# Patient Record
Sex: FEMALE | Race: BLACK OR AFRICAN AMERICAN | NOT HISPANIC OR LATINO | Employment: OTHER | URBAN - METROPOLITAN AREA
[De-identification: names, ages, dates, MRNs, and addresses within clinical notes are randomized per-mention and may not be internally consistent; named-entity substitution may affect disease eponyms.]

---

## 2017-04-07 RX ORDER — CHOLECALCIFEROL (VITAMIN D3) 25 MCG
185 TABLET ORAL DAILY
COMMUNITY
End: 2022-11-16

## 2017-04-07 RX ORDER — MONTELUKAST SODIUM 10 MG/1
10 TABLET ORAL NIGHTLY
COMMUNITY
End: 2017-04-19

## 2017-04-07 RX ORDER — ESOMEPRAZOLE MAGNESIUM 40 MG/1
40 CAPSULE, DELAYED RELEASE ORAL
COMMUNITY
End: 2017-04-19

## 2017-04-07 RX ORDER — GABAPENTIN 100 MG/1
300 CAPSULE ORAL NIGHTLY
COMMUNITY
End: 2018-01-12 | Stop reason: CLARIF

## 2017-04-07 RX ORDER — NITROGLYCERIN 0.4 MG/1
0.4 TABLET SUBLINGUAL EVERY 5 MIN PRN
COMMUNITY
End: 2017-12-20 | Stop reason: SDUPTHER

## 2017-04-07 RX ORDER — ROSUVASTATIN CALCIUM 20 MG/1
20 TABLET, COATED ORAL NIGHTLY
COMMUNITY
End: 2017-09-27 | Stop reason: DRUGHIGH

## 2017-04-07 RX ORDER — NAPROXEN 500 MG/1
500 TABLET ORAL 2 TIMES DAILY WITH MEALS
COMMUNITY
End: 2017-04-19

## 2017-04-07 RX ORDER — FUROSEMIDE 20 MG/1
20 TABLET ORAL DAILY PRN
COMMUNITY
End: 2021-07-12 | Stop reason: SDUPTHER

## 2017-04-07 RX ORDER — LORAZEPAM 1 MG/1
1 TABLET ORAL EVERY 6 HOURS PRN
COMMUNITY
End: 2017-04-19

## 2017-04-07 RX ORDER — AMOXICILLIN 500 MG
2 CAPSULE ORAL DAILY
COMMUNITY

## 2017-04-07 RX ORDER — FLUOXETINE HYDROCHLORIDE 20 MG/1
20 CAPSULE ORAL DAILY
COMMUNITY
End: 2018-01-12 | Stop reason: CLARIF

## 2017-04-07 RX ORDER — VALSARTAN 160 MG/1
80 TABLET ORAL 2 TIMES DAILY
COMMUNITY
End: 2018-06-27 | Stop reason: DRUGHIGH

## 2017-04-07 RX ORDER — METOPROLOL SUCCINATE 25 MG/1
25 TABLET, EXTENDED RELEASE ORAL 2 TIMES DAILY
COMMUNITY
End: 2017-04-19

## 2017-04-07 RX ORDER — ASPIRIN 81 MG/1
81 TABLET ORAL DAILY
COMMUNITY

## 2017-04-07 RX ORDER — POTASSIUM CHLORIDE 750 MG/1
10 TABLET, EXTENDED RELEASE ORAL DAILY
COMMUNITY
End: 2017-06-28 | Stop reason: SDUPTHER

## 2017-04-11 ENCOUNTER — TELEPHONE (OUTPATIENT)
Dept: CARDIOLOGY | Facility: CLINIC | Age: 77
End: 2017-04-11

## 2017-04-11 NOTE — TELEPHONE ENCOUNTER
----- Message from Gabriella Ceja sent at 4/10/2017  4:17 PM CDT -----  Patient is returning office call concerning her appointment on 4/19/17. Please call back at [phone].

## 2017-04-18 PROBLEM — I35.9 AORTIC VALVE DISORDER: Status: ACTIVE | Noted: 2017-04-18

## 2017-04-18 PROBLEM — E78.2 MIXED HYPERLIPIDEMIA: Status: ACTIVE | Noted: 2017-04-18

## 2017-04-18 PROBLEM — I10 ESSENTIAL HYPERTENSION: Status: ACTIVE | Noted: 2017-04-18

## 2017-04-18 PROBLEM — Z95.3 S/P AORTIC VALVE REPLACEMENT WITH BIOPROSTHETIC VALVE: Status: ACTIVE | Noted: 2017-04-18

## 2017-04-18 PROBLEM — I05.9 MITRAL VALVE DISORDER: Status: ACTIVE | Noted: 2017-04-18

## 2017-04-18 PROBLEM — I25.10 CORONARY ARTERY DISEASE INVOLVING NATIVE CORONARY ARTERY OF NATIVE HEART: Status: ACTIVE | Noted: 2017-04-18

## 2017-04-19 ENCOUNTER — OFFICE VISIT (OUTPATIENT)
Dept: CARDIOLOGY | Facility: CLINIC | Age: 77
End: 2017-04-19
Payer: MEDICARE

## 2017-04-19 VITALS
DIASTOLIC BLOOD PRESSURE: 84 MMHG | SYSTOLIC BLOOD PRESSURE: 134 MMHG | HEART RATE: 79 BPM | HEIGHT: 65 IN | BODY MASS INDEX: 33.72 KG/M2 | WEIGHT: 202.38 LBS

## 2017-04-19 DIAGNOSIS — E66.9 OBESITY (BMI 30.0-34.9): ICD-10-CM

## 2017-04-19 DIAGNOSIS — I35.9 AORTIC VALVE DISORDER: ICD-10-CM

## 2017-04-19 DIAGNOSIS — I25.10 CORONARY ARTERY DISEASE INVOLVING NATIVE CORONARY ARTERY OF NATIVE HEART WITHOUT ANGINA PECTORIS: Primary | ICD-10-CM

## 2017-04-19 DIAGNOSIS — I10 ESSENTIAL HYPERTENSION: ICD-10-CM

## 2017-04-19 DIAGNOSIS — E78.2 MIXED HYPERLIPIDEMIA: ICD-10-CM

## 2017-04-19 PROBLEM — E66.811 OBESITY (BMI 30.0-34.9): Status: ACTIVE | Noted: 2017-04-19

## 2017-04-19 PROCEDURE — 99214 OFFICE O/P EST MOD 30 MIN: CPT | Mod: S$GLB,,, | Performed by: INTERNAL MEDICINE

## 2017-04-19 PROCEDURE — 1159F MED LIST DOCD IN RCRD: CPT | Mod: S$GLB,,, | Performed by: INTERNAL MEDICINE

## 2017-04-19 PROCEDURE — 1160F RVW MEDS BY RX/DR IN RCRD: CPT | Mod: S$GLB,,, | Performed by: INTERNAL MEDICINE

## 2017-04-19 PROCEDURE — 1126F AMNT PAIN NOTED NONE PRSNT: CPT | Mod: S$GLB,,, | Performed by: INTERNAL MEDICINE

## 2017-04-19 PROCEDURE — 3079F DIAST BP 80-89 MM HG: CPT | Mod: S$GLB,,, | Performed by: INTERNAL MEDICINE

## 2017-04-19 PROCEDURE — 3075F SYST BP GE 130 - 139MM HG: CPT | Mod: S$GLB,,, | Performed by: INTERNAL MEDICINE

## 2017-04-19 RX ORDER — METOPROLOL SUCCINATE 25 MG/1
25 TABLET, EXTENDED RELEASE ORAL DAILY
Qty: 90 TABLET | Refills: 1 | Status: SHIPPED | OUTPATIENT
Start: 2017-04-19 | End: 2017-10-10 | Stop reason: SDUPTHER

## 2017-04-19 RX ORDER — METOPROLOL TARTRATE 25 MG/1
12.5 TABLET, FILM COATED ORAL 2 TIMES DAILY
COMMUNITY
End: 2017-04-19 | Stop reason: ALTCHOICE

## 2017-04-19 RX ORDER — FAMOTIDINE 10 MG/1
10 TABLET ORAL DAILY
COMMUNITY
End: 2019-05-01

## 2017-04-19 RX ORDER — UBIDECARENONE 30 MG
30 CAPSULE ORAL DAILY
COMMUNITY
Start: 2017-03-01

## 2017-04-19 NOTE — PATIENT INSTRUCTIONS
High Cholesterol: Assessing Your Risk    Have you been told that your cholesterol is too high? If so, you could be heading for a heart attack, also known as acute myocardial infarction (AMI), or stroke. This is especially true if you have other risk factors for heart disease. Get smart about cholesterol and your heart disease risk. This sheet can help you understand your heart disease risk and how your cholesterol level affects it. Talk to your healthcare provider about how to get started controlling your cholesterol.  Why is high cholesterol a problem?  Blood cholesterol is a fatty substance. The body uses it to make membranes in cells and for hormone production. It travels through the bloodstream and is used by the tissues for normal function. When blood cholesterol is high, it forms plaque and causes inflammation. The plaque builds up in the walls of arteries (blood vessels that carry blood from the heart to the body). This narrows the opening for blood flow. Over time, the heart may not get enough oxygen. This can lead to coronary artery disease, heart attack, or stroke.  3 steps to assessing your risk  Step 1. Find your risk factors for heart disease and stroke  How your cholesterol numbers affect your heart health depends on other risk factors for heart attack and stroke. Check off each risk factor below that applies to you:  · Are you a man 45 years old or older or a woman 55 years old or older?  · Does your family have a history of heart problems before the age of 55 in male relatives or age 65 in female relatives? This includes heart attack, coronary heart disease, or atherosclerosis.  · Do you have high blood pressure? Do you take medicine to treat high blood pressure?  · Do you smoke?  · Do you have diabetes?  · Do you exercise very little or not very often? Recommendations are for 30 minutes of exercise at least 5 days a week. If you are not doing cardiovascular exercise as often as these  recommendations, it may not be enough and you may be at higher risk for elevated cholesterol and heart disease.  · Do you eat a diet that is high in saturated or trans fats, cholesterol, sugar, or alcohol? You may be at increased risk for heart disease if you do not eat enough fruits, vegetables, lean meats and eat sugars or drink alcohol sparingly.  · Have you been told you have high cholesterol? Do you take medicine to control your cholesterol?  Step 2. Test your cholesterol  Have your cholesterol tested every 5 years after the age of 20 and more often if you have risk factors. Cholesterol testing most often needs no preparation. Sometimes you may be asked to fast (not eat) before your test. A blood sample is taken and sent to a lab. There, the amount of cholesterol and triglyceride in your blood is measured. There are 2 types of cholesterol in the sample. The first is HDL (good cholesterol). The second is LDL (bad cholesterol). Cholesterol test results are most often shown as the total of HDL and LDL cholesterol numbers. You may also be told the separate HDL and LDL cholesterol results.  Fill in your numbers below.  HDL cholesterol:                           LDL cholesterol:                         Total cholesterol:                         Triglyceride:                           Step 3. Discuss the results with your healthcare provider   If your cholesterol levels are higher than normal, your healthcare provider will help you with steps to take to lower your levels. Steps may include lifestyle changes like diet, physical activity, and quitting smoking, and medicine to lower bad cholesterol levels.  If you have high cholesterol, you may need your cholesterol level tested more often to make sure your medicine and lifestyle changes are working to reduce your risks of having a heart attack or stroke.   Date Last Reviewed: 6/1/2016  © 5218-8660 [employer]. [address] [place] [address], [place] [address].  All rights reserved. This information is not intended as a substitute for professional medical care. Always follow your healthcare professional's instructions.        Understanding Coronary Artery Disease (CAD)    To understand coronary artery disease (CAD), you need to know how your heart works. Your heart is a muscle that pumps blood throughout your body. To work right, your heart needs a steady supply of oxygen. It gets this oxygen from blood supplied by the coronary arteries.        Healthy Artery      Damaged Artery      Narrowed Artery      Blocked Artery   Healthy artery. When a coronary artery is healthy and has no blockages, blood flows through easily. Healthy arteries can easily supply the oxygen-rich blood your heart needs.  Damaged artery. Coronary artery disease begins when damage to the artery lining leads to the buildup of fat-like substances and cholesterol along the artery wall. This is called plaque. This damage could be caused by things like high blood pressure or smoking. This plaque buildup begins to narrow the arteries carrying blood to the heart. This is called atherosclerosis,  Narrowed artery. As more plaque builds up, your artery has trouble supplying blood to your heart muscle when it needs it most, such as during exercise. You may not feel any symptoms when this happens. Or you may feel angina--pressure, tightness, achiness, or pain in your chest, jaw, neck, back, or arm.  Blocked artery. A piece of plaque may break off and completely block the artery. Or a blood clot may plug the narrowed artery. When this happens, blood flow is blocked from reaching the heart. Without oxygen-rich blood, part of the heart muscle becomes damaged and stops working. You may feel crushing pressure or pain in or around your chest. This is a heart attack (acute myocardial infarction, or AMI) and is a medical emergency.  Date Last Reviewed: 3/28/2016  © 8754-7643 [employer]. [address] [place] [address]  Road, [place] [address]. All rights reserved. This information is not intended as a substitute for professional medical care. Always follow your healthcare professional's instructions.        Exercise for a Healthier Heart  You may wonder how you can improve the health of your heart. If youre thinking about exercise, youre on the right track. You dont need to become an athlete, but you do need a certain amount of brisk exercise to help strengthen your heart. If you have been diagnosed with a heart condition, your doctor may recommend exercise to help stabilize your condition. To help make exercise a habit, choose safe, fun activities.     Exercise with a friend. When activity is fun, you're more likely to stick with it.     Be sure to check with your healthcare provider before starting an exercise program.   Why exercise?  Exercising regularly offers many healthy rewards. It can help you do all of the following:  · Improve your blood cholesterol level to help prevent further heart trouble  · Lower your blood pressure to help prevent a stroke or heart attack  · Control diabetes, or reduce your risk of getting this disease  · Improve your heart and lung function  · Reach and maintain a healthy weight  · Make your muscles stronger and more limber so you can stay active  · Prevent falls and fractures by slowing the loss of bone mass (osteoporosis)  · Manage stress better  · Reduce your blood pressure  · Improve your sense of self and your body image  Exercise tips  Ease into your routine. Set small goals. Then build on them.  Exercise on most days. Aim for a total of 150 or more minutes of moderate to  vigorous intensity activity each week. Consider 40 minutes, 3 to 4 times a week. For best results, activity should last for 40 minutes on average. It is OK to work up to the 40 minute period over time. Examples of moderate-intensity activity is walking 1 mile in 15 minutes or 30 to 45 minutes of yard work.  Step up  your daily activity level. Along with your exercise program, try being more active throughout the day. Walk instead of drive. Do more household tasks or yard work.  Choose one or more activities you enjoy. Walking is one of the easiest things you can do. You can also try swimming, riding a bike, dancing, or taking an exercise class.  Stop exercising and call your doctor if you:  · Have chest pain or feel dizzy or lightheaded  · Feel burning, tightness, pressure, or heaviness in your chest, neck, shoulders, back, or arms  · Have unusual shortness of breath  · Have increased joint or muscle pain  · Have palpitations or an irregular heartbeat   Date Last Reviewed: 5/1/2016  © 9565-6790 [employer]. [address] [place] [address], [place] [address]. All rights reserved. This information is not intended as a substitute for professional medical care. Always follow your healthcare professional's instructions.        Established High Blood Pressure    High blood pressure (hypertension) is a chronic disease. Often health care providers dont know what causes it. But it can be caused by certain health conditions and medicines.  If you have high blood pressure, you may not have any symptoms. If you do have symptoms, they may include headache, dizziness, changes in your vision, chest pain, and shortness of breath. But even without symptoms, high blood pressure thats not treated raises your risk for heart attack and stroke. High blood pressure is a serious health risk and shouldnt be ignored.  A blood pressure reading is made up of two numbers: a higher number over a lower number. The top number is the systolic pressure. The bottom number is the diastolic pressure. A normal blood pressure is less than 120 over less than 80.  High blood pressure is when either the top number is 140 or higher, or the bottom number is 90 or higher. This must be the result when taking your blood pressure a number of times. The blood  pressures between normal and high are called prehypertension.  Home care  If you have high blood pressure, you should do what is listed below to lower your blood pressure. If you are taking medicines for high blood pressure, these methods may reduce or end your need for medicines in the future.  · Begin a weight-loss program if you are overweight.  · Cut back on how much salt you get in your diet. Heres how to do this:  ¨ Dont eat foods that have a lot of salt. These include olives, pickles, smoked meats, and salted potato chips.  ¨ Dont add salt to your food at the table.  ¨ Use only small amounts of salt when cooking.  · Begin an exercise program. Talk with your health care provider about the type of exercise program that would be best for you. It doesn't have to be hard. Even brisk walking for 20 minutes 3 times a week is a good form of exercise.  · Dont take medicines that have heart stimulants. This includes many cold and sinus decongestant pills and sprays, as well as diet pills. Check the warnings about hypertension on the label. Stimulants such as amphetamine or cocaine could be lethal for someone with high blood pressure. Never take these.  · Limit how much caffeine you get in your diet. Switch to caffeine-free products.  · Stop smoking. If you are a long-time smoker, this can be hard. Enroll in a stop-smoking program to make it more likely that you will quit for good.  · Learn how to handle stress. This is an important part of any program to lower blood pressure. Learn about relaxation methods like meditation, yoga, or biofeedback.  · If your provider prescribed medicines, take them exactly as directed. Missing doses may cause your blood pressure get out of control.  · Consider buying an automatic blood pressure machine. You can get one of these at most pharmacies. Use this to watch your blood pressure at home. Give the results to your provider.  Follow-up care  You will need to make regular visits to  your health care provider. This is to check your blood pressure and to make changes to your medicines. Make a follow-up appointment as directed.  When to seek medical advice  Call your health care provider right away if any of these occur:  · Chest pain or shortness of breath  · Severe headache  · Throbbing or rushing sound in the ears  · Nosebleed  · Sudden severe pain in your belly (abdomen)  · Extreme drowsiness, confusion, or fainting  · Dizziness or dizziness with a spinning sensation (vertigo)  · Weakness of an arm or leg or one side of the face  · You have problems speaking or seeing   Date Last Reviewed: 11/25/2014 © 2000-2016 [employer]. [address] [place] [address], [place] [address]. All rights reserved. This information is not intended as a substitute for professional medical care. Always follow your healthcare professional's instructions.        Heart Valve Problems  Your hearts job is to pump blood through your body. That job starts with pumping blood through the heart itself. Inside your heart, blood passes through a series of one-way garnica called valves. If a valve works poorly, not enough blood moves forward. A problem heart valve may not open wide enough, not close tightly enough, or both. In any case, not enough blood is sent to the heart muscle or out to the body.  Symptoms of heart valve problems  You can have a problem valve for decades yet have no symptoms. If you do have symptoms, they may come on so slowly that you barely notice them. In other cases, though, symptoms appear suddenly. You might have one or more of these symptoms:  · Problems breathing when you lie down, exert yourself, or get stressed emotionally  · Pain, pressure, tightness, or numbness in your chest, neck, back, or arms (angina)  · Feeling dizzy, faint, or lightheaded  · Tiredness, especially with activity or as the day goes on  · Waking up at night coughing or short of breath  · A fast, pounding, or irregular  heartbeat  · A fluttering feeling in your chest  · Swollen ankles or feet  · Fainting, especially upon standing up or with exertion  Problems opening (stenosis)    When a valve doesnt open all the way, the problem is called stenosis. The leaflets may be stuck together or too stiff to open fully. When the valve doesnt open fully, blood has to flow through a smaller opening. So the heart muscle has to work harder to push the blood through the valve.  Problems closing (regurgitation)    When a valve doesnt close tightly enough and blood leaks backward through the valve, the problem is called regurgitation or insufficiency. The valve itself may be described as leaky. Leaflets may fit together poorly. Or the structures that support them may be torn. Some blood leaks through the valve back into the chamber it just left. So the heart has to move that blood twice. This can result in heart muscle damage.  Common causes of valve problems  People of any age can have heart valve trouble. You may have been born with a problem valve. Or a valve may have worn out as youve aged. It may not be possible to pinpoint what caused your valve problem. But common causes include:  · Buildup of calcium or scar tissue on a valve  · Rheumatic fever and certain other infections and diseases  · High blood pressure  · Other heart problems, such as coronary artery disease  · Congenital defects of the heart valves      Date Last Reviewed: 6/1/2016 © 2000-2016 [employer]. [address] [place] [id number]. All rights reserved. This information is not intended as a substitute for professional medical care. Always follow your healthcare professional's instructions.        Weight Management: Exercise and Activity  Studies show that people who exercise are the most likely to lose weight and keep it off. Exercise burns calories. It helps build muscle to make your body stronger. Make exercise an important part of your  weight-management plan.    Make activity part of your day  You may not think you have the time to exercise. But you can work activity into your daily life--you just need to be committed. Take 10 minutes out of your lunch hour to take a walk. Walk to the [employer] to get your paper instead of having it delivered. Make it a habit to take the stairs instead of the elevator. Park in a far away parking spot instead of the closest. Youll be surprised at how fast these little changes can make a difference.  Some people really cannot walk very far, and tire out quickly with exercise. Instead of becoming discouraged, resolve to do what you can do, and work to make that a regular frequent habit.   The benefits of exercise  Exercise offers many benefits including:   · Exercise increases your metabolism (the speed at which your body burns calories).  · Regular exercise can increase the amount of muscle in your body. Muscle burns calories faster than fat. The more muscle you have, the more calories you burn.  · Exercise gives you energy and curbs your appetite.  · Exercise decreases stress and helps you sleep better.  Make exercise fun  Exercise can be fun. Choose an activity you enjoy. You may even get a friend to do it with you:  · Take a resistance-training or aerobics class  · Join a team sport  · Take a dance class  · Walk the dog  · Ride a bike  If you have health problems, be sure to ask your healthcare provider before you start an exercise program. Have a  help you develop a plan thats safe for you.   Date Last Reviewed: 2/4/2016  © 9458-9240 [employer]. [address] [place] [address], [place] [address]. All rights reserved. This information is not intended as a substitute for professional medical care. Always follow your healthcare professional's instructions.

## 2017-04-19 NOTE — MR AVS SNAPSHOT
[facility]  [address] [place] [id number]  Phone: [phone]                  Yael Claire   2017 11:40 AM   Office Visit    Description:  Female : 1940   Provider:  Eb Ruelas MD   Department:  [facility]           Reason for Visit     Coronary Artery Disease     Valvular Heart Disease     Hypertension     Hyperlipidemia           Diagnoses this Visit        Comments    Coronary artery disease involving native coronary artery of native heart without angina pectoris    -  Primary STABLE    Aortic valve disorder     MONITOR , SOME NARROWING    Essential hypertension     STABLE    Mixed hyperlipidemia     ON MEDS, DIET    Obesity (BMI 30.0-34.9)     DIET           To Do List           Goals (5 Years of Data)     None       These Medications        Disp Refills Start End    metoprolol succinate (TOPROL-XL) 25 MG 24 hr tablet 90 tablet 1 2017    Take 1 tablet (25 mg total) by mouth once daily. - Oral    Pharmacy: [facility] #[id number] - [address]  #: [phone]         [facility]     [facility] -  Assistance  Unless otherwise directed by your provider, please contact [facility], our nurse care line that is available for  assistance.     Registered nurses in the [facility] provide: appointment scheduling, clinical advisement, health education, and other advisory services.  Call: [phone] (toll free)               Medications           Message regarding Medications     Verify the changes and/or additions to your medication regime listed below are the same as discussed with your clinician today.  If any of these changes or additions are incorrect, please notify your healthcare provider.        START taking these NEW medications        Refills    metoprolol succinate (TOPROL-XL) 25 MG 24 hr tablet 1    Sig: Take 1 tablet (25 mg total) by mouth once daily.     Class: Normal    Route: Oral      STOP taking these medications     montelukast (SINGULAIR) 10 mg tablet Take 10 mg by mouth every evening.    naproxen (NAPROSYN) 500 MG tablet Take 500 mg by mouth 2 (two) times daily with meals.    lorazepam (ATIVAN) 1 MG tablet Take 1 mg by mouth every 6 (six) hours as needed for Anxiety.    esomeprazole (NEXIUM) 40 MG capsule Take 40 mg by mouth before breakfast.    metoprolol tartrate (LOPRESSOR) 25 MG tablet Take 12.5 mg by mouth 2 (two) times daily.           Verify that the below list of medications is an accurate representation of the medications you are currently taking.  If none reported, the list may be blank. If incorrect, please contact your healthcare provider. Carry this list with you in case of emergency.           Current Medications     aspirin (ECOTRIN) 81 MG EC tablet Take 81 mg by mouth once daily.    co-enzyme Q-10 30 mg capsule Take 30 mg by mouth once daily.     famotidine (PEPCID) 10 MG tablet Take 10 mg by mouth once daily.    fish oil-omega-3 fatty acids 300-1,000 mg capsule Take 2 g by mouth once daily.    FOLIC ACID/MULTIVIT-MIN/LUTEIN (CENTRUM SILVER ORAL) Take by mouth once daily.    furosemide (LASIX) 20 MG tablet Take 20 mg by mouth once daily. As needed    gabapentin (NEURONTIN) 100 MG capsule Take 300 mg by mouth every evening.     nitroGLYCERIN (NITROSTAT) 0.4 MG SL tablet Place 0.4 mg under the tongue every 5 (five) minutes as needed for Chest pain.    potassium chloride (KLOR-CON) 10 MEQ TbSR Take 10 mEq by mouth once daily.    rosuvastatin (CRESTOR) 40 MG Tab Take 10 mg by mouth every evening.    valsartan (DIOVAN) 160 MG tablet Take 160 mg by mouth once daily.    vitamin D 1000 units Tab Take 185 mg by mouth once daily.    fluoxetine (PROZAC) 20 MG capsule Take 20 mg by mouth once daily.    metoprolol succinate (TOPROL-XL) 25 MG 24 hr tablet Take 1 tablet (25 mg total) by mouth once daily.           Clinical Reference Information          "  Your Vitals Were     BP Pulse Height Weight BMI    134/84 79 5' 5" (1.651 m) 91.8 kg (202 lb 6.4 oz) 33.68 kg/m2      Blood Pressure          Most Recent Value    BP  134/84      Allergies as of 4/19/2017     Darvocet A500 [Propoxyphene N-acetaminophen]    Pcn [Penicillins]      Immunizations Administered on Date of Encounter - 4/19/2017     None      Orders Placed During Today's Visit     Future Labs/Procedures Expected by Expires    Comprehensive metabolic panel  4/19/2017 6/18/2018      [facility] Sign-Up     Activating your [facility] account is as easy as 1-2-3!     1) Visit [email or web address], select Sign Up Now, enter this activation code and your date of birth, then select Next.  [email or web address]  Expires: 6/3/2017 12:11 PM      2) Create a username and password to use when you visit [facility] in the future and select a security question in case you lose your password and select Next.    3) Enter your e-mail address and click Sign Up!    Additional Information  If you have questions, please e-mail [email or web address] or call [phone] to talk to our [facility] staff. Remember, [facility] is NOT to be used for urgent needs. For medical emergencies, dial 911.         Instructions      High Cholesterol: Assessing Your Risk    Have you been told that your cholesterol is too high? If so, you could be heading for a heart attack, also known as acute myocardial infarction (AMI), or stroke. This is especially true if you have other risk factors for heart disease. Get smart about cholesterol and your heart disease risk. This sheet can help you understand your heart disease risk and how your cholesterol level affects it. Talk to your healthcare provider about how to get started controlling your cholesterol.  Why is high cholesterol a problem?  Blood cholesterol is a fatty substance. The body uses it to make membranes in cells and for hormone production. It travels through the bloodstream and is used by the tissues for " normal function. When blood cholesterol is high, it forms plaque and causes inflammation. The plaque builds up in the walls of arteries (blood vessels that carry blood from the heart to the body). This narrows the opening for blood flow. Over time, the heart may not get enough oxygen. This can lead to coronary artery disease, heart attack, or stroke.  3 steps to assessing your risk  Step 1. Find your risk factors for heart disease and stroke  How your cholesterol numbers affect your heart health depends on other risk factors for heart attack and stroke. Check off each risk factor below that applies to you:  · Are you a man 45 years old or older or a woman 55 years old or older?  · Does your family have a history of heart problems before the age of 55 in male relatives or age 65 in female relatives? This includes heart attack, coronary heart disease, or atherosclerosis.  · Do you have high blood pressure? Do you take medicine to treat high blood pressure?  · Do you smoke?  · Do you have diabetes?  · Do you exercise very little or not very often? Recommendations are for 30 minutes of exercise at least 5 days a week. If you are not doing cardiovascular exercise as often as these recommendations, it may not be enough and you may be at higher risk for elevated cholesterol and heart disease.  · Do you eat a diet that is high in saturated or trans fats, cholesterol, sugar, or alcohol? You may be at increased risk for heart disease if you do not eat enough fruits, vegetables, lean meats and eat sugars or drink alcohol sparingly.  · Have you been told you have high cholesterol? Do you take medicine to control your cholesterol?  Step 2. Test your cholesterol  Have your cholesterol tested every 5 years after the age of 20 and more often if you have risk factors. Cholesterol testing most often needs no preparation. Sometimes you may be asked to fast (not eat) before your test. A blood sample is taken and sent to a lab. There,  the amount of cholesterol and triglyceride in your blood is measured. There are 2 types of cholesterol in the sample. The first is HDL (good cholesterol). The second is LDL (bad cholesterol). Cholesterol test results are most often shown as the total of HDL and LDL cholesterol numbers. You may also be told the separate HDL and LDL cholesterol results.  Fill in your numbers below.  HDL cholesterol:                           LDL cholesterol:                         Total cholesterol:                         Triglyceride:                           Step 3. Discuss the results with your healthcare provider   If your cholesterol levels are higher than normal, your healthcare provider will help you with steps to take to lower your levels. Steps may include lifestyle changes like diet, physical activity, and quitting smoking, and medicine to lower bad cholesterol levels.  If you have high cholesterol, you may need your cholesterol level tested more often to make sure your medicine and lifestyle changes are working to reduce your risks of having a heart attack or stroke.   Date Last Reviewed: 6/1/2016  © 5274-5623 [employer]. [address] [place] [address]. All rights reserved. This information is not intended as a substitute for professional medical care. Always follow your healthcare professional's instructions.        Understanding Coronary Artery Disease (CAD)    To understand coronary artery disease (CAD), you need to know how your heart works. Your heart is a muscle that pumps blood throughout your body. To work right, your heart needs a steady supply of oxygen. It gets this oxygen from blood supplied by the coronary arteries.        Healthy Artery      Damaged Artery      Narrowed Artery      Blocked Artery   Healthy artery. When a coronary artery is healthy and has no blockages, blood flows through easily. Healthy arteries can easily supply the oxygen-rich blood your heart  needs.  Damaged artery. Coronary artery disease begins when damage to the artery lining leads to the buildup of fat-like substances and cholesterol along the artery wall. This is called plaque. This damage could be caused by things like high blood pressure or smoking. This plaque buildup begins to narrow the arteries carrying blood to the heart. This is called atherosclerosis,  Narrowed artery. As more plaque builds up, your artery has trouble supplying blood to your heart muscle when it needs it most, such as during exercise. You may not feel any symptoms when this happens. Or you may feel angina--pressure, tightness, achiness, or pain in your chest, jaw, neck, back, or arm.  Blocked artery. A piece of plaque may break off and completely block the artery. Or a blood clot may plug the narrowed artery. When this happens, blood flow is blocked from reaching the heart. Without oxygen-rich blood, part of the heart muscle becomes damaged and stops working. You may feel crushing pressure or pain in or around your chest. This is a heart attack (acute myocardial infarction, or AMI) and is a medical emergency.  Date Last Reviewed: 3/28/2016  © 3552-4637 [employer]. [address] [place], [place] [id number]. All rights reserved. This information is not intended as a substitute for professional medical care. Always follow your healthcare professional's instructions.        Exercise for a Healthier Heart  You may wonder how you can improve the health of your heart. If youre thinking about exercise, youre on the right track. You dont need to become an athlete, but you do need a certain amount of brisk exercise to help strengthen your heart. If you have been diagnosed with a heart condition, your doctor may recommend exercise to help stabilize your condition. To help make exercise a habit, choose safe, fun activities.     Exercise with a friend. When activity is fun, you're more likely to stick with it.     Be  sure to check with your healthcare provider before starting an exercise program.   Why exercise?  Exercising regularly offers many healthy rewards. It can help you do all of the following:  · Improve your blood cholesterol level to help prevent further heart trouble  · Lower your blood pressure to help prevent a stroke or heart attack  · Control diabetes, or reduce your risk of getting this disease  · Improve your heart and lung function  · Reach and maintain a healthy weight  · Make your muscles stronger and more limber so you can stay active  · Prevent falls and fractures by slowing the loss of bone mass (osteoporosis)  · Manage stress better  · Reduce your blood pressure  · Improve your sense of self and your body image  Exercise tips  Ease into your routine. Set small goals. Then build on them.  Exercise on most days. Aim for a total of 150 or more minutes of moderate to  vigorous intensity activity each week. Consider 40 minutes, 3 to 4 times a week. For best results, activity should last for 40 minutes on average. It is OK to work up to the 40 minute period over time. Examples of moderate-intensity activity is walking 1 mile in 15 minutes or 30 to 45 minutes of yard work.  Step up your daily activity level. Along with your exercise program, try being more active throughout the day. Walk instead of drive. Do more household tasks or yard work.  Choose one or more activities you enjoy. Walking is one of the easiest things you can do. You can also try swimming, riding a bike, dancing, or taking an exercise class.  Stop exercising and call your doctor if you:  · Have chest pain or feel dizzy or lightheaded  · Feel burning, tightness, pressure, or heaviness in your chest, neck, shoulders, back, or arms  · Have unusual shortness of breath  · Have increased joint or muscle pain  · Have palpitations or an irregular heartbeat   Date Last Reviewed: 5/1/2016  © 2243-4180 [employer]. [address] [facility],  [place] [address]. All rights reserved. This information is not intended as a substitute for professional medical care. Always follow your healthcare professional's instructions.        Established High Blood Pressure    High blood pressure (hypertension) is a chronic disease. Often health care providers dont know what causes it. But it can be caused by certain health conditions and medicines.  If you have high blood pressure, you may not have any symptoms. If you do have symptoms, they may include headache, dizziness, changes in your vision, chest pain, and shortness of breath. But even without symptoms, high blood pressure thats not treated raises your risk for heart attack and stroke. High blood pressure is a serious health risk and shouldnt be ignored.  A blood pressure reading is made up of two numbers: a higher number over a lower number. The top number is the systolic pressure. The bottom number is the diastolic pressure. A normal blood pressure is less than 120 over less than 80.  High blood pressure is when either the top number is 140 or higher, or the bottom number is 90 or higher. This must be the result when taking your blood pressure a number of times. The blood pressures between normal and high are called prehypertension.  Home care  If you have high blood pressure, you should do what is listed below to lower your blood pressure. If you are taking medicines for high blood pressure, these methods may reduce or end your need for medicines in the future.  · Begin a weight-loss program if you are overweight.  · Cut back on how much salt you get in your diet. Heres how to do this:  ¨ Dont eat foods that have a lot of salt. These include olives, pickles, smoked meats, and salted potato chips.  ¨ Dont add salt to your food at the table.  ¨ Use only small amounts of salt when cooking.  · Begin an exercise program. Talk with your health care provider about the type of exercise program that would be best  for you. It doesn't have to be hard. Even brisk walking for 20 minutes 3 times a week is a good form of exercise.  · Dont take medicines that have heart stimulants. This includes many cold and sinus decongestant pills and sprays, as well as diet pills. Check the warnings about hypertension on the label. Stimulants such as amphetamine or cocaine could be lethal for someone with high blood pressure. Never take these.  · Limit how much caffeine you get in your diet. Switch to caffeine-free products.  · Stop smoking. If you are a long-time smoker, this can be hard. Enroll in a stop-smoking program to make it more likely that you will quit for good.  · Learn how to handle stress. This is an important part of any program to lower blood pressure. Learn about relaxation methods like meditation, yoga, or biofeedback.  · If your provider prescribed medicines, take them exactly as directed. Missing doses may cause your blood pressure get out of control.  · Consider buying an automatic blood pressure machine. You can get one of these at most pharmacies. Use this to watch your blood pressure at home. Give the results to your provider.  Follow-up care  You will need to make regular visits to your health care provider. This is to check your blood pressure and to make changes to your medicines. Make a follow-up appointment as directed.  When to seek medical advice  Call your health care provider right away if any of these occur:  · Chest pain or shortness of breath  · Severe headache  · Throbbing or rushing sound in the ears  · Nosebleed  · Sudden severe pain in your belly (abdomen)  · Extreme drowsiness, confusion, or fainting  · Dizziness or dizziness with a spinning sensation (vertigo)  · Weakness of an arm or leg or one side of the face  · You have problems speaking or seeing   Date Last Reviewed: 11/25/2014  © 6265-6249 [employer]. [address] [place] [address], [place] [address]. All rights reserved. This information  is not intended as a substitute for professional medical care. Always follow your healthcare professional's instructions.        Heart Valve Problems  Your hearts job is to pump blood through your body. That job starts with pumping blood through the heart itself. Inside your heart, blood passes through a series of one-way garnica called valves. If a valve works poorly, not enough blood moves forward. A problem heart valve may not open wide enough, not close tightly enough, or both. In any case, not enough blood is sent to the heart muscle or out to the body.  Symptoms of heart valve problems  You can have a problem valve for decades yet have no symptoms. If you do have symptoms, they may come on so slowly that you barely notice them. In other cases, though, symptoms appear suddenly. You might have one or more of these symptoms:  · Problems breathing when you lie down, exert yourself, or get stressed emotionally  · Pain, pressure, tightness, or numbness in your chest, neck, back, or arms (angina)  · Feeling dizzy, faint, or lightheaded  · Tiredness, especially with activity or as the day goes on  · Waking up at night coughing or short of breath  · A fast, pounding, or irregular heartbeat  · A fluttering feeling in your chest  · Swollen ankles or feet  · Fainting, especially upon standing up or with exertion  Problems opening (stenosis)    When a valve doesnt open all the way, the problem is called stenosis. The leaflets may be stuck together or too stiff to open fully. When the valve doesnt open fully, blood has to flow through a smaller opening. So the heart muscle has to work harder to push the blood through the valve.  Problems closing (regurgitation)    When a valve doesnt close tightly enough and blood leaks backward through the valve, the problem is called regurgitation or insufficiency. The valve itself may be described as leaky. Leaflets may fit together poorly. Or the structures that support them may be  torn. Some blood leaks through the valve back into the chamber it just left. So the heart has to move that blood twice. This can result in heart muscle damage.  Common causes of valve problems  People of any age can have heart valve trouble. You may have been born with a problem valve. Or a valve may have worn out as youve aged. It may not be possible to pinpoint what caused your valve problem. But common causes include:  · Buildup of calcium or scar tissue on a valve  · Rheumatic fever and certain other infections and diseases  · High blood pressure  · Other heart problems, such as coronary artery disease  · Congenital defects of the heart valves      Date Last Reviewed: 6/1/2016 © 2000-2016 [employer]. [address] [place] [address], [place] [address]. All rights reserved. This information is not intended as a substitute for professional medical care. Always follow your healthcare professional's instructions.        Weight Management: Exercise and Activity  Studies show that people who exercise are the most likely to lose weight and keep it off. Exercise burns calories. It helps build muscle to make your body stronger. Make exercise an important part of your weight-management plan.    Make activity part of your day  You may not think you have the time to exercise. But you can work activity into your daily life--you just need to be committed. Take 10 minutes out of your lunch hour to take a walk. Walk to the [employer] to get your paper instead of having it delivered. Make it a habit to take the stairs instead of the elevator. Park in a far away parking spot instead of the closest. Youll be surprised at how fast these little changes can make a difference.  Some people really cannot walk very far, and tire out quickly with exercise. Instead of becoming discouraged, resolve to do what you can do, and work to make that a regular frequent habit.   The benefits of exercise  Exercise offers many benefits  including:   · Exercise increases your metabolism (the speed at which your body burns calories).  · Regular exercise can increase the amount of muscle in your body. Muscle burns calories faster than fat. The more muscle you have, the more calories you burn.  · Exercise gives you energy and curbs your appetite.  · Exercise decreases stress and helps you sleep better.  Make exercise fun  Exercise can be fun. Choose an activity you enjoy. You may even get a friend to do it with you:  · Take a resistance-training or aerobics class  · Join a team sport  · Take a dance class  · Walk the dog  · Ride a bike  If you have health problems, be sure to ask your healthcare provider before you start an exercise program. Have a  help you develop a plan thats safe for you.   Date Last Reviewed: 2/4/2016 © 2000-2016 [employer]. [address] [place] [address]. All rights reserved. This information is not intended as a substitute for professional medical care. Always follow your healthcare professional's instructions.             Language Assistance Services     ATTENTION: Language assistance services are available, free of charge. Please call [phone].      ATENCIÓN: Si habla español, tiene a umaña disposición servicios gratuitos de asistencia lingüística. Llame al [phone].     RENE Ý: N?u b?n nói Ti?ng Vi?t, có các d?ch v? h? tr? ngôn ng? mi?n phí dành cho b?n. G?i s? [phone].         [facility] complies with applicable Federal civil rights laws and does not discriminate on the basis of race, color, national origin, age, disability, or sex.

## 2017-04-19 NOTE — PROGRESS NOTES
Subjective:    Patient ID:  Yael Claire is a 76 y.o. female who presents for Coronary Artery Disease; Valvular Heart Disease; Hypertension; and Hyperlipidemia      HPI  PT WAS FOLLOWED BY DR HOSKINS, LIMITED RECRDS AVIALABLE, ECHO, 8/2016, , MILD LVH, AVR, HILLARY 1.0-1.1  CM2, MILD AI, CA MV, PAP 35, EF 60%, CAROTIDS US 8/2016 MILD CAROTID DISEASE, NO LABS, PCP IS DR LONDON, TO HAVE LABS IN MAY, SEE ROS  Past Medical History:   Diagnosis Date    Acute coronary syndrome     LIGHT 2004    Aortic valve disorder     Coronary artery disease     Heart murmur     Hyperlipidemia     Hypertension     Palpitations     Stenosis of aortic and mitral valves     Valvular regurgitation      Past Surgical History:   Procedure Laterality Date    CARDIAC CATHETERIZATION      CARDIAC VALVE SURGERY      CORONARY ARTERY BYPASS GRAFT       History reviewed. No pertinent family history.  Social History     Social History    Marital status: Single     Spouse name: N/A    Number of children: N/A    Years of education: N/A     Social History Main Topics    Smoking status: Never Smoker    Smokeless tobacco: Never Used    Alcohol use No    Drug use: No    Sexual activity: Not Asked     Other Topics Concern    None     Social History Narrative       Review of patient's allergies indicates:   Allergen Reactions    Darvocet a500 [propoxyphene n-acetaminophen]     Pcn [penicillins]        Current Outpatient Prescriptions:     aspirin (ECOTRIN) 81 MG EC tablet, Take 81 mg by mouth once daily., Disp: , Rfl:     co-enzyme Q-10 30 mg capsule, Take 30 mg by mouth once daily. , Disp: , Rfl:     famotidine (PEPCID) 10 MG tablet, Take 10 mg by mouth once daily., Disp: , Rfl:     fish oil-omega-3 fatty acids 300-1,000 mg capsule, Take 2 g by mouth once daily., Disp: , Rfl:     FOLIC ACID/MULTIVIT-MIN/LUTEIN (CENTRUM SILVER ORAL), Take by mouth once daily., Disp: , Rfl:     furosemide (LASIX) 20 MG tablet, Take 20 mg by mouth once  daily. As needed, Disp: , Rfl:     gabapentin (NEURONTIN) 100 MG capsule, Take 300 mg by mouth every evening. , Disp: , Rfl:     nitroGLYCERIN (NITROSTAT) 0.4 MG SL tablet, Place 0.4 mg under the tongue every 5 (five) minutes as needed for Chest pain., Disp: , Rfl:     potassium chloride (KLOR-CON) 10 MEQ TbSR, Take 10 mEq by mouth once daily., Disp: , Rfl:     rosuvastatin (CRESTOR) 40 MG Tab, Take 10 mg by mouth every evening., Disp: , Rfl:     valsartan (DIOVAN) 160 MG tablet, Take 160 mg by mouth once daily., Disp: , Rfl:     vitamin D 1000 units Tab, Take 185 mg by mouth once daily., Disp: , Rfl:     fluoxetine (PROZAC) 20 MG capsule, Take 20 mg by mouth once daily., Disp: , Rfl:     metoprolol succinate (TOPROL-XL) 25 MG 24 hr tablet, Take 1 tablet (25 mg total) by mouth once daily., Disp: 90 tablet, Rfl: 1    Review of Systems   Constitution: Negative for chills, decreased appetite, diaphoresis, fever, weakness, malaise/fatigue, night sweats and weight gain.   HENT: Negative for congestion and nosebleeds.    Eyes: Positive for visual disturbance. Negative for blurred vision and discharge.   Cardiovascular: Positive for leg swelling (OCC,ANKLES). Negative for chest pain, claudication, cyanosis, irregular heartbeat, near-syncope, orthopnea, paroxysmal nocturnal dyspnea and syncope. Dyspnea on exertion: MINIMAL. Palpitations: OCC,WHEN MEDS WARES OFF.   Respiratory: Negative for hemoptysis, shortness of breath, sleep disturbances due to breathing, sputum production and wheezing. Cough: RECENT BRONCHITIS, BETTER.    Endocrine: Negative for cold intolerance, heat intolerance, polydipsia and polyphagia.   Hematologic/Lymphatic: Negative for adenopathy and bleeding problem. Does not bruise/bleed easily.   Skin: Negative for color change, itching and nail changes.   Musculoskeletal: Positive for arthritis and stiffness. Negative for back pain and falls.   Gastrointestinal: Negative for bloating, abdominal  "pain, change in bowel habit, constipation, dysphagia, flatus, heartburn, hematemesis, jaundice and melena.   Genitourinary: Positive for nocturia. Negative for dysuria, flank pain, frequency, hematuria, incomplete emptying and pelvic pain.   Neurological: Negative for brief paralysis, difficulty with concentration, dizziness, focal weakness, light-headedness, loss of balance, numbness, paresthesias, seizures, sensory change and tremors.   Psychiatric/Behavioral: Negative for altered mental status, memory loss and substance abuse. The patient does not have insomnia and is not nervous/anxious.    Allergic/Immunologic: Negative for persistent infections.        Objective:      Vitals:    04/19/17 1151   BP: 134/84   Pulse: 79   Weight: 91.8 kg (202 lb 6.4 oz)   Height: 5' 5" (1.651 m)   PainSc: 0-No pain     Body mass index is 33.68 kg/(m^2).    Physical Exam   Constitutional: She is oriented to person, place, and time. She appears well-developed and well-nourished.   OVER WEIGHT   HENT:   Head: Normocephalic and atraumatic.   Mouth/Throat: Oropharynx is clear and moist and mucous membranes are normal.   Eyes: Conjunctivae and EOM are normal. Pupils are equal, round, and reactive to light. Right eye exhibits normal extraocular motion. Left eye exhibits normal extraocular motion.   Neck: Trachea normal and normal range of motion. Neck supple. Normal carotid pulses, no hepatojugular reflux and no JVD present. Carotid bruit: MURMUR TO NECK. Edema (TRACE/R) present. No tracheal deviation and no erythema present. No thyromegaly present.   Cardiovascular: Normal rate and regular rhythm.   No extrasystoles are present. PMI is not displaced.  Exam reveals no gallop and no friction rub.    Murmur heard.   Harsh midsystolic murmur is present with a grade of 2/6  at the upper right sternal border radiating to the neck  Pulses:       Carotid pulses are 2+ on the right side, and 2+ on the left side.       Radial pulses are 2+ on " the right side, and 2+ on the left side.        Femoral pulses are 2+ on the right side, and 2+ on the left side.       Popliteal pulses are 2+ on the right side, and 2+ on the left side.        Dorsalis pedis pulses are 2+ on the right side, and 2+ on the left side.        Posterior tibial pulses are 2+ on the right side, and 2+ on the left side.   Pulmonary/Chest: Effort normal and breath sounds normal. No accessory muscle usage. No respiratory distress. She has no wheezes. She has no rales. She exhibits no tenderness.   LARGE STERNOTOMY SCAR   Abdominal: Soft. Bowel sounds are normal. She exhibits no distension and no mass. There is no hepatosplenomegaly. There is no tenderness. There is no guarding and no CVA tenderness.   Musculoskeletal: She exhibits no edema or tenderness.   Lymphadenopathy:     She has no cervical adenopathy.   Neurological: She is alert and oriented to person, place, and time. She displays no tremor. No cranial nerve deficit. She exhibits normal muscle tone. Coordination normal.   Skin: Skin is warm and dry. No rash noted. No cyanosis or erythema. No pallor.   Psychiatric: She has a normal mood and affect. Her speech is normal and behavior is normal. Judgment and thought content normal.               ..    Chemistry    No results found for: NA, K, CL, CO2, BUN, CREATININE, GLU No results found for: CALCIUM, ALKPHOS, AST, ALT, BILITOT         ..No results found for: CHOL  No results found for: HDL  No results found for: LDLCALC  No results found for: TRIG  No results found for: CHOLHDL  ..No results found for: WBC, HGB, HCT, MCV, PLT    Test(s) Reviewed  I have reviewed the following in detail:  [] Stress test   [] Angiography   [] Echocardiogram   [] Labs   [x] Other:       Assessment:         ICD-10-CM ICD-9-CM   1. Coronary artery disease involving native coronary artery of native heart without angina pectoris I25.10 414.01   2. Aortic valve disorder I35.9 424.1   3. Essential  hypertension I10 401.9   4. Mixed hyperlipidemia E78.2 272.2   5. Obesity (BMI 30.0-34.9) E66.9 278.00     Problem List Items Addressed This Visit        Cardiology Problems    Aortic valve disorder    Relevant Orders    Comprehensive metabolic panel    Essential hypertension    Relevant Orders    Comprehensive metabolic panel    Coronary artery disease involving native coronary artery of native heart - Primary    Relevant Orders    Comprehensive metabolic panel    Mixed hyperlipidemia    Relevant Orders    Comprehensive metabolic panel       Other    Obesity (BMI 30.0-34.9)    Relevant Orders    Comprehensive metabolic panel           Plan:     CHANGE METOPROLOL TO LONG ACTING /HR FLUCTUATION, ALL CV CLINICALLY STABLE, NO ANGINA, NO HF, NO TIA, NO CLINICAL ARRHYTHMIA,CONTINUE CURRENT MEDS, EDUCATION, DIET, EXERCISE, WEIGHT LOSS,MONITOR AV, WAS SLIGHTLY NARROWED,  GET ME COPY OF LABS WHEN DONE, RTC IN 6 MO WITH LABS      Coronary artery disease involving native coronary artery of native heart without angina pectoris  -     Comprehensive metabolic panel; Future; Expected date: 4/19/17    Aortic valve disorder  -     Comprehensive metabolic panel; Future; Expected date: 4/19/17    Essential hypertension  -     Comprehensive metabolic panel; Future; Expected date: 4/19/17    Mixed hyperlipidemia  -     Comprehensive metabolic panel; Future; Expected date: 4/19/17    Obesity (BMI 30.0-34.9)  -     Comprehensive metabolic panel; Future; Expected date: 4/19/17    Other orders  -     metoprolol succinate (TOPROL-XL) 25 MG 24 hr tablet; Take 1 tablet (25 mg total) by mouth once daily.  Dispense: 90 tablet; Refill: 1  RTC Low level/low impact aerobic exercise 5x's/wk. Heart healthy diet and risk factor modification.    See labs and med orders.    Aerobic exercise 5x's/wk. Heart healthy diet and risk factor modification.    See labs and med orders.

## 2017-06-28 ENCOUNTER — OFFICE VISIT (OUTPATIENT)
Dept: CARDIOLOGY | Facility: CLINIC | Age: 77
End: 2017-06-28
Payer: MEDICARE

## 2017-06-28 VITALS
HEART RATE: 81 BPM | BODY MASS INDEX: 33.66 KG/M2 | WEIGHT: 202 LBS | SYSTOLIC BLOOD PRESSURE: 102 MMHG | HEIGHT: 65 IN | DIASTOLIC BLOOD PRESSURE: 62 MMHG

## 2017-06-28 DIAGNOSIS — E78.2 MIXED HYPERLIPIDEMIA: ICD-10-CM

## 2017-06-28 DIAGNOSIS — R00.2 HEART PALPITATIONS: ICD-10-CM

## 2017-06-28 DIAGNOSIS — E66.9 OBESITY (BMI 30.0-34.9): ICD-10-CM

## 2017-06-28 DIAGNOSIS — I10 ESSENTIAL HYPERTENSION: Primary | ICD-10-CM

## 2017-06-28 PROCEDURE — 99212 OFFICE O/P EST SF 10 MIN: CPT | Mod: S$GLB,,, | Performed by: INTERNAL MEDICINE

## 2017-06-28 PROCEDURE — 1159F MED LIST DOCD IN RCRD: CPT | Mod: S$GLB,,, | Performed by: INTERNAL MEDICINE

## 2017-06-28 PROCEDURE — 1126F AMNT PAIN NOTED NONE PRSNT: CPT | Mod: S$GLB,,, | Performed by: INTERNAL MEDICINE

## 2017-06-28 RX ORDER — POTASSIUM CHLORIDE 750 MG/1
10 TABLET, EXTENDED RELEASE ORAL
Qty: 8 TABLET | Refills: 3 | Status: SHIPPED | OUTPATIENT
Start: 2017-06-29 | End: 2017-12-20 | Stop reason: SDUPTHER

## 2017-06-28 NOTE — PROGRESS NOTES
Subjective:    Patient ID:  Yael Claire is a 76 y.o. female who presents for Coronary Artery Disease; Hypertension; and Valvular Heart Disease      HPI  HEART FEELS BETTER WITH MEDS CHANGE, NO CP, NO SOB, SEE ROS  Past Medical History:   Diagnosis Date    Acute coronary syndrome     LIGHT 2004    Aortic valve disorder     Coronary artery disease     Heart murmur     Hyperlipidemia     Hypertension     Palpitations     Stenosis of aortic and mitral valves     Valvular regurgitation      Past Surgical History:   Procedure Laterality Date    CARDIAC CATHETERIZATION      CARDIAC VALVE SURGERY      CORONARY ARTERY BYPASS GRAFT       History reviewed. No pertinent family history.  Social History     Social History    Marital status: Single     Spouse name: N/A    Number of children: N/A    Years of education: N/A     Social History Main Topics    Smoking status: Never Smoker    Smokeless tobacco: Never Used    Alcohol use No    Drug use: No    Sexual activity: Not Asked     Other Topics Concern    None     Social History Narrative    None       Review of patient's allergies indicates:   Allergen Reactions    Darvocet a500 [propoxyphene n-acetaminophen]     Pcn [penicillins]        Current Outpatient Prescriptions:     aspirin (ECOTRIN) 81 MG EC tablet, Take 81 mg by mouth once daily., Disp: , Rfl:     co-enzyme Q-10 30 mg capsule, Take 30 mg by mouth once daily. , Disp: , Rfl:     famotidine (PEPCID) 10 MG tablet, Take 10 mg by mouth once daily., Disp: , Rfl:     fish oil-omega-3 fatty acids 300-1,000 mg capsule, Take 2 g by mouth once daily., Disp: , Rfl:     fluoxetine (PROZAC) 20 MG capsule, Take 20 mg by mouth once daily., Disp: , Rfl:     FOLIC ACID/MULTIVIT-MIN/LUTEIN (CENTRUM SILVER ORAL), Take by mouth once daily., Disp: , Rfl:     furosemide (LASIX) 20 MG tablet, Take 20 mg by mouth once daily. As needed, Disp: , Rfl:     gabapentin (NEURONTIN) 100 MG capsule, Take 300 mg by  mouth every evening. , Disp: , Rfl:     metoprolol succinate (TOPROL-XL) 25 MG 24 hr tablet, Take 1 tablet (25 mg total) by mouth once daily., Disp: 90 tablet, Rfl: 1    nitroGLYCERIN (NITROSTAT) 0.4 MG SL tablet, Place 0.4 mg under the tongue every 5 (five) minutes as needed for Chest pain., Disp: , Rfl:     [START ON 6/29/2017] potassium chloride (KLOR-CON) 10 MEQ TbSR, Take 1 tablet (10 mEq total) by mouth twice a week., Disp: 8 tablet, Rfl: 3    rosuvastatin (CRESTOR) 40 MG Tab, Take 10 mg by mouth every evening., Disp: , Rfl:     valsartan (DIOVAN) 160 MG tablet, Take 160 mg by mouth once daily., Disp: , Rfl:     vitamin D 1000 units Tab, Take 185 mg by mouth once daily., Disp: , Rfl:     Review of Systems   Constitution: Negative for chills, decreased appetite, diaphoresis, fever, weakness, malaise/fatigue, night sweats and weight gain.   HENT: Negative for congestion and nosebleeds.    Eyes: Negative for blurred vision and double vision.   Cardiovascular: Negative for chest pain, claudication, cyanosis, irregular heartbeat (BETTER), near-syncope, orthopnea, palpitations (BETTER), paroxysmal nocturnal dyspnea and syncope. Dyspnea on exertion: MINIMAL. Leg swelling: OCC,ANKLES.   Respiratory: Negative for cough, hemoptysis, shortness of breath, sleep disturbances due to breathing, sputum production and wheezing.    Endocrine: Negative for cold intolerance, heat intolerance, polydipsia and polyphagia.   Hematologic/Lymphatic: Negative for adenopathy and bleeding problem. Does not bruise/bleed easily.   Skin: Negative for color change, itching and nail changes.   Musculoskeletal: Positive for arthritis. Negative for back pain and falls.   Gastrointestinal: Negative for bloating, abdominal pain, change in bowel habit, constipation, dysphagia, flatus, heartburn, hematemesis, jaundice and melena.   Genitourinary: Positive for nocturia. Negative for dysuria, flank pain, frequency, hematuria, incomplete emptying  "and pelvic pain.   Neurological: Negative for brief paralysis, difficulty with concentration, dizziness, focal weakness, light-headedness, loss of balance, numbness, paresthesias, seizures, sensory change and tremors.   Psychiatric/Behavioral: Negative for altered mental status, memory loss and substance abuse. The patient does not have insomnia and is not nervous/anxious.    Allergic/Immunologic: Negative for persistent infections.        Objective:      Vitals:    06/28/17 0914   BP: 102/62   Pulse: 81   Weight: 91.6 kg (202 lb)   Height: 5' 5" (1.651 m)   PainSc: 0-No pain     Body mass index is 33.61 kg/m².    Physical Exam   Constitutional: She is oriented to person, place, and time. She appears well-developed and well-nourished.   OVER WEIGHT   HENT:   Head: Normocephalic and atraumatic.   Mouth/Throat: Oropharynx is clear and moist and mucous membranes are normal.   Eyes: Conjunctivae and EOM are normal. Pupils are equal, round, and reactive to light. Right eye exhibits normal extraocular motion. Left eye exhibits normal extraocular motion.   Neck: Trachea normal and normal range of motion. Neck supple. Normal carotid pulses, no hepatojugular reflux and no JVD present. Carotid bruit: MURMUR TO NECK. No tracheal deviation and no erythema present. Edema: TRACE/R. No thyromegaly present.   Cardiovascular: Normal rate and regular rhythm.   No extrasystoles are present. PMI is not displaced.  Exam reveals no gallop and no friction rub.    Murmur heard.   Harsh midsystolic murmur is present with a grade of 2/6  at the upper right sternal border radiating to the neck   Systolic murmur of grade 2/6 is also present at the upper right sternal border.  Pulses:       Carotid pulses are 2+ on the right side, and 2+ on the left side.       Radial pulses are 2+ on the right side, and 2+ on the left side.        Posterior tibial pulses are 2+ on the right side, and 2+ on the left side.   Pulmonary/Chest: Effort normal and " breath sounds normal. No accessory muscle usage. No respiratory distress. She has no wheezes. She has no rales. She exhibits no tenderness.   LARGE STERNOTOMY SCAR   Abdominal: Soft. Bowel sounds are normal. She exhibits no distension and no mass. There is no hepatosplenomegaly. There is no tenderness. There is no guarding and no CVA tenderness.   Musculoskeletal: She exhibits no edema or tenderness.   SLOW GAIT   Lymphadenopathy:     She has no cervical adenopathy.   Neurological: She is alert and oriented to person, place, and time. She displays no tremor. No cranial nerve deficit. She exhibits normal muscle tone. Coordination normal.   Skin: Skin is warm and dry. No rash noted. No cyanosis or erythema. No pallor.   Psychiatric: She has a normal mood and affect. Her speech is normal and behavior is normal. Judgment and thought content normal.               ..    Chemistry    No results found for: NA, K, CL, CO2, BUN, CREATININE, GLU No results found for: CALCIUM, ALKPHOS, AST, ALT, BILITOT         ..No results found for: CHOL  No results found for: HDL  No results found for: LDLCALC  No results found for: TRIG  No results found for: CHOLHDL  ..No results found for: WBC, HGB, HCT, MCV, PLT    Test(s) Reviewed  I have reviewed the following in detail:  [] Stress test   [] Angiography   [] Echocardiogram   [] Labs   [] Other:       Assessment:         ICD-10-CM ICD-9-CM   1. Essential hypertension I10 401.9   2. Heart palpitations R00.2 785.1   3. Obesity (BMI 30.0-34.9) E66.9 278.00   4. Mixed hyperlipidemia E78.2 272.2     Problem List Items Addressed This Visit        Cardiac    Essential hypertension - Primary    Relevant Orders    Comprehensive metabolic panel       Fluids/Electrolytes/Nutrition/GI    Mixed hyperlipidemia    Relevant Orders    Comprehensive metabolic panel    Lipid panel    Obesity (BMI 30.0-34.9)       Other    Heart palpitations      Other Visit Diagnoses    None.          Plan:     ALL CV  CLINICALLY STABLE, NO ANGINA, NO HF, NO TIA, NO CLINICAL ARRHYTHMIA,CONTINUE CURRENT MEDS, EDUCATION, DIET, EXERCISE, OK FOR TWICE/W K WITH LASIX, TO HAVE LABS PER PCP DR LONDON, RTC IN 4 MO WITH LABS      Essential hypertension  -     Comprehensive metabolic panel; Future; Expected date: 06/28/2017    Heart palpitations    Obesity (BMI 30.0-34.9)    Mixed hyperlipidemia  -     Comprehensive metabolic panel; Future; Expected date: 06/28/2017  -     Lipid panel; Future; Expected date: 06/28/2017    Other orders  -     potassium chloride (KLOR-CON) 10 MEQ TbSR; Take 1 tablet (10 mEq total) by mouth twice a week.  Dispense: 8 tablet; Refill: 3    RTC Low level/low impact aerobic exercise 5x's/wk. Heart healthy diet and risk factor modification.    See labs and med orders.    Aerobic exercise 5x's/wk. Heart healthy diet and risk factor modification.    See labs and med orders.

## 2017-06-28 NOTE — PATIENT INSTRUCTIONS
About Arrhythmias    Electrical impulses cause the normal heart to beat 60 to 100 times a minute while at rest. These impulses come from a natural pacemaker deep inside the heart muscle. Each impulse causes the heart muscle to contract. This causes the blood to flow through the heart and out to the tissues and organs of your body.  An arrhythmia is a change from the normal speed or pattern of these electrical impulses. This can cause the heart to beat too fast (tachycardia); or too slow (bradycardia); or in an unsteady pattern (irregular rhythm).  Symptoms of arrhythmias  Different people experience arrhythmias differently. Sometimes they may not have symptoms, but just notice a change in their pulse. Symptoms can include:  · Fluttering feeling in the chest  · Shortness of breath  · Chest pain or pressure  · Neck fullness  · Lightheadedness or dizziness  · Fainting or almost fainting  · Palpitations (the sense that your heart is fluttering or beating fast or hard or irregularly)  · Tiredness, fatigue, or weakness  · Cardiac arrest  Causes of arrhythmias  Arrhythmias are most often due to heart disease such as:  · Coronary artery disease  · Heart valve disease  · Enlarged heart  · High blood pressure  · Heart failure  Other causes of  arrhythmia include:  · Certain medicines (such as asthma inhalers and decongestants)  · Some herbal supplements  · Cardiac stimulant drugs (such as cocaine, amphetamine, diet pills, certain decongestant cold medicines, caffeine, and nicotine)  · Excessive alcohol use  · Anxiety and panic disorder  · Thyroid disease  · Anemia  · Diabetes  · Sleep apnea  · Obesity  · Congenital heart disease  · Cardiac genetic diseases  Arrhythmias can often be prevented. The cause and type of arrhythmia determines the best treatment. Sometimes your doctor may want to monitor your heart rate over a 24-hour period or longer. This can help identify the cause of your arrhythmia and find the best treatment.  This can be done with a Holter monitor, a portable EKG recording device attached by wires to your chest. Or you may get an event monitor, which you can place over the skin in front of your heart to record heart rhythms. You can carry this with you as you go about your routine activities during the monitoring period. Implantable loop recorders may also be used to monitor the heart rhythm for up to 2 years. This miniature device is placed underneath the skin overlying the heart.  Home care  The following guidelines will help you care for yourself at home:  · Avoid cardiac stimulants (such as cocaine, amphetamine, diet pills, certain decongestant cold medicines, caffeine, and nicotine).  · If you smoke, stop smoking. Contact your doctor or a local stop-smoking program for help.  · Tell your doctor about any prescription, over-the-counter, or herbal medicines you take. These may be affecting your heart rhythm.  Follow-up care  Follow up with your healthcare provider, or as advised. If a Holter monitor has been recommended, contact the cardiologist you have been referred to as soon as you can  the device. Other outpatient tests may also be arranged for you at that time.  Call 911  This is the fastest and safest way to get to the emergency department. The paramedics can also start treatment on the way to the hospital, if needed.  Don't wait until your symptoms are severe to call 911. Other reasons to call 911 besides chest pain include:  · Chest, shoulder, arm, neck, or back pain  · Shortness of breath  · Feeling lightheaded, faint, or dizzy  · Unexplained fainting  · Rapid heart beat  · Slower than usual heart rate compared to your normal  · Very irregular heartbeat  · Chest pain (angina) with weakness, dizziness, heavy sweating, nausea, or vomiting  · Extreme drowsiness, or confusion  · Weakness of an arm or leg or one side of the face  · Difficulty with speech or vision  When to seek medical advice  Remember,  "things are not always like they are on TV. Sometimes it is not so obvious. You may only feel weak or just "not right." If it is not clear or if you have any doubt, call for advice.  · Seek help for chest pain, or it feels different from usual, even if your symptoms are mild.  · Don't drive yourself. Have someone else drive. If no one can drive you, call 911.  · If your doctor has given you medicines to take when you have symptoms, take them, but do not delay getting help while trying to find them.  Date Last Reviewed: 4/25/2016 © 2000-2016 [employer]. [address] [place] [address], [place] [address]. All rights reserved. This information is not intended as a substitute for professional medical care. Always follow your healthcare professional's instructions.        Eating Heart-Healthy Foods  Eating has a big impact on your heart health. In fact, eating healthier can improve several of your heart risks at once. For instance, it helps you manage weight, cholesterol, and blood pressure. Here are ideas to help you make heart-healthy changes without giving up all the foods and flavors you love.  Getting started  · Talk with your health care provider about eating plans, such as the DASH or Mediterranean diet. You may also be referred to a dietitian.  · Change a few things at a time. Give yourself time to get used to a few eating changes before adding more.  · Work to create a tasty, healthy eating plan that you can stick to for the rest of your life.    Goals for healthy eating  Below are some tips to improve your eating habits:  · Limit saturated fats and trans fats. Saturated fats raise your levels of cholesterol, so keep these fats to a minimum. They are found in foods such as fatty meats, whole milk, cheese, and palm and coconut oils. Avoid trans fats because they lower good cholesterol as well as raise bad cholesterol. Trans fats are most often found in processed foods.  · Reduce sodium (salt) intake. Eating " too much salt may increase your blood pressure. Limit your sodium intake to 2,300 milligrams (mg) per day, or less if your health care provider recommends it. Dining out less often and eating fewer processed foods are two great ways to decrease the amount of salt you consume.  · Managing calories. A calorie is a unit of energy. Your body burns calories for fuel, but if you eat more calories than your body burns, the extras are stored as fat. Your health care provider can help you create a diet plan to manage your calories. This will likely include eating healthier foods as well as exercising regularly. To help you track your progress, keep a diary to record what you eat and how often you exercise.  Choose the right foods  Aim to make these foods staples of your diet. If you have diabetes, you may have different recommendations than what is listed here:  · Fruits and vegetable provide plenty of nutrients without a lot of calories. At meals, fill half your plate with these foods. Split the other half of your plate between whole grains and lean protein.  · Whole grains are high in fiber and rich in vitamins and nutrients. Good choices include whole-wheat bread, pasta, and brown rice.  · Lean proteins give you nutrition with less fat. Good choices include fish, skinless chicken, and beans.  · Low-fat or nonfat dairy provides nutrients without a lot of fat. Try low-fat or nonfat milk, cheese, or yogurt.  · Healthy fats can be good for you in small amounts. These are unsaturated fats, such as olive oil, nuts, and fish. Try to have at least 2 servings per week of fatty fish such as salmon, sardines, mackerel, rainbow trout, and albacore tuna. These contain omega-3 fatty acids, which are good for your heart. Flaxseed is another source of a heart-healthy fat.  More on heart healthy eating    Read food labels  Healthy eating starts at the grocery store. Be sure to pay attention to food labels on packaged foods. Look for  products that are high in fiber and protein, and low in saturated fat, cholesterol, and sodium. Avoid products that contain trans fat. And pay close attention to serving size. For instance, if you plan to eat two servings, double all the numbers on the label.  Prepare food right  A key part of healthy cooking is cutting down on added fat and salt. Look on the internet for lower-fat, lower-sodium recipes. Also, try these tips:  · Remove fat from meat and skin from poultry before cooking.  · Skim fat from the surface of soups and sauces.  · Broil, boil, bake, steam, grill, and microwave food without added fats.  · Choose ingredients that spice up your food without adding calories, fat, or sodium. Try these items: horseradish, hot sauce, lemon, mustard, nonfat salad dressings, and vinegar. For salt-free herbs and spices, try basil, cilantro, cinnamon, pepper, and rosemary.  Date Last Reviewed: 6/25/2015  © 0590-7573 [email or web address]. [address] [place] [address]. All rights reserved. This information is not intended as a substitute for professional medical care. Always follow your healthcare professional's instructions.        Controlling High Blood Pressure  High blood pressure (hypertension) is often called the silent killer. This is because many people who have it dont know it. High blood pressure is defined as 140/90 mm Hg or higher. Know your blood pressure and remember to check it regularly. Doing so can save your life. Here are some things you can do to help control your blood pressure.    Choose heart-healthy foods  · Select low-salt, low-fat foods. Limit sodium intake to 2,400 mg per day or the amount suggested by your healthcare provider.  · Limit canned, dried, cured, packaged, and fast foods. These can contain a lot of salt.  · Eat 8 to 10 servings of fruits and vegetables every day.  · Choose lean meats, fish, or chicken.  · Eat whole-grain pasta, brown rice, and beans.  · Eat 2 to 3  servings of low-fat or fat-free dairy products.  · Ask your doctor about the DASH eating plan. This plan helps reduce blood pressure.  · When you go to a restaurant, ask that your meal be prepared with no added salt.  Maintain a healthy weight  · Ask your healthcare provider how many calories to eat a day. Then stick to that number.  · Ask your healthcare provider what weight range is healthiest for you. If you are overweight, a weight loss of only 3% to 5% of your body weight can help lower blood pressure. Generally, a good weight loss goal is to lose 10% of your body weight in a year.  · Limit snacks and sweets.  · Get regular exercise.  Get up and get active  · Choose activities you enjoy. Find ones you can do with friends or family. This includes bicycling, dancing, walking, and jogging.  · Park farther away from building entrances.  · Use stairs instead of the elevator.  · When you can, walk or bike instead of driving.  · [place] leaves, garden, or do household repairs.  · Be active at a moderate to vigorous level of physical activity for at least 40 minutes for a minimum of 3 to 4 days a week.   Manage stress  · Make time to relax and enjoy life. Find time to laugh.  · Communicate your concerns with your loved ones and your healthcare provider.  · Visit with family and friends, and keep up with hobbies.  Limit alcohol and quit smoking  · Men should have no more than 2 drinks per day.  · Women should have no more than 1 drink per day.  · Talk with your healthcare provider about quitting smoking. Smoking significantly increases your risk for heart disease and stroke. Ask your healthcare provider about community smoking cessation programs and other options.  Medicines  If lifestyle changes arent enough, your healthcare provider may prescribe high blood pressure medicine. Take all medicines as prescribed. If you have any questions about your medicines, ask your healthcare provider before stopping or changing them.  "  Date Last Reviewed: 4/27/2016  © 9913-3541 [employer]. [address] [place] [address], [place] [address]. All rights reserved. This information is not intended as a substitute for professional medical care. Always follow your healthcare professional's instructions.        Heart Palpitations    Palpitations are the feeling that your heart is beating hard, fast, or irregular. Some describe it as "pounding" or "skipped beats." Palpitations may occur in someone with heart disease, but can also occur in a healthy person.  Heart-related causes:  · Arrhythmia (a change from the heart's normal rhythm)  · Heart valve disease  · Disease of the heart muscle  · Coronary artery disease  · High blood pressure  Non-heart-related causes:  · Certain medicines (such as asthma inhalers and decongestants)  · Some herbal supplements, energy drinks and pills, and weight loss pills  · Illegal stimulant drugs (such as cocaine, crank, methamphetamine, PCP, bath salts, ecstasy)  · Caffeine, alcohol, and tobacco  · Medical conditions such as thyroid disease, anemia, anxiety, and panic disorder  Sometimes the cause can't be found.  Home care  Follow these home care tips:  · Avoid excess caffeine, alcohol, tobacco, and any stimulant drugs.  · Tell your doctor about any prescription or over-the-counter or herbal medicines you take.  Follow-up care  · Follow up with your doctor, or as advised.  Call 911  This is the fastest and safest way to get to the emergency department. The paramedics can also begin treatment on the way to the hospital, if needed.  Don't wait until your symptoms are severe to call 911. These are reasons to call 911:  · Chest pain  · Shortness of breath  · Feeling lightheaded, faint, or dizzy  · Fainting or loss of consciousness  · Very irregular heartbeat  · Rapid heartbeat that makes you uncomfortable  · Slower than usual heart rate associated with symptoms  · Slower than usual heart rate  · Chest pain with " weakness, dizziness, heavy sweating, nausea, or vomiting  · Extreme drowsiness or confusion  · Weakness of an arm or leg, or on 1 side of the face  · Difficulty with speech or vision  When to seek medical advice  Get prompt medical attention if you have palpitations and any of the following:  · Weakness  · Dizziness  · Lightheadedness  · Fainting  Date Last Reviewed: 4/27/2016  © 1546-5404 [employer]. [address] [place] [address], [place] [address]. All rights reserved. This information is not intended as a substitute for professional medical care. Always follow your healthcare professional's instructions.

## 2017-09-20 ENCOUNTER — TELEPHONE (OUTPATIENT)
Dept: CARDIOLOGY | Facility: CLINIC | Age: 77
End: 2017-09-20

## 2017-09-20 NOTE — TELEPHONE ENCOUNTER
----- Message from Jovita Vogel sent at 9/20/2017  9:28 AM CDT -----  Contact: patient  Patient calling in regards to scheduling her 4 mo f/u appt. First avail appt in November but she wants to come in sooner, due to issues she is having. Please advise.  Call back   Thanks!

## 2017-09-27 ENCOUNTER — OFFICE VISIT (OUTPATIENT)
Dept: CARDIOLOGY | Facility: CLINIC | Age: 77
End: 2017-09-27
Payer: MEDICARE

## 2017-09-27 VITALS
WEIGHT: 195.75 LBS | HEIGHT: 65 IN | DIASTOLIC BLOOD PRESSURE: 62 MMHG | BODY MASS INDEX: 32.61 KG/M2 | SYSTOLIC BLOOD PRESSURE: 92 MMHG | HEART RATE: 61 BPM | OXYGEN SATURATION: 97 %

## 2017-09-27 DIAGNOSIS — I10 ESSENTIAL HYPERTENSION: ICD-10-CM

## 2017-09-27 DIAGNOSIS — E78.2 MIXED HYPERLIPIDEMIA: ICD-10-CM

## 2017-09-27 DIAGNOSIS — I25.709 CORONARY ARTERY DISEASE INVOLVING CORONARY BYPASS GRAFT OF NATIVE HEART WITH ANGINA PECTORIS: ICD-10-CM

## 2017-09-27 DIAGNOSIS — I35.9 AORTIC VALVE DISORDER: ICD-10-CM

## 2017-09-27 DIAGNOSIS — I49.9 CARDIAC ARRHYTHMIA, UNSPECIFIED CARDIAC ARRHYTHMIA TYPE: Primary | ICD-10-CM

## 2017-09-27 DIAGNOSIS — E66.9 OBESITY (BMI 30.0-34.9): ICD-10-CM

## 2017-09-27 PROCEDURE — 1126F AMNT PAIN NOTED NONE PRSNT: CPT | Mod: S$GLB,,, | Performed by: INTERNAL MEDICINE

## 2017-09-27 PROCEDURE — 3008F BODY MASS INDEX DOCD: CPT | Mod: S$GLB,,, | Performed by: INTERNAL MEDICINE

## 2017-09-27 PROCEDURE — 3078F DIAST BP <80 MM HG: CPT | Mod: S$GLB,,, | Performed by: INTERNAL MEDICINE

## 2017-09-27 PROCEDURE — 1159F MED LIST DOCD IN RCRD: CPT | Mod: S$GLB,,, | Performed by: INTERNAL MEDICINE

## 2017-09-27 PROCEDURE — 99214 OFFICE O/P EST MOD 30 MIN: CPT | Mod: S$GLB,,, | Performed by: INTERNAL MEDICINE

## 2017-09-27 PROCEDURE — 3074F SYST BP LT 130 MM HG: CPT | Mod: S$GLB,,, | Performed by: INTERNAL MEDICINE

## 2017-09-27 RX ORDER — ROSUVASTATIN CALCIUM 40 MG/1
40 TABLET, COATED ORAL NIGHTLY
Qty: 90 TABLET | Refills: 0 | Status: SHIPPED | OUTPATIENT
Start: 2017-09-27 | End: 2018-02-21 | Stop reason: SDUPTHER

## 2017-09-27 NOTE — PATIENT INSTRUCTIONS
About Arrhythmias    Electrical impulses cause the normal heart to beat 60 to 100 times a minute while at rest. These impulses come from a natural pacemaker deep inside the heart muscle. Each impulse causes the heart muscle to contract. This causes the blood to flow through the heart and out to the tissues and organs of your body.  An arrhythmia is a change from the normal speed or pattern of these electrical impulses. This can cause the heart to beat too fast (tachycardia); or too slow (bradycardia); or in an unsteady pattern (irregular rhythm).  Symptoms of arrhythmias  Different people experience arrhythmias differently. Sometimes they may not have symptoms, but just notice a change in their pulse. Symptoms can include:  · Fluttering feeling in the chest  · Shortness of breath  · Chest pain or pressure  · Neck fullness  · Lightheadedness or dizziness  · Fainting or almost fainting  · Palpitations (the sense that your heart is fluttering or beating fast or hard or irregularly)  · Tiredness, fatigue, or weakness  · Cardiac arrest  Causes of arrhythmias  Arrhythmias are most often due to heart disease such as:  · Coronary artery disease  · Heart valve disease  · Enlarged heart  · High blood pressure  · Heart failure  Other causes of  arrhythmia include:  · Certain medicines (such as asthma inhalers and decongestants)  · Some herbal supplements  · Cardiac stimulant drugs (such as cocaine, amphetamine, diet pills, certain decongestant cold medicines, caffeine, and nicotine)  · Excessive alcohol use  · Anxiety and panic disorder  · Thyroid disease  · Anemia  · Diabetes  · Sleep apnea  · Obesity  · Congenital heart disease  · Cardiac genetic diseases  Arrhythmias can often be prevented. The cause and type of arrhythmia determines the best treatment. Sometimes your doctor may want to monitor your heart rate over a 24-hour period or longer. This can help identify the cause of your arrhythmia and find the best treatment.  This can be done with a Holter monitor, a portable EKG recording device attached by wires to your chest. Or you may get an event monitor, which you can place over the skin in front of your heart to record heart rhythms. You can carry this with you as you go about your routine activities during the monitoring period. Implantable loop recorders may also be used to monitor the heart rhythm for up to 2 years. This miniature device is placed underneath the skin overlying the heart.  Home care  The following guidelines will help you care for yourself at home:  · Avoid cardiac stimulants (such as cocaine, amphetamine, diet pills, certain decongestant cold medicines, caffeine, and nicotine).  · If you smoke, stop smoking. Contact your doctor or a local stop-smoking program for help.  · Tell your doctor about any prescription, over-the-counter, or herbal medicines you take. These may be affecting your heart rhythm.  Follow-up care  Follow up with your healthcare provider, or as advised. If a Holter monitor has been recommended, contact the cardiologist you have been referred to as soon as you can  the device. Other outpatient tests may also be arranged for you at that time.  Call 911  This is the fastest and safest way to get to the emergency department. The paramedics can also start treatment on the way to the hospital, if needed.  Don't wait until your symptoms are severe to call 911. Other reasons to call 911 besides chest pain include:  · Chest, shoulder, arm, neck, or back pain  · Shortness of breath  · Feeling lightheaded, faint, or dizzy  · Unexplained fainting  · Rapid heart beat  · Slower than usual heart rate compared to your normal  · Very irregular heartbeat  · Chest pain (angina) with weakness, dizziness, heavy sweating, nausea, or vomiting  · Extreme drowsiness, or confusion  · Weakness of an arm or leg or one side of the face  · Difficulty with speech or vision  When to seek medical advice  Remember,  "things are not always like they are on TV. Sometimes it is not so obvious. You may only feel weak or just "not right." If it is not clear or if you have any doubt, call for advice.  · Seek help for chest pain, or it feels different from usual, even if your symptoms are mild.  · Don't drive yourself. Have someone else drive. If no one can drive you, call 911.  · If your doctor has given you medicines to take when you have symptoms, take them, but do not delay getting help while trying to find them.  Date Last Reviewed: 4/25/2016  © 9491-0564 [employer]. [address] [place] [address], [place] [address]. All rights reserved. This information is not intended as a substitute for professional medical care. Always follow your healthcare professional's instructions.        What Is Angina?  Angina is a warning that your heart muscle is not getting enough oxygen-rich blood and is at risk for damage. Medicines, certain medical procedures, and lifestyle changes can help control angina. Talk with your healthcare provider about how to prevent angina and what to do if you get it.    How does angina feel?  Angina is often described as chest pain, but this can be misleading. Angina is not always painful, and it isnt always felt in the chest. Angina might feel like:  · Discomfort, aching, tightness, or pressure that comes and goes. You may feel this in your chest, back, abdomen, arm, shoulder, neck, or jaw.  · Tiredness that gets worse or you have more tiredness than usual for no clear reason  · Shortness of breath while doing something that used to be easy  · Heartburn, indigestion, nausea, or sweating  Call 911 right away if any of your symptoms lasts for more than a few minutes. Or if they go away and come back. Or if they happen at rest and don't go away after taking nitroglycerin. Or if they continue to get worse. You could be having a heart attack (acute myocardial infarction).  When does angina happen?  · Angina " "usually happens during activity. It can also occur when youre upset or after a large meal. Sometimes angina can happen when the weather is too hot or too cold. All of these things can put more stress on your body and your heart.  · You may have unstable angina if angina starts occurring more often, lasts longer, happens even when you are resting, or causes more discomfort. Its a sign that your heart problem may be getting worse. You need to call your healthcare provider right away.  Date Last Reviewed: 10/1/2016  © 3127-2053 [employer]. [address] [place] [address]. All rights reserved. This information is not intended as a substitute for professional medical care. Always follow your healthcare professional's instructions.        Controlling Your Cholesterol  Cholesterol is a waxy substance. It travels in your blood through the blood vessels. When you have high cholesterol, it builds up in the walls of the blood vessels. This makes the vessels narrower. Blood flow decreases. You are then at greater risk for having a heart attack or a stroke.  Good and bad cholesterol  Lipids are fats. Blood is mostly water. Fat and water don't mix. So our bodies need lipoproteins (lipids inside a protein shell) to carry the lipids. The protein shell carries its lipids through the bloodstream. There are two main kinds of lipoproteins:  · LDL (low-density lipoprotein) is known as "bad cholesterol." It mainly carries cholesterol. It delivers this cholesterol to body cells. Excess LDL cholesterol will build up in artery walls. This increases your risk for heart disease and stroke.  · HDL (high-density lipoprotein) is known as "good cholesterol." This protein shell collects excess cholesterol that LDLs have left behind on blood vessel walls. That's why high levels of HDL cholesterol can decrease your risk of heart disease and stroke.  Controlling cholesterol levels  Total cholesterol includes LDL and HDL " cholesterol, as well as other fats in the bloodstream. If your total cholesterol is high, follow the steps below to help lower your total cholesterol level:  · Eat less unhealthy fat:  ¨ Cut back on saturated fats and trans (also called hydrogenated) fats by selecting lean cuts of meat, low-fat dairy, and using oils instead of solid fats. Limit baked goods, processed meats, and fried foods. A diet thats high in these fats increases your bad cholesterol. It's not enough to just cut back on foods containing cholesterol.  ¨ Eat about 2 servings of fish per week. Most fish contain omega-3 fatty acids. These help lower blood cholesterol.  ¨ Eat more whole grains and soluble fiber (such as oat bran). These lower overall cholesterol.  · Be active:  ¨ Choose an activity you enjoy. Walking, swimming, and riding a bike are some good ways to be active.  ¨ Start at a level where you feel comfortable. Increase your time and pace a little each week.  ¨ Work up to 40 minutes of moderate to high intensity physical activity at least 3 to 4 days per week.  ¨ Remember, some activity is better than none.  ¨ If you haven't been exercising regularly, start slowly. Check with your doctor to make sure the exercise plan is right for you.  · Quit smoking. Quitting smoking can improve your lipid levels. It also lowers your risk for heart disease and stroke.  · Weight management. If you are overweight or obese, your health care provider will work with you to lose weight and lower your BMI (body mass index) to a normal or near-normal level. Making diet changes and increasing physical activity can help.  · Take medication as directed. Many people need medication to get their LDL levels to a safe level. Medication to lower cholesterol levels is effective and safe. (But taking medication is not a substitute for exercise or watching your diet!) Your doctor can tell you whether you might benefit from a cholesterol-lowering medication.  Date Last  Reviewed: 5/11/2015 © 2000-2017 [employer]. [address] [place] [address], [place] [address]. All rights reserved. This information is not intended as a substitute for professional medical care. Always follow your healthcare professional's instructions.        Heart Disease Education    The heart beats 60 to 100 times per minute, 24 hours a day. This equals almost 1000,000 times a day. It pumps blood with oxygen and nutrients to the tissues and organs of the body. But the heart is a muscle and needs its own supply of blood. Blood flow to the heart is supplied by the coronary arteries. Coronary artery disease (atherosclerosis) is a result of cholesterol, saturated fat, and calcium deposits (plaques) that build up inside the walls. This causes inflammation within the coronary arteries. These plaques narrow the artery and reduce blood flow to the heart muscle. The reduction in blood flow to the heart muscle decreases oxygen supply to the heart. If the narrowing is significant enough, the oxygen supply to one or more regions of the heart can be temporarily or permanently shut down. This can cause chest pain, and possibly death of heart tissue (heart attack).  Types of chest pain  Angina is the name for pain in the heart muscle. Angina is a warning sign of serious heart disease. When untreated it can lead to a heart attack, also known as acute myocardial infarction, or AMI. Angina occurs when there is not enough blood and oxygen flowing to the heart for the amount of work it is doing. This most often happens during physical exertion, when the heart is working hardest. It is usually relieved by rest or nitroglycerin. Angina may also occur after a large meal when extra blood is sent to the digestive organs and less goes to the heart. In the case of advanced or unstable heart disease, angina can occur at rest or awaken you from sleep. Angina usually lasts from a few minutes up to 20 minutes or more. When treated  early, the effects of angina can be reversed without permanent damage to the heart. Angina is a serious condition and needs to be evaluated by a medical professional immediately.  There are two types of angina -- stable and unstable:  · Stable angina usually occurs with a predictable level of activity. Being stable, its character, severity, and occurrence do not change much over time. It usually starts with activity, and resolves with rest or taking your medicine as instructed by your doctor. The symptoms usually do not last long.  · Unstable angina changes or gets worse over time. It is different from whatever you are used to. It may feel different or worse, begin without cause, occur with exercise or exertion, wake you up from sleep, and last longer. It may not respond in the same way as it does when you take your usual medicines for an attack. This type of angina can be a warning sign of an impending heart attack.     A heart attack is usually the result of a blood clot that suddenly forms in a coronary artery that has been narrowed with plaque. When this occurs, blood flow may be cut off to a part of the heart muscle, causing the cells to die. This weakens the pumping action of the heart, which affects the delivery of blood to all the other organs in the body including the brain. This damage is not reversible. However, early treatment can limit the amount of damage.  The pain you feel with angina and a heart attack may have a similar quality. However, it is usually different in intensity and duration. Here are some typical descriptions of a heart attack:  · It is most often experienced as a squeezing, crushing, pressure-like sensation in the center of the chest.  · It is sometimes described as something heavy sitting on my chest.  · It may feel more like a bad case of indigestion.  · The pain may spread from the chest to the arm, shoulder, throat or jaw.  · Sometimes the pain is not felt in the chest at all,  "but only in the arm, shoulder, throat or jaw.  · There may also be nausea, vomiting, dizziness or light-headedness, sweating and trouble breathing.  · Palpitations, or your heart beating rapidly  · A new, irregular heart beat  · Unexplained weakness  You may not be able to tell the difference between "bad" angina and a heart attack at home. Seek help if your symptoms are different than usual. Do not be in denial or just try to "tough it out."  Call 911  This is the fastest and safest way to get to the emergency department. The paramedics can also start treatment on the way to the hospital, saving valuable time for your heart.  · If the angina gets worse, if it continues, or if it stops and returns, call 911 immediately. Do not delay. You may be having a heart attack.  · After you call 911, take a second tablet or spray unless instructed otherwise. When repeating doses, sit down if possible, because it can make you feel lightheaded or dizzy. Wait another 5 minutes. If the angina still does not go away, take a third tablet or spray. Do not take more than 3 tablets or sprays within 15 minutes. Stay on the phone with 911 for further instruction.  · Your healthcare provider may give you slightly different instructions than those above. If so, follow them carefully.  Do not wait until symptoms become severe to call 911.  Other reasons to call 911 include:  · Trouble breathing  · Feeling lightheaded, faint, or dizzy  · Rapid heart beat  · Slower than usual heart rate compared to your normal  · Angina with weakness, dizziness, fainting, heavy sweating, nausea, or vomiting  · Extreme drowsiness, confusion  · Weakness of an arm or leg or one side of the face  · Difficulty with speech or vision  When to seek medical care  Remember, the signs and symptoms of a heart attack are not always like they are on TV. Sometimes they are not so obvious. You may only feel weak, or just not right. If it is not clear or if you have any " "doubt, call for advice.  · Seek help if there is a change in the type of pain, if it feels different, or if your symptoms are mild.  · Do not drive yourself. Have someone else drive you. If no one can drive, call 911.  · Do not delay. Fast diagnosis and treatment can prevent or limit the amount of heart damage during a heart attack.  · Do not go to your doctor's office or a clinic as they may not be able to provide all the testing and treatment required for this condition.  · If your doctor has given you medicine to take when symptoms occur, take them but don't delay getting help trying to locate medicines.  What happens in the emergency department  The emergency department is connected to your local emergency medical system (EMS) through 911. That's why during a cardiac emergency, calling 911 is the fastest way to get help. The goal of the emergency department is to rapidly screen, evaluate, and treat people.  Once you are there, an electrocardiogram (ECG or heart tracing) will be done. Blood samples may be taken to look for the presence of heart enzymes that leak from damaged heart cells and show if a heart attack is occurring. You will often be evaluated by a heart specialist (cardiologist) who decides the best course of action. In the case of severe angina or early heart attack, and depending on the circumstances, powerful "clot busting" medicines can be used to dissolve blood clots in the coronary artery. In other cases, you may be taken to a cardiac catheterization lab. Here, a tiny balloon-tipped catheter is advanced through blood vessels to the heart. There the balloon is inflated pushing open the blood vessel restoring blood flow.  Risk factors for heart disease  Risk factors for heart disease are a combination of genetic and lifestyle. Many risk factors work by either directly or indirectly damaging the blood vessels of the heart, or by increasing the risk of forming blood or cholesterol clots, which then " clog up and block the arteries.     Examples of physical lifestyle risk factors:  · Cigarette smoking  · High blood pressure  · High blood cholesterol  · Use of stimulant drugs such as cocaine, crack, and amphetamines  · Eating a high-fat, high-cholesterol meal  · Diabetes   · Obesity which increases risk for diabetes and high blood pressure  · Lack of regular physical activity     Examples of emotional lifestyle factors:  · Chronic high stress levels release stress hormones. These raise blood pressure and cholesterol level and makes blood clot more easily.  · Held-in anger, hostile or cynical attitude  · Social and emotional isolation, lack of intimacy  · Loss of relationship  · Depression  Other factors that increase the risk of heart attack that you cannot control :  · Age. The older you get beyond 40, the greater is your risk of significant coronary artery disease.  · Gender. More men than women get heart disease; but once past menopause, women who are not taking estrogen replacement have the same risk as men for a heart attack.  · Family history. If your mother, father, brother or sister has coronary artery disease, your risk of having it is higher than a person your age without this family history.  What can you do to decrease your risk  To reduce your risk of heart disease:  · Get regular checkups with your doctor.  · Take your medicines for blood pressure, cholesterol or diabetes as directed.  · Watch your diet. Eat a heart healthy diet choosing fresh foods, less salt, cholesterol, and fat  · Stop smoking. Get help if needed.  · Get regular exercise.  · Manage stress.  · Carry a list of medicines and doses in your wallet.  Date Last Reviewed: 12/30/2015  © 9081-3913 [employer]. [address] [place] [address], [place] [address]. All rights reserved. This information is not intended as a substitute for professional medical care. Always follow your healthcare professional's  instructions.        Controlling High Blood Pressure  High blood pressure (hypertension) is often called the silent killer. This is because many people who have it dont know it. High blood pressure is defined as 140/90 mm Hg or higher. Know your blood pressure and remember to check it regularly. Doing so can save your life. Here are some things you can do to help control your blood pressure.    Choose heart-healthy foods  · Select low-salt, low-fat foods. Limit sodium intake to 2,400 mg per day or the amount suggested by your healthcare provider.  · Limit canned, dried, cured, packaged, and fast foods. These can contain a lot of salt.  · Eat 8 to 10 servings of fruits and vegetables every day.  · Choose lean meats, fish, or chicken.  · Eat whole-grain pasta, brown rice, and beans.  · Eat 2 to 3 servings of low-fat or fat-free dairy products.  · Ask your doctor about the DASH eating plan. This plan helps reduce blood pressure.  · When you go to a restaurant, ask that your meal be prepared with no added salt.  Maintain a healthy weight  · Ask your healthcare provider how many calories to eat a day. Then stick to that number.  · Ask your healthcare provider what weight range is healthiest for you. If you are overweight, a weight loss of only 3% to 5% of your body weight can help lower blood pressure. Generally, a good weight loss goal is to lose 10% of your body weight in a year.  · Limit snacks and sweets.  · Get regular exercise.  Get up and get active  · Choose activities you enjoy. Find ones you can do with friends or family. This includes bicycling, dancing, walking, and jogging.  · Park farther away from building entrances.  · Use stairs instead of the elevator.  · When you can, walk or bike instead of driving.  · [place] leaves, garden, or do household repairs.  · Be active at a moderate to vigorous level of physical activity for at least 40 minutes for a minimum of 3 to 4 days a week.   Manage stress  · Make time  to relax and enjoy life. Find time to laugh.  · Communicate your concerns with your loved ones and your healthcare provider.  · Visit with family and friends, and keep up with hobbies.  Limit alcohol and quit smoking  · Men should have no more than 2 drinks per day.  · Women should have no more than 1 drink per day.  · Talk with your healthcare provider about quitting smoking. Smoking significantly increases your risk for heart disease and stroke. Ask your healthcare provider about community smoking cessation programs and other options.  Medicines  If lifestyle changes arent enough, your healthcare provider may prescribe high blood pressure medicine. Take all medicines as prescribed. If you have any questions about your medicines, ask your healthcare provider before stopping or changing them.   Date Last Reviewed: 4/27/2016 © 2000-2017 [employer]. [address] [place] [address], [place] [address]. All rights reserved. This information is not intended as a substitute for professional medical care. Always follow your healthcare professional's instructions.        Heart Valve Problems  Your hearts job is to pump blood through your body. That job starts with pumping blood through the heart itself. Inside your heart, blood passes through a series of one-way garnica called valves. If a valve works poorly, not enough blood moves forward. A problem heart valve may not open wide enough, not close tightly enough, or both. In any case, not enough blood is sent to the heart muscle or out to the body.  Symptoms of heart valve problems  You can have a problem valve for decades yet have no symptoms. If you do have symptoms, they may come on so slowly that you barely notice them. In other cases, though, symptoms appear suddenly. You might have one or more of these symptoms:  · Problems breathing when you lie down, exert yourself, or get stressed emotionally  · Pain, pressure, tightness, or numbness in your chest, neck,  back, or arms (angina)  · Feeling dizzy, faint, or lightheaded  · Tiredness, especially with activity or as the day goes on  · Waking up at night coughing or short of breath  · A fast, pounding, or irregular heartbeat  · A fluttering feeling in your chest  · Swollen ankles or feet  · Fainting, especially upon standing up or with exertion  Problems opening (stenosis)    When a valve doesnt open all the way, the problem is called stenosis. The leaflets may be stuck together or too stiff to open fully. When the valve doesnt open fully, blood has to flow through a smaller opening. So the heart muscle has to work harder to push the blood through the valve.  Problems closing (regurgitation)    When a valve doesnt close tightly enough and blood leaks backward through the valve, the problem is called regurgitation or insufficiency. The valve itself may be described as leaky. Leaflets may fit together poorly. Or the structures that support them may be torn. Some blood leaks through the valve back into the chamber it just left. So the heart has to move that blood twice. This can result in heart muscle damage.  Common causes of valve problems  People of any age can have heart valve trouble. You may have been born with a problem valve. Or a valve may have worn out as youve aged. It may not be possible to pinpoint what caused your valve problem. But common causes include:  · Buildup of calcium or scar tissue on a valve  · Rheumatic fever and certain other infections and diseases  · High blood pressure  · Other heart problems, such as coronary artery disease  · Congenital defects of the heart valves      Date Last Reviewed: 6/1/2016  © 6349-5771 [employer]. [address] [place] [address], [place] [address]. All rights reserved. This information is not intended as a substitute for professional medical care. Always follow your healthcare professional's instructions.        Weight Management: Healthy Eating  Food is your  bodys fuel. You cant live without it. The key is to give your body enough nutrients and energy without eating too much. Reading food labels can help you make healthy choices. Also, learn new eating habits to manage your weight.     All the values on the label are based on one serving. The serving size is the average portion. Remember to multiply the values on the label by the number of servings you eat.   Eat less fat  A gram of fat has almost 2.5 times the calories of a gram of protein or carbohydrates. Try to balance your food choices so that only 20% to 35% of your calories comes from total fat. This means an average of 2½ to 3½ grams of fat for each 100 calories you eat.  Eat more fiber  High-fiber foods are digested more slowly than low-fiber foods, so you feel full longer. Try to get at least 25 grams of fiber each day for a 2000 calorie diet. Foods high in fiber include:  · Vegetables and fruits  · Whole-grain or bran breads, pastas, and cereals  · Legumes (beans) and peas  As you begin to eat more fiber, be sure to drink plenty of water to keep your digestive system working smoothly.  Tips  Do's and don'ts include:   · Dont skip meals. This often leads to overeating later on. Its best to spread your eating throughout the day.  · Eat a variety of foods, not just a few favorites.  · If you find yourself eating when youre not hungry, ask yourself why. Many of us eat when were bored, stressed, or just to be polite. Listen to your body. If youre not hungry, get busy doing something else instead of eating.  · Eat slower, shooting for 20 to 30 minutes for each meal. It takes 20 minutes for your stomach to tell your brain that its full. Slow eaters tend to eat less and are still satisfied, while fast eaters may tend to be overeaters.   · Pay attention to what you eat. Dont read or watch TV during your meal.  Date Last Reviewed: 1/31/2016  © 3955-4316 [employer]. [address] [place] [address],  [place] [address]. All rights reserved. This information is not intended as a substitute for professional medical care. Always follow your healthcare professional's instructions.

## 2017-09-27 NOTE — PROGRESS NOTES
Subjective:    Patient ID:  Yael Claire is a 76 y.o. female who presents for Palpitations; Hypertension; Hyperlipidemia; and Coronary Artery Disease      HPI  REQUESTED OV, HAD IRREG HEART BEATS, FELT IN NECK WHEN TAKING ALEVE, CHECKED PULSE WAS OK, NOW FEELS BETTER,  DISCUSSED LABS, CMP OK, , , HDL 43, SEE ROS  Past Medical History:   Diagnosis Date    Acute coronary syndrome     LIGHT 2004    Aortic valve disorder     Coronary artery disease     Heart murmur     Hyperlipidemia     Hypertension     Palpitations     Stenosis of aortic and mitral valves     Valvular regurgitation      Past Surgical History:   Procedure Laterality Date    CARDIAC CATHETERIZATION      CARDIAC VALVE SURGERY      CORONARY ARTERY BYPASS GRAFT  04/2005     History reviewed. No pertinent family history.  Social History     Social History    Marital status: Single     Spouse name: N/A    Number of children: N/A    Years of education: N/A     Social History Main Topics    Smoking status: Never Smoker    Smokeless tobacco: Never Used    Alcohol use No    Drug use: No    Sexual activity: Not Asked     Other Topics Concern    None     Social History Narrative    None       Review of patient's allergies indicates:   Allergen Reactions    Darvocet a500 [propoxyphene n-acetaminophen]     Mycinette [cetylpyridinium-benzocaine]     Pcn [penicillins]        Current Outpatient Prescriptions:     aspirin (ECOTRIN) 81 MG EC tablet, Take 81 mg by mouth once daily., Disp: , Rfl:     co-enzyme Q-10 30 mg capsule, Take 30 mg by mouth once daily. , Disp: , Rfl:     famotidine (PEPCID) 10 MG tablet, Take 10 mg by mouth once daily., Disp: , Rfl:     fish oil-omega-3 fatty acids 300-1,000 mg capsule, Take 2 g by mouth once daily., Disp: , Rfl:     fluoxetine (PROZAC) 20 MG capsule, Take 20 mg by mouth once daily., Disp: , Rfl:     FOLIC ACID/MULTIVIT-MIN/LUTEIN (CENTRUM SILVER ORAL), Take by mouth once daily., Disp:  , Rfl:     furosemide (LASIX) 20 MG tablet, Take 20 mg by mouth once daily. As needed, Disp: , Rfl:     gabapentin (NEURONTIN) 100 MG capsule, Take 300 mg by mouth every evening. , Disp: , Rfl:     metoprolol succinate (TOPROL-XL) 25 MG 24 hr tablet, Take 1 tablet (25 mg total) by mouth once daily., Disp: 90 tablet, Rfl: 1    nitroGLYCERIN (NITROSTAT) 0.4 MG SL tablet, Place 0.4 mg under the tongue every 5 (five) minutes as needed for Chest pain., Disp: , Rfl:     potassium chloride (KLOR-CON) 10 MEQ TbSR, Take 1 tablet (10 mEq total) by mouth twice a week., Disp: 8 tablet, Rfl: 3    valsartan (DIOVAN) 160 MG tablet, Take 160 mg by mouth once daily., Disp: , Rfl:     vitamin B complex no.12-niacin 50 mg/15 mL Liqd, Take 1 tablet by mouth once daily., Disp: , Rfl:     vitamin D 1000 units Tab, Take 185 mg by mouth once daily., Disp: , Rfl:     rosuvastatin (CRESTOR) 40 MG Tab, Take 1 tablet (40 mg total) by mouth every evening., Disp: 90 tablet, Rfl: 0    Review of Systems   Constitution: Negative for chills, decreased appetite, diaphoresis, fever, weakness, malaise/fatigue, night sweats and weight gain.   HENT: Negative for congestion and nosebleeds.    Eyes: Negative for blurred vision, discharge and visual disturbance.   Cardiovascular: Positive for irregular heartbeat and orthopnea. Negative for chest pain, claudication, cyanosis, near-syncope, paroxysmal nocturnal dyspnea and syncope. Dyspnea on exertion: MILD. Leg swelling: OCC,ANKLES. Palpitations: OCC,WHEN MEDS WARES OFF.   Respiratory: Negative for hemoptysis, shortness of breath, sleep disturbances due to breathing, sputum production and wheezing. Cough: RECENT BRONCHITIS, BETTER.    Endocrine: Negative for cold intolerance, heat intolerance, polydipsia and polyphagia.   Hematologic/Lymphatic: Negative for adenopathy and bleeding problem. Does not bruise/bleed easily.   Skin: Negative for color change, itching and nail changes.   Musculoskeletal:  "Positive for arthritis. Negative for back pain and falls.   Gastrointestinal: Negative for abdominal pain, change in bowel habit, dysphagia, heartburn, hematemesis, jaundice and melena.   Genitourinary: Negative for dysuria, flank pain and frequency.   Neurological: Negative for brief paralysis, dizziness, focal weakness, light-headedness, loss of balance, numbness, seizures, sensory change and tremors.   Psychiatric/Behavioral: Negative for altered mental status, memory loss and substance abuse. The patient does not have insomnia and is not nervous/anxious.    Allergic/Immunologic: Negative for hives and persistent infections.        Objective:      Vitals:    09/27/17 0847   BP: 92/62   Pulse: 61   SpO2: 97%   Weight: 88.8 kg (195 lb 12.3 oz)   Height: 5' 5" (1.651 m)   PainSc: 0-No pain     Body mass index is 32.58 kg/m².    Physical Exam   Constitutional: She is oriented to person, place, and time. She appears well-developed and well-nourished.   OVER WEIGHT   HENT:   Head: Normocephalic and atraumatic.   Mouth/Throat: Oropharynx is clear and moist and mucous membranes are normal.   Eyes: Conjunctivae and EOM are normal. Pupils are equal, round, and reactive to light. Right eye exhibits normal extraocular motion. Left eye exhibits normal extraocular motion.   Neck: Trachea normal and normal range of motion. Neck supple. Decreased carotid pulses present. No hepatojugular reflux and no JVD present. Carotid bruit: MURMUR TO NECK. No tracheal deviation and no erythema present. Edema: TRACE/R.   Cardiovascular: Normal rate and regular rhythm.   No extrasystoles are present. PMI is not displaced.  Exam reveals no gallop and no friction rub.    Murmur heard.   Harsh midsystolic murmur is present with a grade of 2/6  at the upper right sternal border radiating to the neck   Systolic murmur of grade 2/6 is also present at the upper right sternal border.  Pulses:       Carotid pulses are 2+ on the right side, and 2+ on " the left side.       Radial pulses are 2+ on the right side, and 2+ on the left side.        Dorsalis pedis pulses are 2+ on the right side, and 2+ on the left side.        Posterior tibial pulses are 2+ on the right side, and 2+ on the left side.   Pulmonary/Chest: Effort normal and breath sounds normal. No accessory muscle usage. No respiratory distress. She has no wheezes. She has no rales. She exhibits no tenderness.   LARGE STERNOTOMY SCAR   Abdominal: Soft. Bowel sounds are normal. She exhibits no distension and no mass. There is no hepatosplenomegaly. There is no tenderness. There is no guarding and no CVA tenderness.   Musculoskeletal: She exhibits no edema or tenderness.   SLOW GAIT   Lymphadenopathy:     She has no cervical adenopathy.   Neurological: She is alert and oriented to person, place, and time. She displays no tremor. No cranial nerve deficit. She exhibits normal muscle tone. Coordination normal.   Skin: Skin is warm and dry. No rash noted. No cyanosis or erythema. No pallor.   Psychiatric: She has a normal mood and affect. Her speech is normal and behavior is normal. Judgment and thought content normal.               ..    Chemistry    No results found for: NA, K, CL, CO2, BUN, CREATININE, GLU No results found for: CALCIUM, ALKPHOS, AST, ALT, BILITOT, ESTGFRAFRICA, EGFRNONAA         ..No results found for: CHOL  No results found for: HDL  No results found for: LDLCALC  No results found for: TRIG  No results found for: CHOLHDL  ..No results found for: WBC, HGB, HCT, MCV, PLT    Test(s) Reviewed  I have reviewed the following in detail:  [] Stress test   [] Angiography   [] Echocardiogram   [x] Labs   [] Other:       Assessment:         ICD-10-CM ICD-9-CM   1. Cardiac arrhythmia, unspecified cardiac arrhythmia type I49.9 427.9   2. 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 Mixed hyperlipidemia E78.2 272.2   4.  Essential hypertension I10 401.9   5. Aortic valve disorder I35.9 424.1   6. Obesity (BMI 30.0-34.9) E66.9 278.00     Problem List Items Addressed This Visit        Cardiac/Vascular    Aortic valve disorder    Relevant Orders    2D echo with color flow doppler    Essential hypertension    Coronary artery disease involving coronary bypass graft of native heart with angina pectoris    Relevant Orders    2D echo with color flow doppler    NM Myocardial Perfusion Spect Multi Pharmacologic    NM Multi Pharm Stress Cardiac Component    Mixed hyperlipidemia    Relevant Orders    Lipid panel    Hepatic function panel    Cardiac arrhythmia - Primary    Relevant Orders    Holter monitor - 24 hour    NM Myocardial Perfusion Spect Multi Pharmacologic    NM Multi Pharm Stress Cardiac Component       Endocrine    Obesity (BMI 30.0-34.9)      Other Visit Diagnoses    None.          Plan:     RECURRENT SX, NEEDS EVALUATION, WILL CHECK HOLTER. RE-ASSESS AS/ AI, CHECK NUCLEAR STRESS, INCREASE CRESTOR TO 40 MG PLUS CO-Q-10, ALL OTHER CV CLINICALLY STABLE, , NO HF, NO TIA, ASSESS  CLINICAL ARRHYTHMIA,CONTINUE CURRENT MEDS, EDUCATION, DIET, EXERCISE, WEIGHT LOSS, RTC IN 3 MO      Cardiac arrhythmia, unspecified cardiac arrhythmia type  -     Holter monitor - 24 hour; Future  -     NM Myocardial Perfusion Spect Multi Pharmacologic; Future; Expected date: 09/27/2017  -     NM Multi Pharm Stress Cardiac Component; Future    Coronary artery disease involving coronary bypass graft of native heart with angina pectoris  -     2D echo with color flow doppler; Future  -     NM Myocardial Perfusion Spect Multi Pharmacologic; Future; Expected date: 09/27/2017  -     NM Multi Pharm Stress Cardiac Component; Future    Mixed hyperlipidemia  -     Lipid panel; Future; Expected date: 09/27/2017  -     Hepatic function panel; Future; Expected date: 09/27/2017    Essential hypertension    Aortic valve disorder  -     2D echo with color flow doppler;  Future    Obesity (BMI 30.0-34.9)    Other orders  -     rosuvastatin (CRESTOR) 40 MG Tab; Take 1 tablet (40 mg total) by mouth every evening.  Dispense: 90 tablet; Refill: 0    RTC Low level/low impact aerobic exercise 5x's/wk. Heart healthy diet and risk factor modification.    See labs and med orders.    Aerobic exercise 5x's/wk. Heart healthy diet and risk factor modification.    See labs and med orders.

## 2017-10-10 RX ORDER — METOPROLOL SUCCINATE 25 MG/1
25 TABLET, EXTENDED RELEASE ORAL DAILY
Qty: 90 TABLET | Refills: 1 | Status: SHIPPED | OUTPATIENT
Start: 2017-10-10 | End: 2018-04-01 | Stop reason: SDUPTHER

## 2017-11-06 ENCOUNTER — TELEPHONE (OUTPATIENT)
Dept: CARDIOLOGY | Facility: CLINIC | Age: 77
End: 2017-11-06

## 2017-11-06 NOTE — TELEPHONE ENCOUNTER
----- Message from Luz Elena Hooks sent at 11/6/2017  1:09 PM CST -----  Contact: Patient  Yael, patient [phone], calling to have tomorrows appointments rescheduled and split up into two different appointments. Please advise. Thanks.

## 2017-11-20 ENCOUNTER — TELEPHONE (OUTPATIENT)
Dept: CARDIOLOGY | Facility: CLINIC | Age: 77
End: 2017-11-20

## 2017-11-20 NOTE — TELEPHONE ENCOUNTER
----- Message from Mariangel Watt sent at 11/20/2017  4:02 PM CST -----  Contact: Yael Cueva is calling to check on appointments. States can not do three days next week with Friday being not a good day for anything. Please call [phone] or [phone] to let options on scheduling. Thanks!

## 2017-11-24 ENCOUNTER — TELEPHONE (OUTPATIENT)
Dept: CARDIOLOGY | Facility: CLINIC | Age: 77
End: 2017-11-24

## 2017-11-24 NOTE — TELEPHONE ENCOUNTER
Spoke to patient, conformed stress test appointment date she was told to have the nuclear stress test done, pt stated she reschedule it from 11- to 11- at 12:45.  Inform pt about NPO 4 hours before test, no caffeine consumption after mid-night the night before the test, labs can be drawn when she check in for test and Holter monitor will be placed after the stress test as per Ms. Rey and Ms. Phillips in stress lab, patient verbalized understanding.

## 2017-11-30 ENCOUNTER — HOSPITAL ENCOUNTER (OUTPATIENT)
Dept: RADIOLOGY | Facility: HOSPITAL | Age: 77
Discharge: HOME | End: 2017-11-30
Attending: INTERNAL MEDICINE
Payer: MEDICARE

## 2017-11-30 ENCOUNTER — CLINICAL SUPPORT (OUTPATIENT)
Dept: CARDIOLOGY | Facility: CLINIC | Age: 77
End: 2017-11-30
Attending: INTERNAL MEDICINE
Payer: MEDICARE

## 2017-11-30 DIAGNOSIS — I49.9 CARDIAC ARRHYTHMIA, UNSPECIFIED CARDIAC ARRHYTHMIA TYPE: ICD-10-CM

## 2017-11-30 DIAGNOSIS — I25.709 CORONARY ARTERY DISEASE INVOLVING CORONARY BYPASS GRAFT OF NATIVE HEART WITH ANGINA PECTORIS: ICD-10-CM

## 2017-11-30 LAB — DIASTOLIC DYSFUNCTION: NO

## 2017-11-30 PROCEDURE — 93015 CV STRESS TEST SUPVJ I&R: CPT | Mod: S$GLB,,, | Performed by: INTERNAL MEDICINE

## 2017-11-30 PROCEDURE — 93224 XTRNL ECG REC UP TO 48 HRS: CPT | Mod: S$GLB,,, | Performed by: INTERNAL MEDICINE

## 2017-11-30 PROCEDURE — 78452 HT MUSCLE IMAGE SPECT MULT: CPT | Mod: 26,,, | Performed by: INTERNAL MEDICINE

## 2017-12-01 ENCOUNTER — TELEPHONE (OUTPATIENT)
Dept: CARDIOLOGY | Facility: CLINIC | Age: 77
End: 2017-12-01

## 2017-12-01 NOTE — TELEPHONE ENCOUNTER
----- Message from Eb Ruelas MD sent at 11/30/2017  6:25 PM CST -----  Imaging tests results are within normal parameters.  Keep your follow up appointment.

## 2017-12-05 ENCOUNTER — TELEPHONE (OUTPATIENT)
Dept: CARDIOLOGY | Facility: CLINIC | Age: 77
End: 2017-12-05

## 2017-12-15 ENCOUNTER — CLINICAL SUPPORT (OUTPATIENT)
Dept: CARDIOLOGY | Facility: CLINIC | Age: 77
End: 2017-12-15
Attending: INTERNAL MEDICINE
Payer: MEDICARE

## 2017-12-15 ENCOUNTER — LAB VISIT (OUTPATIENT)
Dept: LAB | Facility: HOSPITAL | Age: 77
End: 2017-12-15
Attending: INTERNAL MEDICINE
Payer: MEDICARE

## 2017-12-15 DIAGNOSIS — I35.9 AORTIC VALVE DISORDER: ICD-10-CM

## 2017-12-15 DIAGNOSIS — E78.2 MIXED HYPERLIPIDEMIA: ICD-10-CM

## 2017-12-15 DIAGNOSIS — I25.709 CORONARY ARTERY DISEASE INVOLVING CORONARY BYPASS GRAFT OF NATIVE HEART WITH ANGINA PECTORIS: ICD-10-CM

## 2017-12-15 LAB
ALBUMIN SERPL BCP-MCNC: 3.2 G/DL
ALP SERPL-CCNC: 110 U/L
ALT SERPL W/O P-5'-P-CCNC: 15 U/L
AORTIC VALVE STENOSIS: ABNORMAL
AST SERPL-CCNC: 23 U/L
BILIRUB DIRECT SERPL-MCNC: 0.3 MG/DL
BILIRUB SERPL-MCNC: 0.6 MG/DL
CHOLEST SERPL-MCNC: 246 MG/DL
CHOLEST/HDLC SERPL: 5.1 {RATIO}
ESTIMATED PA SYSTOLIC PRESSURE: 34.21
HDLC SERPL-MCNC: 48 MG/DL
HDLC SERPL: 19.5 %
LDLC SERPL CALC-MCNC: 180.8 MG/DL
MITRAL VALVE REGURGITATION: ABNORMAL
NONHDLC SERPL-MCNC: 198 MG/DL
PROT SERPL-MCNC: 7.5 G/DL
RETIRED EF AND QEF - SEE NOTES: 55 (ref 55–65)
TRICUSPID VALVE REGURGITATION: ABNORMAL
TRIGL SERPL-MCNC: 86 MG/DL

## 2017-12-15 PROCEDURE — 36415 COLL VENOUS BLD VENIPUNCTURE: CPT | Mod: PO

## 2017-12-15 PROCEDURE — 80076 HEPATIC FUNCTION PANEL: CPT

## 2017-12-15 PROCEDURE — 93306 TTE W/DOPPLER COMPLETE: CPT | Mod: S$GLB,,, | Performed by: INTERNAL MEDICINE

## 2017-12-15 PROCEDURE — 80061 LIPID PANEL: CPT

## 2017-12-20 ENCOUNTER — OFFICE VISIT (OUTPATIENT)
Dept: CARDIOLOGY | Facility: CLINIC | Age: 77
End: 2017-12-20
Payer: MEDICARE

## 2017-12-20 ENCOUNTER — TELEPHONE (OUTPATIENT)
Dept: PHARMACY | Facility: HOSPITAL | Age: 77
End: 2017-12-20

## 2017-12-20 VITALS — BODY MASS INDEX: 32.76 KG/M2 | WEIGHT: 196.63 LBS | HEIGHT: 65 IN

## 2017-12-20 DIAGNOSIS — I35.0 SEVERE AORTIC STENOSIS: Primary | ICD-10-CM

## 2017-12-20 DIAGNOSIS — E78.00 HYPERCHOLESTEROLEMIA: ICD-10-CM

## 2017-12-20 DIAGNOSIS — I34.0 NON-RHEUMATIC MITRAL REGURGITATION: ICD-10-CM

## 2017-12-20 DIAGNOSIS — I49.49 PREMATURE BEATS: ICD-10-CM

## 2017-12-20 DIAGNOSIS — Z79.01 CHRONIC ANTICOAGULATION: ICD-10-CM

## 2017-12-20 DIAGNOSIS — I25.709 CORONARY ARTERY DISEASE INVOLVING CORONARY BYPASS GRAFT OF NATIVE HEART WITH ANGINA PECTORIS: ICD-10-CM

## 2017-12-20 DIAGNOSIS — Z01.810 ENCOUNTER FOR PRE-OPERATIVE CARDIOVASCULAR CLEARANCE: ICD-10-CM

## 2017-12-20 DIAGNOSIS — Z01.818 PRE-OP TESTING: Primary | ICD-10-CM

## 2017-12-20 DIAGNOSIS — E66.9 OBESITY (BMI 30.0-34.9): ICD-10-CM

## 2017-12-20 PROCEDURE — 99214 OFFICE O/P EST MOD 30 MIN: CPT | Mod: S$GLB,,, | Performed by: INTERNAL MEDICINE

## 2017-12-20 RX ORDER — SODIUM CHLORIDE 9 MG/ML
INJECTION, SOLUTION INTRAVENOUS ONCE
Status: CANCELLED | OUTPATIENT
Start: 2017-12-20 | End: 2017-12-20

## 2017-12-20 RX ORDER — NITROGLYCERIN 0.4 MG/1
0.4 TABLET SUBLINGUAL EVERY 5 MIN PRN
Qty: 25 TABLET | Refills: 1 | Status: SHIPPED | OUTPATIENT
Start: 2017-12-20 | End: 2019-05-01 | Stop reason: SDUPTHER

## 2017-12-20 RX ORDER — POTASSIUM CHLORIDE 750 MG/1
10 TABLET, EXTENDED RELEASE ORAL
Qty: 24 TABLET | Refills: 1 | Status: SHIPPED | OUTPATIENT
Start: 2017-12-21 | End: 2019-05-20 | Stop reason: SDUPTHER

## 2017-12-20 RX ORDER — POTASSIUM CHLORIDE 750 MG/1
10 TABLET, EXTENDED RELEASE ORAL
Qty: 8 TABLET | Refills: 3 | Status: SHIPPED | OUTPATIENT
Start: 2017-12-21 | End: 2017-12-20 | Stop reason: SDUPTHER

## 2017-12-20 RX ORDER — NITROGLYCERIN 0.4 MG/1
0.4 TABLET SUBLINGUAL EVERY 5 MIN PRN
Qty: 25 TABLET | Refills: 0 | Status: SHIPPED | OUTPATIENT
Start: 2017-12-20 | End: 2017-12-20 | Stop reason: SDUPTHER

## 2017-12-20 NOTE — TELEPHONE ENCOUNTER
[facility] received a prescription for Repatha and we will contact their insurance company to find out if the medication is covered. We will update patient of status as more information is received. feel free to give us a call with  any questions at [phone].

## 2017-12-20 NOTE — PROGRESS NOTES
Subjective:    Patient ID:  Yael Claire is a 77 y.o. female who presents for Follow-up (3 month with echo, stress, holter, labs); Valvular Heart Disease; Irregular Heart Beat; and Coronary Artery Disease        HPI  DISCUSSED TESTS AND GOALS, NORMAL STRERSS, ECHO SEVERE AS AT 0.79 CM2, MILD/ MOD MR, HOLTER FEW PVC'S/PAC'S,, LFT'S OK,STATES COMPLIANT WITH MEDS, CRESTOR 40 MG,  SEE ROS    Past Medical History:   Diagnosis Date    Acute coronary syndrome     LIGHT 2004    Aortic valve disorder     Coronary artery disease     Heart murmur     Hyperlipidemia     Hypertension     Palpitations     Stenosis of aortic and mitral valves     Valvular regurgitation      Past Surgical History:   Procedure Laterality Date    CARDIAC CATHETERIZATION      CARDIAC VALVE SURGERY      CORONARY ARTERY BYPASS GRAFT  04/2005     History reviewed. No pertinent family history.  Social History     Social History    Marital status: Single     Spouse name: N/A    Number of children: N/A    Years of education: N/A     Social History Main Topics    Smoking status: Never Smoker    Smokeless tobacco: Never Used    Alcohol use No    Drug use: No    Sexual activity: Not Asked     Other Topics Concern    None     Social History Narrative    None       Review of patient's allergies indicates:   Allergen Reactions    Darvocet a500 [propoxyphene n-acetaminophen]     Mycinette [cetylpyridinium-benzocaine]     Pcn [penicillins]        Current Outpatient Prescriptions:     aspirin (ECOTRIN) 81 MG EC tablet, Take 81 mg by mouth once daily., Disp: , Rfl:     co-enzyme Q-10 30 mg capsule, Take 30 mg by mouth once daily. , Disp: , Rfl:     famotidine (PEPCID) 10 MG tablet, Take 10 mg by mouth once daily., Disp: , Rfl:     fish oil-omega-3 fatty acids 300-1,000 mg capsule, Take 2 g by mouth once daily., Disp: , Rfl:     fluoxetine (PROZAC) 20 MG capsule, Take 20 mg by mouth once daily., Disp: , Rfl:     FOLIC  ACID/MULTIVIT-MIN/LUTEIN (CENTRUM SILVER ORAL), Take by mouth once daily., Disp: , Rfl:     furosemide (LASIX) 20 MG tablet, Take 20 mg by mouth once daily. As needed, Disp: , Rfl:     gabapentin (NEURONTIN) 100 MG capsule, Take 300 mg by mouth every evening. , Disp: , Rfl:     metoprolol succinate (TOPROL-XL) 25 MG 24 hr tablet, TAKE 1 TABLET (25 MG TOTAL) BY MOUTH ONCE DAILY., Disp: 90 tablet, Rfl: 1    nitroGLYCERIN (NITROSTAT) 0.4 MG SL tablet, Place 0.4 mg under the tongue every 5 (five) minutes as needed for Chest pain., Disp: , Rfl:     potassium chloride (KLOR-CON) 10 MEQ TbSR, Take 1 tablet (10 mEq total) by mouth twice a week., Disp: 8 tablet, Rfl: 3    rosuvastatin (CRESTOR) 40 MG Tab, Take 1 tablet (40 mg total) by mouth every evening., Disp: 90 tablet, Rfl: 0    valsartan (DIOVAN) 160 MG tablet, Take 160 mg by mouth once daily., Disp: , Rfl:     vitamin B complex no.12-niacin 50 mg/15 mL Liqd, Take 1 tablet by mouth once daily., Disp: , Rfl:     vitamin D 1000 units Tab, Take 185 mg by mouth once daily., Disp: , Rfl:     Review of Systems   Constitution: Negative for chills, decreased appetite, diaphoresis, fever, weakness, malaise/fatigue, night sweats and weight gain.   HENT: Negative for congestion and nosebleeds.    Eyes: Negative for blurred vision, discharge and visual disturbance.   Cardiovascular: Positive for dyspnea on exertion (MILD), irregular heartbeat (OCC) and orthopnea. Negative for chest pain, claudication, cyanosis, near-syncope, paroxysmal nocturnal dyspnea and syncope. Leg swelling: OCC,ANKLES. Palpitations: OCC,WHEN MEDS WARES OFF.   Respiratory: Negative for hemoptysis, sputum production and wheezing. Cough: RECENT BRONCHITIS, BETTER. Shortness of breath: SOME.    Endocrine: Negative for cold intolerance, heat intolerance, polydipsia and polyphagia.   Hematologic/Lymphatic: Negative for adenopathy and bleeding problem. Does not bruise/bleed easily.   Skin: Negative for  "color change, itching and nail changes.   Musculoskeletal: Positive for arthritis. Negative for back pain and falls.   Gastrointestinal: Negative for abdominal pain, change in bowel habit, dysphagia, heartburn, hematemesis, jaundice and melena.   Genitourinary: Negative for dysuria, flank pain and frequency.   Neurological: Negative for brief paralysis, dizziness, focal weakness, light-headedness, loss of balance, numbness, sensory change and tremors.   Psychiatric/Behavioral: Negative for altered mental status, memory loss and substance abuse. The patient does not have insomnia and is not nervous/anxious.    Allergic/Immunologic: Negative for hives and persistent infections.        Objective:      Vitals:    12/20/17 1023   Weight: 89.2 kg (196 lb 10.4 oz)   Height: 5' 5" (1.651 m)   PainSc: 0-No pain     Body mass index is 32.72 kg/m².    Physical Exam   Constitutional: She is oriented to person, place, and time. She appears well-developed and well-nourished.   OVER WEIGHT   HENT:   Head: Normocephalic and atraumatic.   Mouth/Throat: Oropharynx is clear and moist and mucous membranes are normal.   Eyes: Conjunctivae and EOM are normal. Pupils are equal, round, and reactive to light. Right eye exhibits normal extraocular motion. Left eye exhibits normal extraocular motion.   Neck: Trachea normal and normal range of motion. Neck supple. Decreased carotid pulses present. No hepatojugular reflux and no JVD present. Carotid bruit: MURMUR TO NECK. No tracheal deviation and no erythema present. Edema: TRACE/R.   Cardiovascular: Normal rate and regular rhythm.   No extrasystoles are present. PMI is not displaced.  Exam reveals no gallop and no friction rub.    Murmur heard.   Harsh midsystolic murmur is present with a grade of 2/6  at the upper right sternal border radiating to the neck   Systolic murmur of grade 2/6 is also present at the upper right sternal border.  Pulses:       Carotid pulses are 2+ on the right side, " and 2+ on the left side.       Radial pulses are 2+ on the right side, and 2+ on the left side.        Dorsalis pedis pulses are 2+ on the right side, and 2+ on the left side.        Posterior tibial pulses are 2+ on the right side, and 2+ on the left side.   Pulmonary/Chest: Effort normal and breath sounds normal. No accessory muscle usage. No respiratory distress. She has no wheezes. She has no rales. She exhibits no tenderness.   LARGE STERNOTOMY SCAR   Abdominal: Soft. Bowel sounds are normal. She exhibits no distension and no mass. There is no hepatosplenomegaly. There is no tenderness. There is no guarding and no CVA tenderness.   Musculoskeletal: She exhibits no edema or tenderness.   SLOW GAIT   Lymphadenopathy:     She has no cervical adenopathy.   Neurological: She is alert and oriented to person, place, and time. She displays no tremor. No cranial nerve deficit. She exhibits normal muscle tone. Coordination normal.   Skin: Skin is warm and dry. No rash noted. No cyanosis or erythema. No pallor.   Psychiatric: She has a normal mood and affect. Her speech is normal and behavior is normal. Judgment and thought content normal.               ..    Chemistry    No results found for: NA, K, CL, CO2, BUN, CREATININE, GLU     Component Value Date/Time    ALKPHOS 110 12/15/2017 0849    AST 23 12/15/2017 0849    ALT 15 12/15/2017 0849    BILITOT 0.6 12/15/2017 0849            ..  Lab Results   Component Value Date    CHOL 246 (H) 12/15/2017     Lab Results   Component Value Date    HDL 48 12/15/2017     Lab Results   Component Value Date    LDLCALC 180.8 (H) 12/15/2017     Lab Results   Component Value Date    TRIG 86 12/15/2017     Lab Results   Component Value Date    CHOLHDL 19.5 (L) 12/15/2017     ..No results found for: WBC, HGB, HCT, MCV, PLT    Test(s) Reviewed  I have reviewed the following in detail:  [] Stress test   [] Angiography   [] Echocardiogram   [] Labs   [] Other:       Assessment:          ICD-10-CM ICD-9-CM   1. Severe aortic stenosis I35.0 424.1   2. Hypercholesterolemia E78.00 272.0   3. 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    Severe aortic stenosis - Primary    Hypercholesterolemia    Non-rheumatic mitral regurgitation           Plan:         LDL NOT AT TARGET WITH MAXIMUM CRESTOR, WILL ADD REPATHA, WILL LIKELY NEED REPEAT AVR, LAST 2005, COBY  TO ASSESS MR, AV PROTHESIS, THEN R/LHC, ALL OTHER  CV CLINICALLY STABLE, CLASS 1  ANGINA, NO HF, NO TIA, NO SIGNIFICANT CLINICAL ARRHYTHMIA,CONTINUE CURRENT MEDS, EDUCATION, DIET, EXERCISE, WEIGHT LOSS,OK FOR CATRACT SURGERY,ACCEPTABLE RISK  Severe aortic stenosis    Hypercholesterolemia    Coronary artery disease 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 Heart healthy diet and risk factor modification.    See labs and med orders.    Aerobic exercise 5x's/wk. Heart healthy diet and risk factor modification.    See labs and med orders.

## 2017-12-20 NOTE — PATIENT INSTRUCTIONS
Angiogram and COBY    Arrive for procedure at:  [facility] on 1/16/18 at 0800, osbaldo [place]on, patient registration, NPO after MN, HOLD fluid pill, take all other meds with water,     You will receive a phone call from [facility] Pre-Op Department with further instructions prior to your scheduled procedure.    Notify the nurse if you are ALLERGIC TO IODINE.    FASTING: You MAY NOT have anything to eat or drink AFTER MIDNIGHT the day before your procedure. If your procedure is scheduled in the afternoon, you may have a LIGHT BREAKFAST 6-8 hours prior to your procedure.  For example: Two slices of toast; black coffee or black tea.    MEDICATIONS: You may take your regular morning medications with water. If there are any medications that you should not take, you will be instructed to hold them for that morning.    ? CARDIOLOGY PRE-PROCEDURE MEDICATION ORDERS:  ** Please hold any medications that are checked below:    HOLD   # OF DAYS TO HOLD  ? Coumadin   Consult with Coumadin Clinic   ? Xarelto    _DAY BEFORE & DAY OF_  ? Pradaxa  _ DAY BEFORE & DAY OF _  ? Eliquis   _ DAY BEFORE & DAY OF _  ? Metformin    Day before procedure & morning of procedure  ? Short acting insulin   Morning of procedure    CONTINUE the Following Medications   ? Plavix      ? Effient     ? Aspirin        WHAT TO EXPECT:    How long will the procedure take?  The procedure will take an average of 1 - 2 hours to perform.  After the procedure, you will need to lay flat for around 4 - 6 hours to minimize bleeding from the puncture site. If the wrist is accessed you will need to keep your arm still as instructed by the nurse.    When can I go home?  You may be able to be discharged home that same afternoon if there were no complications.  If you have one of the following: balloon; stent; pacemaker or defibrillator procedures, you may spend one night for observation.  Your doctor will determine your discharge based upon your progress.  The  results of your procedure will be discussed with you before you are discharged.  Any further testing or procedures will be scheduled for you either before you leave or you will be instructed to call for a future appointment.      TRANSPORTATION:  PLEASE ARRANGE TO HAVE SOMEONE DRIVE YOU HOME FOLLOWING YOUR PROCEDURE, YOU WILL NOT BE ALLOWED TO DRIVE.

## 2017-12-27 NOTE — TELEPHONE ENCOUNTER
Documentation Only:    Faxed Prior Authorization form and supporting documentation for Repatha to insurance on 12/27/2017

## 2017-12-28 NOTE — TELEPHONE ENCOUNTER
Documentation Only:    Prior Authorization for Repatha has been approved for two years.    Approval dates:  12/27/2017 through 12/27/2019    Patient co-pay: $8.25

## 2018-01-09 ENCOUNTER — TELEPHONE (OUTPATIENT)
Dept: CARDIOLOGY | Facility: CLINIC | Age: 78
End: 2018-01-09

## 2018-01-09 NOTE — TELEPHONE ENCOUNTER
----- Message from Gabriella Ceja sent at 1/9/2018  2:20 PM CST -----  Patient would like to speak to office concerning an injection that office wants her to have. This was about two weeks ago. She has a few questions concerning this. Please call back for more details at [phone].

## 2018-01-10 NOTE — TELEPHONE ENCOUNTER
Initial Repatha Sureclick 140mg/ml Pens consult completed on 1/10/18 . Repatha Sureclick 140mg/ml Pens will be shipped on 1/10/18 to arrive at patient's home on 18 via [employer]. $ 8.35 copay (004)- INVOICE. Patient will start Repatha Sureclick 140mg/ml Pens on  18 - pending appt with Patty Amaral nurse for in home injection training. Address confirmed - apartment, signature required . Confirmed 2 patient identifiers - name and . Therapy Appropriate.  --Injection experience: none, but saw the commercial. She will arrange 1st injection with Concepcion RN [phone].    Counseled patient on administration directions:  - Inject Repatha Sureclick 140mg/ml Pens into the skin every 14 days.   - Take out of the refrigerator 30-60 minutes prior to injection.  - Wash hands before and after injection.  - Monthly RX will come with gauze, bandaids, and alcohol swabs.  - Patient may inject in either the tops of the thighs, abdomen- but at least 2 inches away from her belly button, or the outer part of her upper arm.  Patient was instructed to rotate injections sites.  - Patient is to wipe down the injection site with the alcohol pad, wait to dry.  Gently squeeze the area of the cleaned skin and hold it firmly.   Place the pen flat against the raised area of skin that is being squeezed, then push down on the button and release,  in 10-15 seconds you will hear a click and the window will go from from clear to yellow, indicating injection is complete.  - Patient should rotate injection sites.   - Patient will use sharps container; once full, per [place] law, she/ he may lock the sharps container and place in her trash. She/ he can then contact the Pharmacy and we will replace the sharps at no additional charge.    Patient was counseled on possible side effects:  - Injection site reaction: redness, soreness, itching, bruising, which should resolve within 3-5 days.  - flu-like symptoms    DDIs: none. Medication list  reviewed. Patient directed to contact [facility] prior to starting any RX, OTC, or herbal medications. She was advised to continue current cholesterol meds until told otherwise by her provider.     Patient did not have any further questions. She was advised to keep a calendar to stay compliant. Consultation included: indication; goals of treatment; administration; storage and handling; side effects; how to handle side effects; the importance of compliance; how to handle missed doses; the importance of laboratory monitoring; the importance of keeping all follow up appointments.  Patient understands to report any medication changes to [facility] and provider. All questions answered and addressed to patients satisfaction. I will f/u with her in 1 week from start, [facility] to contact patient in 3 weeks for refills.

## 2018-01-31 ENCOUNTER — TELEPHONE (OUTPATIENT)
Dept: PHARMACY | Facility: CLINIC | Age: 78
End: 2018-01-31

## 2018-02-21 RX ORDER — ROSUVASTATIN CALCIUM 40 MG/1
40 TABLET, COATED ORAL NIGHTLY
Qty: 90 TABLET | Refills: 1 | Status: SHIPPED | OUTPATIENT
Start: 2018-02-21 | End: 2020-09-04 | Stop reason: SDUPTHER

## 2018-02-28 ENCOUNTER — TELEPHONE (OUTPATIENT)
Dept: PHARMACY | Facility: CLINIC | Age: 78
End: 2018-02-28

## 2018-02-28 ENCOUNTER — OFFICE VISIT (OUTPATIENT)
Dept: CARDIOLOGY | Facility: CLINIC | Age: 78
End: 2018-02-28
Payer: MEDICARE

## 2018-02-28 VITALS
HEART RATE: 68 BPM | DIASTOLIC BLOOD PRESSURE: 60 MMHG | HEIGHT: 65 IN | BODY MASS INDEX: 32.18 KG/M2 | OXYGEN SATURATION: 98 % | SYSTOLIC BLOOD PRESSURE: 110 MMHG | WEIGHT: 193.13 LBS

## 2018-02-28 DIAGNOSIS — E66.9 OBESITY (BMI 30.0-34.9): ICD-10-CM

## 2018-02-28 DIAGNOSIS — I25.709 CORONARY ARTERY DISEASE INVOLVING CORONARY BYPASS GRAFT OF NATIVE HEART WITH ANGINA PECTORIS: Primary | ICD-10-CM

## 2018-02-28 DIAGNOSIS — I10 ESSENTIAL HYPERTENSION: ICD-10-CM

## 2018-02-28 DIAGNOSIS — I08.0 MITRAL AND AORTIC VALVE DISEASE: ICD-10-CM

## 2018-02-28 DIAGNOSIS — E78.2 MIXED HYPERLIPIDEMIA: ICD-10-CM

## 2018-02-28 PROCEDURE — 99213 OFFICE O/P EST LOW 20 MIN: CPT | Mod: S$GLB,,, | Performed by: INTERNAL MEDICINE

## 2018-02-28 NOTE — PROGRESS NOTES
Subjective:    Patient ID:  Yael Claire is a 77 y.o. female who presents for Follow-up ([facility] and [facility]); Valvular Heart Disease; Hypertension; and Coronary Artery Disease        HPI  S/P [facility] MILD MR/AI, THICK AV, CATH, PATENT SVG TO OCCLUDED RCA, HILLARY ? 0.9, LOW GRADIENT, UNDERESTIMATED, HAD R BROKEN FOOT, DOING OK OTHERWISE, SEE ROS    Past Medical History:   Diagnosis Date    Acute coronary syndrome     LIGHT 2004    Anticoagulant long-term use     Aortic valve disorder     Arthritis     Coronary artery disease     cabg & valve sgy    Heart murmur     Hyperlipidemia     Hypertension     Palpitations     Stenosis of aortic and mitral valves     Valvular regurgitation      Past Surgical History:   Procedure Laterality Date    CARDIAC CATHETERIZATION      CARDIAC VALVE SURGERY      CORONARY ARTERY BYPASS GRAFT  04/2005    HYSTERECTOMY  1980     Family History   Problem Relation Age of Onset    Hypertension Mother     Heart disease Mother     Hypertension Father     Heart disease Father      Social History     Social History    Marital status: Single     Spouse name: N/A    Number of children: N/A    Years of education: N/A     Social History Main Topics    Smoking status: Never Smoker    Smokeless tobacco: Never Used    Alcohol use Yes      Comment: couple times per yr    Drug use: No    Sexual activity: Not Asked     Other Topics Concern    None     Social History Narrative    None       Review of patient's allergies indicates:   Allergen Reactions    Darvocet a500 [propoxyphene n-acetaminophen]     Mycinette [cetylpyridinium-benzocaine]     Pcn [penicillins]        Current Outpatient Prescriptions:     aspirin (ECOTRIN) 81 MG EC tablet, Take 81 mg by mouth once daily., Disp: , Rfl:     co-enzyme Q-10 30 mg capsule, Take 30 mg by mouth once daily. , Disp: , Rfl:     evolocumab (REPATHA SURECLICK) 140 mg/mL PnIj, Inject 140 mg into the skin every 14 (fourteen) days., Disp: 2 Syringe,  Rfl: 4    famotidine (PEPCID) 10 MG tablet, Take 10 mg by mouth once daily., Disp: , Rfl:     fish oil-omega-3 fatty acids 300-1,000 mg capsule, Take 2 g by mouth once daily., Disp: , Rfl:     FOLIC ACID/MULTIVIT-MIN/LUTEIN (CENTRUM SILVER ORAL), Take by mouth once daily., Disp: , Rfl:     furosemide (LASIX) 20 MG tablet, Take 20 mg by mouth daily as needed. As needed, Disp: , Rfl:     metoprolol succinate (TOPROL-XL) 25 MG 24 hr tablet, TAKE 1 TABLET (25 MG TOTAL) BY MOUTH ONCE DAILY., Disp: 90 tablet, Rfl: 1    nitroGLYCERIN (NITROSTAT) 0.4 MG SL tablet, Place 1 tablet (0.4 mg total) under the tongue every 5 (five) minutes as needed for Chest pain., Disp: 25 tablet, Rfl: 1    ODORLESS GARLIC ORAL, Take by mouth once daily., Disp: , Rfl:     potassium chloride (KLOR-CON) 10 MEQ TbSR, Take 1 tablet (10 mEq total) by mouth twice a week., Disp: 24 tablet, Rfl: 1    rosuvastatin (CRESTOR) 40 MG Tab, TAKE 1 TABLET (40 MG TOTAL) BY MOUTH EVERY EVENING., Disp: 90 tablet, Rfl: 1    valsartan (DIOVAN) 160 MG tablet, Take 80 mg by mouth 2 (two) times daily. , Disp: , Rfl:     vitamin B complex no.12-niacin 50 mg/15 mL Liqd, Take 1 tablet by mouth once daily., Disp: , Rfl:     vitamin D 1000 units Tab, Take 185 mg by mouth once daily., Disp: , Rfl:     Review of Systems   Constitution: Negative for chills, decreased appetite, diaphoresis, fever, weakness, malaise/fatigue and night sweats.   HENT: Negative for congestion and nosebleeds.    Eyes: Negative for blurred vision and visual disturbance (CATARACT).   Cardiovascular: Negative for chest pain, claudication, cyanosis, dyspnea on exertion (MILD), irregular heartbeat, near-syncope, orthopnea, palpitations, paroxysmal nocturnal dyspnea and syncope. Leg swelling: OCC,ANKLES.   Respiratory: Negative for hemoptysis, shortness of breath, sputum production and wheezing. Cough: RECENT BRONCHITIS, BETTER.    Endocrine: Negative for cold intolerance, heat intolerance,  "polydipsia and polyphagia.   Hematologic/Lymphatic: Negative for adenopathy and bleeding problem. Does not bruise/bleed easily.   Skin: Negative for itching and rash.   Musculoskeletal: Positive for arthritis. Negative for back pain and falls.   Gastrointestinal: Negative for abdominal pain, change in bowel habit, dysphagia, heartburn, hematemesis and jaundice.   Genitourinary: Negative for dysuria, flank pain and frequency.   Neurological: Negative for brief paralysis, dizziness, focal weakness, light-headedness, loss of balance, numbness, sensory change and tremors.   Psychiatric/Behavioral: Negative for altered mental status, memory loss and substance abuse. The patient does not have insomnia and is not nervous/anxious.    Allergic/Immunologic: Negative for hives and persistent infections.        Objective:      Vitals:    02/28/18 1011   BP: (!) 110/56   Pulse: 68   SpO2: 98%   Weight: 87.6 kg (193 lb 2 oz)   Height: 5' 5" (1.651 m)   PainSc:   7   PainLoc: Hand     Body mass index is 32.14 kg/m².    Physical Exam   Constitutional: She is oriented to person, place, and time. She appears well-developed and well-nourished.   OVER WEIGHT   HENT:   Head: Normocephalic and atraumatic.   Mouth/Throat: Oropharynx is clear and moist and mucous membranes are normal.   Eyes: Conjunctivae and EOM are normal. Pupils are equal, round, and reactive to light. Right eye exhibits normal extraocular motion. Left eye exhibits normal extraocular motion.   Neck: Trachea normal and normal range of motion. Neck supple. Decreased carotid pulses (SLIGHTLY) present. No hepatojugular reflux and no JVD present. Carotid bruit: MURMUR TO NECK. No tracheal deviation and no erythema present.   Cardiovascular: Normal rate and regular rhythm.   No extrasystoles are present. PMI is not displaced.  Exam reveals no gallop and no friction rub.    Murmur heard.   Harsh midsystolic murmur is present with a grade of 1/6  at the upper right sternal " border radiating to the neck   Systolic murmur of grade 2/6 is also present at the upper right sternal border.  Pulses:       Carotid pulses are 2+ on the right side, and 2+ on the left side.       Radial pulses are 2+ on the right side, and 2+ on the left side.        Dorsalis pedis pulses are 2+ on the right side, and 2+ on the left side.        Posterior tibial pulses are 2+ on the left side.   R FOOT WRAP   Pulmonary/Chest: Effort normal and breath sounds normal. No accessory muscle usage. No respiratory distress. She has no wheezes. She has no rales. She exhibits no tenderness.   LARGE STERNOTOMY SCAR   Abdominal: Soft. Bowel sounds are normal. She exhibits no distension and no mass. There is no hepatosplenomegaly. There is no tenderness. There is no guarding and no CVA tenderness.   Musculoskeletal: She exhibits no edema or tenderness.   SLOW GAIT, TRACE EDEMA   Lymphadenopathy:     She has no cervical adenopathy.   Neurological: She is alert and oriented to person, place, and time. She displays no tremor. No cranial nerve deficit. She exhibits normal muscle tone. Coordination normal.   Skin: Skin is warm and dry. No rash noted. No cyanosis or erythema. No pallor.   Psychiatric: She has a normal mood and affect. Her speech is normal and behavior is normal. Judgment and thought content normal.               ..    Chemistry        Component Value Date/Time     01/23/2018 1034    K 4.3 01/23/2018 1034     01/23/2018 1034    CO2 27 01/23/2018 1034    BUN 12 01/23/2018 1034    CREATININE 0.67 01/23/2018 1034     01/23/2018 1034        Component Value Date/Time    CALCIUM 8.8 01/23/2018 1034    ALKPHOS 110 12/15/2017 0849    AST 23 12/15/2017 0849    ALT 15 12/15/2017 0849    BILITOT 0.6 12/15/2017 0849    ESTGFRAFRICA >60 01/23/2018 1034    EGFRNONAA >60 01/23/2018 1034            ..  Lab Results   Component Value Date    CHOL 246 (H) 12/15/2017     Lab Results   Component Value Date    HDL 48  12/15/2017     Lab Results   Component Value Date    LDLCALC 180.8 (H) 12/15/2017     Lab Results   Component Value Date    TRIG 86 12/15/2017     Lab Results   Component Value Date    CHOLHDL 19.5 (L) 12/15/2017     ..  Lab Results   Component Value Date    WBC 6.28 01/23/2018    HGB 12.9 01/23/2018    HCT 40.1 01/23/2018    MCV 94 01/23/2018     01/23/2018       Test(s) Reviewed  I have reviewed the following in detail:  [] Stress test   [x] Angiography   [x] Echocardiogram   [x] Labs   [] Other:       Assessment:         ICD-10-CM ICD-9-CM   1. Coronary artery disease involving coronary bypass graft of native heart with angina pectoris I25.709 414.05     413.9   2. Mitral and aortic valve disease I08.0 396.9   3. Essential hypertension I10 401.9   4. Mixed hyperlipidemia E78.2 272.2   5. 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 - Primary    Relevant Orders    Comprehensive metabolic panel    Mixed hyperlipidemia    Relevant Orders    Comprehensive metabolic panel    Lipid panel    Mitral and aortic valve disease    Relevant Orders    Comprehensive metabolic panel       Endocrine    Obesity (BMI 30.0-34.9)           Plan:     ALL CV CLINICALLY STABLE, NO ANGINA, NO HF, NO TIA, NO CLINICAL ARRHYTHMIA,CONTINUE CURRENT MEDS, EDUCATION, DIET, EXERCISE, WEIGHT LOSS, MONITOR AS, NOT SEVERE FOR REPEAT AVR, LONG DISCUSSION, RTC IN 3-4 MO WITH LABS      Coronary artery disease 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 Heart healthy diet and risk factor modification.    See labs and med orders.    Aerobic exercise 5x's/wk. Heart healthy diet and risk factor modification.    See labs and med orders.

## 2018-03-27 ENCOUNTER — TELEPHONE (OUTPATIENT)
Dept: PHARMACY | Facility: CLINIC | Age: 78
End: 2018-03-27

## 2018-04-01 RX ORDER — METOPROLOL SUCCINATE 25 MG/1
25 TABLET, EXTENDED RELEASE ORAL DAILY
Qty: 90 TABLET | Refills: 1 | Status: SHIPPED | OUTPATIENT
Start: 2018-04-01 | End: 2018-09-26 | Stop reason: SDUPTHER

## 2018-04-26 ENCOUNTER — TELEPHONE (OUTPATIENT)
Dept: PHARMACY | Facility: CLINIC | Age: 78
End: 2018-04-26

## 2018-05-23 ENCOUNTER — TELEPHONE (OUTPATIENT)
Dept: PHARMACY | Facility: CLINIC | Age: 78
End: 2018-05-23

## 2018-06-22 ENCOUNTER — TELEPHONE (OUTPATIENT)
Dept: PHARMACY | Facility: CLINIC | Age: 78
End: 2018-06-22

## 2018-06-27 ENCOUNTER — OFFICE VISIT (OUTPATIENT)
Dept: CARDIOLOGY | Facility: CLINIC | Age: 78
End: 2018-06-27
Payer: MEDICARE

## 2018-06-27 VITALS
WEIGHT: 193.56 LBS | DIASTOLIC BLOOD PRESSURE: 53 MMHG | HEART RATE: 71 BPM | BODY MASS INDEX: 32.25 KG/M2 | HEIGHT: 65 IN | OXYGEN SATURATION: 95 % | SYSTOLIC BLOOD PRESSURE: 112 MMHG

## 2018-06-27 DIAGNOSIS — E66.9 OBESITY (BMI 30.0-34.9): ICD-10-CM

## 2018-06-27 DIAGNOSIS — I25.709 CORONARY ARTERY DISEASE INVOLVING CORONARY BYPASS GRAFT OF NATIVE HEART WITH ANGINA PECTORIS: ICD-10-CM

## 2018-06-27 DIAGNOSIS — I95.2 HYPOTENSION DUE TO DRUGS: ICD-10-CM

## 2018-06-27 DIAGNOSIS — E78.2 MIXED HYPERLIPIDEMIA: ICD-10-CM

## 2018-06-27 DIAGNOSIS — I10 ESSENTIAL HYPERTENSION: ICD-10-CM

## 2018-06-27 DIAGNOSIS — I08.0 MITRAL AND AORTIC VALVE DISEASE: Primary | ICD-10-CM

## 2018-06-27 DIAGNOSIS — I49.49 PREMATURE BEATS: ICD-10-CM

## 2018-06-27 PROCEDURE — 99214 OFFICE O/P EST MOD 30 MIN: CPT | Mod: S$GLB,,, | Performed by: INTERNAL MEDICINE

## 2018-06-27 PROCEDURE — 3074F SYST BP LT 130 MM HG: CPT | Mod: CPTII,S$GLB,, | Performed by: INTERNAL MEDICINE

## 2018-06-27 PROCEDURE — 3078F DIAST BP <80 MM HG: CPT | Mod: CPTII,S$GLB,, | Performed by: INTERNAL MEDICINE

## 2018-06-27 RX ORDER — LANOLIN ALCOHOL/MO/W.PET/CERES
1000 CREAM (GRAM) TOPICAL DAILY
COMMUNITY
End: 2019-08-22

## 2018-06-27 RX ORDER — VALSARTAN 40 MG/1
40 TABLET ORAL DAILY
Qty: 90 TABLET | Refills: 1 | Status: SHIPPED | OUTPATIENT
Start: 2018-06-27 | End: 2019-01-30 | Stop reason: CLARIF

## 2018-06-27 NOTE — PROGRESS NOTES
Subjective:    Patient ID:  Yael Claire is a 77 y.o. female who presents for Coronary Artery Disease (labs); Valvular Heart Disease; and Irregular Heart Beat        HPI    DISCUSSED LABS AND GOALS, LDL DOWN TO 78, HDL 44, HAS BEEN TAKING REPATHA, DOING OK, NO CP, NO SOB, OA SX,RECENT INJECTION IN L HIP, DBP ON LOW END,  SEE ROS  Past Medical History:   Diagnosis Date    Acute coronary syndrome     LIGHT 2004    Anticoagulant long-term use     Aortic valve disorder     Arthritis     Coronary artery disease     cabg & valve sgy    Heart murmur     Hyperlipidemia     Hypertension     Palpitations     Stenosis of aortic and mitral valves     Valvular regurgitation      Past Surgical History:   Procedure Laterality Date    CARDIAC CATHETERIZATION      CARDIAC VALVE SURGERY      CORONARY ARTERY BYPASS GRAFT  04/2005    HYSTERECTOMY  1980     Family History   Problem Relation Age of Onset    Hypertension Mother     Heart disease Mother     Hypertension Father     Heart disease Father      Social History     Social History    Marital status: Single     Spouse name: N/A    Number of children: N/A    Years of education: N/A     Social History Main Topics    Smoking status: Never Smoker    Smokeless tobacco: Never Used    Alcohol use Yes      Comment: couple times per yr    Drug use: No    Sexual activity: Not on file     Other Topics Concern    Not on file     Social History Narrative    No narrative on file       Review of patient's allergies indicates:   Allergen Reactions    Darvocet a500 [propoxyphene n-acetaminophen]     Mycinette [cetylpyridinium-benzocaine]     Pcn [penicillins]        Current Outpatient Prescriptions:     aspirin (ECOTRIN) 81 MG EC tablet, Take 81 mg by mouth once daily., Disp: , Rfl:     co-enzyme Q-10 30 mg capsule, Take 30 mg by mouth once daily. , Disp: , Rfl:     cyanocobalamin (VITAMIN B-12) 1000 MCG tablet, Take 1,000 mcg by mouth once daily., Disp: , Rfl:      evolocumab (REPATHA SURECLICK) 140 mg/mL PnIj, Inject 140 mg into the skin every 14 (fourteen) days., Disp: 2 mL, Rfl: 4    famotidine (PEPCID) 10 MG tablet, Take 10 mg by mouth once daily., Disp: , Rfl:     fish oil-omega-3 fatty acids 300-1,000 mg capsule, Take 2 g by mouth once daily., Disp: , Rfl:     FOLIC ACID/MULTIVIT-MIN/LUTEIN (CENTRUM SILVER ORAL), Take by mouth once daily., Disp: , Rfl:     furosemide (LASIX) 20 MG tablet, Take 20 mg by mouth daily as needed. As needed, Disp: , Rfl:     metoprolol succinate (TOPROL-XL) 25 MG 24 hr tablet, TAKE 1 TABLET (25 MG TOTAL) BY MOUTH ONCE DAILY., Disp: 90 tablet, Rfl: 1    nitroGLYCERIN (NITROSTAT) 0.4 MG SL tablet, Place 1 tablet (0.4 mg total) under the tongue every 5 (five) minutes as needed for Chest pain., Disp: 25 tablet, Rfl: 1    potassium chloride (KLOR-CON) 10 MEQ TbSR, Take 1 tablet (10 mEq total) by mouth twice a week., Disp: 24 tablet, Rfl: 1    rosuvastatin (CRESTOR) 40 MG Tab, TAKE 1 TABLET (40 MG TOTAL) BY MOUTH EVERY EVENING., Disp: 90 tablet, Rfl: 1    vitamin D 1000 units Tab, Take 185 mg by mouth once daily., Disp: , Rfl:     ODORLESS GARLIC ORAL, Take by mouth once daily., Disp: , Rfl:     valsartan (DIOVAN) 40 MG tablet, Take 1 tablet (40 mg total) by mouth once daily., Disp: 90 tablet, Rfl: 1    Review of Systems   Constitution: Negative for chills, decreased appetite, diaphoresis, fever, weakness and malaise/fatigue.   HENT: Negative for congestion and nosebleeds.    Eyes: Negative for blurred vision and visual disturbance.   Cardiovascular: Negative for chest pain, claudication, cyanosis, dyspnea on exertion (MILD), irregular heartbeat, near-syncope, orthopnea, palpitations, paroxysmal nocturnal dyspnea and syncope. Leg swelling: OCC,ANKLES.   Respiratory: Negative for cough, hemoptysis, shortness of breath and wheezing.    Endocrine: Negative for cold intolerance and heat intolerance.   Hematologic/Lymphatic: Negative for  "adenopathy and bleeding problem. Does not bruise/bleed easily.   Skin: Negative for itching and rash.   Musculoskeletal: Positive for arthritis. Negative for back pain and falls.   Gastrointestinal: Negative for abdominal pain, change in bowel habit, dysphagia, hematemesis and nausea.   Genitourinary: Negative for dysuria, flank pain and frequency.   Neurological: Negative for brief paralysis, dizziness, focal weakness, light-headedness, loss of balance, numbness (L HAND CARPAL TUNNEL) and tremors.   Psychiatric/Behavioral: Negative for altered mental status and memory loss. The patient does not have insomnia.    Allergic/Immunologic: Negative for hives and persistent infections.        Objective:      Vitals:    06/27/18 1050   BP: (!) 112/53   Pulse: 71   SpO2: 95%   Weight: 87.8 kg (193 lb 9 oz)   Height: 5' 5" (1.651 m)   PainSc:   6   PainLoc: Hip     Body mass index is 32.21 kg/m².    Physical Exam   Constitutional: She is oriented to person, place, and time. She appears well-developed and well-nourished.   OVER WEIGHT   HENT:   Head: Normocephalic and atraumatic.   Mouth/Throat: Oropharynx is clear and moist and mucous membranes are normal.   Eyes: Conjunctivae and EOM are normal. Pupils are equal, round, and reactive to light. Right eye exhibits normal extraocular motion. Left eye exhibits normal extraocular motion.   Neck: Trachea normal and normal range of motion. Neck supple. Decreased carotid pulses (SLIGHTLY) present. No hepatojugular reflux and no JVD present. Carotid bruit: MURMUR TO NECK. No tracheal deviation and no erythema present.   Cardiovascular: Normal rate and regular rhythm.   No extrasystoles are present. PMI is not displaced.  Exam reveals no gallop and no friction rub.    Murmur heard.   Harsh midsystolic murmur is present with a grade of 1/6  at the upper right sternal border radiating to the neck   Systolic murmur of grade 2/6 is also present at the upper right sternal " border.  Pulses:       Carotid pulses are 2+ on the right side, and 2+ on the left side.       Radial pulses are 2+ on the right side, and 2+ on the left side.        Dorsalis pedis pulses are 2+ on the right side, and 2+ on the left side.        Posterior tibial pulses are 2+ on the left side.   R FOOT WRAP   Pulmonary/Chest: Effort normal and breath sounds normal. No accessory muscle usage. No respiratory distress. She has no wheezes. She has no rales. She exhibits no tenderness.   LARGE STERNOTOMY SCAR   Abdominal: Soft. Bowel sounds are normal. She exhibits no distension. There is no hepatosplenomegaly. There is no tenderness. There is no CVA tenderness.   Musculoskeletal: She exhibits no edema or tenderness.   SLOW GAIT, TRACE EDEMA   Lymphadenopathy:     She has no cervical adenopathy.   Neurological: She is alert and oriented to person, place, and time. She displays no tremor. No cranial nerve deficit.   Skin: Skin is warm and dry. No rash noted. No cyanosis or erythema. No pallor.   Psychiatric: She has a normal mood and affect. Her speech is normal and behavior is normal.               ..    Chemistry        Component Value Date/Time     01/23/2018 1034    K 4.3 01/23/2018 1034     01/23/2018 1034    CO2 27 01/23/2018 1034    BUN 12 01/23/2018 1034    CREATININE 0.67 01/23/2018 1034     01/23/2018 1034        Component Value Date/Time    CALCIUM 8.8 01/23/2018 1034    ALKPHOS 110 12/15/2017 0849    AST 23 12/15/2017 0849    ALT 15 12/15/2017 0849    BILITOT 0.6 12/15/2017 0849    ESTGFRAFRICA >60 01/23/2018 1034    EGFRNONAA >60 01/23/2018 1034            ..  Lab Results   Component Value Date    CHOL 246 (H) 12/15/2017     Lab Results   Component Value Date    HDL 48 12/15/2017     Lab Results   Component Value Date    LDLCALC 180.8 (H) 12/15/2017     Lab Results   Component Value Date    TRIG 86 12/15/2017     Lab Results   Component Value Date    CHOLHDL 19.5 (L) 12/15/2017     ..  Lab  Results   Component Value Date    WBC 6.28 01/23/2018    HGB 12.9 01/23/2018    HCT 40.1 01/23/2018    MCV 94 01/23/2018     01/23/2018       Test(s) Reviewed  I have reviewed the following in detail:  [] Stress test   [] Angiography   [] Echocardiogram   [x] Labs   [] Other:       Assessment:         ICD-10-CM ICD-9-CM   1. Mitral and aortic valve disease I08.0 396.9   2. 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 Hypotension due to drugs I95.2 458.8     E947.9   4. Premature beats I49.49 427.60   5. Essential hypertension I10 401.9   6. Mixed hyperlipidemia E78.2 272.2   7. 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    Mixed hyperlipidemia    Premature beats    Mitral and aortic valve disease - Primary    Hypotension due to drugs       Endocrine    Obesity (BMI 30.0-34.9)           Plan:     DECREASE VALSARTAN TO 40 MG, WATCH BP, STRICTER DIET,DAILY CRESTOR,  ALL OTHER CV CLINICALLY STABLE, CLASS 01-  ANGINA, NO HF, NO TIA, NO SIGNFICANT CLINICAL ARRHYTHMIA,CONTINUE CURRENT MEDS, EDUCATION, DIET, EXERCISE      Mitral and aortic valve disease    Coronary artery disease 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  Dispense: 90 tablet; Refill: 1    RTC Low level/low impact aerobic exercise 5x's/wk. Heart healthy diet and risk factor modification.    See labs and med orders.    Aerobic exercise 5x's/wk. Heart healthy diet and risk factor modification.    See labs and med orders.

## 2018-07-20 ENCOUNTER — TELEPHONE (OUTPATIENT)
Dept: PHARMACY | Facility: CLINIC | Age: 78
End: 2018-07-20

## 2018-08-16 ENCOUNTER — TELEPHONE (OUTPATIENT)
Dept: PHARMACY | Facility: CLINIC | Age: 78
End: 2018-08-16

## 2018-08-16 NOTE — TELEPHONE ENCOUNTER
Repatha [facility] PENS follow up and refill confirmed. We will ship Repatha refill on 18 via fedex to arrive on 18. $0.00 copay- 004. Confirmed 2 patient identifiers - name and .      Patient self administers Repatha every other  without any difficulties.  Confirms alternating thighs each time.  She has one dose on hand in the fridge, next injection due 18.   No side effects noted.   No missed doses, no new allergies or health conditions reported at this time.    Provider had recently discontinued valsartan due to recalls and patient was started on losartan.  No DDIs with [facility].  Patient reports appt with provider next month, Sept, to follow up.   All questions answered and addressed to patients satisfaction. Pt verbalized understanding.

## 2018-09-11 ENCOUNTER — TELEPHONE (OUTPATIENT)
Dept: PHARMACY | Facility: CLINIC | Age: 78
End: 2018-09-11

## 2018-09-26 RX ORDER — METOPROLOL SUCCINATE 25 MG/1
25 TABLET, EXTENDED RELEASE ORAL DAILY
Qty: 90 TABLET | Refills: 1 | Status: SHIPPED | OUTPATIENT
Start: 2018-09-26 | End: 2019-05-20

## 2018-10-18 ENCOUNTER — TELEPHONE (OUTPATIENT)
Dept: PHARMACY | Facility: CLINIC | Age: 78
End: 2018-10-18

## 2018-10-19 NOTE — TELEPHONE ENCOUNTER
"Incoming call for Repatha refill. Confirmed two patient identifiers - name + . Patient denies any missed doses or side effects. She mentions "itching" on her chest that presented about a month or so ago. Advised that it is not likely the medication b/c she's been on it for a while. She is directed to keep it moisturized to see if that helps and to call [facility] if worsened/persists. She does not have any doses on hand and next one is due on 10/28/18. She confirms no changes to insurance, medications, health conditions or allergies. She asks [facility] to ship Repatha out on 10/23/18 to arrive at her home on 10/24/18 via [employer]. Address confirmed - signature requirement requested. Stressed that package will NOT be left at door, but patient was insistent that she wanted to sign for the package.  $0 copay (004). All questions answered to patient's satisfaction. [facility] will continue to contact patient monthly for refills. TTN  "

## 2018-11-13 ENCOUNTER — TELEPHONE (OUTPATIENT)
Dept: PHARMACY | Facility: CLINIC | Age: 78
End: 2018-11-13

## 2018-11-15 ENCOUNTER — TELEPHONE (OUTPATIENT)
Dept: PHARMACY | Facility: CLINIC | Age: 78
End: 2018-11-15

## 2019-01-10 ENCOUNTER — TELEPHONE (OUTPATIENT)
Dept: PHARMACY | Facility: CLINIC | Age: 79
End: 2019-01-10

## 2019-01-10 NOTE — TELEPHONE ENCOUNTER
Repatha [facility] refill and f/u on rash attempted.  No answer.  LVM for call back. Both home and mobile #s tried.  $8.50 copay in 004.

## 2019-01-11 NOTE — TELEPHONE ENCOUNTER
Repatha follow up and refill confirmed. We will ship Repatha refill on 19 via fedex to arrive on 19. $8.50 copay- 004. Confirmed 2 patient identifiers - name and .      Patient reports itching around her chest has gotten better.  She uses cortisone cream as needed.  Informed patient itching can be from detergent or dry weather.  Issue does not bother her and she plans to continue Repatha as described.    Patient self injects Repatha on her thigh every other  without any difficulties.  Confirms rotating injection sites and storing med in the fridge.   She has zero doses on hand, last injection on 19.  No side effects noted.  No missed doses, no new allergies or health conditions reported at this time.  Patient is on new BP medication, Olmesartan - No DDIs.   No longer on valsartan or losartan due to a recall.  All questions answered and addressed to patients satisfaction. Pt verbalized understanding.

## 2019-01-30 ENCOUNTER — OFFICE VISIT (OUTPATIENT)
Dept: CARDIOLOGY | Facility: CLINIC | Age: 79
End: 2019-01-30
Payer: MEDICARE

## 2019-01-30 VITALS
HEIGHT: 65 IN | WEIGHT: 193.31 LBS | OXYGEN SATURATION: 98 % | SYSTOLIC BLOOD PRESSURE: 134 MMHG | BODY MASS INDEX: 32.21 KG/M2 | DIASTOLIC BLOOD PRESSURE: 62 MMHG | HEART RATE: 72 BPM

## 2019-01-30 DIAGNOSIS — E78.2 MIXED HYPERLIPIDEMIA: ICD-10-CM

## 2019-01-30 DIAGNOSIS — E66.9 OBESITY (BMI 30.0-34.9): ICD-10-CM

## 2019-01-30 DIAGNOSIS — I08.0 MITRAL AND AORTIC VALVE DISEASE: ICD-10-CM

## 2019-01-30 DIAGNOSIS — I25.709 CORONARY ARTERY DISEASE INVOLVING CORONARY BYPASS GRAFT OF NATIVE HEART WITH ANGINA PECTORIS: Primary | ICD-10-CM

## 2019-01-30 DIAGNOSIS — I10 ESSENTIAL HYPERTENSION: ICD-10-CM

## 2019-01-30 PROBLEM — I95.2 HYPOTENSION DUE TO DRUGS: Status: RESOLVED | Noted: 2018-06-27 | Resolved: 2019-01-30

## 2019-01-30 PROCEDURE — 1101F PT FALLS ASSESS-DOCD LE1/YR: CPT | Mod: CPTII,S$GLB,, | Performed by: INTERNAL MEDICINE

## 2019-01-30 PROCEDURE — 3075F PR MOST RECENT SYSTOLIC BLOOD PRESS GE 130-139MM HG: ICD-10-PCS | Mod: CPTII,S$GLB,, | Performed by: INTERNAL MEDICINE

## 2019-01-30 PROCEDURE — 99214 OFFICE O/P EST MOD 30 MIN: CPT | Mod: S$GLB,,, | Performed by: INTERNAL MEDICINE

## 2019-01-30 PROCEDURE — 99214 PR OFFICE/OUTPT VISIT, EST, LEVL IV, 30-39 MIN: ICD-10-PCS | Mod: S$GLB,,, | Performed by: INTERNAL MEDICINE

## 2019-01-30 PROCEDURE — 3078F PR MOST RECENT DIASTOLIC BLOOD PRESSURE < 80 MM HG: ICD-10-PCS | Mod: CPTII,S$GLB,, | Performed by: INTERNAL MEDICINE

## 2019-01-30 PROCEDURE — 3078F DIAST BP <80 MM HG: CPT | Mod: CPTII,S$GLB,, | Performed by: INTERNAL MEDICINE

## 2019-01-30 PROCEDURE — 1101F PR PT FALLS ASSESS DOC 0-1 FALLS W/OUT INJ PAST YR: ICD-10-PCS | Mod: CPTII,S$GLB,, | Performed by: INTERNAL MEDICINE

## 2019-01-30 PROCEDURE — 3075F SYST BP GE 130 - 139MM HG: CPT | Mod: CPTII,S$GLB,, | Performed by: INTERNAL MEDICINE

## 2019-01-30 RX ORDER — OLMESARTAN MEDOXOMIL 40 MG/1
40 TABLET ORAL DAILY
Qty: 90 TABLET | Refills: 1 | Status: SHIPPED | OUTPATIENT
Start: 2019-01-30 | End: 2019-05-20

## 2019-01-30 RX ORDER — OLMESARTAN MEDOXOMIL 40 MG/1
40 TABLET ORAL DAILY
COMMUNITY
End: 2019-01-30 | Stop reason: SDUPTHER

## 2019-01-30 NOTE — PROGRESS NOTES
Subjective:    Patient ID:  Yael Claire is a 78 y.o. female who presents for Hypertension (Labs); Coronary Artery Disease; and Valvular Heart Disease        HPI  MULTIPLE MEDS CHANGE B/O RECALL, DISCUSSED  LABS AND GOALS, CMP OK, , CR 0.62, LDL 76, HDL 48, ON REPATHA AND CRESTOR, ,DOING OK, SEE ROS    Past Medical History:   Diagnosis Date    Acute coronary syndrome     LIGHT 2004    Anticoagulant long-term use     Aortic valve disorder     Arthritis     Coronary artery disease     cabg & valve sgy    Heart murmur     Hyperlipidemia     Hypertension     Palpitations     Stenosis of aortic and mitral valves     Valvular regurgitation      Past Surgical History:   Procedure Laterality Date    CARDIAC CATHETERIZATION      CARDIAC VALVE SURGERY      CORONARY ARTERY BYPASS GRAFT  04/2005    Heart Cath With Grafts-Left N/A 1/23/2018    Performed by [facility]uelas [facility] at [facility] CATH    HYSTERECTOMY  1980    TRANSESOPHAGEAL ECHOCARDIOGRAM (COBY) N/A 1/23/2018    Performed by [facility] MD at [facility] CATH     Family History   Problem Relation Age of Onset    Hypertension Mother     Heart disease Mother     Hypertension Father     Heart disease Father      Social History     Socioeconomic History    Marital status: Single     Spouse name: Not on file    Number of children: Not on file    Years of education: Not on file    Highest education level: Not on file   Social Needs    Financial resource strain: Not on file    Food insecurity - worry: Not on file    Food insecurity - inability: Not on file    Transportation needs - medical: Not on file    Transportation needs - non-medical: Not on file   Occupational History    Not on file   Tobacco Use    Smoking status: Never Smoker    Smokeless tobacco: Never Used   Substance and Sexual Activity    Alcohol use: Yes     Comment: couple times per yr    Drug use: No    Sexual activity: Not on file   Other Topics Concern    Not on file   Social  History Narrative    Not on file       Review of patient's allergies indicates:   Allergen Reactions    Darvocet a500 [propoxyphene n-acetaminophen]     Mycinette [cetylpyridinium-benzocaine]     Pcn [penicillins]        Current Outpatient Medications:     aspirin (ECOTRIN) 81 MG EC tablet, Take 81 mg by mouth once daily., Disp: , Rfl:     co-enzyme Q-10 30 mg capsule, Take 30 mg by mouth once daily. , Disp: , Rfl:     evolocumab (REPATHA SURECLICK) 140 mg/mL PnIj, Inject 140 mg into the skin every 14 (fourteen) days., Disp: 2 mL, Rfl: 4    famotidine (PEPCID) 10 MG tablet, Take 10 mg by mouth once daily., Disp: , Rfl:     FOLIC ACID/MULTIVIT-MIN/LUTEIN (CENTRUM SILVER ORAL), Take by mouth once daily. Takes 1/2 tablet, Disp: , Rfl:     furosemide (LASIX) 20 MG tablet, Take 20 mg by mouth daily as needed. As needed, Disp: , Rfl:     metoprolol succinate (TOPROL-XL) 25 MG 24 hr tablet, TAKE 1 TABLET (25 MG TOTAL) BY MOUTH ONCE DAILY., Disp: 90 tablet, Rfl: 1    nitroGLYCERIN (NITROSTAT) 0.4 MG SL tablet, Place 1 tablet (0.4 mg total) under the tongue every 5 (five) minutes as needed for Chest pain., Disp: 25 tablet, Rfl: 1    olmesartan (BENICAR) 40 MG tablet, Take 1 tablet (40 mg total) by mouth once daily., Disp: 90 tablet, Rfl: 1    potassium chloride (KLOR-CON) 10 MEQ TbSR, Take 1 tablet (10 mEq total) by mouth twice a week., Disp: 24 tablet, Rfl: 1    rosuvastatin (CRESTOR) 40 MG Tab, TAKE 1 TABLET (40 MG TOTAL) BY MOUTH EVERY EVENING., Disp: 90 tablet, Rfl: 1    vitamin D 1000 units Tab, Take 185 mg by mouth once daily., Disp: , Rfl:     cyanocobalamin (VITAMIN B-12) 1000 MCG tablet, Take 1,000 mcg by mouth once daily., Disp: , Rfl:     fish oil-omega-3 fatty acids 300-1,000 mg capsule, Take 2 g by mouth once daily., Disp: , Rfl:     Review of Systems   Constitution: Negative for chills, decreased appetite, diaphoresis, fever, weakness, malaise/fatigue and weight gain.   HENT: Negative for  "congestion and nosebleeds.    Eyes: Negative for blurred vision and visual disturbance.   Cardiovascular: Negative for chest pain, claudication, cyanosis, dyspnea on exertion (MILD), irregular heartbeat, leg swelling (OCC,MILD), near-syncope, orthopnea, palpitations, paroxysmal nocturnal dyspnea and syncope.   Respiratory: Negative for cough, hemoptysis, shortness of breath and wheezing.    Endocrine: Negative for cold intolerance and heat intolerance.   Hematologic/Lymphatic: Negative for adenopathy and bleeding problem. Does not bruise/bleed easily.   Skin: Negative for dry skin, itching and rash.   Musculoskeletal: Negative for arthritis (MILD), back pain and falls.   Gastrointestinal: Negative for abdominal pain, change in bowel habit, dysphagia, hematemesis and nausea.   Genitourinary: Negative for dysuria, flank pain, frequency and nocturia (3-4).   Neurological: Negative for brief paralysis, dizziness, focal weakness, light-headedness, loss of balance, numbness (L HAND CARPAL TUNNEL) and tremors.   Psychiatric/Behavioral: Negative for altered mental status and memory loss. The patient does not have insomnia.    Allergic/Immunologic: Negative for environmental allergies and persistent infections.        Objective:      Vitals:    01/30/19 1033   BP: 134/62   Pulse: 72   SpO2: 98%   Weight: 87.7 kg (193 lb 5.5 oz)   Height: 5' 5" (1.651 m)   PainSc: 0-No pain     Body mass index is 32.17 kg/m².    Physical Exam   Constitutional: She is oriented to person, place, and time. She appears well-developed and well-nourished.   OVER WEIGHT   HENT:   Head: Normocephalic and atraumatic.   Mouth/Throat: Oropharynx is clear and moist and mucous membranes are normal.   Eyes: Conjunctivae and EOM are normal. Pupils are equal, round, and reactive to light. Right eye exhibits normal extraocular motion. Left eye exhibits normal extraocular motion.   Neck: Trachea normal and normal range of motion. Neck supple. Decreased carotid " pulses (SLIGHTLY) present. No hepatojugular reflux and no JVD present. Carotid bruit: MURMUR TO NECK. No erythema present.   Cardiovascular: Normal rate and regular rhythm.  No extrasystoles are present. PMI is not displaced. Exam reveals no gallop and no friction rub.   Murmur heard.   Harsh midsystolic murmur is present with a grade of 1/6 at the upper right sternal border radiating to the neck.   Systolic murmur of grade 2/6 is also present at the upper right sternal border.  Pulses:       Carotid pulses are 2+ on the right side, and 2+ on the left side.       Radial pulses are 2+ on the right side, and 2+ on the left side.        Dorsalis pedis pulses are 2+ on the right side, and 2+ on the left side.        Posterior tibial pulses are 2+ on the left side.   R FOOT WRAP   Pulmonary/Chest: Effort normal and breath sounds normal. No accessory muscle usage. No respiratory distress. She has no wheezes. She has no rales. She exhibits no tenderness.   LARGE STERNOTOMY SCAR   Abdominal: Soft. Bowel sounds are normal. She exhibits no distension and no mass. There is no hepatosplenomegaly. There is no CVA tenderness.   Musculoskeletal: She exhibits no edema or tenderness.   SLOW GAIT, TRACE EDEMA   Lymphadenopathy:     She has no cervical adenopathy.   Neurological: She is alert and oriented to person, place, and time. She displays no tremor. No cranial nerve deficit.   Skin: Skin is warm and dry. No rash noted. No cyanosis or erythema. No pallor.   Psychiatric: She has a normal mood and affect. Her speech is normal and behavior is normal.               ..    Chemistry        Component Value Date/Time     01/23/2018 1034    K 4.3 01/23/2018 1034     01/23/2018 1034    CO2 27 01/23/2018 1034    BUN 12 01/23/2018 1034    CREATININE 0.67 01/23/2018 1034     01/23/2018 1034        Component Value Date/Time    CALCIUM 8.8 01/23/2018 1034    ALKPHOS 110 12/15/2017 0849    AST 23 12/15/2017 0849    ALT 15  12/15/2017 0849    BILITOT 0.6 12/15/2017 0849    ESTGFRAFRICA >60 01/23/2018 1034    EGFRNONAA >60 01/23/2018 1034            ..  Lab Results   Component Value Date    CHOL 246 (H) 12/15/2017     Lab Results   Component Value Date    HDL 48 12/15/2017     Lab Results   Component Value Date    LDLCALC 180.8 (H) 12/15/2017     Lab Results   Component Value Date    TRIG 86 12/15/2017     Lab Results   Component Value Date    CHOLHDL 19.5 (L) 12/15/2017     ..  Lab Results   Component Value Date    WBC 6.28 01/23/2018    HGB 12.9 01/23/2018    HCT 40.1 01/23/2018    MCV 94 01/23/2018     01/23/2018       Test(s) Reviewed  I have reviewed the following in detail:  [] Stress test   [] Angiography   [] Echocardiogram   [x] Labs   [] Other:       Assessment:         ICD-10-CM ICD-9-CM   1. Coronary artery disease involving coronary bypass graft of native heart with angina pectoris I25.709 414.05     413.9   2. Essential hypertension I10 401.9   3. Mitral and aortic valve disease I08.0 396.9   4. Obesity (BMI 30.0-34.9) E66.9 278.00   5. 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 with angina pectoris - Primary    Relevant Orders    Comprehensive metabolic panel    Mixed hyperlipidemia    Relevant Orders    Comprehensive metabolic panel    Mitral and aortic valve disease    Relevant Orders    Comprehensive metabolic panel       Endocrine    Obesity (BMI 30.0-34.9)           Plan:     ALL CV CLINICALLY STABLE, CLASS 1  ANGINA, NO HF, NO TIA, NO CLINICAL ARRHYTHMIA,CONTINUE CURRENT MEDS, EDUCATION, DIET, EXERCISE, WEIGHT LOSS, DAILY CRESTOR, RTC IN 6 MO WITH LABS      Coronary artery disease 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  Dispense: 90 tablet; Refill: 1    RTC Low level/low impact aerobic exercise 5x's/wk. Heart healthy diet and risk factor modification.    See labs and med orders.    Aerobic exercise 5x's/wk. Heart healthy diet and risk factor modification.    See labs and med orders.

## 2019-02-07 ENCOUNTER — TELEPHONE (OUTPATIENT)
Dept: PHARMACY | Facility: CLINIC | Age: 79
End: 2019-02-07

## 2019-03-07 ENCOUNTER — TELEPHONE (OUTPATIENT)
Dept: PHARMACY | Facility: CLINIC | Age: 79
End: 2019-03-07

## 2019-03-11 ENCOUNTER — TELEPHONE (OUTPATIENT)
Dept: PHARMACY | Facility: CLINIC | Age: 79
End: 2019-03-11

## 2019-04-05 ENCOUNTER — TELEPHONE (OUTPATIENT)
Dept: PHARMACY | Facility: CLINIC | Age: 79
End: 2019-04-05

## 2019-04-05 NOTE — TELEPHONE ENCOUNTER
----- Message from Lori Jordan sent at 4/5/2019 10:49 AM CDT -----  Contact: Myra samuels/ [facility]  Type:  RX Refill Request    Who Called:  Myra  Refill or New Rx:  refill  RX Name and Strength:  evolocumab (REPATHA SURECLICK) 140 mg/mL PnIj  How is the patient currently taking it? (ex. 1XDay):  n/a  Is this a 30 day or 90 day RX:  30  Preferred Pharmacy with phone number:      [facility] -   [address] [place] [address]  Phone: [phone] Fax: [phone]  Local or Mail Order:  Mail order  Ordering Provider:  Suraj Alfaro Call Back Number: [phone]   Additional Information:  Stephanie adv'd [facility] refused to transfer the rx.

## 2019-04-17 NOTE — TELEPHONE ENCOUNTER
----- Message from Moody De La Vega sent at 4/17/2019  1:52 PM CDT -----  Type:  RX Refill Request    Who Called:  Self   Refill or New Rx:  New rx   RX Name and Strength:  repatha   How is the patient currently taking it? (ex. 1XDay):  Is this a 30 day or 90 day RX:  90 day supply  Preferred Pharmacy with phone number:  [facility]  Local or Mail Order:  Mail order  Ordering Provider:  Dr Ruelas   [facility] Call Back Number:  [phone]  Additional Information:  Patient requesting new rx repatha with [facility] order pharmacy for the month of May

## 2019-05-01 ENCOUNTER — OFFICE VISIT (OUTPATIENT)
Dept: CARDIOLOGY | Facility: CLINIC | Age: 79
End: 2019-05-01
Payer: MEDICARE

## 2019-05-01 VITALS
WEIGHT: 194.88 LBS | HEART RATE: 71 BPM | OXYGEN SATURATION: 97 % | SYSTOLIC BLOOD PRESSURE: 130 MMHG | HEIGHT: 65 IN | BODY MASS INDEX: 32.47 KG/M2 | DIASTOLIC BLOOD PRESSURE: 70 MMHG

## 2019-05-01 DIAGNOSIS — I10 ESSENTIAL HYPERTENSION: ICD-10-CM

## 2019-05-01 DIAGNOSIS — E66.9 OBESITY (BMI 30.0-34.9): ICD-10-CM

## 2019-05-01 DIAGNOSIS — R07.89 CHEST WALL DISCOMFORT: ICD-10-CM

## 2019-05-01 DIAGNOSIS — T50.905A DRUG SIDE EFFECTS, INITIAL ENCOUNTER: Primary | ICD-10-CM

## 2019-05-01 PROCEDURE — 1101F PT FALLS ASSESS-DOCD LE1/YR: CPT | Mod: CPTII,S$GLB,, | Performed by: INTERNAL MEDICINE

## 2019-05-01 PROCEDURE — 3075F PR MOST RECENT SYSTOLIC BLOOD PRESS GE 130-139MM HG: ICD-10-PCS | Mod: CPTII,S$GLB,, | Performed by: INTERNAL MEDICINE

## 2019-05-01 PROCEDURE — 99213 PR OFFICE/OUTPT VISIT, EST, LEVL III, 20-29 MIN: ICD-10-PCS | Mod: S$GLB,,, | Performed by: INTERNAL MEDICINE

## 2019-05-01 PROCEDURE — 3078F PR MOST RECENT DIASTOLIC BLOOD PRESSURE < 80 MM HG: ICD-10-PCS | Mod: CPTII,S$GLB,, | Performed by: INTERNAL MEDICINE

## 2019-05-01 PROCEDURE — 99213 OFFICE O/P EST LOW 20 MIN: CPT | Mod: S$GLB,,, | Performed by: INTERNAL MEDICINE

## 2019-05-01 PROCEDURE — 1101F PR PT FALLS ASSESS DOC 0-1 FALLS W/OUT INJ PAST YR: ICD-10-PCS | Mod: CPTII,S$GLB,, | Performed by: INTERNAL MEDICINE

## 2019-05-01 PROCEDURE — 3075F SYST BP GE 130 - 139MM HG: CPT | Mod: CPTII,S$GLB,, | Performed by: INTERNAL MEDICINE

## 2019-05-01 PROCEDURE — 3078F DIAST BP <80 MM HG: CPT | Mod: CPTII,S$GLB,, | Performed by: INTERNAL MEDICINE

## 2019-05-01 RX ORDER — PANTOPRAZOLE SODIUM 40 MG/1
40 TABLET, DELAYED RELEASE ORAL DAILY
Refills: 5 | COMMUNITY
Start: 2019-04-16 | End: 2020-03-09

## 2019-05-01 RX ORDER — NITROGLYCERIN 0.4 MG/1
0.4 TABLET SUBLINGUAL EVERY 5 MIN PRN
Qty: 25 TABLET | Refills: 1 | Status: SHIPPED | OUTPATIENT
Start: 2019-05-01 | End: 2019-11-22 | Stop reason: SDUPTHER

## 2019-05-01 NOTE — PROGRESS NOTES
Subjective:    Patient ID:  Yael Claire is a 78 y.o. female who presents for Hypertension; Valvular Heart Disease; and Chest Pain        HPI  REQUESTED EARLIER OV, ITCHING AT INCISION SITE AND SORENESS, , OCC NECK STIFFNESS, NO CP PER SE, NO ANGINA,OCC FLU LIKE SX WITH REPATHA,  SEE ROS    Past Medical History:   Diagnosis Date    Acute coronary syndrome     LIGHT 2004    Anticoagulant long-term use     Aortic valve disorder     Arthritis     Coronary artery disease     cabg & valve sgy    Heart murmur     Hyperlipidemia     Hypertension     Palpitations     Stenosis of aortic and mitral valves     Valvular regurgitation      Past Surgical History:   Procedure Laterality Date    CARDIAC CATHETERIZATION      CARDIAC VALVE SURGERY      CORONARY ARTERY BYPASS GRAFT  04/2005    Heart Cath With Grafts-Left N/A 1/23/2018    Performed [facility] CATH    HYSTERECTOMY  1980    TRANSESOPHAGEAL ECHOCARDIOGRAM (COBY) N/A 1/23/2018    Performed by Eb [facility] MD at [facility] CATH     Family History   Problem Relation Age of Onset    Hypertension Mother     Heart disease Mother     Hypertension Father     Heart disease Father      Social History     Socioeconomic History    Marital status: Single     Spouse name: Not on file    Number of children: Not on file    Years of education: Not on file    Highest education level: Not on file   Occupational History    Not on file   Social Needs    Financial resource strain: Not on file    Food insecurity:     Worry: Not on file     Inability: Not on file    Transportation needs:     Medical: Not on file     Non-medical: Not on file   Tobacco Use    Smoking status: Never Smoker    Smokeless tobacco: Never Used   Substance and Sexual Activity    Alcohol use: Yes     Comment: couple times per yr    Drug use: No    Sexual activity: Not on file   Lifestyle    Physical activity:     Days per week: Not on file     Minutes per session: Not on  file    Stress: Not on file   Relationships    Social connections:     Talks on phone: Not on file     Gets together: Not on file     Attends [employer] service: Not on file     Active member of club or organization: Not on file     Attends meetings of clubs or organizations: Not on file     Relationship status: Not on file   Other Topics Concern    Not on file   Social History Narrative    Not on file       Review of patient's allergies indicates:   Allergen Reactions    Darvocet a500 [propoxyphene n-acetaminophen]     Mycinette [cetylpyridinium-benzocaine]     Pcn [penicillins]        Current Outpatient Medications:     aspirin (ECOTRIN) 81 MG EC tablet, Take 81 mg by mouth once daily., Disp: , Rfl:     co-enzyme Q-10 30 mg capsule, Take 30 mg by mouth once daily. , Disp: , Rfl:     evolocumab (REPATHA SURECLICK) 140 mg/mL PnIj, Inject 140 mg into the skin every 14 (fourteen) days., Disp: 2 mL, Rfl: 6    fish oil-omega-3 fatty acids 300-1,000 mg capsule, Take 2 g by mouth once daily., Disp: , Rfl:     FOLIC ACID/MULTIVIT-MIN/LUTEIN (CENTRUM SILVER ORAL), Take by mouth once daily. Takes 1/2 tablet, Disp: , Rfl:     furosemide (LASIX) 20 MG tablet, Take 20 mg by mouth daily as needed. As needed, Disp: , Rfl:     metoprolol succinate (TOPROL-XL) 25 MG 24 hr tablet, TAKE 1 TABLET (25 MG TOTAL) BY MOUTH ONCE DAILY., Disp: 90 tablet, Rfl: 1    nitroGLYCERIN (NITROSTAT) 0.4 MG SL tablet, Place 1 tablet (0.4 mg total) under the tongue every 5 (five) minutes as needed for Chest pain., Disp: 25 tablet, Rfl: 1    olmesartan (BENICAR) 40 MG tablet, Take 1 tablet (40 mg total) by mouth once daily., Disp: 90 tablet, Rfl: 1    pantoprazole (PROTONIX) 40 MG tablet, Take 40 mg by mouth once daily., Disp: , Rfl: 5    potassium chloride (KLOR-CON) 10 MEQ TbSR, Take 1 tablet (10 mEq total) by mouth twice a week., Disp: 24 tablet, Rfl: 1    rosuvastatin (CRESTOR) 40 MG Tab, TAKE 1 TABLET (40 MG TOTAL) BY MOUTH EVERY  "EVENING., Disp: 90 tablet, Rfl: 1    cyanocobalamin (VITAMIN B-12) 1000 MCG tablet, Take 1,000 mcg by mouth once daily., Disp: , Rfl:     vitamin D 1000 units Tab, Take 185 mg by mouth once daily., Disp: , Rfl:     Review of Systems   Constitution: Negative for chills, decreased appetite, diaphoresis, fever, malaise/fatigue and weight gain.   Cardiovascular: Negative for chest pain (INCISION), claudication, cyanosis, dyspnea on exertion (MILD), irregular heartbeat, leg swelling (OCC,MILD), near-syncope, orthopnea, palpitations, paroxysmal nocturnal dyspnea and syncope.   Respiratory: Negative for cough, hemoptysis and shortness of breath.    Hematologic/Lymphatic: Negative for bleeding problem. Does not bruise/bleed easily.   Skin: Negative for dry skin, itching and rash.   Musculoskeletal: Negative for arthritis (MILD), back pain and falls.   Gastrointestinal: Negative for abdominal pain, change in bowel habit, dysphagia, hematemesis and nausea.   Neurological: Negative for brief paralysis, dizziness, focal weakness, light-headedness, numbness (L HAND CARPAL TUNNEL) and weakness.   Psychiatric/Behavioral: Negative for altered mental status and memory loss. The patient does not have insomnia.         Objective:      Vitals:    05/01/19 1024   BP: 130/70   Pulse: 71   SpO2: 97%   Weight: 88.4 kg (194 lb 14.2 oz)   Height: 5' 5" (1.651 m)   PainSc: 0-No pain     Body mass index is 32.43 kg/m².    Physical Exam   Constitutional: She is oriented to person, place, and time. She appears well-developed and well-nourished.   OVER WEIGHT   HENT:   Head: Normocephalic and atraumatic.   Mouth/Throat: Oropharynx is clear and moist and mucous membranes are normal.   Eyes: Pupils are equal, round, and reactive to light. Conjunctivae and EOM are normal. Right eye exhibits normal extraocular motion. Left eye exhibits normal extraocular motion.   Neck: Trachea normal and normal range of motion. Neck supple. Decreased carotid " pulses (SLIGHTLY) present. No hepatojugular reflux and no JVD present. Carotid bruit: MURMUR TO NECK. No erythema present.   Cardiovascular: Normal rate and regular rhythm.  No extrasystoles are present. PMI is not displaced. Exam reveals no gallop and no friction rub.   Murmur heard.   Harsh midsystolic murmur is present with a grade of 1/6 at the upper right sternal border radiating to the neck.   Systolic murmur of grade 2/6 is also present at the upper right sternal border.  Pulses:       Carotid pulses are 2+ on the right side, and 2+ on the left side.       Radial pulses are 2+ on the right side, and 2+ on the left side.        Dorsalis pedis pulses are 2+ on the right side, and 2+ on the left side.        Posterior tibial pulses are 2+ on the left side.   R FOOT WRAP   Pulmonary/Chest: Effort normal and breath sounds normal. No accessory muscle usage. No respiratory distress. She has no wheezes. She has no rales. She exhibits no tenderness.   LARGE STERNOTOMY SCAR   Abdominal: Soft. Bowel sounds are normal. She exhibits no mass. There is no hepatosplenomegaly. There is no CVA tenderness.   Musculoskeletal: She exhibits no tenderness.   SLOW GAIT, TRACE EDEMA   Lymphadenopathy:     She has no cervical adenopathy.   Neurological: She is alert and oriented to person, place, and time. She displays no tremor. No cranial nerve deficit.   Skin: Skin is warm and dry. No cyanosis.   Psychiatric: She has a normal mood and affect. Her speech is normal and behavior is normal.               ..    Chemistry        Component Value Date/Time     01/23/2018 1034    K 4.3 01/23/2018 1034     01/23/2018 1034    CO2 27 01/23/2018 1034    BUN 12 01/23/2018 1034    CREATININE 0.67 01/23/2018 1034     01/23/2018 1034        Component Value Date/Time    CALCIUM 8.8 01/23/2018 1034    ALKPHOS 110 12/15/2017 0849    AST 23 12/15/2017 0849    ALT 15 12/15/2017 0849    BILITOT 0.6 12/15/2017 0849    ESTGFRAFRICA >60  01/23/2018 1034    EGFRNONAA >60 01/23/2018 1034            ..  Lab Results   Component Value Date    CHOL 246 (H) 12/15/2017     Lab Results   Component Value Date    HDL 48 12/15/2017     Lab Results   Component Value Date    LDLCALC 180.8 (H) 12/15/2017     Lab Results   Component Value Date    TRIG 86 12/15/2017     Lab Results   Component Value Date    CHOLHDL 19.5 (L) 12/15/2017     ..  Lab Results   Component Value Date    WBC 6.28 01/23/2018    HGB 12.9 01/23/2018    HCT 40.1 01/23/2018    MCV 94 01/23/2018     01/23/2018       Test(s) Reviewed  I have reviewed the following in detail:  [] Stress test   [] Angiography   [] Echocardiogram   [] Labs   [] Other:       Assessment:         ICD-10-CM ICD-9-CM   1. Drug side effects, initial encounter T50.905A E947.9   2. Chest wall discomfort R07.89 786.52   3. Essential hypertension I10 401.9   4. Obesity (BMI 30.0-34.9) E66.9 278.00     Problem List Items Addressed This Visit        Cardiac/Vascular    Essential hypertension       Endocrine    Obesity (BMI 30.0-34.9)       Other    Drug side effects, initial encounter - Primary    Chest wall discomfort           Plan:     CONTINUE REPATHA, MINOR SE'S, WANTS IT FROM [employer], CHEST WALL/SCA SX CHRONIC FOR 10 YEARS SINCE SURGERY,LARGE SCAR NO INFLAMMATORY SIGNS, ALL OTHER CV CLINICALLY STABLE, NO ANGINA OR RARE , NO HF, NO TIA, NO CLINICAL ARRHYTHMIA,CONTINUE CURRENT MEDS, EDUCATION, DIET, EXERCISE, WEIGHT LOSS, RTC AS SCHEDULED      Drug side effects, initial encounter  Comments:  MINOR, CONTINUE REPATHA    Chest wall discomfort    Essential hypertension    Obesity (BMI 30.0-34.9)    Other orders  -     evolocumab (REPATHA SURECLICK) 140 mg/mL PnIj; Inject 140 mg into the skin every 14 (fourteen) days.  Dispense: 2 mL; Refill: 6  -     nitroGLYCERIN (NITROSTAT) 0.4 MG SL tablet; Place 1 tablet (0.4 mg total) under the tongue every 5 (five) minutes as needed for Chest pain.  Dispense: 25 tablet; Refill:  1    RTC Low level/low impact aerobic exercise 5x's/wk. Heart healthy diet and risk factor modification.    See labs and med orders.    Aerobic exercise 5x's/wk. Heart healthy diet and risk factor modification.    See labs and med orders.

## 2019-05-03 ENCOUNTER — TELEPHONE (OUTPATIENT)
Dept: PHARMACY | Facility: CLINIC | Age: 79
End: 2019-05-03

## 2019-05-08 ENCOUNTER — TELEPHONE (OUTPATIENT)
Dept: PHARMACY | Facility: CLINIC | Age: 79
End: 2019-05-08

## 2019-05-08 NOTE — TELEPHONE ENCOUNTER
Refill call for Repatha. Pt. confirmed they are in need of a refill. Will prepare to ship on  to arrive 5/10 at [facility] Address in [facility] with patient consent. Copay $8.50 at 004, Pt. insists on paying with a money order. Patient has 8 doses remaining. next dose due on . Patient has not started any new medications, no missed doses and no side effects. Patient taking medication as prescribed, no change in directions. Patient did not have any concerns or questions for the pharmacist at this time.  & address verified.-[place]

## 2019-05-20 ENCOUNTER — OFFICE VISIT (OUTPATIENT)
Dept: PRIMARY CARE CLINIC | Facility: CLINIC | Age: 79
End: 2019-05-20
Payer: MEDICARE

## 2019-05-20 VITALS
RESPIRATION RATE: 18 BRPM | HEART RATE: 66 BPM | WEIGHT: 195.31 LBS | OXYGEN SATURATION: 97 % | HEIGHT: 65 IN | DIASTOLIC BLOOD PRESSURE: 66 MMHG | BODY MASS INDEX: 32.54 KG/M2 | SYSTOLIC BLOOD PRESSURE: 118 MMHG

## 2019-05-20 DIAGNOSIS — I10 ESSENTIAL HYPERTENSION: Primary | ICD-10-CM

## 2019-05-20 DIAGNOSIS — R10.9 RIGHT FLANK DISCOMFORT: ICD-10-CM

## 2019-05-20 DIAGNOSIS — E78.2 MIXED HYPERLIPIDEMIA: ICD-10-CM

## 2019-05-20 DIAGNOSIS — E66.9 OBESITY (BMI 30.0-34.9): ICD-10-CM

## 2019-05-20 DIAGNOSIS — Z12.39 SCREENING FOR BREAST CANCER: ICD-10-CM

## 2019-05-20 PROBLEM — Z01.810 ENCOUNTER FOR PRE-OPERATIVE CARDIOVASCULAR CLEARANCE: Status: RESOLVED | Noted: 2017-12-20 | Resolved: 2019-05-20

## 2019-05-20 PROBLEM — E78.00 HYPERCHOLESTEROLEMIA: Status: RESOLVED | Noted: 2017-12-20 | Resolved: 2019-05-20

## 2019-05-20 LAB
BILIRUB SERPL-MCNC: NORMAL MG/DL
BLOOD URINE, POC: NORMAL
COLOR, POC UA: YELLOW
GLUCOSE UR QL STRIP: NORMAL
KETONES UR QL STRIP: NORMAL
LEUKOCYTE ESTERASE URINE, POC: NORMAL
NITRITE, POC UA: NORMAL
PH, POC UA: 5
PROTEIN, POC: NORMAL
SPECIFIC GRAVITY, POC UA: 1
UROBILINOGEN, POC UA: NORMAL

## 2019-05-20 PROCEDURE — 81001 URINALYSIS AUTO W/SCOPE: CPT | Mod: S$GLB,,, | Performed by: NURSE PRACTITIONER

## 2019-05-20 PROCEDURE — 3074F PR MOST RECENT SYSTOLIC BLOOD PRESSURE < 130 MM HG: ICD-10-PCS | Mod: CPTII,S$GLB,, | Performed by: NURSE PRACTITIONER

## 2019-05-20 PROCEDURE — 99204 PR OFFICE/OUTPT VISIT, NEW, LEVL IV, 45-59 MIN: ICD-10-PCS | Mod: 25,S$GLB,, | Performed by: NURSE PRACTITIONER

## 2019-05-20 PROCEDURE — 3078F PR MOST RECENT DIASTOLIC BLOOD PRESSURE < 80 MM HG: ICD-10-PCS | Mod: CPTII,S$GLB,, | Performed by: NURSE PRACTITIONER

## 2019-05-20 PROCEDURE — 3078F DIAST BP <80 MM HG: CPT | Mod: CPTII,S$GLB,, | Performed by: NURSE PRACTITIONER

## 2019-05-20 PROCEDURE — 3074F SYST BP LT 130 MM HG: CPT | Mod: CPTII,S$GLB,, | Performed by: NURSE PRACTITIONER

## 2019-05-20 PROCEDURE — 1101F PR PT FALLS ASSESS DOC 0-1 FALLS W/OUT INJ PAST YR: ICD-10-PCS | Mod: CPTII,S$GLB,, | Performed by: NURSE PRACTITIONER

## 2019-05-20 PROCEDURE — 81001 POCT URINALYSIS, DIPSTICK OR TABLET REAGENT, AUTOMATED, WITH MICROSCOP: ICD-10-PCS | Mod: S$GLB,,, | Performed by: NURSE PRACTITIONER

## 2019-05-20 PROCEDURE — 1101F PT FALLS ASSESS-DOCD LE1/YR: CPT | Mod: CPTII,S$GLB,, | Performed by: NURSE PRACTITIONER

## 2019-05-20 PROCEDURE — 99204 OFFICE O/P NEW MOD 45 MIN: CPT | Mod: 25,S$GLB,, | Performed by: NURSE PRACTITIONER

## 2019-05-20 RX ORDER — POTASSIUM CHLORIDE 750 MG/1
10 TABLET, EXTENDED RELEASE ORAL
Qty: 24 TABLET | Refills: 2 | Status: SHIPPED | OUTPATIENT
Start: 2019-05-20 | End: 2020-01-27 | Stop reason: SDUPTHER

## 2019-05-20 RX ORDER — METOPROLOL SUCCINATE 25 MG/1
12.5 TABLET, EXTENDED RELEASE ORAL DAILY
Qty: 90 TABLET | Refills: 1
Start: 2019-05-20 | End: 2019-11-14 | Stop reason: SDUPTHER

## 2019-05-20 RX ORDER — OLMESARTAN MEDOXOMIL 40 MG/1
20 TABLET ORAL DAILY
Qty: 90 TABLET | Refills: 1
Start: 2019-05-20 | End: 2019-09-11 | Stop reason: SDUPTHER

## 2019-05-20 NOTE — PATIENT INSTRUCTIONS
Eating Heart-Healthy Foods  Eating has a big impact on your heart health. In fact, eating healthier can improve several of your heart risks at once. For instance, it helps you manage weight, cholesterol, and blood pressure. Here are ideas to help you make heart-healthy changes without giving up all the foods and flavors you love.  Getting started  · Talk with your health care provider about eating plans, such as the DASH or Mediterranean diet. You may also be referred to a dietitian.  · Change a few things at a time. Give yourself time to get used to a few eating changes before adding more.  · Work to create a tasty, healthy eating plan that you can stick to for the rest of your life.    Goals for healthy eating  Below are some tips to improve your eating habits:  · Limit saturated fats and trans fats. Saturated fats raise your levels of cholesterol, so keep these fats to a minimum. They are found in foods such as fatty meats, whole milk, cheese, and palm and coconut oils. Avoid trans fats because they lower good cholesterol as well as raise bad cholesterol. Trans fats are most often found in processed foods.  · Reduce sodium (salt) intake. Eating too much salt may increase your blood pressure. Limit your sodium intake to 2,300 milligrams (mg) per day, or less if your health care provider recommends it. Dining out less often and eating fewer processed foods are two great ways to decrease the amount of salt you consume.  · Managing calories. A calorie is a unit of energy. Your body burns calories for fuel, but if you eat more calories than your body burns, the extras are stored as fat. Your health care provider can help you create a diet plan to manage your calories. This will likely include eating healthier foods as well as exercising regularly. To help you track your progress, keep a diary to record what you eat and how often you exercise.  Choose the right foods  Aim to make these foods staples of your diet. If  you have diabetes, you may have different recommendations than what is listed here:  · Fruits and vegetable provide plenty of nutrients without a lot of calories. At meals, fill half your plate with these foods. Split the other half of your plate between whole grains and lean protein.  · Whole grains are high in fiber and rich in vitamins and nutrients. Good choices include whole-wheat bread, pasta, and brown rice.  · Lean proteins give you nutrition with less fat. Good choices include fish, skinless chicken, and beans.  · Low-fat or nonfat dairy provides nutrients without a lot of fat. Try low-fat or nonfat milk, cheese, or yogurt.  · Healthy fats can be good for you in small amounts. These are unsaturated fats, such as olive oil, nuts, and fish. Try to have at least 2 servings per week of fatty fish such as salmon, sardines, mackerel, rainbow trout, and albacore tuna. These contain omega-3 fatty acids, which are good for your heart. Flaxseed is another source of a heart-healthy fat.  More on heart healthy eating    Read food labels  Healthy eating starts at the grocery store. Be sure to pay attention to food labels on packaged foods. Look for products that are high in fiber and protein, and low in saturated fat, cholesterol, and sodium. Avoid products that contain trans fat. And pay close attention to serving size. For instance, if you plan to eat two servings, double all the numbers on the label.  Prepare food right  A key part of healthy cooking is cutting down on added fat and salt. Look on the internet for lower-fat, lower-sodium recipes. Also, try these tips:  · Remove fat from meat and skin from poultry before cooking.  · Skim fat from the surface of soups and sauces.  · Broil, boil, bake, steam, grill, and microwave food without added fats.  · Choose ingredients that spice up your food without adding calories, fat, or sodium. Try these items: horseradish, hot sauce, lemon, mustard, nonfat salad dressings,  and vinegar. For salt-free herbs and spices, try basil, cilantro, cinnamon, pepper, and rosemary.  Date Last Reviewed: 6/25/2015  © 2296-1661 [employer]. [address] [place] [address]. All rights reserved. This information is not intended as a substitute for professional medical care. Always follow your healthcare professional's instructions.        Aerobic Exercise for a Healthy Heart  Exercise is a lot more than an energy booster and a stress reliever. It also strengthens your heart muscle, lowers your blood pressure and cholesterol, and burns calories. It can also improve your resting muscle tone, and your mood.     Remember, some activity is better than none.    Choose an aerobic activity  Choose an activity that makes your heart and lungs work harder than they do when you rest or walk normally. This aerobic exercise can improve the way your heart and other muscles use oxygen. Make it fun by exercising with a friend and choosing an activity you enjoy. Here are some ideas:  · Walking  · Swimming  · Bicycling  · Stair climbing  · Dancing  · Jogging  · Gardening  Exercise regularly  If you havent been exercising regularly,  get your doctors OK first. Then start slowly.  Here are some tips:  · Begin exercising 3 times a week for 5 to 10 minutes at a time.  · When you feel comfortable, add a few minutes each session.  · Slowly build up to exercising 3 to 4 times each week. Each session should last for 40 minutes, on average, and involve moderate- to vigorous-intensity physical activity.  · If you have been given nitroglycerin, be sure to carry it when you exercise.  · If you get chest pain (angina) when youre exercising, stop what youre doing, take your nitroglycerin, and call your doctor.  Date Last Reviewed: 6/2/2016  © 4221-6086 [employer]. [address] [place] [address]. All rights reserved. This information is not intended as a substitute for professional  medical care. Always follow your healthcare professional's instructions.

## 2019-05-20 NOTE — PROGRESS NOTES
Subjective:       Patient ID: Yael Claire is a 78 y.o. female.    Chief Complaint: Establish Care  This is her first time in primary care clinic. She is here to establish care.  HPI   States was seeing Dr. Conklin as her primary care and then was transferred to Dr. Arreaga. She is here today to establish care.  Vitals:    05/20/19 1432   BP: 118/66   Pulse: 66   Resp: 18     BP Readings from Last 3 Encounters:   05/20/19 118/66   05/01/19 130/70   01/30/19 134/62     Past Medical History:   Diagnosis Date    Acute coronary syndrome     LIGHT 2004    Anticoagulant long-term use     Aortic valve disorder     Arthritis     Coronary artery disease     cabg & valve sgy    Heart murmur     Hyperlipidemia     Hypertension     Palpitations     Stenosis of aortic and mitral valves     Valvular regurgitation      Review of Systems    Here latest lipid and CMP are per YANYOAH on 01/24/2019  Objective:      Physical Exam   Constitutional: She is oriented to person, place, and time. Vital signs are normal. She appears well-developed and well-nourished. She is active and cooperative.   HENT:   Head: Normocephalic and atraumatic.   Right Ear: Hearing, tympanic membrane, external ear and ear canal normal.   Left Ear: Hearing, tympanic membrane, external ear and ear canal normal.   Nose: Nose normal.   Mouth/Throat: Uvula is midline, oropharynx is clear and moist and mucous membranes are normal.   Neck: Trachea normal, normal range of motion, full passive range of motion without pain and phonation normal. Neck supple.   Cardiovascular: Normal rate, regular rhythm and normal heart sounds.   Pulmonary/Chest: Effort normal and breath sounds normal.   Abdominal: Soft. Bowel sounds are normal. There is no tenderness.   Musculoskeletal: Normal range of motion.   Lymphadenopathy:        Head (right side): No submental, no submandibular, no tonsillar, no preauricular, no posterior auricular and no occipital adenopathy present.         Head (left side): No submental, no submandibular, no tonsillar, no preauricular, no posterior auricular and no occipital adenopathy present.   Neurological: She is alert and oriented to person, place, and time.   Skin: Skin is warm, dry and intact.        Keloid area to surgical site.    Psychiatric: She has a normal mood and affect. Her speech is normal and behavior is normal. Judgment and thought content normal. Cognition and memory are normal.   Nursing note and vitals reviewed.      Assessment & Plan:      Essential hypertension  -     olmesartan (BENICAR) 40 MG tablet; Take 0.5 tablets (20 mg total) by mouth once daily. Patient states taking only half tablet daily  Dispense: 90 tablet; Refill: 1  -     metoprolol succinate (TOPROL-XL) 25 MG 24 hr tablet; Take 0.5 tablets (12.5 mg total) by mouth once daily. Patient states she is only taking half daily  Dispense: 90 tablet; Refill: 1  -     potassium chloride (KLOR-CON) 10 MEQ TbSR; Take 1 tablet (10 mEq total) by mouth twice a week.  Dispense: 24 tablet; Refill: 2    Mixed hyperlipidemia- Stable latest levels at [facility]  Total Cholesterol 135  Triglyceride 56  HDL 48  LDL 76    Obesity (BMI 30.0-34.9)-Heart Healthy diet and exercise    Screening for breast cancer  -     Mammo Digital Screening Bilat; Future; Expected date: 07/08/2019    Right flank discomfort  -     POCT urinalysis, dipstick or tablet reag      Medication List with Changes/Refills   Current Medications    ASPIRIN (ECOTRIN) 81 MG EC TABLET    Take 81 mg by mouth once daily.    CO-ENZYME Q-10 30 MG CAPSULE    Take 30 mg by mouth once daily.     CYANOCOBALAMIN (VITAMIN B-12) 1000 MCG TABLET    Take 1,000 mcg by mouth once daily.    EVOLOCUMAB (REPATHA SURECLICK) 140 MG/ML PNIJ    Inject 140 mg into the skin every 14 (fourteen) days.    FISH OIL-OMEGA-3 FATTY ACIDS 300-1,000 MG CAPSULE    Take 2 g by mouth once daily.    FOLIC ACID/MULTIVIT-MIN/LUTEIN (CENTRUM SILVER ORAL)    Take by mouth once  daily. Takes 1/2 tablet    FUROSEMIDE (LASIX) 20 MG TABLET    Take 20 mg by mouth daily as needed. As needed    NITROGLYCERIN (NITROSTAT) 0.4 MG SL TABLET    Place 1 tablet (0.4 mg total) under the tongue every 5 (five) minutes as needed for Chest pain.    PANTOPRAZOLE (PROTONIX) 40 MG TABLET    Take 40 mg by mouth once daily.    ROSUVASTATIN (CRESTOR) 40 MG TAB    TAKE 1 TABLET (40 MG TOTAL) BY MOUTH EVERY EVENING.    VITAMIN D 1000 UNITS TAB    Take 185 mg by mouth once daily.   Changed and/or Refilled Medications    Modified Medication Previous Medication    METOPROLOL SUCCINATE (TOPROL-XL) 25 MG 24 HR TABLET metoprolol succinate (TOPROL-XL) 25 MG 24 hr tablet       Take 0.5 tablets (12.5 mg total) by mouth once daily. Patient states she is only taking half daily    TAKE 1 TABLET (25 MG TOTAL) BY MOUTH ONCE DAILY.    OLMESARTAN (BENICAR) 40 MG TABLET olmesartan (BENICAR) 40 MG tablet       Take 0.5 tablets (20 mg total) by mouth once daily. Patient states taking only half tablet daily    Take 1 tablet (40 mg total) by mouth once daily.    POTASSIUM CHLORIDE (KLOR-CON) 10 MEQ TBSR potassium chloride (KLOR-CON) 10 MEQ TbSR       Take 1 tablet (10 mEq total) by mouth twice a week.    Take 1 tablet (10 mEq total) by mouth twice a week.         Follow up in about 3 months (around 8/20/2019), or if symptoms worsen or fail to improve.

## 2019-05-30 ENCOUNTER — TELEPHONE (OUTPATIENT)
Dept: PHARMACY | Facility: CLINIC | Age: 79
End: 2019-05-30

## 2019-07-02 ENCOUNTER — TELEPHONE (OUTPATIENT)
Dept: PHARMACY | Facility: CLINIC | Age: 79
End: 2019-07-02

## 2019-07-30 ENCOUNTER — TELEPHONE (OUTPATIENT)
Dept: PHARMACY | Facility: CLINIC | Age: 79
End: 2019-07-30

## 2019-08-22 ENCOUNTER — TELEPHONE (OUTPATIENT)
Dept: FAMILY MEDICINE | Facility: CLINIC | Age: 79
End: 2019-08-22

## 2019-08-22 ENCOUNTER — OFFICE VISIT (OUTPATIENT)
Dept: PRIMARY CARE CLINIC | Facility: CLINIC | Age: 79
End: 2019-08-22
Payer: MEDICARE

## 2019-08-22 VITALS
WEIGHT: 187.81 LBS | SYSTOLIC BLOOD PRESSURE: 124 MMHG | HEART RATE: 73 BPM | HEIGHT: 65 IN | BODY MASS INDEX: 31.29 KG/M2 | TEMPERATURE: 98 F | DIASTOLIC BLOOD PRESSURE: 50 MMHG | OXYGEN SATURATION: 97 %

## 2019-08-22 DIAGNOSIS — N39.0 URINARY TRACT INFECTION WITHOUT HEMATURIA, SITE UNSPECIFIED: ICD-10-CM

## 2019-08-22 DIAGNOSIS — R35.0 FREQUENCY OF URINATION: ICD-10-CM

## 2019-08-22 DIAGNOSIS — E08.00 DIABETES MELLITUS DUE TO UNDERLYING CONDITION WITH HYPEROSMOLARITY WITHOUT COMA, WITHOUT LONG-TERM CURRENT USE OF INSULIN: ICD-10-CM

## 2019-08-22 DIAGNOSIS — R35.1 NOCTURIA: Primary | ICD-10-CM

## 2019-08-22 DIAGNOSIS — R81 GLUCOSURIA: ICD-10-CM

## 2019-08-22 LAB
BILIRUB SERPL-MCNC: ABNORMAL MG/DL
BLOOD URINE, POC: ABNORMAL
COLOR, POC UA: YELLOW
GLUCOSE UR QL STRIP: 50
KETONES UR QL STRIP: ABNORMAL
LEUKOCYTE ESTERASE URINE, POC: ABNORMAL
NITRITE, POC UA: ABNORMAL
PH, POC UA: 5
PROTEIN, POC: ABNORMAL
SPECIFIC GRAVITY, POC UA: 1.01
UROBILINOGEN, POC UA: NORMAL

## 2019-08-22 PROCEDURE — 3078F DIAST BP <80 MM HG: CPT | Mod: CPTII,S$GLB,, | Performed by: PHYSICIAN ASSISTANT

## 2019-08-22 PROCEDURE — 99213 PR OFFICE/OUTPT VISIT, EST, LEVL III, 20-29 MIN: ICD-10-PCS | Mod: 25,S$GLB,, | Performed by: PHYSICIAN ASSISTANT

## 2019-08-22 PROCEDURE — 1101F PR PT FALLS ASSESS DOC 0-1 FALLS W/OUT INJ PAST YR: ICD-10-PCS | Mod: CPTII,S$GLB,, | Performed by: PHYSICIAN ASSISTANT

## 2019-08-22 PROCEDURE — 81002 POCT URINE DIPSTICK WITHOUT MICROSCOPE: ICD-10-PCS | Mod: S$GLB,,, | Performed by: PHYSICIAN ASSISTANT

## 2019-08-22 PROCEDURE — 3074F SYST BP LT 130 MM HG: CPT | Mod: CPTII,S$GLB,, | Performed by: PHYSICIAN ASSISTANT

## 2019-08-22 PROCEDURE — 99213 OFFICE O/P EST LOW 20 MIN: CPT | Mod: 25,S$GLB,, | Performed by: PHYSICIAN ASSISTANT

## 2019-08-22 PROCEDURE — 3074F PR MOST RECENT SYSTOLIC BLOOD PRESSURE < 130 MM HG: ICD-10-PCS | Mod: CPTII,S$GLB,, | Performed by: PHYSICIAN ASSISTANT

## 2019-08-22 PROCEDURE — 1101F PT FALLS ASSESS-DOCD LE1/YR: CPT | Mod: CPTII,S$GLB,, | Performed by: PHYSICIAN ASSISTANT

## 2019-08-22 PROCEDURE — 81002 URINALYSIS NONAUTO W/O SCOPE: CPT | Mod: S$GLB,,, | Performed by: PHYSICIAN ASSISTANT

## 2019-08-22 PROCEDURE — 3078F PR MOST RECENT DIASTOLIC BLOOD PRESSURE < 80 MM HG: ICD-10-PCS | Mod: CPTII,S$GLB,, | Performed by: PHYSICIAN ASSISTANT

## 2019-08-22 RX ORDER — PHENAZOPYRIDINE HYDROCHLORIDE 200 MG/1
200 TABLET, FILM COATED ORAL 3 TIMES DAILY PRN
Qty: 9 TABLET | Refills: 0 | Status: SHIPPED | OUTPATIENT
Start: 2019-08-22 | End: 2019-08-25

## 2019-08-22 RX ORDER — SULFAMETHOXAZOLE AND TRIMETHOPRIM 800; 160 MG/1; MG/1
1 TABLET ORAL 2 TIMES DAILY
Qty: 20 TABLET | Refills: 0 | Status: SHIPPED | OUTPATIENT
Start: 2019-08-22 | End: 2019-09-01

## 2019-08-22 NOTE — TELEPHONE ENCOUNTER
----- Message from Bartolo Avendaño PA-C sent at 8/22/2019  4:05 PM CDT -----  Contact: Self  Please notify pt the antibiotic should not be a problem and go ahead and take the Bactrim    The Pyridium is not necessary but canuse OTC AZO if needed  ----- Message -----  From: Chandler Mishra MA  Sent: 8/22/2019   3:35 PM  To: FELICIA Trimble Mr Avendaño I have a patient Ms Yael Claire [id number] is having concerns about her meds Pyridium and Sulfamethoxazole The pharmacy informed her that the Sulfamethoxazole can enteract with her olmesartan which can raise her potassium  And the  Pyridium isn't covered by her insurance. She asking if she can be prescribed something else that her insurance will cover.     Thanks      ----- Message -----  From: Inez Ferguson  Sent: 8/22/2019   1:58 PM  To: Jarocho Mcfarland Staff    Type: Needs Medical Advice    Who Called:  patient  Best Call Back Number:[phone] (home)   Additional Information:patient would like a call back in regards to the Rx the Dr sent over today for the Antibiotic/Pain meds said she will explain when the nurse calls her back she also have a problem with the insurance

## 2019-08-22 NOTE — PROGRESS NOTES
Subjective:       Patient ID: Yael Claire is a 78 y.o. female.    Chief Complaint: Urinary Frequency and Nocturia    HPI   Sx x 2 wks  No dysuria  Review of Systems   Constitutional: Negative.  Negative for activity change, appetite change, chills, diaphoresis, fatigue, fever and unexpected weight change.   HENT: Negative.    Eyes: Negative.    Respiratory: Negative.  Negative for cough and shortness of breath.    Cardiovascular: Negative.  Negative for chest pain and leg swelling.   Gastrointestinal: Negative.    Endocrine: Negative.    Genitourinary: Positive for frequency and urgency. Negative for dysuria, flank pain and hematuria.   Musculoskeletal: Negative.    Skin: Negative.  Negative for rash.       Objective:      Physical Exam   Constitutional: She appears well-developed and well-nourished. No distress.   HENT:   Head: Normocephalic and atraumatic.   Eyes: Conjunctivae are normal. No scleral icterus.   Neck: Normal range of motion. Neck supple. No tracheal deviation present. No thyromegaly present.   Abdominal: Soft. She exhibits no distension and no mass. There is no tenderness. There is no rebound and no guarding.   No CVA tenderness   Musculoskeletal: She exhibits no edema.   Lymphadenopathy:     She has no cervical adenopathy.   Skin: Skin is warm and dry. No rash noted.   Vitals reviewed.      Assessment:       1. Nocturia    2. Frequency of urination    3. Glucosuria    4. Urinary tract infection without hematuria, site unspecified        Plan:       Yael was seen today for urinary frequency and nocturia.    Diagnoses and all orders for this visit:    Nocturia  -     POCT URINE DIPSTICK WITHOUT MICROSCOPE  -     sulfamethoxazole-trimethoprim 800-160mg (BACTRIM DS) 800-160 mg Tab; Take 1 tablet by mouth 2 (two) times daily. for 10 days  -     phenazopyridine (PYRIDIUM) 200 MG tablet; Take 1 tablet (200 mg total) by mouth 3 (three) times daily as needed for Pain.  -     POCT urinalysis, dipstick  or tablet reag  -     Urine culture  -     Cancel: Comprehensive metabolic panel; Future  -     Cancel: Hemoglobin A1c; Future  -     Hemoglobin A1c; Future  -     Hemoglobin A1c  -     Comprehensive metabolic panel; Future  -     Comprehensive metabolic panel    Frequency of urination  -     sulfamethoxazole-trimethoprim 800-160mg (BACTRIM DS) 800-160 mg Tab; Take 1 tablet by mouth 2 (two) times daily. for 10 days  -     phenazopyridine (PYRIDIUM) 200 MG tablet; Take 1 tablet (200 mg total) by mouth 3 (three) times daily as needed for Pain.  -     POCT urinalysis, dipstick or tablet reag  -     Urine culture  -     Cancel: Comprehensive metabolic panel; Future  -     Cancel: Hemoglobin A1c; Future  -     Hemoglobin A1c; Future  -     Hemoglobin A1c  -     Comprehensive metabolic panel; Future  -     Comprehensive metabolic panel    Glucosuria  -     Cancel: Comprehensive metabolic panel; Future  -     Cancel: Hemoglobin A1c; Future  -     Hemoglobin A1c; Future  -     Hemoglobin A1c  -     Comprehensive metabolic panel; Future  -     Comprehensive metabolic panel    Urinary tract infection without hematuria, site unspecified  -     sulfamethoxazole-trimethoprim 800-160mg (BACTRIM DS) 800-160 mg Tab; Take 1 tablet by mouth 2 (two) times daily. for 10 days  -     phenazopyridine (PYRIDIUM) 200 MG tablet; Take 1 tablet (200 mg total) by mouth 3 (three) times daily as needed for Pain.  -     POCT urinalysis, dipstick or tablet reag  -     Urine culture  -     Cancel: Comprehensive metabolic panel; Future  -     Cancel: Hemoglobin A1c; Future  -     Hemoglobin A1c; Future  -     Hemoglobin A1c  -     Comprehensive metabolic panel; Future  -     Comprehensive metabolic panel    Diabetes mellitus due to underlying condition with hyperosmolarity without coma, without long-term current use of insulin  -     Cancel: Hemoglobin A1c; Future  -     Hemoglobin A1c; Future  -     Hemoglobin A1c    discussed otc's  Needs f/u check  1 wk

## 2019-08-22 NOTE — TELEPHONE ENCOUNTER
----- Message from Inez Ferguson sent at 8/22/2019  1:58 PM CDT -----  Contact: Self  Type: Needs Medical Advice    Who Called:  patient  Best Call Back Number:[phone] (home)   Additional Information:patient would like a call back in regards to the Rx the Dr sent over today for the Antibiotic/Pain meds said she will explain when the nurse calls her back she also have a problem with the insurance

## 2019-08-28 ENCOUNTER — TELEPHONE (OUTPATIENT)
Dept: PHARMACY | Facility: CLINIC | Age: 79
End: 2019-08-28

## 2019-09-11 ENCOUNTER — OFFICE VISIT (OUTPATIENT)
Dept: CARDIOLOGY | Facility: CLINIC | Age: 79
End: 2019-09-11
Payer: MEDICARE

## 2019-09-11 VITALS
OXYGEN SATURATION: 98 % | HEIGHT: 65 IN | HEART RATE: 72 BPM | SYSTOLIC BLOOD PRESSURE: 134 MMHG | WEIGHT: 188.25 LBS | BODY MASS INDEX: 31.36 KG/M2 | DIASTOLIC BLOOD PRESSURE: 56 MMHG

## 2019-09-11 DIAGNOSIS — E78.2 MIXED HYPERLIPIDEMIA: ICD-10-CM

## 2019-09-11 DIAGNOSIS — I25.709 CORONARY ARTERY DISEASE INVOLVING CORONARY BYPASS GRAFT OF NATIVE HEART WITH ANGINA PECTORIS: Primary | ICD-10-CM

## 2019-09-11 DIAGNOSIS — E66.9 OBESITY (BMI 30.0-34.9): ICD-10-CM

## 2019-09-11 DIAGNOSIS — I49.49 PREMATURE BEATS: ICD-10-CM

## 2019-09-11 DIAGNOSIS — I08.0 MITRAL AND AORTIC VALVE DISEASE: ICD-10-CM

## 2019-09-11 DIAGNOSIS — I10 ESSENTIAL HYPERTENSION: ICD-10-CM

## 2019-09-11 PROCEDURE — 99214 OFFICE O/P EST MOD 30 MIN: CPT | Mod: S$GLB,,, | Performed by: INTERNAL MEDICINE

## 2019-09-11 PROCEDURE — 3078F DIAST BP <80 MM HG: CPT | Mod: CPTII,S$GLB,, | Performed by: INTERNAL MEDICINE

## 2019-09-11 PROCEDURE — 3075F SYST BP GE 130 - 139MM HG: CPT | Mod: CPTII,S$GLB,, | Performed by: INTERNAL MEDICINE

## 2019-09-11 PROCEDURE — 3075F PR MOST RECENT SYSTOLIC BLOOD PRESS GE 130-139MM HG: ICD-10-PCS | Mod: CPTII,S$GLB,, | Performed by: INTERNAL MEDICINE

## 2019-09-11 PROCEDURE — 99214 PR OFFICE/OUTPT VISIT, EST, LEVL IV, 30-39 MIN: ICD-10-PCS | Mod: S$GLB,,, | Performed by: INTERNAL MEDICINE

## 2019-09-11 PROCEDURE — 1101F PR PT FALLS ASSESS DOC 0-1 FALLS W/OUT INJ PAST YR: ICD-10-PCS | Mod: CPTII,S$GLB,, | Performed by: INTERNAL MEDICINE

## 2019-09-11 PROCEDURE — 1101F PT FALLS ASSESS-DOCD LE1/YR: CPT | Mod: CPTII,S$GLB,, | Performed by: INTERNAL MEDICINE

## 2019-09-11 PROCEDURE — 3078F PR MOST RECENT DIASTOLIC BLOOD PRESSURE < 80 MM HG: ICD-10-PCS | Mod: CPTII,S$GLB,, | Performed by: INTERNAL MEDICINE

## 2019-09-11 RX ORDER — OLMESARTAN MEDOXOMIL 40 MG/1
20 TABLET ORAL DAILY
Qty: 90 TABLET | Refills: 1
Start: 2019-09-11 | End: 2020-03-09 | Stop reason: SDUPTHER

## 2019-09-11 NOTE — PROGRESS NOTES
Subjective:    Patient ID:  Yael Claire is a 78 y.o. female who presents for Coronary Artery Disease and Valvular Heart Disease        HPI  DISCUSSED RECENT LABS CMP /HBA1C 4.9% AT [facility], DOING OK, NO CHEST PAIN, NO SIGNIFICANT SHORTNESS OF BREATH, NO TIA TYPE SYMPTOMS, NO NEAR-SYNCOPE, NO PALPITATION, SEE ROS    Past Medical History:   Diagnosis Date    Acute coronary syndrome     LIGHT 2004    Anticoagulant long-term use     Aortic valve disorder     Arthritis     Coronary artery disease     cabg & valve sgy    Heart murmur     Hyperlipidemia     Hypertension     Palpitations     Stenosis of aortic and mitral valves     Valvular regurgitation      Past Surgical History:   Procedure Laterality Date    CARDIAC CATHETERIZATION      CARDIAC VALVE SURGERY      CORONARY ARTERY BYPASS GRAFT  04/2005    Heart Cath With Grafts-Left N/A 1/23/2018    Performed by [facility] at [facility] CATH    HYSTERECTOMY  1980    TRANSESOPHAGEAL ECHOCARDIOGRAM (COBY) N/A 1/23/2018    Performed by Eb [facility]uelas MD at [facility] CATH     Family History   Problem Relation Age of Onset    Hypertension Mother     Heart disease Mother     Hypertension Father     Heart disease Father      Social History     Socioeconomic History    Marital status: Single     Spouse name: Not on file    Number of children: Not on file    Years of education: Not on file    Highest education level: Not on file   Occupational History    Not on file   Social Needs    Financial resource strain: Not on file    Food insecurity:     Worry: Not on file     Inability: Not on file    Transportation needs:     Medical: Not on file     Non-medical: Not on file   Tobacco Use    Smoking status: Never Smoker    Smokeless tobacco: Never Used   Substance and Sexual Activity    Alcohol use: Yes     Comment: couple times per yr    Drug use: No    Sexual activity: Not on file   Lifestyle    Physical activity:     Days per week: Not on file     Minutes  per session: Not on file    Stress: Not on file   Relationships    Social connections:     Talks on phone: Not on file     Gets together: Not on file     Attends Confucianist service: Not on file     Active member of club or organization: Not on file     Attends meetings of clubs or organizations: Not on file     Relationship status: Not on file   Other Topics Concern    Not on file   Social History Narrative    Not on file       Review of patient's allergies indicates:   Allergen Reactions    Clindamycin Diarrhea    Darvocet a500 [propoxyphene n-acetaminophen]     Erythromycin Other (See Comments)    Mycinette [cetylpyridinium-benzocaine]     Pcn [penicillins]        Current Outpatient Medications:     aspirin (ECOTRIN) 81 MG EC tablet, Take 81 mg by mouth once daily., Disp: , Rfl:     co-enzyme Q-10 30 mg capsule, Take 30 mg by mouth once daily. , Disp: , Rfl:     evolocumab (REPATHA SURECLICK) 140 mg/mL PnIj, Inject 140 mg into the skin every 14 (fourteen) days., Disp: 2 mL, Rfl: 6    fish oil-omega-3 fatty acids 300-1,000 mg capsule, Take 2 g by mouth once daily., Disp: , Rfl:     FOLIC ACID/MULTIVIT-MIN/LUTEIN (CENTRUM SILVER ORAL), Take by mouth once daily. ONE DAILY, Disp: , Rfl:     furosemide (LASIX) 20 MG tablet, Take 20 mg by mouth daily as needed. As needed, Disp: , Rfl:     metoprolol succinate (TOPROL-XL) 25 MG 24 hr tablet, Take 0.5 tablets (12.5 mg total) by mouth once daily. Patient states she is only taking half daily (Patient taking differently: Take 12.5 mg by mouth 2 (two) times daily. Patient states she is only taking half twice daily), Disp: 90 tablet, Rfl: 1    nitroGLYCERIN (NITROSTAT) 0.4 MG SL tablet, Place 1 tablet (0.4 mg total) under the tongue every 5 (five) minutes as needed for Chest pain., Disp: 25 tablet, Rfl: 1    olmesartan (BENICAR) 40 MG tablet, Take 0.5 tablets (20 mg total) by mouth once daily. Patient states taking only half tablet daily, Disp: 90 tablet, Rfl:  "1    pantoprazole (PROTONIX) 40 MG tablet, Take 40 mg by mouth once daily., Disp: , Rfl: 5    potassium chloride (KLOR-CON) 10 MEQ TbSR, Take 1 tablet (10 mEq total) by mouth twice a week., Disp: 24 tablet, Rfl: 2    rosuvastatin (CRESTOR) 40 MG Tab, TAKE 1 TABLET (40 MG TOTAL) BY MOUTH EVERY EVENING., Disp: 90 tablet, Rfl: 1    vitamin D 1000 units Tab, Take 185 mg by mouth once daily., Disp: , Rfl:     Review of Systems   Constitution: Negative for chills, decreased appetite, diaphoresis, fever, malaise/fatigue and weight gain.   Cardiovascular: Negative for chest pain (OCC SCAR), claudication, cyanosis, dyspnea on exertion (MILD), irregular heartbeat, leg swelling (OCC,MILD), near-syncope, orthopnea, palpitations, paroxysmal nocturnal dyspnea and syncope.   Respiratory: Negative for cough, hemoptysis and shortness of breath.    Hematologic/Lymphatic: Negative for bleeding problem. Does not bruise/bleed easily.   Skin: Negative for dry skin, itching and rash.   Musculoskeletal: Negative for arthritis (MILD), back pain and falls.   Gastrointestinal: Negative for abdominal pain, change in bowel habit, dysphagia, hematemesis and nausea.   Neurological: Negative for brief paralysis, dizziness, focal weakness, light-headedness, numbness (LLE WITH STANDING) and weakness.   Psychiatric/Behavioral: Negative for altered mental status and memory loss. The patient does not have insomnia.         Objective:      Vitals:    09/11/19 1355   BP: (!) 134/56   Pulse: 72   SpO2: 98%   Weight: 85.4 kg (188 lb 4.4 oz)   Height: 5' 5" (1.651 m)   PainSc: 0-No pain     Body mass index is 31.33 kg/m².    Physical Exam   Constitutional: She is oriented to person, place, and time. She appears well-developed and well-nourished.   OVER WEIGHT   HENT:   Head: Normocephalic and atraumatic.   Mouth/Throat: Oropharynx is clear and moist and mucous membranes are normal.   Eyes: Pupils are equal, round, and reactive to light. Conjunctivae and " EOM are normal. Right eye exhibits normal extraocular motion. Left eye exhibits normal extraocular motion.   Neck: Trachea normal and normal range of motion. Neck supple. Decreased carotid pulses (SLIGHTLY) present. No hepatojugular reflux and no JVD present. Carotid bruit: MURMUR TO NECK. No erythema present.   Cardiovascular: Normal rate and regular rhythm.  No extrasystoles are present. PMI is not displaced. Exam reveals no gallop and no friction rub.   Murmur heard.   Harsh midsystolic murmur is present with a grade of 1/6 at the upper right sternal border radiating to the neck.   Systolic murmur of grade 2/6 is also present at the upper right sternal border.  Pulses:       Carotid pulses are 2+ on the right side, and 2+ on the left side.       Radial pulses are 2+ on the right side, and 2+ on the left side.        Posterior tibial pulses are 2+ on the right side, and 2+ on the left side.   Pulmonary/Chest: Effort normal and breath sounds normal. No accessory muscle usage. No respiratory distress. She has no wheezes. She has no rales. She exhibits no tenderness.   LARGE STERNOTOMY SCAR   Abdominal: Soft. Bowel sounds are normal. She exhibits no mass. There is no hepatosplenomegaly. There is no CVA tenderness.   Musculoskeletal: She exhibits no tenderness.    NO EDEMA   Lymphadenopathy:     She has no cervical adenopathy.   Neurological: She is alert and oriented to person, place, and time. She displays no tremor. No cranial nerve deficit.   Skin: Skin is warm and dry. No cyanosis.   Psychiatric: She has a normal mood and affect. Her speech is normal and behavior is normal.               ..    Chemistry        Component Value Date/Time     01/23/2018 1034    K 4.3 01/23/2018 1034     01/23/2018 1034    CO2 27 01/23/2018 1034    BUN 12 01/23/2018 1034    CREATININE 0.67 01/23/2018 1034     01/23/2018 1034        Component Value Date/Time    CALCIUM 8.8 01/23/2018 1034    ALKPHOS 110 12/15/2017  0849    AST 23 12/15/2017 0849    ALT 15 12/15/2017 0849    BILITOT 0.6 12/15/2017 0849    ESTGFRAFRICA >60 01/23/2018 1034    EGFRNONAA >60 01/23/2018 1034            ..  Lab Results   Component Value Date    CHOL 246 (H) 12/15/2017     Lab Results   Component Value Date    HDL 48 12/15/2017     Lab Results   Component Value Date    LDLCALC 180.8 (H) 12/15/2017     Lab Results   Component Value Date    TRIG 86 12/15/2017     Lab Results   Component Value Date    CHOLHDL 19.5 (L) 12/15/2017     ..  Lab Results   Component Value Date    WBC 6.28 01/23/2018    HGB 12.9 01/23/2018    HCT 40.1 01/23/2018    MCV 94 01/23/2018     01/23/2018       Test(s) Reviewed  I have reviewed the following in detail:  [] Stress test   [] Angiography   [] Echocardiogram   [x] Labs   [] Other:       Assessment:         ICD-10-CM ICD-9-CM   1. Coronary artery disease involving coronary bypass graft of native heart with angina pectoris I25.709 414.05     413.9   2. Essential hypertension I10 401.9   3. Mitral and aortic valve disease I08.0 396.9   4. Obesity (BMI 30.0-34.9) E66.9 278.00   5. Mixed hyperlipidemia E78.2 272.2   6. 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 - Primary    Relevant Orders    Comprehensive metabolic panel    Mixed hyperlipidemia    Relevant Orders    Comprehensive metabolic panel    Lipid panel    Premature beats    Relevant Orders    Comprehensive metabolic panel    Mitral and aortic valve disease    Relevant Orders    Comprehensive metabolic panel       Endocrine    Obesity (BMI 30.0-34.9)    Relevant Orders    Comprehensive metabolic panel           Plan:         ALL CV CLINICALLY RELATIVELY STABLE, CLASS 1  ANGINA, NO HF, NO TIA, NO CLINICAL ARRHYTHMIA,CONTINUE CURRENT MEDS, EDUCATION, DIET, EXERCISE, WEIGHT LOSS, MONITOR VALVES CLOSELY, RETURN TO CLINIC IN 6 MONTHS WITH LABS  Coronary artery disease 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 Patient states taking only half tablet daily  Dispense: 90 tablet; Refill: 1    Mitral and aortic valve disease  -     Comprehensive metabolic panel; Future; Expected date: 09/11/2019    Obesity (BMI 30.0-34.9)  -     Comprehensive metabolic panel; Future; Expected date: 09/11/2019    Mixed hyperlipidemia  -     Comprehensive metabolic panel; Future; Expected date: 09/11/2019  -     Lipid panel; Future; Expected date: 09/11/2019    Premature beats  -     Comprehensive metabolic panel; Future; Expected date: 09/11/2019    RTC Low level/low impact aerobic exercise 5x's/wk. Heart healthy diet and risk factor modification.    See labs and med orders.    Aerobic exercise 5x's/wk. Heart healthy diet and risk factor modification.    See labs and med orders.

## 2019-09-19 ENCOUNTER — OFFICE VISIT (OUTPATIENT)
Dept: PRIMARY CARE CLINIC | Facility: CLINIC | Age: 79
End: 2019-09-19
Payer: MEDICARE

## 2019-09-19 VITALS
OXYGEN SATURATION: 97 % | DIASTOLIC BLOOD PRESSURE: 58 MMHG | BODY MASS INDEX: 30.97 KG/M2 | WEIGHT: 185.88 LBS | SYSTOLIC BLOOD PRESSURE: 138 MMHG | HEART RATE: 71 BPM | HEIGHT: 65 IN

## 2019-09-19 DIAGNOSIS — R07.89 CHEST WALL DISCOMFORT: Primary | ICD-10-CM

## 2019-09-19 DIAGNOSIS — I35.9 AORTIC VALVE DISORDER: ICD-10-CM

## 2019-09-19 DIAGNOSIS — K64.9 HEMORRHOIDS, UNSPECIFIED HEMORRHOID TYPE: ICD-10-CM

## 2019-09-19 DIAGNOSIS — L91.0 KELOID SCAR: ICD-10-CM

## 2019-09-19 DIAGNOSIS — E66.9 OBESITY (BMI 30.0-34.9): ICD-10-CM

## 2019-09-19 DIAGNOSIS — I10 ESSENTIAL HYPERTENSION: ICD-10-CM

## 2019-09-19 PROCEDURE — 3075F SYST BP GE 130 - 139MM HG: CPT | Mod: CPTII,S$GLB,, | Performed by: PHYSICIAN ASSISTANT

## 2019-09-19 PROCEDURE — 11900 PR INJECTION INTO SKIN LESIONS, UP TO 7: ICD-10-PCS | Mod: S$GLB,,, | Performed by: PHYSICIAN ASSISTANT

## 2019-09-19 PROCEDURE — 11900 INJECT SKIN LESIONS </W 7: CPT | Mod: S$GLB,,, | Performed by: PHYSICIAN ASSISTANT

## 2019-09-19 PROCEDURE — 3075F PR MOST RECENT SYSTOLIC BLOOD PRESS GE 130-139MM HG: ICD-10-PCS | Mod: CPTII,S$GLB,, | Performed by: PHYSICIAN ASSISTANT

## 2019-09-19 PROCEDURE — 3078F PR MOST RECENT DIASTOLIC BLOOD PRESSURE < 80 MM HG: ICD-10-PCS | Mod: CPTII,S$GLB,, | Performed by: PHYSICIAN ASSISTANT

## 2019-09-19 PROCEDURE — 3078F DIAST BP <80 MM HG: CPT | Mod: CPTII,S$GLB,, | Performed by: PHYSICIAN ASSISTANT

## 2019-09-19 PROCEDURE — 1101F PR PT FALLS ASSESS DOC 0-1 FALLS W/OUT INJ PAST YR: ICD-10-PCS | Mod: CPTII,S$GLB,, | Performed by: PHYSICIAN ASSISTANT

## 2019-09-19 PROCEDURE — 99214 OFFICE O/P EST MOD 30 MIN: CPT | Mod: 25,S$GLB,, | Performed by: PHYSICIAN ASSISTANT

## 2019-09-19 PROCEDURE — 1101F PT FALLS ASSESS-DOCD LE1/YR: CPT | Mod: CPTII,S$GLB,, | Performed by: PHYSICIAN ASSISTANT

## 2019-09-19 PROCEDURE — 99214 PR OFFICE/OUTPT VISIT, EST, LEVL IV, 30-39 MIN: ICD-10-PCS | Mod: 25,S$GLB,, | Performed by: PHYSICIAN ASSISTANT

## 2019-09-19 RX ORDER — HYDROCODONE BITARTRATE AND ACETAMINOPHEN 10; 325 MG/1; MG/1
1 TABLET ORAL 2 TIMES DAILY PRN
Refills: 0 | COMMUNITY
Start: 2019-09-15

## 2019-09-19 RX ORDER — SULFAMETHOXAZOLE AND TRIMETHOPRIM 800; 160 MG/1; MG/1
TABLET ORAL
COMMUNITY
End: 2020-01-06

## 2019-09-19 RX ORDER — TRIAMCINOLONE ACETONIDE 40 MG/ML
40 INJECTION, SUSPENSION INTRA-ARTICULAR; INTRAMUSCULAR
Status: COMPLETED | OUTPATIENT
Start: 2019-09-19 | End: 2019-09-19

## 2019-09-19 RX ADMIN — TRIAMCINOLONE ACETONIDE 40 MG: 40 INJECTION, SUSPENSION INTRA-ARTICULAR; INTRAMUSCULAR at 03:09

## 2019-09-19 NOTE — PROGRESS NOTES
Subjective:       Patient ID: Yael Claire is a 78 y.o. female.    Chief Complaint: Hypertension    HPI   Pt feels well  Possible hemorrhoid needs check  Urine sx greatly improved  Recent lab tests look good  Pt has a lot of tenderness in her chest wall sternal scar  Review of Systems   Constitutional: Negative.  Negative for activity change, appetite change, chills, diaphoresis, fatigue, fever and unexpected weight change.   HENT: Negative.    Eyes: Negative.    Respiratory: Negative.  Negative for cough and shortness of breath.    Cardiovascular: Negative.  Negative for chest pain and leg swelling.   Gastrointestinal: Negative.    Endocrine: Negative.    Genitourinary: Positive for frequency. Negative for dysuria, flank pain, hematuria and urgency.   Musculoskeletal: Negative.    Skin: Positive for wound.   Neurological: Negative.        Objective:      Physical Exam   Constitutional: She appears well-developed and well-nourished. No distress.   HENT:   Head: Normocephalic and atraumatic.   Right Ear: External ear normal.   Left Ear: External ear normal.   Nose: Nose normal.   Mouth/Throat: Oropharynx is clear and moist. No oropharyngeal exudate.   Eyes: Conjunctivae are normal. No scleral icterus.   Neck: Normal range of motion. Neck supple. No tracheal deviation present. No thyromegaly present.   Cardiovascular: Normal rate, regular rhythm and intact distal pulses. Exam reveals no gallop and no friction rub.   Murmur heard.  Systolic and diastolic murmur   Pulmonary/Chest: Effort normal and breath sounds normal. No stridor. No respiratory distress. She has no wheezes. She has no rales. She exhibits tenderness.   Abdominal: Soft. Bowel sounds are normal. She exhibits no distension and no mass. There is no tenderness. There is no rebound and no guarding. No hernia.   Genitourinary:   Genitourinary Comments: Tender ext hemorrhoid non bleeding at 12 o'clock   Musculoskeletal: She exhibits tenderness. She exhibits  "no edema.   Tender large 5" keloid scar over sternum      Lymphadenopathy:     She has no cervical adenopathy.   Skin: Skin is warm and dry. No rash noted.   Vitals reviewed.      Assessment:       1. Chest wall discomfort    2. Obesity (BMI 30.0-34.9)    3. Essential hypertension    4. Aortic valve disorder    5. Keloid scar    6. Hemorrhoids, unspecified hemorrhoid type        Plan:       Yael was seen today for hypertension.    Diagnoses and all orders for this visit:    Chest wall discomfort    Obesity (BMI 30.0-34.9)    Essential hypertension    Aortic valve disorder    Keloid scar  -     triamcinolone acetonide injection 10 mg    Hemorrhoids, unspecified hemorrhoid type    discussed otc's    Injected keloid scar multi sites along scar with total 10 mg Kenalog injected 30 mg destroyed  F/u 2 or 3 wks  Pt tolerated procedure well  "

## 2019-09-26 ENCOUNTER — TELEPHONE (OUTPATIENT)
Dept: PHARMACY | Facility: CLINIC | Age: 79
End: 2019-09-26

## 2019-10-17 ENCOUNTER — OFFICE VISIT (OUTPATIENT)
Dept: PRIMARY CARE CLINIC | Facility: CLINIC | Age: 79
End: 2019-10-17
Payer: MEDICARE

## 2019-10-17 VITALS
TEMPERATURE: 99 F | DIASTOLIC BLOOD PRESSURE: 88 MMHG | HEIGHT: 65 IN | OXYGEN SATURATION: 95 % | HEART RATE: 68 BPM | SYSTOLIC BLOOD PRESSURE: 122 MMHG | WEIGHT: 185.19 LBS | BODY MASS INDEX: 30.85 KG/M2 | RESPIRATION RATE: 18 BRPM

## 2019-10-17 DIAGNOSIS — H60.312 CHRONIC DIFFUSE OTITIS EXTERNA OF LEFT EAR: Primary | ICD-10-CM

## 2019-10-17 DIAGNOSIS — R53.83 FATIGUE, UNSPECIFIED TYPE: ICD-10-CM

## 2019-10-17 DIAGNOSIS — L91.0 KELOID SCAR: ICD-10-CM

## 2019-10-17 PROCEDURE — 1101F PT FALLS ASSESS-DOCD LE1/YR: CPT | Mod: CPTII,S$GLB,, | Performed by: PHYSICIAN ASSISTANT

## 2019-10-17 PROCEDURE — 99213 OFFICE O/P EST LOW 20 MIN: CPT | Mod: 25,S$GLB,, | Performed by: PHYSICIAN ASSISTANT

## 2019-10-17 PROCEDURE — 3079F PR MOST RECENT DIASTOLIC BLOOD PRESSURE 80-89 MM HG: ICD-10-PCS | Mod: CPTII,S$GLB,, | Performed by: PHYSICIAN ASSISTANT

## 2019-10-17 PROCEDURE — 99213 PR OFFICE/OUTPT VISIT, EST, LEVL III, 20-29 MIN: ICD-10-PCS | Mod: 25,S$GLB,, | Performed by: PHYSICIAN ASSISTANT

## 2019-10-17 PROCEDURE — 11900 PR INJECTION INTO SKIN LESIONS, UP TO 7: ICD-10-PCS | Mod: S$GLB,,, | Performed by: PHYSICIAN ASSISTANT

## 2019-10-17 PROCEDURE — 3074F SYST BP LT 130 MM HG: CPT | Mod: CPTII,S$GLB,, | Performed by: PHYSICIAN ASSISTANT

## 2019-10-17 PROCEDURE — 11900 INJECT SKIN LESIONS </W 7: CPT | Mod: S$GLB,,, | Performed by: PHYSICIAN ASSISTANT

## 2019-10-17 PROCEDURE — 3074F PR MOST RECENT SYSTOLIC BLOOD PRESSURE < 130 MM HG: ICD-10-PCS | Mod: CPTII,S$GLB,, | Performed by: PHYSICIAN ASSISTANT

## 2019-10-17 PROCEDURE — 3079F DIAST BP 80-89 MM HG: CPT | Mod: CPTII,S$GLB,, | Performed by: PHYSICIAN ASSISTANT

## 2019-10-17 PROCEDURE — 1101F PR PT FALLS ASSESS DOC 0-1 FALLS W/OUT INJ PAST YR: ICD-10-PCS | Mod: CPTII,S$GLB,, | Performed by: PHYSICIAN ASSISTANT

## 2019-10-17 RX ORDER — NEOMYCIN SULFATE, POLYMYXIN B SULFATE AND HYDROCORTISONE 10; 3.5; 1 MG/ML; MG/ML; [USP'U]/ML
3 SUSPENSION/ DROPS AURICULAR (OTIC) 4 TIMES DAILY
Qty: 10 ML | Refills: 0 | Status: SHIPPED | OUTPATIENT
Start: 2019-10-17 | End: 2019-10-20

## 2019-10-17 RX ORDER — TRIAMCINOLONE ACETONIDE 40 MG/ML
40 INJECTION, SUSPENSION INTRA-ARTICULAR; INTRAMUSCULAR
Status: COMPLETED | OUTPATIENT
Start: 2019-10-17 | End: 2019-10-17

## 2019-10-17 RX ADMIN — TRIAMCINOLONE ACETONIDE 40 MG: 40 INJECTION, SUSPENSION INTRA-ARTICULAR; INTRAMUSCULAR at 04:10

## 2019-10-17 NOTE — PROGRESS NOTES
Subjective:       Patient ID: Yael Claire is a 78 y.o. female.    Chief Complaint: Fatigue (follow up )    HPI   Pt feeling well  Scar did shrink and is less tender  Pt is concerned about elevated B 12 level  Itchy L ear  Would like another injection into scar to reduce it  Review of Systems   Constitutional: Negative.  Negative for activity change, appetite change, chills, diaphoresis, fatigue, fever and unexpected weight change.   HENT: Positive for ear pain.    Eyes: Negative.    Respiratory: Negative.  Negative for cough and shortness of breath.    Cardiovascular: Negative.  Negative for chest pain and leg swelling.   Gastrointestinal: Negative.    Endocrine: Negative.    Genitourinary: Negative.    Musculoskeletal: Negative.    Skin: Positive for rash.   Neurological: Negative.        Objective:      Physical Exam   Constitutional: She appears well-developed and well-nourished. No distress.   HENT:   Head: Normocephalic and atraumatic.   Nose: Nose normal.   Mouth/Throat: Oropharynx is clear and moist. No oropharyngeal exudate.   Sinuses non tender  Mucus clear  Scaling and erythema both ext ear canals   Eyes: Conjunctivae are normal. No scleral icterus.   Neck: Normal range of motion. Neck supple. No tracheal deviation present. No thyromegaly present.   Cardiovascular: Normal rate, regular rhythm and intact distal pulses. Exam reveals no gallop and no friction rub.   Murmur heard.  Systolic and diastolic murmur   Pulmonary/Chest: Effort normal and breath sounds normal. No stridor. No respiratory distress. She has no wheezes. She has no rales.   Musculoskeletal: She exhibits no edema.   Lymphadenopathy:     She has no cervical adenopathy.   Skin: Skin is warm and dry. Rash noted.   Tender keloid sternal area scars   Softer and less tender at previous injection site   Vitals reviewed.      Assessment:       1. Chronic diffuse otitis externa of left ear    2. Keloid scar    3. Fatigue, unspecified type         Plan:       Yael was seen today for fatigue.    Diagnoses and all orders for this visit:    Chronic diffuse otitis externa of left ear  -     neomycin-polymyxin-hydrocortisone (CORTISPORIN) 3.5-10,000-1 mg/mL-unit/mL-% otic suspension; Place 3 drops into the left ear 4 (four) times daily. for 10 doses    Keloid scar  -     triamcinolone acetonide injection 40 mg    Fatigue, unspecified type      Injected the 2 larger keloid area scars with 20 mg 0.5 cc's Kenalog with 0.5 cc's destroyed  Pt tolerated procedure well    F/u check 1 mos

## 2019-10-22 ENCOUNTER — TELEPHONE (OUTPATIENT)
Dept: PHARMACY | Facility: CLINIC | Age: 79
End: 2019-10-22

## 2019-10-22 NOTE — TELEPHONE ENCOUNTER
Patient returned call for refill of Repatha.  Last injection given 10/18/19.  Confirmed dose is the same.  Next injection due 19.  No missed doses and 0 doses remaining on hand.  No new medications, allergies or health conditions.  No issues with side effects.  No additional questions or concerns.  Repatha will ship 10/30/19 for delivery on 10/31/19.  Address confirmed.  Two patient identifiers confirmed - name and .  Therapy appropriate.

## 2019-11-14 ENCOUNTER — OFFICE VISIT (OUTPATIENT)
Dept: PRIMARY CARE CLINIC | Facility: CLINIC | Age: 79
End: 2019-11-14
Payer: MEDICARE

## 2019-11-14 VITALS
BODY MASS INDEX: 31.04 KG/M2 | HEART RATE: 73 BPM | SYSTOLIC BLOOD PRESSURE: 124 MMHG | WEIGHT: 186.31 LBS | HEIGHT: 65 IN | DIASTOLIC BLOOD PRESSURE: 54 MMHG | OXYGEN SATURATION: 99 %

## 2019-11-14 DIAGNOSIS — E78.2 MIXED HYPERLIPIDEMIA: ICD-10-CM

## 2019-11-14 DIAGNOSIS — R07.89 CHEST WALL DISCOMFORT: ICD-10-CM

## 2019-11-14 DIAGNOSIS — I10 ESSENTIAL HYPERTENSION: Primary | ICD-10-CM

## 2019-11-14 DIAGNOSIS — I35.9 AORTIC VALVE DISORDER: ICD-10-CM

## 2019-11-14 DIAGNOSIS — I05.9 MITRAL VALVE DISORDER: ICD-10-CM

## 2019-11-14 DIAGNOSIS — H60.312 CHRONIC DIFFUSE OTITIS EXTERNA OF LEFT EAR: ICD-10-CM

## 2019-11-14 PROCEDURE — 3074F SYST BP LT 130 MM HG: CPT | Mod: CPTII,S$GLB,, | Performed by: PHYSICIAN ASSISTANT

## 2019-11-14 PROCEDURE — 99214 OFFICE O/P EST MOD 30 MIN: CPT | Mod: S$GLB,,, | Performed by: PHYSICIAN ASSISTANT

## 2019-11-14 PROCEDURE — 1101F PT FALLS ASSESS-DOCD LE1/YR: CPT | Mod: CPTII,S$GLB,, | Performed by: PHYSICIAN ASSISTANT

## 2019-11-14 PROCEDURE — 99214 PR OFFICE/OUTPT VISIT, EST, LEVL IV, 30-39 MIN: ICD-10-PCS | Mod: S$GLB,,, | Performed by: PHYSICIAN ASSISTANT

## 2019-11-14 PROCEDURE — 3078F DIAST BP <80 MM HG: CPT | Mod: CPTII,S$GLB,, | Performed by: PHYSICIAN ASSISTANT

## 2019-11-14 PROCEDURE — 1101F PR PT FALLS ASSESS DOC 0-1 FALLS W/OUT INJ PAST YR: ICD-10-PCS | Mod: CPTII,S$GLB,, | Performed by: PHYSICIAN ASSISTANT

## 2019-11-14 PROCEDURE — 3074F PR MOST RECENT SYSTOLIC BLOOD PRESSURE < 130 MM HG: ICD-10-PCS | Mod: CPTII,S$GLB,, | Performed by: PHYSICIAN ASSISTANT

## 2019-11-14 PROCEDURE — 3078F PR MOST RECENT DIASTOLIC BLOOD PRESSURE < 80 MM HG: ICD-10-PCS | Mod: CPTII,S$GLB,, | Performed by: PHYSICIAN ASSISTANT

## 2019-11-14 RX ORDER — METOPROLOL SUCCINATE 25 MG/1
12.5 TABLET, EXTENDED RELEASE ORAL 2 TIMES DAILY
Start: 2019-11-14 | End: 2019-11-22 | Stop reason: SDUPTHER

## 2019-11-14 NOTE — PROGRESS NOTES
Subjective:       Patient ID: Yael Claire is a 79 y.o. female.    Chief Complaint: Otalgia (follow up)    HPI   Pt. In today for recheck of her ears  The cortisporin has helped a lot  Itching is almost gone  Pt meeds refill on BP med  Pt feels well  Review of Systems   Constitutional: Negative.  Negative for activity change, appetite change, chills, diaphoresis, fatigue, fever and unexpected weight change.   HENT: Negative.    Eyes: Negative.    Respiratory: Negative.  Negative for cough and shortness of breath.    Cardiovascular: Negative.  Negative for chest pain and leg swelling.   Gastrointestinal: Negative.    Endocrine: Negative.    Genitourinary: Negative.    Musculoskeletal: Negative.    Skin: Negative.  Negative for rash.   Neurological: Negative.        Objective:      Physical Exam   Constitutional: She appears well-developed and well-nourished. No distress.   HENT:   Head: Normocephalic and atraumatic.   Right Ear: External ear normal.   Nose: Nose normal.   Mouth/Throat: Oropharynx is clear and moist. No oropharyngeal exudate.   Mild scale L ext ear   Eyes: Conjunctivae are normal. No scleral icterus.   Neck: Normal range of motion. Neck supple. No tracheal deviation present. No thyromegaly present.   Cardiovascular: Normal rate, regular rhythm and intact distal pulses. Exam reveals no gallop and no friction rub.   Murmur heard.  Systolic and diastolic murmur present   Pulmonary/Chest: Effort normal and breath sounds normal. No stridor. No respiratory distress. She has no wheezes. She has no rales.   Musculoskeletal: She exhibits no edema.   Lymphadenopathy:     She has no cervical adenopathy.   Skin: Skin is warm and dry. No rash noted.   Vitals reviewed.      Assessment:       1. Essential hypertension    2. Mixed hyperlipidemia    3. Chest wall discomfort    4. Aortic valve disorder    5. Mitral valve disorder    6. Chronic diffuse otitis externa of left ear        Plan:         Yael was seen  today for otalgia.    Diagnoses and all orders for this visit:    Essential hypertension  -     metoprolol succinate (TOPROL-XL) 25 MG 24 hr tablet; Take 0.5 tablets (12.5 mg total) by mouth 2 (two) times daily. Patient states she is only taking half twice daily    Mixed hyperlipidemia    Chest wall discomfort    Aortic valve disorder    Mitral valve disorder    Chronic diffuse otitis externa of left ear    continue cortisporin otic gtts.

## 2019-11-22 DIAGNOSIS — I10 ESSENTIAL HYPERTENSION: ICD-10-CM

## 2019-11-22 RX ORDER — NITROGLYCERIN 0.4 MG/1
0.4 TABLET SUBLINGUAL EVERY 5 MIN PRN
Qty: 25 TABLET | Refills: 1 | Status: SHIPPED | OUTPATIENT
Start: 2019-11-22 | End: 2020-01-27 | Stop reason: SDUPTHER

## 2019-11-22 RX ORDER — METOPROLOL SUCCINATE 25 MG/1
12.5 TABLET, EXTENDED RELEASE ORAL 2 TIMES DAILY
Qty: 60 TABLET | Refills: 2 | Status: SHIPPED | OUTPATIENT
Start: 2019-11-22 | End: 2020-03-20 | Stop reason: SDUPTHER

## 2019-11-22 NOTE — TELEPHONE ENCOUNTER
----- Message from Bartolo Holguin sent at 11/22/2019  8:51 AM CST -----  Contact: pt  Type:  RX Refill Request    Who Called:  pt  Refill or New Rx:  refill  RX Name and Strength:  Pharm has not received Rx yet  metoprolol succinate (TOPROL-XL) 25 MG 24 hr tablet / nitroGLYCERIN (NITROSTAT) 0.4 MG SL tablet  How is the patient currently taking it? (ex. 1XDay):  Toprol-XL: Sig - Route: Take 0.5 tablets (12.5 mg total) by mouth 2 (two) times daily. Patient states she is only taking half twice daily - Oral   NITROSTAT: Sig - Route: Place 1 tablet (0.4 mg total) under the tongue every 5 (five) minutes as needed for Chest pain. - Sublingual  Is this a 30 day or 90 day RX:  90  Preferred Pharmacy with phone number:    [facility]/pharmacy #[id number] - [place] - [address]  [address] [place] [address] AVE.  [place] [id number]  Phone: [phone] Fax: [phone]    Local or Mail Order:  local  Ordering Provider:    Best Call Back Number:  [phone]  Additional Information:  PT is out of meds currently. Please call to advise when Rx are sent to pharmacy so PT can follow up with Pharm.

## 2019-11-29 ENCOUNTER — TELEPHONE (OUTPATIENT)
Dept: PHARMACY | Facility: CLINIC | Age: 79
End: 2019-11-29

## 2019-12-26 ENCOUNTER — TELEPHONE (OUTPATIENT)
Dept: PHARMACY | Facility: CLINIC | Age: 79
End: 2019-12-26

## 2020-01-06 ENCOUNTER — OFFICE VISIT (OUTPATIENT)
Dept: PRIMARY CARE CLINIC | Facility: CLINIC | Age: 80
End: 2020-01-06
Payer: MEDICARE

## 2020-01-06 ENCOUNTER — TELEPHONE (OUTPATIENT)
Dept: FAMILY MEDICINE | Facility: CLINIC | Age: 80
End: 2020-01-06

## 2020-01-06 ENCOUNTER — TELEPHONE (OUTPATIENT)
Dept: PHARMACY | Facility: CLINIC | Age: 80
End: 2020-01-06

## 2020-01-06 VITALS
OXYGEN SATURATION: 98 % | SYSTOLIC BLOOD PRESSURE: 130 MMHG | HEART RATE: 80 BPM | HEIGHT: 65 IN | TEMPERATURE: 98 F | WEIGHT: 190.25 LBS | DIASTOLIC BLOOD PRESSURE: 70 MMHG | BODY MASS INDEX: 31.7 KG/M2

## 2020-01-06 DIAGNOSIS — I25.709 CORONARY ARTERY DISEASE INVOLVING CORONARY BYPASS GRAFT OF NATIVE HEART WITH ANGINA PECTORIS: ICD-10-CM

## 2020-01-06 DIAGNOSIS — I10 ESSENTIAL HYPERTENSION: Primary | ICD-10-CM

## 2020-01-06 DIAGNOSIS — E78.2 MIXED HYPERLIPIDEMIA: ICD-10-CM

## 2020-01-06 DIAGNOSIS — L98.9 SKIN LESION, SUPERFICIAL: ICD-10-CM

## 2020-01-06 PROCEDURE — 1159F MED LIST DOCD IN RCRD: CPT | Mod: S$GLB,,, | Performed by: NURSE PRACTITIONER

## 2020-01-06 PROCEDURE — 99214 PR OFFICE/OUTPT VISIT, EST, LEVL IV, 30-39 MIN: ICD-10-PCS | Mod: S$GLB,,, | Performed by: NURSE PRACTITIONER

## 2020-01-06 PROCEDURE — 3075F PR MOST RECENT SYSTOLIC BLOOD PRESS GE 130-139MM HG: ICD-10-PCS | Mod: CPTII,S$GLB,, | Performed by: NURSE PRACTITIONER

## 2020-01-06 PROCEDURE — 1159F PR MEDICATION LIST DOCUMENTED IN MEDICAL RECORD: ICD-10-PCS | Mod: S$GLB,,, | Performed by: NURSE PRACTITIONER

## 2020-01-06 PROCEDURE — 1101F PT FALLS ASSESS-DOCD LE1/YR: CPT | Mod: CPTII,S$GLB,, | Performed by: NURSE PRACTITIONER

## 2020-01-06 PROCEDURE — 3078F PR MOST RECENT DIASTOLIC BLOOD PRESSURE < 80 MM HG: ICD-10-PCS | Mod: CPTII,S$GLB,, | Performed by: NURSE PRACTITIONER

## 2020-01-06 PROCEDURE — 99214 OFFICE O/P EST MOD 30 MIN: CPT | Mod: S$GLB,,, | Performed by: NURSE PRACTITIONER

## 2020-01-06 PROCEDURE — 1125F PR PAIN SEVERITY QUANTIFIED, PAIN PRESENT: ICD-10-PCS | Mod: S$GLB,,, | Performed by: NURSE PRACTITIONER

## 2020-01-06 PROCEDURE — 3075F SYST BP GE 130 - 139MM HG: CPT | Mod: CPTII,S$GLB,, | Performed by: NURSE PRACTITIONER

## 2020-01-06 PROCEDURE — 1101F PR PT FALLS ASSESS DOC 0-1 FALLS W/OUT INJ PAST YR: ICD-10-PCS | Mod: CPTII,S$GLB,, | Performed by: NURSE PRACTITIONER

## 2020-01-06 PROCEDURE — 3078F DIAST BP <80 MM HG: CPT | Mod: CPTII,S$GLB,, | Performed by: NURSE PRACTITIONER

## 2020-01-06 PROCEDURE — 1125F AMNT PAIN NOTED PAIN PRSNT: CPT | Mod: S$GLB,,, | Performed by: NURSE PRACTITIONER

## 2020-01-06 RX ORDER — NEOMYCIN SULFATE, POLYMYXIN B SULFATE AND HYDROCORTISONE 10; 3.5; 1 MG/ML; MG/ML; [USP'U]/ML
SUSPENSION/ DROPS AURICULAR (OTIC)
COMMUNITY
End: 2020-05-04

## 2020-01-06 RX ORDER — MUPIROCIN 20 MG/G
OINTMENT TOPICAL 3 TIMES DAILY
Qty: 15 G | Refills: 1 | Status: SHIPPED | OUTPATIENT
Start: 2020-01-06 | End: 2022-03-07

## 2020-01-06 RX ORDER — DICLOFENAC SODIUM 10 MG/G
GEL TOPICAL
Refills: 6 | COMMUNITY
Start: 2019-11-10 | End: 2020-05-04 | Stop reason: SDUPTHER
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
## 2020-01-06 NOTE — PATIENT INSTRUCTIONS
CORICIDIN HBP over the counter for any cold symptoms    Eating Heart-Healthy Foods  Eating has a big impact on your heart health. In fact, eating healthier can improve several of your heart risks at once. For instance, it helps you manage weight, cholesterol, and blood pressure. Here are ideas to help you make heart-healthy changes without giving up all the foods and flavors you love.  Getting started  · Talk with your health care provider about eating plans, such as the DASH or Mediterranean diet. You may also be referred to a dietitian.  · Change a few things at a time. Give yourself time to get used to a few eating changes before adding more.  · Work to create a tasty, healthy eating plan that you can stick to for the rest of your life.    Goals for healthy eating  Below are some tips to improve your eating habits:  · Limit saturated fats and trans fats. Saturated fats raise your levels of cholesterol, so keep these fats to a minimum. They are found in foods such as fatty meats, whole milk, cheese, and palm and coconut oils. Avoid trans fats because they lower good cholesterol as well as raise bad cholesterol. Trans fats are most often found in processed foods.  · Reduce sodium (salt) intake. Eating too much salt may increase your blood pressure. Limit your sodium intake to 2,300 milligrams (mg) per day, or less if your health care provider recommends it. Dining out less often and eating fewer processed foods are two great ways to decrease the amount of salt you consume.  · Managing calories. A calorie is a unit of energy. Your body burns calories for fuel, but if you eat more calories than your body burns, the extras are stored as fat. Your health care provider can help you create a diet plan to manage your calories. This will likely include eating healthier foods as well as exercising regularly. To help you track your progress, keep a diary to record what you eat and how often you exercise.  Choose the right  foods  Aim to make these foods staples of your diet. If you have diabetes, you may have different recommendations than what is listed here:  · Fruits and vegetable provide plenty of nutrients without a lot of calories. At meals, fill half your plate with these foods. Split the other half of your plate between whole grains and lean protein.  · Whole grains are high in fiber and rich in vitamins and nutrients. Good choices include whole-wheat bread, pasta, and brown rice.  · Lean proteins give you nutrition with less fat. Good choices include fish, skinless chicken, and beans.  · Low-fat or nonfat dairy provides nutrients without a lot of fat. Try low-fat or nonfat milk, cheese, or yogurt.  · Healthy fats can be good for you in small amounts. These are unsaturated fats, such as olive oil, nuts, and fish. Try to have at least 2 servings per week of fatty fish such as salmon, sardines, mackerel, rainbow trout, and albacore tuna. These contain omega-3 fatty acids, which are good for your heart. Flaxseed is another source of a heart-healthy fat.  More on heart healthy eating    Read food labels  Healthy eating starts at the grocery store. Be sure to pay attention to food labels on packaged foods. Look for products that are high in fiber and protein, and low in saturated fat, cholesterol, and sodium. Avoid products that contain trans fat. And pay close attention to serving size. For instance, if you plan to eat two servings, double all the numbers on the label.  Prepare food right  A key part of healthy cooking is cutting down on added fat and salt. Look on the internet for lower-fat, lower-sodium recipes. Also, try these tips:  · Remove fat from meat and skin from poultry before cooking.  · Skim fat from the surface of soups and sauces.  · Broil, boil, bake, steam, grill, and microwave food without added fats.  · Choose ingredients that spice up your food without adding calories, fat, or sodium. Try these items:  horseradish, hot sauce, lemon, mustard, nonfat salad dressings, and vinegar. For salt-free herbs and spices, try basil, cilantro, cinnamon, pepper, and rosemary.  Date Last Reviewed: 6/25/2015  © 0182-9593 [employer]. [address] [place] [address]. All rights reserved. This information is not intended as a substitute for professional medical care. Always follow your healthcare professional's instructions.

## 2020-01-22 ENCOUNTER — TELEPHONE (OUTPATIENT)
Dept: PHARMACY | Facility: CLINIC | Age: 80
End: 2020-01-22

## 2020-01-27 ENCOUNTER — OFFICE VISIT (OUTPATIENT)
Dept: PRIMARY CARE CLINIC | Facility: CLINIC | Age: 80
End: 2020-01-27
Payer: MEDICARE

## 2020-01-27 VITALS
HEIGHT: 65 IN | WEIGHT: 187.81 LBS | TEMPERATURE: 98 F | BODY MASS INDEX: 31.29 KG/M2 | HEART RATE: 65 BPM | SYSTOLIC BLOOD PRESSURE: 120 MMHG | OXYGEN SATURATION: 97 % | DIASTOLIC BLOOD PRESSURE: 80 MMHG

## 2020-01-27 DIAGNOSIS — E78.2 MIXED HYPERLIPIDEMIA: ICD-10-CM

## 2020-01-27 DIAGNOSIS — I10 ESSENTIAL HYPERTENSION: Primary | ICD-10-CM

## 2020-01-27 DIAGNOSIS — I25.709 CORONARY ARTERY DISEASE INVOLVING CORONARY BYPASS GRAFT OF NATIVE HEART WITH ANGINA PECTORIS: ICD-10-CM

## 2020-01-27 DIAGNOSIS — Z12.31 ENCOUNTER FOR SCREENING MAMMOGRAM FOR MALIGNANT NEOPLASM OF BREAST: ICD-10-CM

## 2020-01-27 DIAGNOSIS — E66.9 OBESITY (BMI 30.0-34.9): ICD-10-CM

## 2020-01-27 PROCEDURE — 1125F PR PAIN SEVERITY QUANTIFIED, PAIN PRESENT: ICD-10-PCS | Mod: S$GLB,,, | Performed by: NURSE PRACTITIONER

## 2020-01-27 PROCEDURE — 99214 OFFICE O/P EST MOD 30 MIN: CPT | Mod: S$GLB,,, | Performed by: NURSE PRACTITIONER

## 2020-01-27 PROCEDURE — 1159F MED LIST DOCD IN RCRD: CPT | Mod: S$GLB,,, | Performed by: NURSE PRACTITIONER

## 2020-01-27 PROCEDURE — 3079F PR MOST RECENT DIASTOLIC BLOOD PRESSURE 80-89 MM HG: ICD-10-PCS | Mod: CPTII,S$GLB,, | Performed by: NURSE PRACTITIONER

## 2020-01-27 PROCEDURE — 1101F PT FALLS ASSESS-DOCD LE1/YR: CPT | Mod: CPTII,S$GLB,, | Performed by: NURSE PRACTITIONER

## 2020-01-27 PROCEDURE — 3074F SYST BP LT 130 MM HG: CPT | Mod: CPTII,S$GLB,, | Performed by: NURSE PRACTITIONER

## 2020-01-27 PROCEDURE — 3079F DIAST BP 80-89 MM HG: CPT | Mod: CPTII,S$GLB,, | Performed by: NURSE PRACTITIONER

## 2020-01-27 PROCEDURE — 99214 PR OFFICE/OUTPT VISIT, EST, LEVL IV, 30-39 MIN: ICD-10-PCS | Mod: S$GLB,,, | Performed by: NURSE PRACTITIONER

## 2020-01-27 PROCEDURE — 3074F PR MOST RECENT SYSTOLIC BLOOD PRESSURE < 130 MM HG: ICD-10-PCS | Mod: CPTII,S$GLB,, | Performed by: NURSE PRACTITIONER

## 2020-01-27 PROCEDURE — 1101F PR PT FALLS ASSESS DOC 0-1 FALLS W/OUT INJ PAST YR: ICD-10-PCS | Mod: CPTII,S$GLB,, | Performed by: NURSE PRACTITIONER

## 2020-01-27 PROCEDURE — 1125F AMNT PAIN NOTED PAIN PRSNT: CPT | Mod: S$GLB,,, | Performed by: NURSE PRACTITIONER

## 2020-01-27 PROCEDURE — 1159F PR MEDICATION LIST DOCUMENTED IN MEDICAL RECORD: ICD-10-PCS | Mod: S$GLB,,, | Performed by: NURSE PRACTITIONER

## 2020-01-27 RX ORDER — POTASSIUM CHLORIDE 750 MG/1
10 TABLET, EXTENDED RELEASE ORAL
Qty: 24 TABLET | Refills: 2 | Status: SHIPPED | OUTPATIENT
Start: 2020-01-27 | End: 2021-06-07 | Stop reason: SDUPTHER

## 2020-01-27 RX ORDER — NITROGLYCERIN 0.4 MG/1
0.4 TABLET SUBLINGUAL EVERY 5 MIN PRN
Qty: 25 TABLET | Refills: 1 | Status: SHIPPED | OUTPATIENT
Start: 2020-01-27 | End: 2020-05-13 | Stop reason: SDUPTHER

## 2020-01-27 NOTE — PATIENT INSTRUCTIONS
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
## 2020-02-19 ENCOUNTER — TELEPHONE (OUTPATIENT)
Dept: PHARMACY | Facility: CLINIC | Age: 80
End: 2020-02-19

## 2020-03-09 ENCOUNTER — TELEPHONE (OUTPATIENT)
Dept: FAMILY MEDICINE | Facility: CLINIC | Age: 80
End: 2020-03-09

## 2020-03-09 ENCOUNTER — OFFICE VISIT (OUTPATIENT)
Dept: PRIMARY CARE CLINIC | Facility: CLINIC | Age: 80
End: 2020-03-09
Payer: MEDICARE

## 2020-03-09 VITALS
HEIGHT: 65 IN | WEIGHT: 188.94 LBS | HEART RATE: 71 BPM | BODY MASS INDEX: 31.48 KG/M2 | SYSTOLIC BLOOD PRESSURE: 132 MMHG | DIASTOLIC BLOOD PRESSURE: 80 MMHG | TEMPERATURE: 97 F | OXYGEN SATURATION: 98 %

## 2020-03-09 DIAGNOSIS — I10 ESSENTIAL HYPERTENSION: Primary | ICD-10-CM

## 2020-03-09 DIAGNOSIS — I35.9 AORTIC VALVE DISORDER: ICD-10-CM

## 2020-03-09 DIAGNOSIS — L91.0 KELOID SKIN DISORDER: ICD-10-CM

## 2020-03-09 DIAGNOSIS — E78.2 MIXED HYPERLIPIDEMIA: ICD-10-CM

## 2020-03-09 PROCEDURE — 1101F PR PT FALLS ASSESS DOC 0-1 FALLS W/OUT INJ PAST YR: ICD-10-PCS | Mod: CPTII,S$GLB,, | Performed by: NURSE PRACTITIONER

## 2020-03-09 PROCEDURE — 3079F DIAST BP 80-89 MM HG: CPT | Mod: CPTII,S$GLB,, | Performed by: NURSE PRACTITIONER

## 2020-03-09 PROCEDURE — 1159F PR MEDICATION LIST DOCUMENTED IN MEDICAL RECORD: ICD-10-PCS | Mod: S$GLB,,, | Performed by: NURSE PRACTITIONER

## 2020-03-09 PROCEDURE — 1125F PR PAIN SEVERITY QUANTIFIED, PAIN PRESENT: ICD-10-PCS | Mod: S$GLB,,, | Performed by: NURSE PRACTITIONER

## 2020-03-09 PROCEDURE — 3075F SYST BP GE 130 - 139MM HG: CPT | Mod: CPTII,S$GLB,, | Performed by: NURSE PRACTITIONER

## 2020-03-09 PROCEDURE — 99214 OFFICE O/P EST MOD 30 MIN: CPT | Mod: S$GLB,,, | Performed by: NURSE PRACTITIONER

## 2020-03-09 PROCEDURE — 1125F AMNT PAIN NOTED PAIN PRSNT: CPT | Mod: S$GLB,,, | Performed by: NURSE PRACTITIONER

## 2020-03-09 PROCEDURE — 1159F MED LIST DOCD IN RCRD: CPT | Mod: S$GLB,,, | Performed by: NURSE PRACTITIONER

## 2020-03-09 PROCEDURE — 99214 PR OFFICE/OUTPT VISIT, EST, LEVL IV, 30-39 MIN: ICD-10-PCS | Mod: S$GLB,,, | Performed by: NURSE PRACTITIONER

## 2020-03-09 PROCEDURE — 3075F PR MOST RECENT SYSTOLIC BLOOD PRESS GE 130-139MM HG: ICD-10-PCS | Mod: CPTII,S$GLB,, | Performed by: NURSE PRACTITIONER

## 2020-03-09 PROCEDURE — 3079F PR MOST RECENT DIASTOLIC BLOOD PRESSURE 80-89 MM HG: ICD-10-PCS | Mod: CPTII,S$GLB,, | Performed by: NURSE PRACTITIONER

## 2020-03-09 PROCEDURE — 1101F PT FALLS ASSESS-DOCD LE1/YR: CPT | Mod: CPTII,S$GLB,, | Performed by: NURSE PRACTITIONER

## 2020-03-09 RX ORDER — OLMESARTAN MEDOXOMIL 40 MG/1
40 TABLET ORAL DAILY
Qty: 90 TABLET | Refills: 1 | Status: SHIPPED | OUTPATIENT
Start: 2020-03-09 | End: 2020-03-28 | Stop reason: SDUPTHER

## 2020-03-09 NOTE — TELEPHONE ENCOUNTER
Patient states her pharmacy will not let her fill her blood pressure medication as they states it is too early please advise

## 2020-03-09 NOTE — PROGRESS NOTES
Subjective:       Patient ID: Yael Claire is a 79 y.o. female.    Chief Complaint: Hypertension (follow up)  Patient was last seen by me on 01/27/2020.  HPI   She is here to follow up with HTN. She would like to discuss refills of Olmesartan. Her medication profile states that she is taking 0.5 tablet of the 40 mg tablet. However she states she is taking one whole tablet daily and she has also taken an extra half tablet periodically.  Vitals:    03/09/20 1352   BP: 132/80   Pulse: 71   Temp: 97.4 °F (36.3 °C)     BP Readings from Last 3 Encounters:   03/09/20 132/80   01/27/20 120/80   01/06/20 130/70     Review of Systems   Constitutional: Negative.    All other systems reviewed and are negative.      She has an appt. with Dr. Ruelas in April  She goes to [facility] pain management and states she is considering some injections  Objective:      Physical Exam   Constitutional: She is oriented to person, place, and time. Vital signs are normal. She appears well-developed and well-nourished. She is active and cooperative.   HENT:   Head: Normocephalic and atraumatic.   Right Ear: External ear normal.   Left Ear: External ear normal.   Nose: Nose normal.   Mouth/Throat: Uvula is midline, oropharynx is clear and moist and mucous membranes are normal. She has dentures.   Eyes: Lids are normal.   Neck: Trachea normal, normal range of motion, full passive range of motion without pain and phonation normal. Neck supple.   Cardiovascular: Normal rate and regular rhythm.   Murmur heard.  Consistent sound with aortic stenosis   Pulmonary/Chest: Effort normal and breath sounds normal.   Abdominal: Soft. Bowel sounds are normal. There is no splenomegaly or hepatomegaly. There is no tenderness.   Musculoskeletal: Normal range of motion.        Right ankle: She exhibits swelling.   Trace edema right outer ankle.   Lymphadenopathy:        Head (right side): No submental, no submandibular, no tonsillar, no preauricular, no  posterior auricular and no occipital adenopathy present.        Head (left side): No submental, no submandibular, no tonsillar, no preauricular, no posterior auricular and no occipital adenopathy present.     She has no cervical adenopathy.   Neurological: She is alert and oriented to person, place, and time.   Skin: Skin is warm and dry.        Numerous Keloid to sternal area and under breast region from prior surgical sites   Psychiatric: She has a normal mood and affect. Her speech is normal and behavior is normal. Judgment and thought content normal. Cognition and memory are normal.   Nursing note and vitals reviewed.      Assessment & Plan:       Essential hypertension  -     olmesartan (BENICAR) 40 MG tablet; Take 1 tablet (40 mg total) by mouth once daily.  Dispense: 90 tablet; Refill: 1    Mixed hyperlipidemia- Stable, Repatha, ;ates lipid pan at [facility] on 01/17/2020.    LIPID PANELResulted: 1/17/2020 10:06 AM  [facility]  Component Name Value Ref Range   CHOLESTEROL 153 <200 mg/dL   TRIGLYCERIDE 70 <=150 mg/dL   HDL 52 40 - 59 mg/dL   LDL CALCULATED 87 <130 mg/dL   NON-HDL CHOLESTEROL 101 <130 mg/dL   CHOL/HDL RATIO 2.9     Specimen Collected on   Blood 1/17/2020 9:18 AM   Result Narrative      Coronary Heart Disease Risk  ---------------------------------                       Male  Female  Average Risk          5.0    4.4   2 x Average Risk      9.6    7.1  3 x Average Risk     23.4   11.0       Keloid skin disorder- chronic and stable    Aortic valve disorder- Managed by Dr. Ruelas    I have discussed with her in detail that the max dosage of is 40 mg daily and that she is not to take more than that daily. She verbalized understanding and I have sent refill for 40mg daily  She has been instructed to take home blood pressure readings and to bring them in at her next appointment in May. I have also advised that if she has any concerns of elevated readings she can follow  up before that date with her readings with her for visit.       Medication List with Changes/Refills   Current Medications    ASPIRIN (ECOTRIN) 81 MG EC TABLET    Take 81 mg by mouth once daily.    CO-ENZYME Q-10 30 MG CAPSULE    Take 30 mg by mouth once daily.     DICLOFENAC SODIUM (VOLTAREN) 1 % GEL    APPLY 2 GRAM TO THE AFFECTED AREA(S) BY TOPICAL ROUTE 4 TIMES PER DAY    EVOLOCUMAB (REPATHA SURECLICK) 140 MG/ML PNIJ    Inject 140 mg into the skin every 14 (fourteen) days.    FISH OIL-OMEGA-3 FATTY ACIDS 300-1,000 MG CAPSULE    Take 2 g by mouth once daily.    FOLIC ACID/MULTIVIT-MIN/LUTEIN (CENTRUM SILVER ORAL)    Take by mouth once daily. ONE DAILY    FUROSEMIDE (LASIX) 20 MG TABLET    Take 20 mg by mouth daily as needed. As needed    HYDROCODONE-ACETAMINOPHEN (NORCO)  MG PER TABLET    Take 1 tablet by mouth 2 (two) times daily as needed.    METOPROLOL SUCCINATE (TOPROL-XL) 25 MG 24 HR TABLET    Take 0.5 tablets (12.5 mg total) by mouth 2 (two) times daily. Patient states she is only taking half twice daily    MUPIROCIN (BACTROBAN) 2 % OINTMENT    Apply topically 3 (three) times daily.    NEOMYCIN-POLYMYXIN-HYDROCORTISONE (CORTISPORIN) 3.5-10,000-1 MG/ML-UNIT/ML-% OTIC SUSPENSION    neomycin-polymyxin-hydrocort 3.5 mg-10,000 unit/mL-1 % ear drops,susp    NITROGLYCERIN (NITROSTAT) 0.4 MG SL TABLET    Place 1 tablet (0.4 mg total) under the tongue every 5 (five) minutes as needed for Chest pain.    POTASSIUM CHLORIDE (KLOR-CON) 10 MEQ TBSR    Take 1 tablet (10 mEq total) by mouth twice a week.    ROSUVASTATIN (CRESTOR) 40 MG TAB    TAKE 1 TABLET (40 MG TOTAL) BY MOUTH EVERY EVENING.    VITAMIN D 1000 UNITS TAB    Take 185 mg by mouth once daily.   Changed and/or Refilled Medications    Modified Medication Previous Medication    OLMESARTAN (BENICAR) 40 MG TABLET olmesartan (BENICAR) 40 MG tablet       Take 1 tablet (40 mg total) by mouth once daily.    Take 0.5 tablets (20 mg total) by mouth once daily.  Patient states taking only half tablet daily   Discontinued Medications    PANTOPRAZOLE (PROTONIX) 40 MG TABLET    Take 40 mg by mouth once daily.     Follow up if symptoms worsen or fail to improve.

## 2020-03-09 NOTE — TELEPHONE ENCOUNTER
Unable to reach via home or cell number. Able to leave message on samantalie number. I have spoken to pharmacy and she cannot get refill of Olmesartan until 03/30/2020 because she has taken more than recommended dosage for past few months. She will need to use another medication until she can get this refill on 03/30/2020. I will discuss further with her when I am able to reach via telephone.

## 2020-03-10 DIAGNOSIS — I10 ESSENTIAL HYPERTENSION: Primary | ICD-10-CM

## 2020-03-10 RX ORDER — AMLODIPINE BESYLATE 2.5 MG/1
5 TABLET ORAL DAILY
Qty: 30 TABLET | Refills: 0 | Status: SHIPPED | OUTPATIENT
Start: 2020-03-10 | End: 2020-05-04 | Stop reason: SDUPTHER

## 2020-03-11 ENCOUNTER — TELEPHONE (OUTPATIENT)
Dept: FAMILY MEDICINE | Facility: CLINIC | Age: 80
End: 2020-03-11

## 2020-03-16 ENCOUNTER — CLINICAL SUPPORT (OUTPATIENT)
Dept: PRIMARY CARE CLINIC | Facility: CLINIC | Age: 80
End: 2020-03-16
Payer: MEDICARE

## 2020-03-16 VITALS — DIASTOLIC BLOOD PRESSURE: 78 MMHG | SYSTOLIC BLOOD PRESSURE: 120 MMHG | HEART RATE: 76 BPM

## 2020-03-16 NOTE — PROGRESS NOTES
Yael Claire 79 y.o. female is here today for Blood Pressure check.   History of HTN yes.    Review of patient's allergies indicates:   Allergen Reactions    Clindamycin Diarrhea    Darvocet a500 [propoxyphene n-acetaminophen]     Erythromycin Other (See Comments)    Mycinette [cetylpyridinium-benzocaine]     Pcn [penicillins]      Creatinine   Date Value Ref Range Status   01/23/2018 0.67 0.50 - 1.40 mg/dL Final     Sodium   Date Value Ref Range Status   01/23/2018 138 136 - 145 mmol/L Final     Potassium   Date Value Ref Range Status   01/23/2018 4.3 3.5 - 5.1 mmol/L Final   ]  Patient verifies taking blood pressure medications on a regular basis at the same time of the day.     Current Outpatient Medications:     amLODIPine (NORVASC) 2.5 MG tablet, Take 2 tablets (5 mg total) by mouth once daily., Disp: 30 tablet, Rfl: 0    aspirin (ECOTRIN) 81 MG EC tablet, Take 81 mg by mouth once daily., Disp: , Rfl:     co-enzyme Q-10 30 mg capsule, Take 30 mg by mouth once daily. , Disp: , Rfl:     diclofenac sodium (VOLTAREN) 1 % Gel, APPLY 2 GRAM TO THE AFFECTED AREA(S) BY TOPICAL ROUTE 4 TIMES PER DAY, Disp: , Rfl: 6    evolocumab (REPATHA SURECLICK) 140 mg/mL PnIj, Inject 140 mg into the skin every 14 (fourteen) days., Disp: 2 mL, Rfl: 6    fish oil-omega-3 fatty acids 300-1,000 mg capsule, Take 2 g by mouth once daily., Disp: , Rfl:     FOLIC ACID/MULTIVIT-MIN/LUTEIN (CENTRUM SILVER ORAL), Take by mouth once daily. ONE DAILY, Disp: , Rfl:     furosemide (LASIX) 20 MG tablet, Take 20 mg by mouth daily as needed. As needed, Disp: , Rfl:     HYDROcodone-acetaminophen (NORCO)  mg per tablet, Take 1 tablet by mouth 2 (two) times daily as needed., Disp: , Rfl: 0    metoprolol succinate (TOPROL-XL) 25 MG 24 hr tablet, Take 0.5 tablets (12.5 mg total) by mouth 2 (two) times daily. Patient states she is only taking half twice daily, Disp: 60 tablet, Rfl: 2    mupirocin (BACTROBAN) 2 % ointment, Apply  topically 3 (three) times daily., Disp: 15 g, Rfl: 1    neomycin-polymyxin-hydrocortisone (CORTISPORIN) 3.5-10,000-1 mg/mL-unit/mL-% otic suspension, neomycin-polymyxin-hydrocort 3.5 mg-10,000 unit/mL-1 % ear drops,susp, Disp: , Rfl:     nitroGLYCERIN (NITROSTAT) 0.4 MG SL tablet, Place 1 tablet (0.4 mg total) under the tongue every 5 (five) minutes as needed for Chest pain., Disp: 25 tablet, Rfl: 1    olmesartan (BENICAR) 40 MG tablet, Take 1 tablet (40 mg total) by mouth once daily., Disp: 90 tablet, Rfl: 1    potassium chloride (KLOR-CON) 10 MEQ TbSR, Take 1 tablet (10 mEq total) by mouth twice a week., Disp: 24 tablet, Rfl: 2    rosuvastatin (CRESTOR) 40 MG Tab, TAKE 1 TABLET (40 MG TOTAL) BY MOUTH EVERY EVENING., Disp: 90 tablet, Rfl: 1    vitamin D 1000 units Tab, Take 185 mg by mouth once daily., Disp: , Rfl:   Does patient have record of home blood pressure readings yes. Readings have been averaging 137/69 - 127/58.   Last dose of blood pressure medication was taken at 11am.  Patient is asymptomatic.   Complains of N/A.  120/78 , P.76

## 2020-03-17 DIAGNOSIS — I10 ESSENTIAL HYPERTENSION: ICD-10-CM

## 2020-03-20 DIAGNOSIS — I10 ESSENTIAL HYPERTENSION: ICD-10-CM

## 2020-03-23 ENCOUNTER — TELEPHONE (OUTPATIENT)
Dept: CARDIOLOGY | Facility: CLINIC | Age: 80
End: 2020-03-23

## 2020-03-23 RX ORDER — METOPROLOL SUCCINATE 25 MG/1
12.5 TABLET, EXTENDED RELEASE ORAL 2 TIMES DAILY
Qty: 90 TABLET | Refills: 1 | Status: SHIPPED | OUTPATIENT
Start: 2020-03-23 | End: 2020-05-21

## 2020-03-24 ENCOUNTER — TELEPHONE (OUTPATIENT)
Dept: PHARMACY | Facility: CLINIC | Age: 80
End: 2020-03-24

## 2020-03-28 NOTE — TELEPHONE ENCOUNTER
----- Message from Xochilt Galvan sent at 3/27/2020  7:00 PM CDT -----  Contact: Jyothi from [facility]  Type:  RX Refill Request    Who Called:  Jyothi  Refill or New Rx: refill  RX Name and Strength: olmesartan (BENICAR) 40 MG tablet  How is the patient currently taking it? (ex. 1XDay): 1 x day  Is this a 30 day or 90 day RX: 90 day  Preferred Pharmacy with phone number: [facility] mail order  Local or Mail Order: mail order  Ordering Provider:    Would the patient rather a call back or a response via [facility]?  Call back   Best Call Back Number: [phone]  Additional Information: [facility] faxed refill request 3/17/20

## 2020-03-29 RX ORDER — OLMESARTAN MEDOXOMIL 40 MG/1
40 TABLET ORAL DAILY
Qty: 90 TABLET | Refills: 1 | Status: SHIPPED | OUTPATIENT
Start: 2020-03-29 | End: 2020-09-14 | Stop reason: SDUPTHER

## 2020-03-29 RX ORDER — AMLODIPINE BESYLATE 2.5 MG/1
5 TABLET ORAL DAILY
Qty: 90 TABLET | Refills: 0 | OUTPATIENT
Start: 2020-03-29 | End: 2021-03-29

## 2020-03-30 NOTE — TELEPHONE ENCOUNTER
Repatha refill confirmed. We will ship Repatha refill on 3/31 via fedex to arrive on . $8.95 copay- 004. Confirmed 2 patient identifiers - name and . Therapy appropriate.     Patient has 0 doses of Repatha remaining. Pt injects 140mg every 14 days. Pt's next dose is due on . Pt reports they are not having any side effects so far. No missed doses, no new medications, no new allergies or health conditions reported at this time. Allergies reviewed and medication reconciliation complete (reviewed and documented in [facility]). All questions answered and addressed to patients satisfaction. Advised to call [facility] and provider if any issues arise.  Pt verbalized understanding.

## 2020-03-31 DIAGNOSIS — I10 ESSENTIAL HYPERTENSION: ICD-10-CM

## 2020-05-04 ENCOUNTER — OFFICE VISIT (OUTPATIENT)
Dept: PRIMARY CARE CLINIC | Facility: CLINIC | Age: 80
End: 2020-05-04
Payer: MEDICARE

## 2020-05-04 ENCOUNTER — TELEPHONE (OUTPATIENT)
Dept: FAMILY MEDICINE | Facility: CLINIC | Age: 80
End: 2020-05-04

## 2020-05-04 VITALS
OXYGEN SATURATION: 98 % | TEMPERATURE: 99 F | HEART RATE: 74 BPM | BODY MASS INDEX: 31.14 KG/M2 | HEIGHT: 65 IN | WEIGHT: 186.94 LBS | DIASTOLIC BLOOD PRESSURE: 64 MMHG | SYSTOLIC BLOOD PRESSURE: 143 MMHG

## 2020-05-04 DIAGNOSIS — G89.29 CHRONIC PAIN OF LEFT KNEE: ICD-10-CM

## 2020-05-04 DIAGNOSIS — M25.562 CHRONIC PAIN OF LEFT KNEE: ICD-10-CM

## 2020-05-04 DIAGNOSIS — I10 ESSENTIAL HYPERTENSION: ICD-10-CM

## 2020-05-04 DIAGNOSIS — R30.0 DYSURIA: Primary | ICD-10-CM

## 2020-05-04 LAB
BILIRUB SERPL-MCNC: ABNORMAL MG/DL
BLOOD URINE, POC: ABNORMAL
COLOR, POC UA: ABNORMAL
GLUCOSE UR QL STRIP: NORMAL
KETONES UR QL STRIP: ABNORMAL
LEUKOCYTE ESTERASE URINE, POC: ABNORMAL
NITRITE, POC UA: ABNORMAL
PH, POC UA: 7
PROTEIN, POC: ABNORMAL
SPECIFIC GRAVITY, POC UA: 1
UROBILINOGEN, POC UA: NORMAL

## 2020-05-04 PROCEDURE — 1159F MED LIST DOCD IN RCRD: CPT | Mod: S$GLB,,, | Performed by: NURSE PRACTITIONER

## 2020-05-04 PROCEDURE — 1125F PR PAIN SEVERITY QUANTIFIED, PAIN PRESENT: ICD-10-PCS | Mod: S$GLB,,, | Performed by: NURSE PRACTITIONER

## 2020-05-04 PROCEDURE — 1159F PR MEDICATION LIST DOCUMENTED IN MEDICAL RECORD: ICD-10-PCS | Mod: S$GLB,,, | Performed by: NURSE PRACTITIONER

## 2020-05-04 PROCEDURE — 1101F PR PT FALLS ASSESS DOC 0-1 FALLS W/OUT INJ PAST YR: ICD-10-PCS | Mod: CPTII,S$GLB,, | Performed by: NURSE PRACTITIONER

## 2020-05-04 PROCEDURE — 1125F AMNT PAIN NOTED PAIN PRSNT: CPT | Mod: S$GLB,,, | Performed by: NURSE PRACTITIONER

## 2020-05-04 PROCEDURE — 99213 OFFICE O/P EST LOW 20 MIN: CPT | Mod: S$GLB,,, | Performed by: NURSE PRACTITIONER

## 2020-05-04 PROCEDURE — 81001 POCT URINALYSIS, DIPSTICK OR TABLET REAGENT, AUTOMATED, WITH MICROSCOP: ICD-10-PCS | Mod: S$GLB,,, | Performed by: NURSE PRACTITIONER

## 2020-05-04 PROCEDURE — 3077F PR MOST RECENT SYSTOLIC BLOOD PRESSURE >= 140 MM HG: ICD-10-PCS | Mod: CPTII,S$GLB,, | Performed by: NURSE PRACTITIONER

## 2020-05-04 PROCEDURE — 3078F DIAST BP <80 MM HG: CPT | Mod: CPTII,S$GLB,, | Performed by: NURSE PRACTITIONER

## 2020-05-04 PROCEDURE — 99213 PR OFFICE/OUTPT VISIT, EST, LEVL III, 20-29 MIN: ICD-10-PCS | Mod: S$GLB,,, | Performed by: NURSE PRACTITIONER

## 2020-05-04 PROCEDURE — 81001 URINALYSIS AUTO W/SCOPE: CPT | Mod: S$GLB,,, | Performed by: NURSE PRACTITIONER

## 2020-05-04 PROCEDURE — 3077F SYST BP >= 140 MM HG: CPT | Mod: CPTII,S$GLB,, | Performed by: NURSE PRACTITIONER

## 2020-05-04 PROCEDURE — 3078F PR MOST RECENT DIASTOLIC BLOOD PRESSURE < 80 MM HG: ICD-10-PCS | Mod: CPTII,S$GLB,, | Performed by: NURSE PRACTITIONER

## 2020-05-04 PROCEDURE — 1101F PT FALLS ASSESS-DOCD LE1/YR: CPT | Mod: CPTII,S$GLB,, | Performed by: NURSE PRACTITIONER

## 2020-05-04 RX ORDER — AMLODIPINE BESYLATE 2.5 MG/1
5 TABLET ORAL DAILY
Qty: 30 TABLET | Refills: 0 | OUTPATIENT
Start: 2020-05-04 | End: 2021-05-04

## 2020-05-04 RX ORDER — DICLOFENAC SODIUM 10 MG/G
GEL TOPICAL
Qty: 1 TUBE | Refills: 3 | Status: SHIPPED | OUTPATIENT
Start: 2020-05-04 | End: 2020-12-21 | Stop reason: SDUPTHER

## 2020-05-04 RX ORDER — AMLODIPINE BESYLATE 2.5 MG/1
5 TABLET ORAL DAILY
Qty: 30 TABLET | Refills: 3 | Status: SHIPPED | OUTPATIENT
Start: 2020-05-04 | End: 2020-05-13 | Stop reason: DRUGHIGH

## 2020-05-04 NOTE — PROGRESS NOTES
Subjective:       Patient ID: Yael Claire is a 79 y.o. female.    Chief Complaint: Urinary Tract Infection  She was last seen in primary care by me on 03/09/2020.  HPI   She is here with complaints of dysuria.  Vitals:    05/04/20 1533   BP: (!) 143/64   Pulse: 74   Temp: 98.9 °F (37.2 °C)     Review of Systems   Genitourinary: Positive for frequency.       She states she has restarted her generic Benicar but her blood pressure at home diastolic remains slightly elevated.Will add the 2.5 mg orf Norvasc to take along with it.She was taking Norvasc in place of the Benicar prior due to not able to get the Benicar because she ran out before time and could not get additional pills. It appears that she was taking two of her tablets daily when her readings were elevated.   Objective:      Physical Exam   Constitutional: She is oriented to person, place, and time. She appears well-developed and well-nourished. She is active and cooperative.   HENT:   Head: Normocephalic and atraumatic.   Right Ear: Hearing and external ear normal.   Left Ear: Hearing and external ear normal.   Nose: Nose normal.   Eyes: Lids are normal.   Neck: Trachea normal, normal range of motion, full passive range of motion without pain and phonation normal. Neck supple.   Cardiovascular: Normal rate and regular rhythm.   Pulmonary/Chest: Effort normal and breath sounds normal.   Abdominal: Soft. She exhibits no distension and no ascites. There is no tenderness. There is no rigidity, no rebound, no guarding and no CVA tenderness.   Musculoskeletal: Normal range of motion.   Lymphadenopathy:        Head (right side): No submental and no submandibular adenopathy present.        Head (left side): No submental and no submandibular adenopathy present.   Neurological: She is alert and oriented to person, place, and time. She has normal strength.   Skin: Skin is warm, dry and intact.   Psychiatric: She has a normal mood and affect. Her speech is normal and  behavior is normal. Judgment and thought content normal. Cognition and memory are normal.   Nursing note and vitals reviewed.      Assessment & Plan:       Dysuria  -     POCT urinalysis, dipstick or tablet reag    Essential hypertension  -     amLODIPine (NORVASC) 2.5 MG tablet; Take 2 tablets (5 mg total) by mouth once daily.  Dispense: 30 tablet; Refill: 3    Chronic pain of left knee  -     diclofenac sodium (VOLTAREN) 1 % Gel; APPLY 2 GRAM TO THE AFFECTED AREA(S) BY TOPICAL ROUTE 4 TIMES PER DAY  Dispense: 1 Tube; Refill: 3      Medication List with Changes/Refills   Current Medications    ASPIRIN (ECOTRIN) 81 MG EC TABLET    Take 81 mg by mouth once daily.    CO-ENZYME Q-10 30 MG CAPSULE    Take 30 mg by mouth once daily.     EVOLOCUMAB (REPATHA SURECLICK) 140 MG/ML PNIJ    Inject 140 mg into the skin every 14 (fourteen) days.    FISH OIL-OMEGA-3 FATTY ACIDS 300-1,000 MG CAPSULE    Take 2 g by mouth once daily.    FOLIC ACID/MULTIVIT-MIN/LUTEIN (CENTRUM SILVER ORAL)    Take by mouth once daily. ONE DAILY    FUROSEMIDE (LASIX) 20 MG TABLET    Take 20 mg by mouth daily as needed. As needed    HYDROCODONE-ACETAMINOPHEN (NORCO)  MG PER TABLET    Take 1 tablet by mouth 2 (two) times daily as needed.    METOPROLOL SUCCINATE (TOPROL-XL) 25 MG 24 HR TABLET    Take 0.5 tablets (12.5 mg total) by mouth 2 (two) times daily. Patient states she is only taking half twice daily    MUPIROCIN (BACTROBAN) 2 % OINTMENT    Apply topically 3 (three) times daily.    NITROGLYCERIN (NITROSTAT) 0.4 MG SL TABLET    Place 1 tablet (0.4 mg total) under the tongue every 5 (five) minutes as needed for Chest pain.    OLMESARTAN (BENICAR) 40 MG TABLET    Take 1 tablet (40 mg total) by mouth once daily.    POTASSIUM CHLORIDE (KLOR-CON) 10 MEQ TBSR    Take 1 tablet (10 mEq total) by mouth twice a week.    ROSUVASTATIN (CRESTOR) 40 MG TAB    TAKE 1 TABLET (40 MG TOTAL) BY MOUTH EVERY EVENING.    VITAMIN D 1000 UNITS TAB    Take 185 mg by  mouth once daily.   Changed and/or Refilled Medications    Modified Medication Previous Medication    AMLODIPINE (NORVASC) 2.5 MG TABLET amLODIPine (NORVASC) 2.5 MG tablet       Take 2 tablets (5 mg total) by mouth once daily.    Take 2 tablets (5 mg total) by mouth once daily.    DICLOFENAC SODIUM (VOLTAREN) 1 % GEL diclofenac sodium (VOLTAREN) 1 % Gel       APPLY 2 GRAM TO THE AFFECTED AREA(S) BY TOPICAL ROUTE 4 TIMES PER DAY    APPLY 2 GRAM TO THE AFFECTED AREA(S) BY TOPICAL ROUTE 4 TIMES PER DAY   Discontinued Medications    NEOMYCIN-POLYMYXIN-HYDROCORTISONE (CORTISPORIN) 3.5-10,000-1 MG/ML-UNIT/ML-% OTIC SUSPENSION    neomycin-polymyxin-hydrocort 3.5 mg-10,000 unit/mL-1 % ear drops,susp         Follow up if symptoms worsen or fail to improve.

## 2020-05-04 NOTE — TELEPHONE ENCOUNTER
----- Message from Maritza Palafox sent at 5/4/2020  9:01 AM CDT -----  Contact: Patient  Type:  Same Day Appointment Request    Caller is requesting a same day appointment.  Caller declined first available appointment listed below.      Name of Caller:  Patient  Symptoms:  Frequent urination  Best Call Back Number:    Additional Information: Calling to schedule same day appointment today if possible [facility]

## 2020-05-12 DIAGNOSIS — I10 ESSENTIAL HYPERTENSION: ICD-10-CM

## 2020-05-13 ENCOUNTER — OFFICE VISIT (OUTPATIENT)
Dept: CARDIOLOGY | Facility: CLINIC | Age: 80
End: 2020-05-13
Payer: MEDICARE

## 2020-05-13 VITALS
HEART RATE: 78 BPM | BODY MASS INDEX: 31.19 KG/M2 | OXYGEN SATURATION: 99 % | DIASTOLIC BLOOD PRESSURE: 66 MMHG | SYSTOLIC BLOOD PRESSURE: 138 MMHG | WEIGHT: 187.19 LBS | HEIGHT: 65 IN

## 2020-05-13 DIAGNOSIS — I10 ESSENTIAL HYPERTENSION: ICD-10-CM

## 2020-05-13 DIAGNOSIS — I25.709 CORONARY ARTERY DISEASE INVOLVING CORONARY BYPASS GRAFT OF NATIVE HEART WITH ANGINA PECTORIS: Primary | ICD-10-CM

## 2020-05-13 DIAGNOSIS — E66.9 OBESITY (BMI 30.0-34.9): ICD-10-CM

## 2020-05-13 DIAGNOSIS — I08.0 MITRAL AND AORTIC VALVE DISEASE: ICD-10-CM

## 2020-05-13 PROCEDURE — 1159F MED LIST DOCD IN RCRD: CPT | Mod: S$GLB,,, | Performed by: INTERNAL MEDICINE

## 2020-05-13 PROCEDURE — 99214 OFFICE O/P EST MOD 30 MIN: CPT | Mod: S$GLB,,, | Performed by: INTERNAL MEDICINE

## 2020-05-13 PROCEDURE — 3075F SYST BP GE 130 - 139MM HG: CPT | Mod: CPTII,S$GLB,, | Performed by: INTERNAL MEDICINE

## 2020-05-13 PROCEDURE — 3075F PR MOST RECENT SYSTOLIC BLOOD PRESS GE 130-139MM HG: ICD-10-PCS | Mod: CPTII,S$GLB,, | Performed by: INTERNAL MEDICINE

## 2020-05-13 PROCEDURE — 1159F PR MEDICATION LIST DOCUMENTED IN MEDICAL RECORD: ICD-10-PCS | Mod: S$GLB,,, | Performed by: INTERNAL MEDICINE

## 2020-05-13 PROCEDURE — 1101F PR PT FALLS ASSESS DOC 0-1 FALLS W/OUT INJ PAST YR: ICD-10-PCS | Mod: CPTII,S$GLB,, | Performed by: INTERNAL MEDICINE

## 2020-05-13 PROCEDURE — 1101F PT FALLS ASSESS-DOCD LE1/YR: CPT | Mod: CPTII,S$GLB,, | Performed by: INTERNAL MEDICINE

## 2020-05-13 PROCEDURE — 3078F PR MOST RECENT DIASTOLIC BLOOD PRESSURE < 80 MM HG: ICD-10-PCS | Mod: CPTII,S$GLB,, | Performed by: INTERNAL MEDICINE

## 2020-05-13 PROCEDURE — 1125F PR PAIN SEVERITY QUANTIFIED, PAIN PRESENT: ICD-10-PCS | Mod: S$GLB,,, | Performed by: INTERNAL MEDICINE

## 2020-05-13 PROCEDURE — 99214 PR OFFICE/OUTPT VISIT, EST, LEVL IV, 30-39 MIN: ICD-10-PCS | Mod: S$GLB,,, | Performed by: INTERNAL MEDICINE

## 2020-05-13 PROCEDURE — 1125F AMNT PAIN NOTED PAIN PRSNT: CPT | Mod: S$GLB,,, | Performed by: INTERNAL MEDICINE

## 2020-05-13 PROCEDURE — 3078F DIAST BP <80 MM HG: CPT | Mod: CPTII,S$GLB,, | Performed by: INTERNAL MEDICINE

## 2020-05-13 RX ORDER — ASCORBIC ACID 500 MG
500 TABLET ORAL DAILY
COMMUNITY
End: 2023-02-22 | Stop reason: CLARIF

## 2020-05-13 RX ORDER — AMLODIPINE BESYLATE 2.5 MG/1
5 TABLET ORAL DAILY
Qty: 30 TABLET | Refills: 3 | Status: SHIPPED | OUTPATIENT
Start: 2020-05-13 | End: 2020-06-15 | Stop reason: SDUPTHER

## 2020-05-13 RX ORDER — NITROGLYCERIN 0.4 MG/1
0.4 TABLET SUBLINGUAL EVERY 5 MIN PRN
Qty: 25 TABLET | Refills: 1 | Status: SHIPPED | OUTPATIENT
Start: 2020-05-13 | End: 2020-06-04

## 2020-05-13 RX ORDER — AMLODIPINE BESYLATE 5 MG/1
5 TABLET ORAL DAILY
Qty: 90 TABLET | Refills: 1 | Status: SHIPPED | OUTPATIENT
Start: 2020-05-13 | End: 2020-05-13 | Stop reason: SDUPTHER

## 2020-05-13 NOTE — PROGRESS NOTES
Subjective:    Patient ID:  Yael Claire is a 79 y.o. female who presents for Coronary Artery Disease; Hypertension; and Valvular Heart Disease        HPI  LABS FROM January, CMP OK, LDL 87, BP UP, TAKING NORVASC 2.5 ONCE/ NOT ENOUGH ON RX, NO CHEST PAIN NO SIGNIFICANT SHORTNESS OF BREATH NO TIA TYPE SYMPTOMS NO NEAR-SYNCOPE, SEE ROS    Past Medical History:   Diagnosis Date    Acute coronary syndrome     LIGHT 2004    Anticoagulant long-term use     Aortic valve disorder     Arthritis     Coronary artery disease     cabg & valve sgy    Heart murmur     Hyperlipidemia     Hypertension     Palpitations     Stenosis of aortic and mitral valves     Valvular regurgitation      Past Surgical History:   Procedure Laterality Date    CARDIAC CATHETERIZATION      CARDIAC VALVE SURGERY      CORONARY ARTERY BYPASS GRAFT  04/2005    HYSTERECTOMY  1980     Family History   Problem Relation Age of Onset    Hypertension Mother     Heart disease Mother     Hypertension Father     Heart disease Father      Social History     Socioeconomic History    Marital status: Single     Spouse name: Not on file    Number of children: Not on file    Years of education: Not on file    Highest education level: Not on file   Occupational History    Not on file   Social Needs    Financial resource strain: Not on file    Food insecurity:     Worry: Not on file     Inability: Not on file    Transportation needs:     Medical: Not on file     Non-medical: Not on file   Tobacco Use    Smoking status: Never Smoker    Smokeless tobacco: Never Used   Substance and Sexual Activity    Alcohol use: Yes     Comment: couple times per yr    Drug use: No    Sexual activity: Not on file   Lifestyle    Physical activity:     Days per week: Not on file     Minutes per session: Not on file    Stress: Not on file   Relationships    Social connections:     Talks on phone: Not on file     Gets together: Not on file     Attends  Restorationist service: Not on file     Active member of club or organization: Not on file     Attends meetings of clubs or organizations: Not on file     Relationship status: Not on file   Other Topics Concern    Not on file   Social History Narrative    Not on file       Review of patient's allergies indicates:   Allergen Reactions    Clindamycin Diarrhea    Darvocet a500 [propoxyphene n-acetaminophen]     Erythromycin Other (See Comments)    Mycinette [cetylpyridinium-benzocaine]     Pcn [penicillins]        Current Outpatient Medications:     ascorbic acid, vitamin C, (VITAMIN C) 500 MG tablet, Take 500 mg by mouth once daily., Disp: , Rfl:     aspirin (ECOTRIN) 81 MG EC tablet, Take 81 mg by mouth once daily., Disp: , Rfl:     co-enzyme Q-10 30 mg capsule, Take 30 mg by mouth once daily. , Disp: , Rfl:     diclofenac sodium (VOLTAREN) 1 % Gel, APPLY 2 GRAM TO THE AFFECTED AREA(S) BY TOPICAL ROUTE 4 TIMES PER DAY, Disp: 1 Tube, Rfl: 3    evolocumab (REPATHA SURECLICK) 140 mg/mL PnIj, Inject 140 mg into the skin every 14 (fourteen) days., Disp: 2 mL, Rfl: 6    fish oil-omega-3 fatty acids 300-1,000 mg capsule, Take 2 g by mouth once daily., Disp: , Rfl:     FOLIC ACID/MULTIVIT-MIN/LUTEIN (CENTRUM SILVER ORAL), Take by mouth once daily. ONE DAILY, Disp: , Rfl:     furosemide (LASIX) 20 MG tablet, Take 20 mg by mouth daily as needed. As needed, Disp: , Rfl:     HYDROcodone-acetaminophen (NORCO)  mg per tablet, Take 1 tablet by mouth 2 (two) times daily as needed., Disp: , Rfl: 0    metoprolol succinate (TOPROL-XL) 25 MG 24 hr tablet, Take 0.5 tablets (12.5 mg total) by mouth 2 (two) times daily. Patient states she is only taking half twice daily, Disp: 90 tablet, Rfl: 1    nitroGLYCERIN (NITROSTAT) 0.4 MG SL tablet, Place 1 tablet (0.4 mg total) under the tongue every 5 (five) minutes as needed for Chest pain., Disp: 25 tablet, Rfl: 1    olmesartan (BENICAR) 40 MG tablet, Take 1 tablet (40 mg  "total) by mouth once daily., Disp: 90 tablet, Rfl: 1    potassium chloride (KLOR-CON) 10 MEQ TbSR, Take 1 tablet (10 mEq total) by mouth twice a week., Disp: 24 tablet, Rfl: 2    rosuvastatin (CRESTOR) 40 MG Tab, TAKE 1 TABLET (40 MG TOTAL) BY MOUTH EVERY EVENING., Disp: 90 tablet, Rfl: 1    vitamin D 1000 units Tab, Take 185 mg by mouth once daily., Disp: , Rfl:     amLODIPine (NORVASC) 2.5 MG tablet, TAKE 2 TABLETS (5 MG TOTAL) BY MOUTH ONCE DAILY., Disp: 30 tablet, Rfl: 3    mupirocin (BACTROBAN) 2 % ointment, Apply topically 3 (three) times daily. (Patient not taking: Reported on 5/13/2020), Disp: 15 g, Rfl: 1    Review of Systems   Constitution: Negative for chills, decreased appetite, diaphoresis, fever, malaise/fatigue, night sweats and weight gain.   Eyes: Negative for blurred vision and visual disturbance.   Cardiovascular: Negative for chest pain, claudication, cyanosis, dyspnea on exertion (MILD), irregular heartbeat, leg swelling (OCC), near-syncope, orthopnea, palpitations, paroxysmal nocturnal dyspnea and syncope.   Respiratory: Negative for cough, hemoptysis and shortness of breath.    Hematologic/Lymphatic: Negative for bleeding problem. Does not bruise/bleed easily.   Skin: Negative for dry skin, itching and rash.   Musculoskeletal: Negative for arthritis (MILD), back pain and falls.   Gastrointestinal: Negative for abdominal pain, change in bowel habit, dysphagia, hematemesis and nausea.   Neurological: Negative for brief paralysis, dizziness, focal weakness, light-headedness, numbness (LLE WITH STANDING) and weakness.   Psychiatric/Behavioral: Negative for altered mental status and memory loss. The patient does not have insomnia.         Objective:      Vitals:    05/13/20 0854   BP: 138/66   Pulse: 78   SpO2: 99%   Weight: 84.9 kg (187 lb 2.7 oz)   Height: 5' 5" (1.651 m)   PainSc:   5     Body mass index is 31.15 kg/m².    Physical Exam   Constitutional: She is oriented to person, place, " and time. She appears well-developed and well-nourished.   OVER WEIGHT   HENT:   Head: Normocephalic and atraumatic.   Mouth/Throat: Oropharynx is clear and moist and mucous membranes are normal.   Eyes: Pupils are equal, round, and reactive to light. Conjunctivae and EOM are normal. Right eye exhibits normal extraocular motion. Left eye exhibits normal extraocular motion.   Neck: Trachea normal and normal range of motion. Neck supple. Decreased carotid pulses (SLIGHTLY) present. No hepatojugular reflux and no JVD present. Carotid bruit: MURMUR TO NECK. No erythema present.   Cardiovascular: Normal rate and regular rhythm.  No extrasystoles are present. PMI is not displaced. Exam reveals no gallop and no friction rub.   Murmur heard.   Harsh midsystolic murmur is present with a grade of 1/6 at the upper right sternal border radiating to the neck.   Systolic murmur of grade 2/6 is also present at the upper right sternal border.  Pulses:       Carotid pulses are 2+ on the right side, and 2+ on the left side.       Radial pulses are 2+ on the right side, and 2+ on the left side.        Posterior tibial pulses are 2+ on the right side, and 2+ on the left side.   Pulmonary/Chest: Effort normal and breath sounds normal. No accessory muscle usage. No respiratory distress. She has no wheezes. She has no rales. She exhibits no tenderness.   LARGE STERNOTOMY SCAR   Abdominal: Soft. Bowel sounds are normal. She exhibits no mass. There is no hepatosplenomegaly. There is no CVA tenderness.   Musculoskeletal: She exhibits no tenderness.    NO EDEMA   Lymphadenopathy:     She has no cervical adenopathy.   Neurological: She is alert and oriented to person, place, and time. She displays no tremor. No cranial nerve deficit.   Skin: Skin is warm and dry. No cyanosis.   Psychiatric: She has a normal mood and affect. Her speech is normal and behavior is normal.               ..    Chemistry        Component Value Date/Time      01/23/2018 1034    K 4.3 01/23/2018 1034     01/23/2018 1034    CO2 27 01/23/2018 1034    BUN 12 01/23/2018 1034    CREATININE 0.67 01/23/2018 1034     01/23/2018 1034        Component Value Date/Time    CALCIUM 8.8 01/23/2018 1034    ALKPHOS 110 12/15/2017 0849    AST 23 12/15/2017 0849    ALT 15 12/15/2017 0849    BILITOT 0.6 12/15/2017 0849    ESTGFRAFRICA >60 01/23/2018 1034    EGFRNONAA >60 01/23/2018 1034            ..  Lab Results   Component Value Date    CHOL 246 (H) 12/15/2017     Lab Results   Component Value Date    HDL 48 12/15/2017     Lab Results   Component Value Date    LDLCALC 180.8 (H) 12/15/2017     Lab Results   Component Value Date    TRIG 86 12/15/2017     Lab Results   Component Value Date    CHOLHDL 19.5 (L) 12/15/2017     ..  Lab Results   Component Value Date    WBC 6.28 01/23/2018    HGB 12.9 01/23/2018    HCT 40.1 01/23/2018    MCV 94 01/23/2018     01/23/2018       Test(s) Reviewed  I have reviewed the following in detail:  [] Stress test   [] Angiography   [] Echocardiogram   [x] Labs   [] Other:       Assessment:         ICD-10-CM ICD-9-CM   1. Coronary artery disease involving coronary bypass graft of native heart with angina pectoris I25.709 414.05     413.9   2. Essential hypertension I10 401.9   3. Mitral and aortic valve disease I08.0 396.9   4. 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 - Primary    Relevant Medications    nitroGLYCERIN (NITROSTAT) 0.4 MG SL tablet    Mitral and aortic valve disease       Endocrine    Obesity (BMI 30.0-34.9)           Plan:         CHANGE NORVASC TO 5 MG DAILY, WATCH BLOOD PRESSURE, ALL OTHER CV CLINICALLY STABLE, CLASS 0-1 ANGINA, NO HF, NO TIA, NO CLINICAL ARRHYTHMIA,CONTINUE CURRENT MEDS, EDUCATION, DIET, EXERCISE, , WEIGHT LOSS, RETURN TO  CLINIC IN 6 MO  Coronary artery disease involving coronary bypass graft of native heart with angina pectoris  -     nitroGLYCERIN (NITROSTAT) 0.4 MG SL tablet; Place 1 tablet (0.4 mg total) under the tongue every 5 (five) minutes as needed for Chest pain.  Dispense: 25 tablet; Refill: 1    Essential hypertension    Mitral and aortic valve disease    Obesity (BMI 30.0-34.9)    Other orders  -     Discontinue: amLODIPine (NORVASC) 5 MG tablet; Take 1 tablet (5 mg total) by mouth once daily.  Dispense: 90 tablet; Refill: 1    RTC Low level/low impact aerobic exercise 5x's/wk. Heart healthy diet and risk factor modification.    See labs and med orders.    Aerobic exercise 5x's/wk. Heart healthy diet and risk factor modification.    See labs and med orders.

## 2020-05-21 DIAGNOSIS — I10 ESSENTIAL HYPERTENSION: ICD-10-CM

## 2020-05-21 RX ORDER — METOPROLOL SUCCINATE 25 MG/1
TABLET, EXTENDED RELEASE ORAL
Qty: 90 TABLET | Refills: 1 | Status: SHIPPED | OUTPATIENT
Start: 2020-05-21 | End: 2020-11-04 | Stop reason: SDUPTHER

## 2020-05-25 ENCOUNTER — TELEPHONE (OUTPATIENT)
Dept: FAMILY MEDICINE | Facility: CLINIC | Age: 80
End: 2020-05-25

## 2020-05-25 NOTE — TELEPHONE ENCOUNTER
----- Message from Karen Solitario MA sent at 5/25/2020 11:11 AM CDT -----  Contact: patient  Type: Needs Medical Advice  Who Called:  Yael Alfaro Call Back Number:   Additional Information: patient is unable to attend her appointment today.  Please call to reschedule.

## 2020-06-15 ENCOUNTER — OFFICE VISIT (OUTPATIENT)
Dept: PRIMARY CARE CLINIC | Facility: CLINIC | Age: 80
End: 2020-06-15
Payer: MEDICARE

## 2020-06-15 VITALS
WEIGHT: 185.44 LBS | HEIGHT: 65 IN | TEMPERATURE: 99 F | SYSTOLIC BLOOD PRESSURE: 140 MMHG | OXYGEN SATURATION: 99 % | HEART RATE: 80 BPM | DIASTOLIC BLOOD PRESSURE: 88 MMHG | BODY MASS INDEX: 30.89 KG/M2

## 2020-06-15 DIAGNOSIS — R80.8 OTHER PROTEINURIA: ICD-10-CM

## 2020-06-15 DIAGNOSIS — I10 ESSENTIAL HYPERTENSION: Primary | ICD-10-CM

## 2020-06-15 DIAGNOSIS — R79.9 ABNORMAL FINDING OF BLOOD CHEMISTRY, UNSPECIFIED: ICD-10-CM

## 2020-06-15 DIAGNOSIS — M54.9 MUSCULOSKELETAL BACK PAIN: ICD-10-CM

## 2020-06-15 DIAGNOSIS — R35.81 NOCTURNAL POLYURIA: ICD-10-CM

## 2020-06-15 LAB
BILIRUB SERPL-MCNC: ABNORMAL MG/DL
BLOOD URINE, POC: ABNORMAL
COLOR, POC UA: ABNORMAL
GLUCOSE UR QL STRIP: NORMAL
KETONES UR QL STRIP: ABNORMAL
LEUKOCYTE ESTERASE URINE, POC: ABNORMAL
NITRITE, POC UA: ABNORMAL
PH, POC UA: 5
PROTEIN, POC: ABNORMAL
SPECIFIC GRAVITY, POC UA: 1.02
UROBILINOGEN, POC UA: 1

## 2020-06-15 PROCEDURE — 81001 POCT URINALYSIS, DIPSTICK OR TABLET REAGENT, AUTOMATED, WITH MICROSCOP: ICD-10-PCS | Mod: S$GLB,,, | Performed by: NURSE PRACTITIONER

## 2020-06-15 PROCEDURE — 1159F PR MEDICATION LIST DOCUMENTED IN MEDICAL RECORD: ICD-10-PCS | Mod: S$GLB,,, | Performed by: NURSE PRACTITIONER

## 2020-06-15 PROCEDURE — 3077F PR MOST RECENT SYSTOLIC BLOOD PRESSURE >= 140 MM HG: ICD-10-PCS | Mod: CPTII,S$GLB,, | Performed by: NURSE PRACTITIONER

## 2020-06-15 PROCEDURE — 3077F SYST BP >= 140 MM HG: CPT | Mod: CPTII,S$GLB,, | Performed by: NURSE PRACTITIONER

## 2020-06-15 PROCEDURE — 3079F PR MOST RECENT DIASTOLIC BLOOD PRESSURE 80-89 MM HG: ICD-10-PCS | Mod: CPTII,S$GLB,, | Performed by: NURSE PRACTITIONER

## 2020-06-15 PROCEDURE — 1126F PR PAIN SEVERITY QUANTIFIED, NO PAIN PRESENT: ICD-10-PCS | Mod: S$GLB,,, | Performed by: NURSE PRACTITIONER

## 2020-06-15 PROCEDURE — 1126F AMNT PAIN NOTED NONE PRSNT: CPT | Mod: S$GLB,,, | Performed by: NURSE PRACTITIONER

## 2020-06-15 PROCEDURE — 3079F DIAST BP 80-89 MM HG: CPT | Mod: CPTII,S$GLB,, | Performed by: NURSE PRACTITIONER

## 2020-06-15 PROCEDURE — 99214 OFFICE O/P EST MOD 30 MIN: CPT | Mod: 25,S$GLB,, | Performed by: NURSE PRACTITIONER

## 2020-06-15 PROCEDURE — 81001 URINALYSIS AUTO W/SCOPE: CPT | Mod: S$GLB,,, | Performed by: NURSE PRACTITIONER

## 2020-06-15 PROCEDURE — 1101F PT FALLS ASSESS-DOCD LE1/YR: CPT | Mod: CPTII,S$GLB,, | Performed by: NURSE PRACTITIONER

## 2020-06-15 PROCEDURE — 99214 PR OFFICE/OUTPT VISIT, EST, LEVL IV, 30-39 MIN: ICD-10-PCS | Mod: 25,S$GLB,, | Performed by: NURSE PRACTITIONER

## 2020-06-15 PROCEDURE — 1159F MED LIST DOCD IN RCRD: CPT | Mod: S$GLB,,, | Performed by: NURSE PRACTITIONER

## 2020-06-15 PROCEDURE — 1101F PR PT FALLS ASSESS DOC 0-1 FALLS W/OUT INJ PAST YR: ICD-10-PCS | Mod: CPTII,S$GLB,, | Performed by: NURSE PRACTITIONER

## 2020-06-15 RX ORDER — AMLODIPINE BESYLATE 5 MG/1
5 TABLET ORAL DAILY
Qty: 30 TABLET | Refills: 3
Start: 2020-06-15 | End: 2020-09-14 | Stop reason: SDUPTHER

## 2020-06-15 NOTE — PROGRESS NOTES
Subjective:       Patient ID: Yael Claire is a 79 y.o. female.    Chief Complaint: Shoulder Pain (left shoulder pain)  Patient was last seen by me on 05/04/2020.  Last seen by PCP; Edu on 11/14/2019.  HPI   She is here with complaints of left shoulder pain to posterior portion of left back. States aspercream with Lidocaine has helped.   She has not taken her amlodipine yet this morning.  She did take her Benicar at 9am this morning     States frequent night time urination  Vitals:    06/15/20 1114   BP: (!) 140/88   Pulse:    Temp:      BP Readings from Last 3 Encounters:   06/15/20 (!) 140/88   05/13/20 138/66   05/04/20 (!) 143/64     Review of Systems    CMP  Sodium   Date Value Ref Range Status   01/23/2018 138 136 - 145 mmol/L Final     Potassium   Date Value Ref Range Status   01/23/2018 4.3 3.5 - 5.1 mmol/L Final     Chloride   Date Value Ref Range Status   01/23/2018 105 95 - 110 mmol/L Final     CO2   Date Value Ref Range Status   01/23/2018 27 22 - 31 mmol/L Final     Glucose   Date Value Ref Range Status   01/23/2018 102 70 - 110 mg/dL Final     Comment:     The ADA recommends the following guidelines for fasting glucose:  Normal:       less than 100 mg/dL  Prediabetes:  100 mg/dL to 125 mg/dL  Diabetes:     126 mg/dL or higher       BUN, Bld   Date Value Ref Range Status   01/23/2018 12 7 - 18 mg/dL Final     Creatinine   Date Value Ref Range Status   01/23/2018 0.67 0.50 - 1.40 mg/dL Final     Calcium   Date Value Ref Range Status   01/23/2018 8.8 8.4 - 10.2 mg/dL Final     Total Protein   Date Value Ref Range Status   12/15/2017 7.5 6.0 - 8.4 g/dL Final     Albumin   Date Value Ref Range Status   12/15/2017 3.2 (L) 3.5 - 5.2 g/dL Final     Total Bilirubin   Date Value Ref Range Status   12/15/2017 0.6 0.1 - 1.0 mg/dL Final     Comment:     For infants and newborns, interpretation of results should be based  on gestational age, weight and in agreement with clinical  observations.  Premature Infant  recommended reference ranges:  Up to 24 hours.............<8.0 mg/dL  Up to 48 hours............<12.0 mg/dL  3-5 days..................<15.0 mg/dL  6-29 days.................<15.0 mg/dL       Alkaline Phosphatase   Date Value Ref Range Status   12/15/2017 110 55 - 135 U/L Final     AST   Date Value Ref Range Status   12/15/2017 23 10 - 40 U/L Final     ALT   Date Value Ref Range Status   12/15/2017 15 10 - 44 U/L Final     Anion Gap   Date Value Ref Range Status   01/23/2018 6 (L) 8 - 16 mmol/L Final     eGFR if    Date Value Ref Range Status   01/23/2018 >60 >60 mL/min/1.73 m^2 Final     eGFR if non    Date Value Ref Range Status   01/23/2018 >60 >60 mL/min/1.73 m^2 Final     Comment:     Calculation used to obtain the estimated glomerular filtration  rate (eGFR) is the CKD-EPI equation.              Objective:      Physical Exam  Vitals signs and nursing note reviewed. Exam conducted with a chaperone present.   Constitutional:       General: She is awake.      Appearance: Normal appearance. She is overweight.   HENT:      Head: Normocephalic and atraumatic.      Right Ear: Hearing, tympanic membrane, ear canal and external ear normal.      Left Ear: Hearing, tympanic membrane, ear canal and external ear normal.      Nose: Nose normal.   Eyes:      General: Lids are normal.         Right eye: No foreign body or discharge.         Left eye: No foreign body or discharge.   Neck:      Musculoskeletal: Normal range of motion and neck supple.   Cardiovascular:      Rate and Rhythm: Normal rate and regular rhythm.   Pulmonary:      Effort: Pulmonary effort is normal.      Breath sounds: Normal breath sounds. No decreased breath sounds.   Abdominal:      General: Bowel sounds are normal.      Palpations: Abdomen is soft.      Tenderness: There is no abdominal tenderness.   Musculoskeletal: Normal range of motion.        Arms:    Lymphadenopathy:      Head:      Right side of head: No  submental, submandibular, tonsillar, preauricular, posterior auricular or occipital adenopathy.      Left side of head: No submental, submandibular, tonsillar, preauricular, posterior auricular or occipital adenopathy.      Cervical: No cervical adenopathy.      Right cervical: No superficial, deep or posterior cervical adenopathy.     Left cervical: No superficial, deep or posterior cervical adenopathy.   Skin:     General: Skin is warm and dry.             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   Psychiatric:         Attention and Perception: Attention and perception normal.         Mood and Affect: Mood and affect normal.         Speech: Speech normal.         Behavior: Behavior normal. Behavior is cooperative.         Thought Content: Thought content normal.         Cognition and Memory: Cognition and memory normal.         Judgment: Judgment normal.         Assessment & Plan:       Essential hypertension  -     amLODIPine (NORVASC) 5 MG tablet; Take 1 tablet (5 mg total) by mouth once daily.  Dispense: 30 tablet; Refill: 3    Musculoskeletal back pain    Nocturnal polyuria  -     Hemoglobin A1C; Future; Expected date: 06/15/2020  -     POCT urinalysis, dipstick or tablet reag    Abnormal finding of blood chemistry, unspecified   -     Hemoglobin A1C; Future; Expected date: 06/15/2020    Other proteinuria  -     Hemoglobin A1C; Future; Expected date: 06/15/2020    She has muscle relaxant at home and not using it. I have advised to start using it and I will call to get the name of it from her later today. She thinks it is tizanidine.    Medication List with Changes/Refills   Current Medications    ASCORBIC ACID, VITAMIN C, (VITAMIN C) 500 MG TABLET    Take 500 mg by mouth once daily.    ASPIRIN (ECOTRIN) 81 MG EC TABLET    Take 81 mg by mouth once daily.    CO-ENZYME Q-10 30 MG CAPSULE    Take 30 mg by mouth once daily.      DICLOFENAC SODIUM (VOLTAREN) 1 % GEL    APPLY 2 GRAM TO THE AFFECTED AREA(S) BY TOPICAL ROUTE 4 TIMES PER DAY    EVOLOCUMAB (REPATHA SURECLICK) 140 MG/ML PNIJ    Inject 140 mg into the skin every 14 (fourteen) days.    FISH OIL-OMEGA-3 FATTY ACIDS 300-1,000 MG CAPSULE    Take 2 g by mouth once daily.    FOLIC ACID/MULTIVIT-MIN/LUTEIN (CENTRUM SILVER ORAL)    Take by mouth once daily. ONE DAILY    FUROSEMIDE (LASIX) 20 MG TABLET    Take 20 mg by mouth daily as needed. As needed    HYDROCODONE-ACETAMINOPHEN (NORCO)  MG PER TABLET    Take 1 tablet by mouth 2 (two) times daily as needed.    METOPROLOL SUCCINATE (TOPROL-XL) 25 MG 24 HR TABLET    TAKE 1/2 TABLET BY MOUTH TWICE DAILY    MUPIROCIN (BACTROBAN) 2 % OINTMENT    Apply topically 3 (three) times daily.    NITROGLYCERIN (NITROSTAT) 0.4 MG SL TABLET    PLACE 1 TABLET (0.4 MG TOTAL) UNDER THE TONGUE EVERY 5 (FIVE) MINUTES AS NEEDED FOR CHEST PAIN.    OLMESARTAN (BENICAR) 40 MG TABLET    Take 1 tablet (40 mg total) by mouth once daily.    POTASSIUM CHLORIDE (KLOR-CON) 10 MEQ TBSR    Take 1 tablet (10 mEq total) by mouth twice a week.    ROSUVASTATIN (CRESTOR) 40 MG TAB    TAKE 1 TABLET (40 MG TOTAL) BY MOUTH EVERY EVENING.    VITAMIN D 1000 UNITS TAB    Take 185 mg by mouth once daily.   Changed and/or Refilled Medications    Modified Medication Previous Medication    AMLODIPINE (NORVASC) 5 MG TABLET amLODIPine (NORVASC) 2.5 MG tablet       Take 1 tablet (5 mg total) by mouth once daily.    TAKE 2 TABLETS (5 MG TOTAL) BY MOUTH ONCE DAILY.         Follow up in about 4 weeks (around 7/13/2020), or if symptoms worsen or fail to improve.

## 2020-06-15 NOTE — PATIENT INSTRUCTIONS
Eating Heart-Healthy Foods  Eating has a big impact on your heart health. In fact, eating healthier can improve several of your heart risks at once. For instance, it helps you manage weight, cholesterol, and blood pressure. Here are ideas to help you make heart-healthy changes without giving up all the foods and flavors you love.  Getting started  · Talk with your health care provider about eating plans, such as the DASH or Mediterranean diet. You may also be referred to a dietitian.  · Change a few things at a time. Give yourself time to get used to a few eating changes before adding more.  · Work to create a tasty, healthy eating plan that you can stick to for the rest of your life.    Goals for healthy eating  Below are some tips to improve your eating habits:  · Limit saturated fats and trans fats. Saturated fats raise your levels of cholesterol, so keep these fats to a minimum. They are found in foods such as fatty meats, whole milk, cheese, and palm and coconut oils. Avoid trans fats because they lower good cholesterol as well as raise bad cholesterol. Trans fats are most often found in processed foods.  · Reduce sodium (salt) intake. Eating too much salt may increase your blood pressure. Limit your sodium intake to 2,300 milligrams (mg) per day, or less if your health care provider recommends it. Dining out less often and eating fewer processed foods are two great ways to decrease the amount of salt you consume.  · Managing calories. A calorie is a unit of energy. Your body burns calories for fuel, but if you eat more calories than your body burns, the extras are stored as fat. Your health care provider can help you create a diet plan to manage your calories. This will likely include eating healthier foods as well as exercising regularly. To help you track your progress, keep a diary to record what you eat and how often you exercise.  Choose the right foods  Aim to make these foods staples of your diet. If  you have diabetes, you may have different recommendations than what is listed here:  · Fruits and vegetable provide plenty of nutrients without a lot of calories. At meals, fill half your plate with these foods. Split the other half of your plate between whole grains and lean protein.  · Whole grains are high in fiber and rich in vitamins and nutrients. Good choices include whole-wheat bread, pasta, and brown rice.  · Lean proteins give you nutrition with less fat. Good choices include fish, skinless chicken, and beans.  · Low-fat or nonfat dairy provides nutrients without a lot of fat. Try low-fat or nonfat milk, cheese, or yogurt.  · Healthy fats can be good for you in small amounts. These are unsaturated fats, such as olive oil, nuts, and fish. Try to have at least 2 servings per week of fatty fish such as salmon, sardines, mackerel, rainbow trout, and albacore tuna. These contain omega-3 fatty acids, which are good for your heart. Flaxseed is another source of a heart-healthy fat.  More on heart healthy eating    Read food labels  Healthy eating starts at the grocery store. Be sure to pay attention to food labels on packaged foods. Look for products that are high in fiber and protein, and low in saturated fat, cholesterol, and sodium. Avoid products that contain trans fat. And pay close attention to serving size. For instance, if you plan to eat two servings, double all the numbers on the label.  Prepare food right  A key part of healthy cooking is cutting down on added fat and salt. Look on the internet for lower-fat, lower-sodium recipes. Also, try these tips:  · Remove fat from meat and skin from poultry before cooking.  · Skim fat from the surface of soups and sauces.  · Broil, boil, bake, steam, grill, and microwave food without added fats.  · Choose ingredients that spice up your food without adding calories, fat, or sodium. Try these items: horseradish, hot sauce, lemon, mustard, nonfat salad dressings,  and vinegar. For salt-free herbs and spices, try basil, cilantro, cinnamon, pepper, and rosemary.  Date Last Reviewed: 6/25/2015 © 2000-2017 [employer]. [address] [place] [address], [place] [address]. All rights reserved. This information is not intended as a substitute for professional medical care. Always follow your healthcare professional's instructions.        Discharge Instructions: Taking Your Blood Pressure  Blood pressure is the force of blood as it moves from the heart through the blood vessels. You can take your own blood pressure reading using a digital monitor. Take readings as often as your healthcare provider instructs. Take your readings each time in the same way, using the same arm. Here are guidelines for taking your blood pressure.  The American Heart Association (AHA) recommends purchasing a blood pressure monitor that is validated and approved by the Association for the Advancement of Medical Instrumentation, the [place] Hypertension Society, and the International Protocol for the Validation of Automated BP Measuring Devices. If the blood pressure monitor is for a senior adult, a pregnant woman, or a child, make certain it is validated for use with such a population. For the most reliable readings, the AHA recommends an automatic, cuff-style, upper arm (bicep) monitor. The readings from finger and wrist monitors are not as reliable as the upper arm monitor.        Step 1. Relax    · Wait at least a half hour after smoking, eating, or exercising. Do not drink coffee, tea, soda, or other caffeinated beverages before checking your blood pressure.   · Sit comfortably at a table. Place the monitor near you.  · Rest for a few minutes before you begin.        Step 2. Wrap the cuff    · Place your arm on the table, palm up. Put your arm in a position that is level with your heart. Wrap the cuff around your upper arm, about an inch above your elbow. Its best to wrap the cuff on bare skin, not  over clothing.  · Make sure your cuff fits. If it doesnt wrap around your upper arm, order a larger cuff. A cuff that is too large or too small can result in an inaccurate blood pressure reading.           Step 3. Inflate the cuff    · Pump the cuff until the scale reads 200. If you have a self-inflating cuff, push the button that starts the pump.  · The cuff will tighten, then loosen.  · The numbers will change. When they stop changing, your blood pressure reading will appear.  · If you get a reading that is too high or too low for you, relax for a few minutes. Then do the test again.    Step 4. Write down the results  · Write down your blood pressure numbers. Leno the date and time. Keep your results in one place, such as a notebook.  · Remove the cuff from your arm. Turn off the machine.  · Take the readings with you to your medical appointments.  · If you start a new blood pressure medicine, or change a blood pressure medicine dose, note the day you started the new drug or dosage on your blood pressure recording sheet. This will help your healthcare provider monitor the effect of medication changes.     Date Last Reviewed: 4/27/2016  © 3576-3530 [employer]. [address] [place] [address], [place] [address]. All rights reserved. This information is not intended as a substitute for professional medical care. Always follow your healthcare professional's instructions.

## 2020-06-17 RX ORDER — EVOLOCUMAB 140 MG/ML
140 INJECTION, SOLUTION SUBCUTANEOUS
Qty: 2 ML | Refills: 6 | Status: SHIPPED | OUTPATIENT
Start: 2020-06-17 | End: 2021-01-21 | Stop reason: SDUPTHER

## 2020-06-18 ENCOUNTER — TELEPHONE (OUTPATIENT)
Dept: PHARMACY | Facility: CLINIC | Age: 80
End: 2020-06-18

## 2020-07-13 ENCOUNTER — OFFICE VISIT (OUTPATIENT)
Dept: PRIMARY CARE CLINIC | Facility: CLINIC | Age: 80
End: 2020-07-13
Payer: MEDICARE

## 2020-07-13 VITALS
OXYGEN SATURATION: 97 % | SYSTOLIC BLOOD PRESSURE: 126 MMHG | WEIGHT: 184.06 LBS | BODY MASS INDEX: 30.67 KG/M2 | HEIGHT: 65 IN | TEMPERATURE: 99 F | DIASTOLIC BLOOD PRESSURE: 76 MMHG | HEART RATE: 67 BPM

## 2020-07-13 DIAGNOSIS — E78.2 MIXED HYPERLIPIDEMIA: ICD-10-CM

## 2020-07-13 DIAGNOSIS — I10 ESSENTIAL HYPERTENSION: Primary | ICD-10-CM

## 2020-07-13 DIAGNOSIS — Z12.31 ENCOUNTER FOR SCREENING MAMMOGRAM FOR MALIGNANT NEOPLASM OF BREAST: ICD-10-CM

## 2020-07-13 DIAGNOSIS — E66.9 OBESITY (BMI 30.0-34.9): ICD-10-CM

## 2020-07-13 PROCEDURE — 1125F AMNT PAIN NOTED PAIN PRSNT: CPT | Mod: S$GLB,,, | Performed by: NURSE PRACTITIONER

## 2020-07-13 PROCEDURE — 1159F PR MEDICATION LIST DOCUMENTED IN MEDICAL RECORD: ICD-10-PCS | Mod: S$GLB,,, | Performed by: NURSE PRACTITIONER

## 2020-07-13 PROCEDURE — 1101F PR PT FALLS ASSESS DOC 0-1 FALLS W/OUT INJ PAST YR: ICD-10-PCS | Mod: CPTII,S$GLB,, | Performed by: NURSE PRACTITIONER

## 2020-07-13 PROCEDURE — 3078F DIAST BP <80 MM HG: CPT | Mod: CPTII,S$GLB,, | Performed by: NURSE PRACTITIONER

## 2020-07-13 PROCEDURE — 3074F SYST BP LT 130 MM HG: CPT | Mod: CPTII,S$GLB,, | Performed by: NURSE PRACTITIONER

## 2020-07-13 PROCEDURE — 3078F PR MOST RECENT DIASTOLIC BLOOD PRESSURE < 80 MM HG: ICD-10-PCS | Mod: CPTII,S$GLB,, | Performed by: NURSE PRACTITIONER

## 2020-07-13 PROCEDURE — 99214 OFFICE O/P EST MOD 30 MIN: CPT | Mod: S$GLB,,, | Performed by: NURSE PRACTITIONER

## 2020-07-13 PROCEDURE — 1125F PR PAIN SEVERITY QUANTIFIED, PAIN PRESENT: ICD-10-PCS | Mod: S$GLB,,, | Performed by: NURSE PRACTITIONER

## 2020-07-13 PROCEDURE — 3074F PR MOST RECENT SYSTOLIC BLOOD PRESSURE < 130 MM HG: ICD-10-PCS | Mod: CPTII,S$GLB,, | Performed by: NURSE PRACTITIONER

## 2020-07-13 PROCEDURE — 1159F MED LIST DOCD IN RCRD: CPT | Mod: S$GLB,,, | Performed by: NURSE PRACTITIONER

## 2020-07-13 PROCEDURE — 99214 PR OFFICE/OUTPT VISIT, EST, LEVL IV, 30-39 MIN: ICD-10-PCS | Mod: S$GLB,,, | Performed by: NURSE PRACTITIONER

## 2020-07-13 PROCEDURE — 1101F PT FALLS ASSESS-DOCD LE1/YR: CPT | Mod: CPTII,S$GLB,, | Performed by: NURSE PRACTITIONER

## 2020-07-13 NOTE — PROGRESS NOTES
Subjective:       Patient ID: Yael Claire is a 79 y.o. female.    Chief Complaint: Follow-up and Hypertension  Patient was last seen by me on 06/15/2020  HPI   She is here today to follow up with HTN.  Vitals:    07/13/20 0921   BP: 126/76   Pulse: 67   Temp: 99.1 °F (37.3 °C)     BP Readings from Last 3 Encounters:   07/13/20 126/76   06/15/20 (!) 140/88   05/13/20 138/66     Review of Systems   Respiratory: Negative for shortness of breath.    Cardiovascular: Negative for leg swelling.   Neurological: Negative for headaches.   All other systems reviewed and are negative.      States taking Aleve about 3 times weekly for pain in sides and lower legs and it helps  Her last CMP at [facility] on 01/17/2020 was all within normal range.   Objective:      Physical Exam  Vitals signs and nursing note reviewed.   Constitutional:       General: She is awake.      Appearance: Normal appearance. She is well-developed, well-groomed and overweight.   HENT:      Head: Normocephalic and atraumatic.      Right Ear: Tympanic membrane, ear canal and external ear normal.      Left Ear: Tympanic membrane, ear canal and external ear normal.   Eyes:      General: Lids are normal.         Right eye: No foreign body or discharge.         Left eye: No foreign body or discharge.   Neck:      Musculoskeletal: Full passive range of motion without pain, normal range of motion and neck supple.      Trachea: Trachea and phonation normal.   Cardiovascular:      Rate and Rhythm: Normal rate and regular rhythm.   Pulmonary:      Effort: Pulmonary effort is normal.      Breath sounds: Normal breath sounds and air entry.   Abdominal:      General: Bowel sounds are normal.      Palpations: Abdomen is soft.   Musculoskeletal: Normal range of motion.   Lymphadenopathy:      Head:      Right side of head: No submental, submandibular, tonsillar, preauricular, posterior auricular or occipital adenopathy.      Left side of head: No submental, submandibular,  tonsillar, preauricular, posterior auricular or occipital adenopathy.      Cervical: No cervical adenopathy.      Right cervical: No superficial, deep or posterior cervical adenopathy.     Left cervical: No superficial, deep or posterior cervical adenopathy.   Skin:     General: Skin is warm and dry.   Neurological:      General: No focal deficit present.      Mental Status: She is alert and oriented to person, place, and time.      Coordination: Coordination is intact.      Gait: Gait is intact.   Psychiatric:         Attention and Perception: Attention and perception normal.         Mood and Affect: Mood and affect normal.         Speech: Speech normal.         Behavior: Behavior normal. Behavior is cooperative.         Thought Content: Thought content normal.         Cognition and Memory: Cognition and memory normal.         Judgment: Judgment normal.         Assessment & Plan:       Essential hypertension- Controlled Norvasc, Benicar, Toprol, Lasix,  BP Readings from Last 3 Encounters:   07/13/20 126/76   06/15/20 (!) 140/88   05/13/20 138/66       Mixed hyperlipidemia- Stable Crestor    Obesity (BMI 30.0-34.9)- Lifestyle changes discussed walk 10-15 minutes daily low saturated fat diet    Encounter for screening mammogram for malignant neoplasm of breast   -     Mammo Digital Screening Bilat; Future; Expected date: 07/13/2020      Medication List with Changes/Refills   Current Medications    AMLODIPINE (NORVASC) 5 MG TABLET    Take 1 tablet (5 mg total) by mouth once daily.    ASCORBIC ACID, VITAMIN C, (VITAMIN C) 500 MG TABLET    Take 500 mg by mouth once daily.    ASPIRIN (ECOTRIN) 81 MG EC TABLET    Take 81 mg by mouth once daily.    CO-ENZYME Q-10 30 MG CAPSULE    Take 30 mg by mouth once daily.     DICLOFENAC SODIUM (VOLTAREN) 1 % GEL    APPLY 2 GRAM TO THE AFFECTED AREA(S) BY TOPICAL ROUTE 4 TIMES PER DAY    EVOLOCUMAB (REPATHA SURECLICK) 140 MG/ML PNIJ    Inject 140 mg into the skin every 14 (fourteen)  days.    FISH OIL-OMEGA-3 FATTY ACIDS 300-1,000 MG CAPSULE    Take 2 g by mouth once daily.    FOLIC ACID/MULTIVIT-MIN/LUTEIN (CENTRUM SILVER ORAL)    Take by mouth once daily. ONE DAILY    FUROSEMIDE (LASIX) 20 MG TABLET    Take 20 mg by mouth daily as needed. As needed    HYDROCODONE-ACETAMINOPHEN (NORCO)  MG PER TABLET    Take 1 tablet by mouth 2 (two) times daily as needed.    METOPROLOL SUCCINATE (TOPROL-XL) 25 MG 24 HR TABLET    TAKE 1/2 TABLET BY MOUTH TWICE DAILY    MUPIROCIN (BACTROBAN) 2 % OINTMENT    Apply topically 3 (three) times daily.    NITROGLYCERIN (NITROSTAT) 0.4 MG SL TABLET    PLACE 1 TABLET (0.4 MG TOTAL) UNDER THE TONGUE EVERY 5 (FIVE) MINUTES AS NEEDED FOR CHEST PAIN.    OLMESARTAN (BENICAR) 40 MG TABLET    Take 1 tablet (40 mg total) by mouth once daily.    POTASSIUM CHLORIDE (KLOR-CON) 10 MEQ TBSR    Take 1 tablet (10 mEq total) by mouth twice a week.    ROSUVASTATIN (CRESTOR) 40 MG TAB    TAKE 1 TABLET (40 MG TOTAL) BY MOUTH EVERY EVENING.    VITAMIN D 1000 UNITS TAB    Take 185 mg by mouth once daily.         Follow up in about 3 months (around 10/13/2020), or if symptoms worsen or fail to improve.

## 2020-07-24 ENCOUNTER — TELEPHONE (OUTPATIENT)
Dept: PHARMACY | Facility: CLINIC | Age: 80
End: 2020-07-24

## 2020-08-18 ENCOUNTER — TELEPHONE (OUTPATIENT)
Dept: PHARMACY | Facility: CLINIC | Age: 80
End: 2020-08-18

## 2020-08-24 NOTE — TELEPHONE ENCOUNTER
RX OUTGOING call regarding REPATHA @ [facility]. Shipping out  for  arrival with patients consent. Copay of $8.95 @ 004. Address and  confirmed. Patient has 0 doses on hand at this time. Patient has not started any new medications, has had no missed doses and no side effects present. Patient is currently taking the medication as directed by doctors instruction. Patient states they do not have any questions or concerns at this time. Patients next injection is scheduled for

## 2020-09-04 RX ORDER — ROSUVASTATIN CALCIUM 40 MG/1
40 TABLET, COATED ORAL NIGHTLY
Qty: 90 TABLET | Refills: 1 | Status: SHIPPED | OUTPATIENT
Start: 2020-09-04 | End: 2020-12-01 | Stop reason: SDUPTHER

## 2020-09-04 NOTE — TELEPHONE ENCOUNTER
----- Message from Marina Enamorado sent at 9/4/2020  3:09 PM CDT -----  Type:  RX Refill Request    Who Called:  Yael  Refill or New Rx:  refill  RX Name and Strength:  rosuvastatin (CRESTOR) 40 MG Tab  How is the patient currently taking it? (ex. 1XDay):  once daily  Is this a 30 day or 90 day RX:  90  Preferred Pharmacy with phone number:    [facility]/pharmacy #[id number] - [place] - [address]  [address] [place], [place] [address].  [place] [id number]  Phone: [phone] Fax: [phone]    Local or Mail Order:  local  Ordering Provider:  Dr Ruelas  [facility] Call Back Number:  [phone]  Additional Information:  Second request. thank you!

## 2020-09-14 ENCOUNTER — OFFICE VISIT (OUTPATIENT)
Dept: PRIMARY CARE CLINIC | Facility: CLINIC | Age: 80
End: 2020-09-14
Payer: MEDICARE

## 2020-09-14 VITALS
TEMPERATURE: 98 F | HEIGHT: 65 IN | BODY MASS INDEX: 31.26 KG/M2 | HEART RATE: 66 BPM | DIASTOLIC BLOOD PRESSURE: 80 MMHG | SYSTOLIC BLOOD PRESSURE: 130 MMHG | OXYGEN SATURATION: 98 % | WEIGHT: 187.63 LBS

## 2020-09-14 DIAGNOSIS — I10 ESSENTIAL HYPERTENSION: Primary | ICD-10-CM

## 2020-09-14 DIAGNOSIS — E78.2 MIXED HYPERLIPIDEMIA: ICD-10-CM

## 2020-09-14 DIAGNOSIS — L91.0 KELOID SKIN DISORDER: ICD-10-CM

## 2020-09-14 DIAGNOSIS — E66.9 OBESITY (BMI 30.0-34.9): ICD-10-CM

## 2020-09-14 DIAGNOSIS — Z11.59 NEED FOR HEPATITIS C SCREENING TEST: ICD-10-CM

## 2020-09-14 PROCEDURE — 1159F PR MEDICATION LIST DOCUMENTED IN MEDICAL RECORD: ICD-10-PCS | Mod: S$GLB,,, | Performed by: NURSE PRACTITIONER

## 2020-09-14 PROCEDURE — 3079F PR MOST RECENT DIASTOLIC BLOOD PRESSURE 80-89 MM HG: ICD-10-PCS | Mod: CPTII,S$GLB,, | Performed by: NURSE PRACTITIONER

## 2020-09-14 PROCEDURE — 1101F PT FALLS ASSESS-DOCD LE1/YR: CPT | Mod: CPTII,S$GLB,, | Performed by: NURSE PRACTITIONER

## 2020-09-14 PROCEDURE — 1126F AMNT PAIN NOTED NONE PRSNT: CPT | Mod: S$GLB,,, | Performed by: NURSE PRACTITIONER

## 2020-09-14 PROCEDURE — 1101F PR PT FALLS ASSESS DOC 0-1 FALLS W/OUT INJ PAST YR: ICD-10-PCS | Mod: CPTII,S$GLB,, | Performed by: NURSE PRACTITIONER

## 2020-09-14 PROCEDURE — 99214 OFFICE O/P EST MOD 30 MIN: CPT | Mod: S$GLB,,, | Performed by: NURSE PRACTITIONER

## 2020-09-14 PROCEDURE — 3079F DIAST BP 80-89 MM HG: CPT | Mod: CPTII,S$GLB,, | Performed by: NURSE PRACTITIONER

## 2020-09-14 PROCEDURE — 3075F SYST BP GE 130 - 139MM HG: CPT | Mod: CPTII,S$GLB,, | Performed by: NURSE PRACTITIONER

## 2020-09-14 PROCEDURE — 1159F MED LIST DOCD IN RCRD: CPT | Mod: S$GLB,,, | Performed by: NURSE PRACTITIONER

## 2020-09-14 PROCEDURE — 99214 PR OFFICE/OUTPT VISIT, EST, LEVL IV, 30-39 MIN: ICD-10-PCS | Mod: S$GLB,,, | Performed by: NURSE PRACTITIONER

## 2020-09-14 PROCEDURE — 1126F PR PAIN SEVERITY QUANTIFIED, NO PAIN PRESENT: ICD-10-PCS | Mod: S$GLB,,, | Performed by: NURSE PRACTITIONER

## 2020-09-14 PROCEDURE — 3075F PR MOST RECENT SYSTOLIC BLOOD PRESS GE 130-139MM HG: ICD-10-PCS | Mod: CPTII,S$GLB,, | Performed by: NURSE PRACTITIONER

## 2020-09-14 RX ORDER — AMLODIPINE BESYLATE 5 MG/1
5 TABLET ORAL DAILY
Qty: 90 TABLET | Refills: 1
Start: 2020-09-14 | End: 2020-10-31

## 2020-09-14 RX ORDER — OLMESARTAN MEDOXOMIL 40 MG/1
40 TABLET ORAL DAILY
Qty: 90 TABLET | Refills: 1 | Status: SHIPPED | OUTPATIENT
Start: 2020-09-14 | End: 2020-10-14 | Stop reason: SDUPTHER

## 2020-09-14 NOTE — PROGRESS NOTES
Subjective:       Patient ID: Yael Claire is a 79 y.o. female.    Chief Complaint: Hypertension (follow up)  Patient was last seen by me on 07/13/2020.  HPI   She is here to follow up with HTN  Vitals:    09/14/20 1039   BP: 130/80   Pulse: 66   Temp: 98.3 °F (36.8 °C)     BP Readings from Last 3 Encounters:   09/14/20 130/80   07/13/20 126/76   06/15/20 (!) 140/88     Review of Systems   Respiratory: Negative for shortness of breath.    Cardiovascular: Negative for chest pain and leg swelling.       She states uses asper cream for bilateral muscle discomfort to her upper back region.   Objective:      Physical Exam  Vitals signs and nursing note reviewed.   Constitutional:       General: She is awake.      Appearance: Normal appearance. She is well-developed and well-groomed.   HENT:      Head: Normocephalic and atraumatic.      Right Ear: Hearing, tympanic membrane, ear canal and external ear normal.      Left Ear: Hearing, tympanic membrane, ear canal and external ear normal.   Eyes:      General: Lids are normal.         Right eye: No foreign body or discharge.         Left eye: No foreign body or discharge.   Neck:      Musculoskeletal: Full passive range of motion without pain, normal range of motion and neck supple.      Trachea: Trachea and phonation normal.   Cardiovascular:      Rate and Rhythm: Normal rate and regular rhythm.      Heart sounds: Murmur present.      Comments: Minimal (trace swelling) to right outer ankle   Pulmonary:      Effort: Pulmonary effort is normal.      Breath sounds: Normal breath sounds.   Chest:          Comments: Large keloid scar to her chest surgical region  Abdominal:      General: Bowel sounds are normal.      Palpations: Abdomen is soft.   Musculoskeletal: Normal range of motion.   Lymphadenopathy:      Head:      Right side of head: No submental, submandibular, tonsillar, preauricular, posterior auricular or occipital adenopathy.      Left side of head: No submental,  submandibular, tonsillar, preauricular, posterior auricular or occipital adenopathy.      Cervical: No cervical adenopathy.      Right cervical: No superficial, deep or posterior cervical adenopathy.     Left cervical: No superficial, deep or posterior cervical adenopathy.   Skin:     General: Skin is warm and dry.      Capillary Refill: Capillary refill takes less than 2 seconds.      Findings: No rash.             Comments: Large keloid from prior cardiac surgery   Neurological:      General: No focal deficit present.      Mental Status: She is alert and oriented to person, place, and time.   Psychiatric:         Attention and Perception: Attention and perception normal.         Mood and Affect: Mood and affect normal.         Speech: Speech normal.         Behavior: Behavior normal. Behavior is cooperative.         Thought Content: Thought content normal.         Cognition and Memory: Cognition and memory normal.         Judgment: Judgment normal.         Assessment & Plan:       Essential hypertension  -     olmesartan (BENICAR) 40 MG tablet; Take 1 tablet (40 mg total) by mouth once daily.  Dispense: 90 tablet; Refill: 1  -     amLODIPine (NORVASC) 5 MG tablet; Take 1 tablet (5 mg total) by mouth once daily.  Dispense: 90 tablet; Refill: 1  -     CBC auto differential; Future; Expected date: 09/14/2020  -     Comprehensive metabolic panel; Future; Expected date: 09/14/2020  -     Lipid Panel; Future; Expected date: 09/14/2020    Mixed hyperlipidemia  -     Lipid Panel; Future; Expected date: 09/14/2020    Obesity (BMI 30.0-34.9)    Keloid skin disorder    Need for hepatitis C screening test  -     Hepatitis C Antibody; Future; Expected date: 09/14/2020      I have encouraged to get her flu vaccine today  Heart healthy diet    Medication List with Changes/Refills   Current Medications    ASCORBIC ACID, VITAMIN C, (VITAMIN C) 500 MG TABLET    Take 500 mg by mouth once daily.    ASPIRIN (ECOTRIN) 81 MG EC TABLET     Take 81 mg by mouth once daily.    CO-ENZYME Q-10 30 MG CAPSULE    Take 30 mg by mouth once daily.     DICLOFENAC SODIUM (VOLTAREN) 1 % GEL    APPLY 2 GRAM TO THE AFFECTED AREA(S) BY TOPICAL ROUTE 4 TIMES PER DAY    EVOLOCUMAB (REPATHA SURECLICK) 140 MG/ML PNIJ    Inject 140 mg into the skin every 14 (fourteen) days.    FISH OIL-OMEGA-3 FATTY ACIDS 300-1,000 MG CAPSULE    Take 2 g by mouth once daily.    FOLIC ACID/MULTIVIT-MIN/LUTEIN (CENTRUM SILVER ORAL)    Take by mouth once daily. ONE DAILY    FUROSEMIDE (LASIX) 20 MG TABLET    Take 20 mg by mouth daily as needed. As needed    HYDROCODONE-ACETAMINOPHEN (NORCO)  MG PER TABLET    Take 1 tablet by mouth 2 (two) times daily as needed.    METOPROLOL SUCCINATE (TOPROL-XL) 25 MG 24 HR TABLET    TAKE 1/2 TABLET BY MOUTH TWICE DAILY    MUPIROCIN (BACTROBAN) 2 % OINTMENT    Apply topically 3 (three) times daily.    NITROGLYCERIN (NITROSTAT) 0.4 MG SL TABLET    PLACE 1 TABLET (0.4 MG TOTAL) UNDER THE TONGUE EVERY 5 (FIVE) MINUTES AS NEEDED FOR CHEST PAIN.    POTASSIUM CHLORIDE (KLOR-CON) 10 MEQ TBSR    Take 1 tablet (10 mEq total) by mouth twice a week.    ROSUVASTATIN (CRESTOR) 40 MG TAB    Take 1 tablet (40 mg total) by mouth every evening.    VITAMIN D 1000 UNITS TAB    Take 185 mg by mouth once daily.   Changed and/or Refilled Medications    Modified Medication Previous Medication    AMLODIPINE (NORVASC) 5 MG TABLET amLODIPine (NORVASC) 5 MG tablet       Take 1 tablet (5 mg total) by mouth once daily.    Take 1 tablet (5 mg total) by mouth once daily.    OLMESARTAN (BENICAR) 40 MG TABLET olmesartan (BENICAR) 40 MG tablet       Take 1 tablet (40 mg total) by mouth once daily.    Take 1 tablet (40 mg total) by mouth once daily.         Follow up in about 4 months (around 1/14/2021), or if symptoms worsen or fail to improve.

## 2020-09-14 NOTE — PATIENT INSTRUCTIONS

## 2020-09-22 ENCOUNTER — TELEPHONE (OUTPATIENT)
Dept: PHARMACY | Facility: CLINIC | Age: 80
End: 2020-09-22

## 2020-09-29 ENCOUNTER — TELEPHONE (OUTPATIENT)
Dept: FAMILY MEDICINE | Facility: CLINIC | Age: 80
End: 2020-09-29

## 2020-09-29 NOTE — TELEPHONE ENCOUNTER
----- Message from Tabitha Rodriguez sent at 9/28/2020  1:18 PM CDT -----  Contact: patient  Type: Needs Medical Advice  Who Called:  patient    Best Call Back Number: [phone] (home)     Additional Information: please contact in regards to scheduling a same day appt

## 2020-10-14 DIAGNOSIS — I10 ESSENTIAL HYPERTENSION: ICD-10-CM

## 2020-10-14 RX ORDER — OLMESARTAN MEDOXOMIL 40 MG/1
40 TABLET ORAL DAILY
Qty: 90 TABLET | Refills: 1 | Status: SHIPPED | OUTPATIENT
Start: 2020-10-14 | End: 2020-12-24

## 2020-10-30 ENCOUNTER — SPECIALTY PHARMACY (OUTPATIENT)
Dept: PHARMACY | Facility: CLINIC | Age: 80
End: 2020-10-30

## 2020-10-30 NOTE — TELEPHONE ENCOUNTER
Specialty Pharmacy - Refill Coordination    Specialty Medication Orders Linked to Encounter      Most Recent Value   Medication #1  evolocumab (REPATHA SURECLICK) 140 mg/mL PnIj (Order#[id number], Rx#[id number])          Refill Questions - Documented Responses      Most Recent Value   Relationship to patient of person spoken to?  Self   HIPAA/medical authority confirmed?  Yes   Any changes in contact preferences or allowed representatives?  No   Has the patient had any insurance changes?  No   Has the patient had any changes to specialty medication, dose, or instructions?  No   Has the patient started taking any new medications, herbals, or supplements?  No   Has the patient been diagnosed with any new medical conditions?  No   Does the patient have any new allergies to medications or foods?  No   Does the patient have any concerns about side effects?  No   Can the patient store medication/sharps container properly (at the correct temperature, away from children/pets, etc.)?  Yes   Can the patient call emergency services (211) in the event of an emergency?  Yes   Does the patient have any concerns or questions about taking or administering this medication as prescribed?  No   How many doses did the patient miss in the past 4 weeks or since the last fill?  0   How many doses does the patient have on hand?  0   How many days does the patient report on hand quantity will last?  0   Does the number of doses/days supply remaining match pharmacy expected amounts?  Yes   How will the patient receive the medication?  Mail   When does the patient need to receive the medication?  11/03/20   Shipping Address  Home   Address in [facility] confirmed and updated if neccessary?  Yes   Expected Copay ($)  8.95   Is the patient able to afford the medication copay?  Yes   Payment Method  (!) invoice (approval required)   Days supply of Refill  28   Would patient like to speak to a pharmacist?  No   Do you want to trigger an  intervention?  No   Do you want to trigger an additional referral task?  No   Refill activity completed?  Yes   Refill activity plan  Refill scheduled   Shipment/Pickup Date:  11/02/20          Current Outpatient Medications   Medication Sig    amLODIPine (NORVASC) 5 MG tablet Take 1 tablet (5 mg total) by mouth once daily.    ascorbic acid, vitamin C, (VITAMIN C) 500 MG tablet Take 500 mg by mouth once daily.    aspirin (ECOTRIN) 81 MG EC tablet Take 81 mg by mouth once daily.    co-enzyme Q-10 30 mg capsule Take 30 mg by mouth once daily.     diclofenac sodium (VOLTAREN) 1 % Gel APPLY 2 GRAM TO THE AFFECTED AREA(S) BY TOPICAL ROUTE 4 TIMES PER DAY    evolocumab (REPATHA SURECLICK) 140 mg/mL PnIj Inject 140 mg into the skin every 14 (fourteen) days.    fish oil-omega-3 fatty acids 300-1,000 mg capsule Take 2 g by mouth once daily.    FOLIC ACID/MULTIVIT-MIN/LUTEIN (CENTRUM SILVER ORAL) Take by mouth once daily. ONE DAILY    furosemide (LASIX) 20 MG tablet Take 20 mg by mouth daily as needed. As needed    HYDROcodone-acetaminophen (NORCO)  mg per tablet Take 1 tablet by mouth 2 (two) times daily as needed.    metoprolol succinate (TOPROL-XL) 25 MG 24 hr tablet TAKE 1/2 TABLET BY MOUTH TWICE DAILY    mupirocin (BACTROBAN) 2 % ointment Apply topically 3 (three) times daily.    nitroGLYCERIN (NITROSTAT) 0.4 MG SL tablet PLACE 1 TABLET (0.4 MG TOTAL) UNDER THE TONGUE EVERY 5 (FIVE) MINUTES AS NEEDED FOR CHEST PAIN.    olmesartan (BENICAR) 40 MG tablet Take 1 tablet (40 mg total) by mouth once daily.    potassium chloride (KLOR-CON) 10 MEQ TbSR Take 1 tablet (10 mEq total) by mouth twice a week.    rosuvastatin (CRESTOR) 40 MG Tab Take 1 tablet (40 mg total) by mouth every evening.    vitamin D 1000 units Tab Take 185 mg by mouth once daily.   Last reviewed on 10/30/2020  9:31 AM by Steph Shah    Review of patient's allergies indicates:   Allergen Reactions    Clindamycin Diarrhea    Darvocet  a500 [propoxyphene n-acetaminophen]     Erythromycin Other (See Comments)    Mycinette [cetylpyridinium-benzocaine]     Pcn [penicillins]     Last reviewed on  10/30/2020 9:31 AM by Steph Shah      Tasks added this encounter   No tasks added.   Tasks due within next 3 months   10/26/2020 - Refill Call     Steph Shah  [facility]  [address] [place] [id number]  Phone: [phone]  Fax: [phone]

## 2020-11-04 ENCOUNTER — OFFICE VISIT (OUTPATIENT)
Dept: CARDIOLOGY | Facility: CLINIC | Age: 80
End: 2020-11-04
Payer: MEDICARE

## 2020-11-04 VITALS
HEART RATE: 69 BPM | HEIGHT: 65 IN | SYSTOLIC BLOOD PRESSURE: 132 MMHG | DIASTOLIC BLOOD PRESSURE: 56 MMHG | BODY MASS INDEX: 31.33 KG/M2 | OXYGEN SATURATION: 98 % | WEIGHT: 188.06 LBS

## 2020-11-04 DIAGNOSIS — I08.0 MITRAL AND AORTIC VALVE DISEASE: ICD-10-CM

## 2020-11-04 DIAGNOSIS — I10 ESSENTIAL HYPERTENSION: ICD-10-CM

## 2020-11-04 DIAGNOSIS — E66.9 OBESITY (BMI 30.0-34.9): ICD-10-CM

## 2020-11-04 DIAGNOSIS — I49.49 PREMATURE BEATS: ICD-10-CM

## 2020-11-04 DIAGNOSIS — I25.709 CORONARY ARTERY DISEASE INVOLVING CORONARY BYPASS GRAFT OF NATIVE HEART WITH ANGINA PECTORIS: Primary | ICD-10-CM

## 2020-11-04 PROCEDURE — 1126F PR PAIN SEVERITY QUANTIFIED, NO PAIN PRESENT: ICD-10-PCS | Mod: S$GLB,,, | Performed by: INTERNAL MEDICINE

## 2020-11-04 PROCEDURE — 99214 PR OFFICE/OUTPT VISIT, EST, LEVL IV, 30-39 MIN: ICD-10-PCS | Mod: S$GLB,,, | Performed by: INTERNAL MEDICINE

## 2020-11-04 PROCEDURE — 1100F PTFALLS ASSESS-DOCD GE2>/YR: CPT | Mod: CPTII,S$GLB,, | Performed by: INTERNAL MEDICINE

## 2020-11-04 PROCEDURE — 1159F MED LIST DOCD IN RCRD: CPT | Mod: S$GLB,,, | Performed by: INTERNAL MEDICINE

## 2020-11-04 PROCEDURE — 3078F PR MOST RECENT DIASTOLIC BLOOD PRESSURE < 80 MM HG: ICD-10-PCS | Mod: CPTII,S$GLB,, | Performed by: INTERNAL MEDICINE

## 2020-11-04 PROCEDURE — 3288F FALL RISK ASSESSMENT DOCD: CPT | Mod: CPTII,S$GLB,, | Performed by: INTERNAL MEDICINE

## 2020-11-04 PROCEDURE — 99214 OFFICE O/P EST MOD 30 MIN: CPT | Mod: S$GLB,,, | Performed by: INTERNAL MEDICINE

## 2020-11-04 PROCEDURE — 1159F PR MEDICATION LIST DOCUMENTED IN MEDICAL RECORD: ICD-10-PCS | Mod: S$GLB,,, | Performed by: INTERNAL MEDICINE

## 2020-11-04 PROCEDURE — 3078F DIAST BP <80 MM HG: CPT | Mod: CPTII,S$GLB,, | Performed by: INTERNAL MEDICINE

## 2020-11-04 PROCEDURE — 3075F SYST BP GE 130 - 139MM HG: CPT | Mod: CPTII,S$GLB,, | Performed by: INTERNAL MEDICINE

## 2020-11-04 PROCEDURE — 1100F PR PT FALLS ASSESS DOC 2+ FALLS/FALL W/INJURY/YR: ICD-10-PCS | Mod: CPTII,S$GLB,, | Performed by: INTERNAL MEDICINE

## 2020-11-04 PROCEDURE — 3288F PR FALLS RISK ASSESSMENT DOCUMENTED: ICD-10-PCS | Mod: CPTII,S$GLB,, | Performed by: INTERNAL MEDICINE

## 2020-11-04 PROCEDURE — 1126F AMNT PAIN NOTED NONE PRSNT: CPT | Mod: S$GLB,,, | Performed by: INTERNAL MEDICINE

## 2020-11-04 PROCEDURE — 3075F PR MOST RECENT SYSTOLIC BLOOD PRESS GE 130-139MM HG: ICD-10-PCS | Mod: CPTII,S$GLB,, | Performed by: INTERNAL MEDICINE

## 2020-11-04 RX ORDER — METOPROLOL SUCCINATE 25 MG/1
12.5 TABLET, EXTENDED RELEASE ORAL 2 TIMES DAILY
Qty: 90 TABLET | Refills: 1 | Status: SHIPPED | OUTPATIENT
Start: 2020-11-04 | End: 2021-05-28 | Stop reason: SDUPTHER

## 2020-11-04 NOTE — PROGRESS NOTES
Subjective:    Patient ID:  Yael Claire is a 79 y.o. female who presents for Coronary Artery Disease, Hypertension, and Valvular Heart Disease        HPI  NO LABS, DOING WELL, FELL CHASING A BIRD 1 MO AGO,  NO CHEST PAIN NO SIGNIFICANT SHORTNESS OF BREATH NO TIA TYPE SYMPTOMS NO NEAR-SYNCOPE, SEE ROS    Past Medical History:   Diagnosis Date    Acute coronary syndrome     LIGHT 2004    Anticoagulant long-term use     Aortic valve disorder     Arthritis     Coronary artery disease     cabg & valve sgy    Heart murmur     Hyperlipidemia     Hypertension     Palpitations     Stenosis of aortic and mitral valves     Valvular regurgitation      Past Surgical History:   Procedure Laterality Date    CARDIAC CATHETERIZATION      CARDIAC VALVE SURGERY      CORONARY ARTERY BYPASS GRAFT  04/2005    HYSTERECTOMY  1980     Family History   Problem Relation Age of Onset    Hypertension Mother     Heart disease Mother     Hypertension Father     Heart disease Father      Social History     Socioeconomic History    Marital status: Single     Spouse name: Not on file    Number of children: Not on file    Years of education: Not on file    Highest education level: Not on file   Occupational History    Not on file   Social Needs    Financial resource strain: Not on file    Food insecurity     Worry: Not on file     Inability: Not on file    Transportation needs     Medical: Not on file     Non-medical: Not on file   Tobacco Use    Smoking status: Never Smoker    Smokeless tobacco: Never Used   Substance and Sexual Activity    Alcohol use: Yes     Comment: couple times per yr    Drug use: No    Sexual activity: Not on file   Lifestyle    Physical activity     Days per week: Not on file     Minutes per session: Not on file    Stress: Not on file   Relationships    Social connections     Talks on phone: Not on file     Gets together: Not on file     Attends Gnosticism service: Not on file     Active  member of club or organization: Not on file     Attends meetings of clubs or organizations: Not on file     Relationship status: Not on file   Other Topics Concern    Not on file   Social History Narrative    Not on file       Review of patient's allergies indicates:   Allergen Reactions    Clindamycin Diarrhea    Darvocet a500 [propoxyphene n-acetaminophen]     Erythromycin Other (See Comments)    Mycinette [cetylpyridinium-benzocaine]     Pcn [penicillins]        Current Outpatient Medications:     amLODIPine (NORVASC) 5 MG tablet, TAKE 1 TABLET BY MOUTH EVERY DAY, Disp: 90 tablet, Rfl: 0    ascorbic acid, vitamin C, (VITAMIN C) 500 MG tablet, Take 500 mg by mouth once daily., Disp: , Rfl:     aspirin (ECOTRIN) 81 MG EC tablet, Take 81 mg by mouth once daily., Disp: , Rfl:     co-enzyme Q-10 30 mg capsule, Take 30 mg by mouth once daily. , Disp: , Rfl:     diclofenac sodium (VOLTAREN) 1 % Gel, APPLY 2 GRAM TO THE AFFECTED AREA(S) BY TOPICAL ROUTE 4 TIMES PER DAY, Disp: 1 Tube, Rfl: 3    evolocumab (REPATHA SURECLICK) 140 mg/mL PnIj, Inject 140 mg into the skin every 14 (fourteen) days., Disp: 2 mL, Rfl: 6    fish oil-omega-3 fatty acids 300-1,000 mg capsule, Take 2 g by mouth once daily., Disp: , Rfl:     FOLIC ACID/MULTIVIT-MIN/LUTEIN (CENTRUM SILVER ORAL), Take by mouth once daily. ONE DAILY, Disp: , Rfl:     furosemide (LASIX) 20 MG tablet, Take 20 mg by mouth daily as needed. As needed, Disp: , Rfl:     HYDROcodone-acetaminophen (NORCO)  mg per tablet, Take 1 tablet by mouth 2 (two) times daily as needed., Disp: , Rfl: 0    metoprolol succinate (TOPROL-XL) 25 MG 24 hr tablet, Take 0.5 tablets (12.5 mg total) by mouth 2 (two) times daily., Disp: 90 tablet, Rfl: 1    mupirocin (BACTROBAN) 2 % ointment, Apply topically 3 (three) times daily., Disp: 15 g, Rfl: 1    nitroGLYCERIN (NITROSTAT) 0.4 MG SL tablet, PLACE 1 TABLET (0.4 MG TOTAL) UNDER THE TONGUE EVERY 5 (FIVE) MINUTES AS NEEDED  "FOR CHEST PAIN., Disp: 25 tablet, Rfl: 1    olmesartan (BENICAR) 40 MG tablet, Take 1 tablet (40 mg total) by mouth once daily., Disp: 90 tablet, Rfl: 1    potassium chloride (KLOR-CON) 10 MEQ TbSR, Take 1 tablet (10 mEq total) by mouth twice a week., Disp: 24 tablet, Rfl: 2    rosuvastatin (CRESTOR) 40 MG Tab, Take 1 tablet (40 mg total) by mouth every evening., Disp: 90 tablet, Rfl: 1    vitamin D 1000 units Tab, Take 185 mg by mouth once daily., Disp: , Rfl:     Review of Systems   Constitution: Negative for chills, decreased appetite, diaphoresis, fever, malaise/fatigue and night sweats.   Eyes: Negative for blurred vision and visual disturbance.   Cardiovascular: Negative for chest pain, claudication, cyanosis, dyspnea on exertion (MILD), irregular heartbeat, leg swelling (OCC), near-syncope, orthopnea, palpitations, paroxysmal nocturnal dyspnea and syncope.   Respiratory: Negative for cough, hemoptysis, shortness of breath and wheezing.    Hematologic/Lymphatic: Negative for bleeding problem. Does not bruise/bleed easily.   Skin: Negative for dry skin, itching and rash.   Musculoskeletal: Negative for back pain and falls (ONCE).   Gastrointestinal: Negative for abdominal pain, change in bowel habit, dysphagia, hematemesis, melena and nausea.   Neurological: Negative for brief paralysis, dizziness, focal weakness, light-headedness, loss of balance, tremors and weakness.   Psychiatric/Behavioral: Negative for altered mental status, depression and memory loss.        Objective:      Vitals:    11/04/20 1455   BP: (!) 132/56   Pulse: 69   SpO2: 98%   Weight: 85.3 kg (188 lb 0.8 oz)   Height: 5' 5" (1.651 m)   PainSc: 0-No pain     Body mass index is 31.29 kg/m².    Physical Exam   Constitutional: She is oriented to person, place, and time. She appears well-developed and well-nourished.   OVER WEIGHT   HENT:   Head: Normocephalic and atraumatic.   Mouth/Throat: Oropharynx is clear and moist and mucous membranes " are normal.   Eyes: Pupils are equal, round, and reactive to light. Conjunctivae and EOM are normal. Right eye exhibits normal extraocular motion. Left eye exhibits normal extraocular motion.   Neck: Neck supple. Decreased carotid pulses (MILDLY) present. No hepatojugular reflux and no JVD present. Carotid bruit: MURMUR TO NECK.   Cardiovascular: Normal rate and regular rhythm.  No extrasystoles are present. PMI is not displaced. Exam reveals no gallop and no friction rub.   Murmur heard.   Harsh midsystolic murmur is present with a grade of 1/6 at the upper right sternal border radiating to the neck.   Systolic murmur of grade 2/6 is also present at the upper right sternal border.  Pulses:       Carotid pulses are 2+ on the right side and 2+ on the left side.       Radial pulses are 2+ on the right side and 2+ on the left side.        Posterior tibial pulses are 2+ on the right side and 2+ on the left side.   Pulmonary/Chest: Effort normal and breath sounds normal. No accessory muscle usage. No respiratory distress. She has no wheezes. She has no rales. She exhibits no tenderness.   LARGE STERNOTOMY SCAR   Abdominal: Soft. Bowel sounds are normal. There is no hepatosplenomegaly. There is no abdominal tenderness.   Musculoskeletal:         General: No tenderness.      Comments:  NO ANKLE  EDEMA   Neurological: She is alert and oriented to person, place, and time. No cranial nerve deficit.   Skin: Skin is warm and dry. No rash noted.   Psychiatric: She has a normal mood and affect. Her speech is normal and behavior is normal.               ..    Chemistry        Component Value Date/Time     01/23/2018 1034    K 4.3 01/23/2018 1034     01/23/2018 1034    CO2 27 01/23/2018 1034    BUN 12 01/23/2018 1034    CREATININE 0.67 01/23/2018 1034     01/23/2018 1034        Component Value Date/Time    CALCIUM 8.8 01/23/2018 1034    ALKPHOS 110 12/15/2017 0849    AST 23 12/15/2017 0849    ALT 15 12/15/2017  0849    BILITOT 0.6 12/15/2017 0849    ESTGFRAFRICA >60 01/23/2018 1034    EGFRNONAA >60 01/23/2018 1034            ..  Lab Results   Component Value Date    CHOL 246 (H) 12/15/2017     Lab Results   Component Value Date    HDL 48 12/15/2017     Lab Results   Component Value Date    LDLCALC 180.8 (H) 12/15/2017     Lab Results   Component Value Date    TRIG 86 12/15/2017     Lab Results   Component Value Date    CHOLHDL 19.5 (L) 12/15/2017     ..  Lab Results   Component Value Date    WBC 6.28 01/23/2018    HGB 12.9 01/23/2018    HCT 40.1 01/23/2018    MCV 94 01/23/2018     01/23/2018       Test(s) Reviewed  I have reviewed the following in detail:  [] Stress test   [] Angiography   [] Echocardiogram   [] Labs   [x] Other:       Assessment:         ICD-10-CM ICD-9-CM   1. Coronary artery disease involving coronary bypass graft of native heart with angina pectoris  I25.709 414.05     413.9   2. Mitral and aortic valve disease  I08.0 396.9   3. Essential hypertension  I10 401.9   4. Premature beats  I49.49 427.60   5. Obesity (BMI 30.0-34.9)  E66.9 278.00     Problem List Items Addressed This Visit        Cardiac/Vascular    Essential hypertension    Relevant Medications    metoprolol succinate (TOPROL-XL) 25 MG 24 hr tablet    Coronary artery disease involving coronary bypass graft of native heart with angina pectoris - Primary    Premature beats    Mitral and aortic valve disease    Relevant Orders    Echo Color Flow Doppler? Yes       Endocrine    Obesity (BMI 30.0-34.9)           Plan:     ALL CV CLINICALLY STABLE, CLASS 1 ANGINA, NO HF, NO TIA, NO CLINICAL ARRHYTHMIA,CONTINUE CURRENT MEDS, EDUCATION, DIET, EXERCISE, WEIGHT LOSS, REASSESS VALVULAR DISEASE, LABS SOON, PREVIOUS LOW ORDERED, RETURN TO CLINIC IN 6 MO WITH AN ECHO,       Coronary artery disease involving coronary bypass graft of native heart with angina pectoris    Mitral and aortic valve disease  -     Echo Color Flow Doppler? Yes; Future;  Expected date: 05/04/2021    Essential hypertension  -     metoprolol succinate (TOPROL-XL) 25 MG 24 hr tablet; Take 0.5 tablets (12.5 mg total) by mouth 2 (two) times daily.  Dispense: 90 tablet; Refill: 1    Premature beats    Obesity (BMI 30.0-34.9)    RTC Low level/low impact aerobic exercise 5x's/wk. Heart healthy diet and risk factor modification.    See labs and med orders.    Aerobic exercise 5x's/wk. Heart healthy diet and risk factor modification.    See labs and med orders.

## 2020-11-18 ENCOUNTER — TELEPHONE (OUTPATIENT)
Dept: CARDIOLOGY | Facility: CLINIC | Age: 80
End: 2020-11-18

## 2020-11-18 NOTE — TELEPHONE ENCOUNTER
Cardiac clearance     Pt will be having Left eye cataract surgery at the Eye surgery [facility], and needs cardiac clearance     How many days prior does pt needs to stop taking aspirin 81 mg ?    Last office visit 11/4/2020      Please advise  Fax to [phone]

## 2020-11-25 LAB
ALBUMIN SERPL-MCNC: 4.2 G/DL (ref 3.7–4.7)
ALBUMIN/GLOB SERPL: 1.6 {RATIO} (ref 1.2–2.2)
ALP SERPL-CCNC: 106 IU/L (ref 39–117)
ALT SERPL-CCNC: 24 IU/L (ref 0–32)
AST SERPL-CCNC: 30 IU/L (ref 0–40)
BASOPHILS # BLD AUTO: 0.1 X10E3/UL (ref 0–0.2)
BASOPHILS NFR BLD AUTO: 1 %
BILIRUB SERPL-MCNC: 0.4 MG/DL (ref 0–1.2)
BUN SERPL-MCNC: 11 MG/DL (ref 8–27)
BUN/CREAT SERPL: 16 (ref 12–28)
CALCIUM SERPL-MCNC: 9.2 MG/DL (ref 8.7–10.3)
CHLORIDE SERPL-SCNC: 103 MMOL/L (ref 96–106)
CHOLEST SERPL-MCNC: 145 MG/DL (ref 100–199)
CO2 SERPL-SCNC: 20 MMOL/L (ref 20–29)
CREAT SERPL-MCNC: 0.69 MG/DL (ref 0.57–1)
EOSINOPHIL # BLD AUTO: 0.6 X10E3/UL (ref 0–0.4)
EOSINOPHIL NFR BLD AUTO: 8 %
ERYTHROCYTE [DISTWIDTH] IN BLOOD BY AUTOMATED COUNT: 11.9 % (ref 11.7–15.4)
GLOBULIN SER CALC-MCNC: 2.7 G/DL (ref 1.5–4.5)
GLUCOSE SERPL-MCNC: 99 MG/DL (ref 65–99)
HCT VFR BLD AUTO: 40.1 % (ref 34–46.6)
HCV AB S/CO SERPL IA: <0.1 S/CO RATIO (ref 0–0.9)
HDLC SERPL-MCNC: 56 MG/DL
HGB BLD-MCNC: 13.3 G/DL (ref 11.1–15.9)
IMM GRANULOCYTES # BLD AUTO: 0 X10E3/UL (ref 0–0.1)
IMM GRANULOCYTES NFR BLD AUTO: 0 %
LDLC SERPL CALC-MCNC: 73 MG/DL (ref 0–99)
LYMPHOCYTES # BLD AUTO: 1.8 X10E3/UL (ref 0.7–3.1)
LYMPHOCYTES NFR BLD AUTO: 23 %
MCH RBC QN AUTO: 31.8 PG (ref 26.6–33)
MCHC RBC AUTO-ENTMCNC: 33.2 G/DL (ref 31.5–35.7)
MCV RBC AUTO: 96 FL (ref 79–97)
MONOCYTES # BLD AUTO: 0.6 X10E3/UL (ref 0.1–0.9)
MONOCYTES NFR BLD AUTO: 8 %
NEUTROPHILS # BLD AUTO: 4.6 X10E3/UL (ref 1.4–7)
NEUTROPHILS NFR BLD AUTO: 60 %
PLATELET # BLD AUTO: 207 X10E3/UL (ref 150–450)
POTASSIUM SERPL-SCNC: 4.3 MMOL/L (ref 3.5–5.2)
PROT SERPL-MCNC: 6.9 G/DL (ref 6–8.5)
RBC # BLD AUTO: 4.18 X10E6/UL (ref 3.77–5.28)
SODIUM SERPL-SCNC: 133 MMOL/L (ref 134–144)
TRIGL SERPL-MCNC: 83 MG/DL (ref 0–149)
VLDLC SERPL CALC-MCNC: 16 MG/DL (ref 5–40)
WBC # BLD AUTO: 7.7 X10E3/UL (ref 3.4–10.8)

## 2020-11-27 ENCOUNTER — SPECIALTY PHARMACY (OUTPATIENT)
Dept: PHARMACY | Facility: CLINIC | Age: 80
End: 2020-11-27

## 2020-11-27 NOTE — TELEPHONE ENCOUNTER
Specialty Pharmacy - Refill Coordination    Specialty Medication Orders Linked to Encounter      Most Recent Value   Medication #1  evolocumab (REPATHA SURECLICK) 140 mg/mL PnIj (Order#[id number], Rx#[id number])          Refill Questions - Documented Responses      Most Recent Value   Relationship to patient of person spoken to?  Self   HIPAA/medical authority confirmed?  Yes   Any changes in contact preferences or allowed representatives?  No   Has the patient had any insurance changes?  No   Has the patient had any changes to specialty medication, dose, or instructions?  No   Has the patient started taking any new medications, herbals, or supplements?  No   Has the patient been diagnosed with any new medical conditions?  No   Does the patient have any new allergies to medications or foods?  No   Does the patient have any concerns about side effects?  No   Can the patient store medication/sharps container properly (at the correct temperature, away from children/pets, etc.)?  Yes   Can the patient call emergency services (911) in the event of an emergency?  Yes   Does the patient have any concerns or questions about taking or administering this medication as prescribed?  No   How many doses did the patient miss in the past 4 weeks or since the last fill?  0   How many doses does the patient have on hand?  0   Does the number of doses/days supply remaining match pharmacy expected amounts?  Yes   During the past 4 weeks, has patient missed any activities due to condition or medication?  No   During the past 4 weeks, did patient have any of the following urgent care visits?  None   How will the patient receive the medication?  Mail   When does the patient need to receive the medication?  12/02/20   Shipping Address  Home   Address in [facility] confirmed and updated if neccessary?  Yes   Expected Copay ($)  8.95   Is the patient able to afford the medication copay?  Yes   Payment Method  (!) invoice (approval  required) [always AR]   Days supply of Refill  28   Would patient like to speak to a pharmacist?  Yes   Do you want to trigger an intervention?  No   Do you want to trigger an additional referral task?  No   Refill activity completed?  Yes   Refill activity plan  Refill scheduled   Shipment/Pickup Date:  11/30/20          Current Outpatient Medications   Medication Sig    amLODIPine (NORVASC) 5 MG tablet TAKE 1 TABLET BY MOUTH EVERY DAY    ascorbic acid, vitamin C, (VITAMIN C) 500 MG tablet Take 500 mg by mouth once daily.    aspirin (ECOTRIN) 81 MG EC tablet Take 81 mg by mouth once daily.    co-enzyme Q-10 30 mg capsule Take 30 mg by mouth once daily.     diclofenac sodium (VOLTAREN) 1 % Gel APPLY 2 GRAM TO THE AFFECTED AREA(S) BY TOPICAL ROUTE 4 TIMES PER DAY    evolocumab (REPATHA SURECLICK) 140 mg/mL PnIj Inject 140 mg into the skin every 14 (fourteen) days.    fish oil-omega-3 fatty acids 300-1,000 mg capsule Take 2 g by mouth once daily.    FOLIC ACID/MULTIVIT-MIN/LUTEIN (CENTRUM SILVER ORAL) Take by mouth once daily. ONE DAILY    furosemide (LASIX) 20 MG tablet Take 20 mg by mouth daily as needed. As needed    HYDROcodone-acetaminophen (NORCO)  mg per tablet Take 1 tablet by mouth 2 (two) times daily as needed.    metoprolol succinate (TOPROL-XL) 25 MG 24 hr tablet Take 0.5 tablets (12.5 mg total) by mouth 2 (two) times daily.    mupirocin (BACTROBAN) 2 % ointment Apply topically 3 (three) times daily.    nitroGLYCERIN (NITROSTAT) 0.4 MG SL tablet PLACE 1 TABLET (0.4 MG TOTAL) UNDER THE TONGUE EVERY 5 (FIVE) MINUTES AS NEEDED FOR CHEST PAIN.    olmesartan (BENICAR) 40 MG tablet Take 1 tablet (40 mg total) by mouth once daily.    potassium chloride (KLOR-CON) 10 MEQ TbSR Take 1 tablet (10 mEq total) by mouth twice a week.    rosuvastatin (CRESTOR) 40 MG Tab Take 1 tablet (40 mg total) by mouth every evening.    vitamin D 1000 units Tab Take 185 mg by mouth once daily.   Last reviewed on  11/27/2020 12:37 PM by Esther Salinas    Review of patient's allergies indicates:   Allergen Reactions    Clindamycin Diarrhea    Darvocet a500 [propoxyphene n-acetaminophen]     Erythromycin Other (See Comments)    Mycinette [cetylpyridinium-benzocaine]     Pcn [penicillins]     Last reviewed on  11/27/2020 12:37 PM by Esther Salinas      Tasks added this encounter   12/20/2020 - Refill Call (Auto Added)   Tasks due within next 3 months   No tasks due.     No supplied needed. Signature requirement declined. Call transferred to clinical pharmacist to discuss knee & leg pain. CEB    Esther Salinas  [facility]  [address] [place] [address]  Phone: [phone]  Fax: [phone]

## 2020-12-01 ENCOUNTER — SPECIALTY PHARMACY (OUTPATIENT)
Dept: PHARMACY | Facility: CLINIC | Age: 80
End: 2020-12-01

## 2020-12-01 DIAGNOSIS — I25.709 CORONARY ARTERY DISEASE INVOLVING CORONARY BYPASS GRAFT OF NATIVE HEART WITH ANGINA PECTORIS: ICD-10-CM

## 2020-12-01 DIAGNOSIS — E78.2 MIXED HYPERLIPIDEMIA: Primary | ICD-10-CM

## 2020-12-01 RX ORDER — ROSUVASTATIN CALCIUM 40 MG/1
40 TABLET, COATED ORAL NIGHTLY
Qty: 90 TABLET | Refills: 1 | Status: SHIPPED | OUTPATIENT
Start: 2020-12-01 | End: 2020-12-21 | Stop reason: SDUPTHER

## 2020-12-01 NOTE — TELEPHONE ENCOUNTER
Refill for Crestor 40mg was sent to [facility] in [place] per pt request .

## 2020-12-01 NOTE — TELEPHONE ENCOUNTER
----- Message from Moody De La Vega sent at 12/1/2020  2:25 PM CST -----  Regarding: rx  Contact: self  Type:  RX Refill Request    Who Called:  self  Refill or New Rx:  new rx with [facility]   RX Name and Strength:  nitroGLYCERIN (NITROSTAT) 0.4 MG SL tablet  How is the patient currently taking it? (ex. 1XDay):    Is this a 30 day or 90 day RX:  30 day supply  Preferred Pharmacy with phone number:  [facility]   Local or Mail Order: local  Ordering Provider:  Madelin De La Vega  Best Call Back Number:  [phone]  Additional Information:  Patient no longer uses pharmacy [facility]

## 2020-12-01 NOTE — TELEPHONE ENCOUNTER
----- Message from Bartolo Holguin sent at 12/1/2020  9:37 AM CST -----  Regarding: refill  Type:  RX Refill Request    Who Called:  pt  Refill or New Rx:  refill  RX Name and Strength:    1. rosuvastatin (CRESTOR) 40 MG Tab 90 tablet 1 9/4/2020 9/4/2021      Sig - Route: Take 1 tablet (40 mg total) by mouth every evening. - Oral     2. nitroGLYCERIN (NITROSTAT) 0.4 MG SL tablet 25 tablet 1 9/2/2020       Sig - Route: PLACE 1 TABLET (0.4 MG TOTAL) UNDER THE TONGUE EVERY 5 (FIVE) MINUTES AS NEEDED FOR CHEST PAIN. - Sublingual     How is the patient currently taking it? (ex. 1XDay):  see above  Is this a 30 day or 90 day RX:  90  Preferred Pharmacy with phone number:    [facility]  [address] [place] [address]  [phone]    Local or Mail Order:  local  Ordering Provider:  Dr Ruelas  Best Call Back Number:  [phone]  Additional Information:  Please note Rx is going to NEW Pharm: [facility]

## 2020-12-01 NOTE — TELEPHONE ENCOUNTER
Spoke with patient- she is aware to hold Crestor x 2 weeks.  I will call patient in 2 weeks to assess if holding Crestor has improved her aches

## 2020-12-01 NOTE — TELEPHONE ENCOUNTER
"Patient reported muscle aches x 2 months.  She is on Repatha + Cresor. Messaged Dr Ruelas who advised for patient to  "Hold Crestor for 2 weeks see if that makes a difference, then restart, thanks"     Called patient to let her know- LVM for pt to call [facility] back  Will attempt again in 2 days  "

## 2020-12-03 RX ORDER — NITROGLYCERIN 0.4 MG/1
0.4 TABLET SUBLINGUAL EVERY 5 MIN PRN
Qty: 25 TABLET | Refills: 1 | Status: SHIPPED | OUTPATIENT
Start: 2020-12-03 | End: 2021-06-14 | Stop reason: SDUPTHER

## 2020-12-17 ENCOUNTER — TELEPHONE (OUTPATIENT)
Dept: FAMILY MEDICINE | Facility: CLINIC | Age: 80
End: 2020-12-17

## 2020-12-17 ENCOUNTER — SPECIALTY PHARMACY (OUTPATIENT)
Dept: PHARMACY | Facility: CLINIC | Age: 80
End: 2020-12-17

## 2020-12-17 NOTE — TELEPHONE ENCOUNTER
----- Message from Ronn Patino sent at 12/17/2020  2:21 PM CST -----  Type: Needs Medical Advice  Who Called: Lara/ [facility]    Pharmacy name and phone #:        [facility] - [place], [place] - [address] [facility]  [address] [facility] [address]  Phone: [phone] Fax: [phone]      Best Call Back Number:   Additional Information:  Caller states that she would like a callback regarding the status of a refill request that was faxed on 12/08/20  amLODIPine (NORVASC) 5 MG tablet  rosuvastatin (CRESTOR) 40 MG Tab

## 2020-12-21 ENCOUNTER — OFFICE VISIT (OUTPATIENT)
Dept: PRIMARY CARE CLINIC | Facility: CLINIC | Age: 80
End: 2020-12-21
Payer: MEDICARE

## 2020-12-21 VITALS
SYSTOLIC BLOOD PRESSURE: 119 MMHG | WEIGHT: 186.31 LBS | DIASTOLIC BLOOD PRESSURE: 57 MMHG | HEIGHT: 65 IN | HEART RATE: 68 BPM | BODY MASS INDEX: 31.04 KG/M2 | TEMPERATURE: 97 F | OXYGEN SATURATION: 100 %

## 2020-12-21 DIAGNOSIS — E78.2 MIXED HYPERLIPIDEMIA: ICD-10-CM

## 2020-12-21 DIAGNOSIS — G89.29 CHRONIC LEFT SHOULDER PAIN: Primary | ICD-10-CM

## 2020-12-21 DIAGNOSIS — L91.0 KELOID SKIN DISORDER: ICD-10-CM

## 2020-12-21 DIAGNOSIS — M25.512 CHRONIC LEFT SHOULDER PAIN: Primary | ICD-10-CM

## 2020-12-21 DIAGNOSIS — G89.29 CHRONIC PAIN OF LEFT KNEE: ICD-10-CM

## 2020-12-21 DIAGNOSIS — I10 ESSENTIAL HYPERTENSION: ICD-10-CM

## 2020-12-21 DIAGNOSIS — M25.562 CHRONIC PAIN OF LEFT KNEE: ICD-10-CM

## 2020-12-21 PROCEDURE — 3074F SYST BP LT 130 MM HG: CPT | Mod: CPTII,S$GLB,, | Performed by: NURSE PRACTITIONER

## 2020-12-21 PROCEDURE — 3078F PR MOST RECENT DIASTOLIC BLOOD PRESSURE < 80 MM HG: ICD-10-PCS | Mod: CPTII,S$GLB,, | Performed by: NURSE PRACTITIONER

## 2020-12-21 PROCEDURE — 1159F PR MEDICATION LIST DOCUMENTED IN MEDICAL RECORD: ICD-10-PCS | Mod: S$GLB,,, | Performed by: NURSE PRACTITIONER

## 2020-12-21 PROCEDURE — 3078F DIAST BP <80 MM HG: CPT | Mod: CPTII,S$GLB,, | Performed by: NURSE PRACTITIONER

## 2020-12-21 PROCEDURE — 99214 PR OFFICE/OUTPT VISIT, EST, LEVL IV, 30-39 MIN: ICD-10-PCS | Mod: S$GLB,,, | Performed by: NURSE PRACTITIONER

## 2020-12-21 PROCEDURE — 99214 OFFICE O/P EST MOD 30 MIN: CPT | Mod: S$GLB,,, | Performed by: NURSE PRACTITIONER

## 2020-12-21 PROCEDURE — 1159F MED LIST DOCD IN RCRD: CPT | Mod: S$GLB,,, | Performed by: NURSE PRACTITIONER

## 2020-12-21 PROCEDURE — 3074F PR MOST RECENT SYSTOLIC BLOOD PRESSURE < 130 MM HG: ICD-10-PCS | Mod: CPTII,S$GLB,, | Performed by: NURSE PRACTITIONER

## 2020-12-21 PROCEDURE — 1125F PR PAIN SEVERITY QUANTIFIED, PAIN PRESENT: ICD-10-PCS | Mod: S$GLB,,, | Performed by: NURSE PRACTITIONER

## 2020-12-21 PROCEDURE — 1125F AMNT PAIN NOTED PAIN PRSNT: CPT | Mod: S$GLB,,, | Performed by: NURSE PRACTITIONER

## 2020-12-21 RX ORDER — ROSUVASTATIN CALCIUM 20 MG/1
20 TABLET, COATED ORAL NIGHTLY
Qty: 90 TABLET | Refills: 1 | Status: SHIPPED | OUTPATIENT
Start: 2020-12-21 | End: 2021-05-19 | Stop reason: SDUPTHER

## 2020-12-21 RX ORDER — DICLOFENAC SODIUM 10 MG/G
GEL TOPICAL
Qty: 1 TUBE | Refills: 3 | Status: SHIPPED | OUTPATIENT
Start: 2020-12-21 | End: 2021-11-01 | Stop reason: SDUPTHER

## 2020-12-21 NOTE — PROGRESS NOTES
Subjective:       Patient ID: Yael Claire is a 80 y.o. female.    Chief Complaint: Shoulder Pain  Patient was last seen by me on 09/14/2020.  HPI   Pain under left shoulder region. She also states that she has been talking to Dr. Ruelas office.  Vitals:    12/21/20 1346   BP: (!) 119/57   Pulse: 68   Temp: 97 °F (36.1 °C)     BP Readings from Last 3 Encounters:   12/21/20 (!) 119/57   11/04/20 (!) 132/56   09/14/20 130/80     Review of Systems    She states did speak with Dr. Ruelas office and she held Crestor for a few weeks with very minimal relief. States they were considering decrease in Crestor to see if this helps her discomfort.  She currently has been off for 2 weeks.  States no chest pain or SOB and has not had to use any SL nitro.  She states keloid area also is uncomfortable.  She states takes Lasix less than twice monthly  Objective:      Physical Exam  Pulmonary:      Effort: Pulmonary effort is normal.      Breath sounds: Normal breath sounds and air entry.   Musculoskeletal:        Arms:       Comments: States left upper arm joint and left posterior back region she has some pain at times. Pain lasts for less than one hour. Sometimes uncomfortable when lay down.  Uses aspercream with lidocaine and it helps   Neurological:      General: No focal deficit present.      Mental Status: She is alert and oriented to person, place, and time.   Psychiatric:         Attention and Perception: Attention and perception normal.         Mood and Affect: Mood and affect normal.         Speech: Speech normal.         Behavior: Behavior normal. Behavior is cooperative.         Thought Content: Thought content normal.         Cognition and Memory: Cognition and memory normal.         Judgment: Judgment normal.         Assessment & Plan:       Chronic left shoulder pain- OK to continue Aspercreme since it is working    Essential hypertension- Controlled Norvasc, Toprol, Benicar  BP Readings from Last 3 Encounters:    12/21/20 (!) 119/57   11/04/20 (!) 132/56   09/14/20 130/80       Mixed hyperlipidemia  -     rosuvastatin (CRESTOR) 20 MG tablet; Take 1 tablet (20 mg total) by mouth every evening.  Dispense: 90 tablet; Refill: 1    Chronic pain of left knee  -     diclofenac sodium (VOLTAREN) 1 % Gel; APPLY 2 GRAM TO THE AFFECTED AREA(S) BY TOPICAL ROUTE 4 TIMES PER DAY  Dispense: 1 Tube; Refill: 3    Keloid skin disorder        I have explained that because of her coronary history I do not her to completely be off the Crestor.  Ok to continue OTC Aspercreme    Medication List with Changes/Refills   Current Medications    AMLODIPINE (NORVASC) 5 MG TABLET    TAKE 1 TABLET BY MOUTH EVERY DAY    ASCORBIC ACID, VITAMIN C, (VITAMIN C) 500 MG TABLET    Take 500 mg by mouth once daily.    ASPIRIN (ECOTRIN) 81 MG EC TABLET    Take 81 mg by mouth once daily.    CO-ENZYME Q-10 30 MG CAPSULE    Take 30 mg by mouth once daily.     EVOLOCUMAB (REPATHA SURECLICK) 140 MG/ML PNIJ    Inject 140 mg into the skin every 14 (fourteen) days.    FISH OIL-OMEGA-3 FATTY ACIDS 300-1,000 MG CAPSULE    Take 2 g by mouth once daily.    FOLIC ACID/MULTIVIT-MIN/LUTEIN (CENTRUM SILVER ORAL)    Take by mouth once daily. ONE DAILY    FUROSEMIDE (LASIX) 20 MG TABLET    Take 20 mg by mouth daily as needed. As needed    HYDROCODONE-ACETAMINOPHEN (NORCO)  MG PER TABLET    Take 1 tablet by mouth 2 (two) times daily as needed.    METOPROLOL SUCCINATE (TOPROL-XL) 25 MG 24 HR TABLET    Take 0.5 tablets (12.5 mg total) by mouth 2 (two) times daily.    MUPIROCIN (BACTROBAN) 2 % OINTMENT    Apply topically 3 (three) times daily.    NITROGLYCERIN (NITROSTAT) 0.4 MG SL TABLET    Place 1 tablet (0.4 mg total) under the tongue every 5 (five) minutes as needed for Chest pain.    OLMESARTAN (BENICAR) 40 MG TABLET    Take 1 tablet (40 mg total) by mouth once daily.    POTASSIUM CHLORIDE (KLOR-CON) 10 MEQ TBSR    Take 1 tablet (10 mEq total) by mouth twice a week.     VITAMIN D 1000 UNITS TAB    Take 185 mg by mouth once daily.   Changed and/or Refilled Medications    Modified Medication Previous Medication    DICLOFENAC SODIUM (VOLTAREN) 1 % GEL diclofenac sodium (VOLTAREN) 1 % Gel       APPLY 2 GRAM TO THE AFFECTED AREA(S) BY TOPICAL ROUTE 4 TIMES PER DAY    APPLY 2 GRAM TO THE AFFECTED AREA(S) BY TOPICAL ROUTE 4 TIMES PER DAY    ROSUVASTATIN (CRESTOR) 20 MG TABLET rosuvastatin (CRESTOR) 40 MG Tab       Take 1 tablet (20 mg total) by mouth every evening.    Take 1 tablet (40 mg total) by mouth every evening.     No follow-ups on file.

## 2020-12-22 ENCOUNTER — SPECIALTY PHARMACY (OUTPATIENT)
Dept: PHARMACY | Facility: CLINIC | Age: 80
End: 2020-12-22

## 2020-12-22 NOTE — TELEPHONE ENCOUNTER
Specialty Pharmacy - Refill Coordination    Specialty Medication Orders Linked to Encounter      Most Recent Value   Medication #1  evolocumab (REPATHA SURECLICK) 140 mg/mL PnIj (Order#[id number], Rx#[id number])          Refill Questions - Documented Responses      Most Recent Value   Relationship to patient of person spoken to?  Self   HIPAA/medical authority confirmed?  Yes   Any changes in contact preferences or allowed representatives?  No   How many doses does the patient have on hand?  0   How many days does the patient report on hand quantity will last?  0   Does the number of doses/days supply remaining match pharmacy expected amounts?  Yes   How will the patient receive the medication?  Mail   When does the patient need to receive the medication?  12/31/20   Shipping Address  Home   Address in [facility] confirmed and updated if neccessary?  Yes   Expected Copay ($)  8.95   Is the patient able to afford the medication copay?  Yes   Payment Method  (!) invoice (approval required)   Days supply of Refill  28   Would patient like to speak to a pharmacist?  No   Do you want to trigger an intervention?  No   Do you want to trigger an additional referral task?  No   Refill activity completed?  Yes   Refill activity plan  Refill scheduled   Shipment/Pickup Date:  12/28/20      Patient always sends money order, refill sent via AR.    Current Outpatient Medications   Medication Sig    amLODIPine (NORVASC) 5 MG tablet TAKE 1 TABLET BY MOUTH EVERY DAY    ascorbic acid, vitamin C, (VITAMIN C) 500 MG tablet Take 500 mg by mouth once daily.    aspirin (ECOTRIN) 81 MG EC tablet Take 81 mg by mouth once daily.    co-enzyme Q-10 30 mg capsule Take 30 mg by mouth once daily.     diclofenac sodium (VOLTAREN) 1 % Gel APPLY 2 GRAM TO THE AFFECTED AREA(S) BY TOPICAL ROUTE 4 TIMES PER DAY    evolocumab (REPATHA SURECLICK) 140 mg/mL PnIj Inject 140 mg into the skin every 14 (fourteen) days.    fish oil-omega-3 fatty  acids 300-1,000 mg capsule Take 2 g by mouth once daily.    FOLIC ACID/MULTIVIT-MIN/LUTEIN (CENTRUM SILVER ORAL) Take by mouth once daily. ONE DAILY    furosemide (LASIX) 20 MG tablet Take 20 mg by mouth daily as needed. As needed    HYDROcodone-acetaminophen (NORCO)  mg per tablet Take 1 tablet by mouth 2 (two) times daily as needed.    metoprolol succinate (TOPROL-XL) 25 MG 24 hr tablet Take 0.5 tablets (12.5 mg total) by mouth 2 (two) times daily.    mupirocin (BACTROBAN) 2 % ointment Apply topically 3 (three) times daily.    nitroGLYCERIN (NITROSTAT) 0.4 MG SL tablet Place 1 tablet (0.4 mg total) under the tongue every 5 (five) minutes as needed for Chest pain.    olmesartan (BENICAR) 40 MG tablet Take 1 tablet (40 mg total) by mouth once daily.    potassium chloride (KLOR-CON) 10 MEQ TbSR Take 1 tablet (10 mEq total) by mouth twice a week.    rosuvastatin (CRESTOR) 20 MG tablet Take 1 tablet (20 mg total) by mouth every evening.    vitamin D 1000 units Tab Take 185 mg by mouth once daily.   Last reviewed on 12/21/2020  1:51 PM by Madelin De La Vega, DNP, APRN    Review of patient's allergies indicates:   Allergen Reactions    Clindamycin Diarrhea    Darvocet a500 [propoxyphene n-acetaminophen]     Erythromycin Other (See Comments)    Mycinette [cetylpyridinium-benzocaine]     Pcn [penicillins]     Last reviewed on  12/21/2020 1:50 PM by Madelin De La Vega      Tasks added this encounter   1/19/2021 - Refill Call (Auto Added)   Tasks due within next 3 months   No tasks due.     Noemi Barnes, PharmD  [facility]  [address] [place] [address]  Phone: [phone]  Fax: [phone]

## 2021-01-07 ENCOUNTER — TELEPHONE (OUTPATIENT)
Dept: CARDIOLOGY | Facility: CLINIC | Age: 81
End: 2021-01-07

## 2021-01-15 ENCOUNTER — TELEPHONE (OUTPATIENT)
Dept: FAMILY MEDICINE | Facility: CLINIC | Age: 81
End: 2021-01-15

## 2021-01-19 ENCOUNTER — IMMUNIZATION (OUTPATIENT)
Dept: FAMILY MEDICINE | Facility: CLINIC | Age: 81
End: 2021-01-19
Payer: MEDICARE

## 2021-01-19 DIAGNOSIS — Z23 NEED FOR VACCINATION: Primary | ICD-10-CM

## 2021-01-19 PROCEDURE — 91300 COVID-19, MRNA, LNP-S, PF, 30 MCG/0.3 ML DOSE VACCINE: CPT | Mod: PBBFAC | Performed by: NURSE PRACTITIONER

## 2021-01-21 ENCOUNTER — SPECIALTY PHARMACY (OUTPATIENT)
Dept: PHARMACY | Facility: CLINIC | Age: 81
End: 2021-01-21

## 2021-01-21 RX ORDER — EVOLOCUMAB 140 MG/ML
140 INJECTION, SOLUTION SUBCUTANEOUS
Qty: 2 ML | Refills: 6 | Status: SHIPPED | OUTPATIENT
Start: 2021-01-21 | End: 2021-04-28 | Stop reason: SDUPTHER

## 2021-02-01 ENCOUNTER — OFFICE VISIT (OUTPATIENT)
Dept: PRIMARY CARE CLINIC | Facility: CLINIC | Age: 81
End: 2021-02-01
Payer: MEDICARE

## 2021-02-01 VITALS
HEART RATE: 66 BPM | SYSTOLIC BLOOD PRESSURE: 120 MMHG | DIASTOLIC BLOOD PRESSURE: 60 MMHG | WEIGHT: 188.25 LBS | BODY MASS INDEX: 31.36 KG/M2 | HEIGHT: 65 IN | OXYGEN SATURATION: 98 % | TEMPERATURE: 99 F

## 2021-02-01 DIAGNOSIS — I10 ESSENTIAL HYPERTENSION: Primary | ICD-10-CM

## 2021-02-01 DIAGNOSIS — E66.9 OBESITY (BMI 30.0-34.9): ICD-10-CM

## 2021-02-01 DIAGNOSIS — E78.2 MIXED HYPERLIPIDEMIA: ICD-10-CM

## 2021-02-01 DIAGNOSIS — I25.709 CORONARY ARTERY DISEASE INVOLVING CORONARY BYPASS GRAFT OF NATIVE HEART WITH ANGINA PECTORIS: ICD-10-CM

## 2021-02-01 PROCEDURE — 99214 PR OFFICE/OUTPT VISIT, EST, LEVL IV, 30-39 MIN: ICD-10-PCS | Mod: S$GLB,,, | Performed by: NURSE PRACTITIONER

## 2021-02-01 PROCEDURE — 3074F PR MOST RECENT SYSTOLIC BLOOD PRESSURE < 130 MM HG: ICD-10-PCS | Mod: CPTII,S$GLB,, | Performed by: NURSE PRACTITIONER

## 2021-02-01 PROCEDURE — 1126F AMNT PAIN NOTED NONE PRSNT: CPT | Mod: S$GLB,,, | Performed by: NURSE PRACTITIONER

## 2021-02-01 PROCEDURE — 3074F SYST BP LT 130 MM HG: CPT | Mod: CPTII,S$GLB,, | Performed by: NURSE PRACTITIONER

## 2021-02-01 PROCEDURE — 1159F MED LIST DOCD IN RCRD: CPT | Mod: S$GLB,,, | Performed by: NURSE PRACTITIONER

## 2021-02-01 PROCEDURE — 1126F PR PAIN SEVERITY QUANTIFIED, NO PAIN PRESENT: ICD-10-PCS | Mod: S$GLB,,, | Performed by: NURSE PRACTITIONER

## 2021-02-01 PROCEDURE — 99214 OFFICE O/P EST MOD 30 MIN: CPT | Mod: S$GLB,,, | Performed by: NURSE PRACTITIONER

## 2021-02-01 PROCEDURE — 1159F PR MEDICATION LIST DOCUMENTED IN MEDICAL RECORD: ICD-10-PCS | Mod: S$GLB,,, | Performed by: NURSE PRACTITIONER

## 2021-02-01 PROCEDURE — 3078F PR MOST RECENT DIASTOLIC BLOOD PRESSURE < 80 MM HG: ICD-10-PCS | Mod: CPTII,S$GLB,, | Performed by: NURSE PRACTITIONER

## 2021-02-01 PROCEDURE — 3078F DIAST BP <80 MM HG: CPT | Mod: CPTII,S$GLB,, | Performed by: NURSE PRACTITIONER

## 2021-02-01 RX ORDER — AMLODIPINE BESYLATE 5 MG/1
5 TABLET ORAL DAILY
Qty: 90 TABLET | Refills: 1 | Status: SHIPPED | OUTPATIENT
Start: 2021-02-01 | End: 2021-06-07 | Stop reason: SDUPTHER

## 2021-02-09 ENCOUNTER — IMMUNIZATION (OUTPATIENT)
Dept: FAMILY MEDICINE | Facility: CLINIC | Age: 81
End: 2021-02-09
Payer: MEDICARE

## 2021-02-09 DIAGNOSIS — Z23 NEED FOR VACCINATION: Primary | ICD-10-CM

## 2021-02-09 PROCEDURE — 91300 COVID-19, MRNA, LNP-S, PF, 30 MCG/0.3 ML DOSE VACCINE: CPT | Mod: PBBFAC | Performed by: FAMILY MEDICINE

## 2021-02-09 PROCEDURE — 0002A COVID-19, MRNA, LNP-S, PF, 30 MCG/0.3 ML DOSE VACCINE: CPT | Mod: PBBFAC | Performed by: FAMILY MEDICINE

## 2021-02-19 ENCOUNTER — SPECIALTY PHARMACY (OUTPATIENT)
Dept: PHARMACY | Facility: CLINIC | Age: 81
End: 2021-02-19

## 2021-02-19 DIAGNOSIS — E78.2 MIXED HYPERLIPIDEMIA: Primary | ICD-10-CM

## 2021-03-03 DIAGNOSIS — I10 ESSENTIAL HYPERTENSION: ICD-10-CM

## 2021-03-03 RX ORDER — OLMESARTAN MEDOXOMIL 40 MG/1
40 TABLET ORAL DAILY
Qty: 90 TABLET | Refills: 0 | Status: SHIPPED | OUTPATIENT
Start: 2021-03-03 | End: 2021-05-03 | Stop reason: SDUPTHER

## 2021-03-30 ENCOUNTER — SPECIALTY PHARMACY (OUTPATIENT)
Dept: PHARMACY | Facility: CLINIC | Age: 81
End: 2021-03-30

## 2021-04-28 DIAGNOSIS — E78.2 MIXED HYPERLIPIDEMIA: Primary | ICD-10-CM

## 2021-04-28 RX ORDER — EVOLOCUMAB 140 MG/ML
140 INJECTION, SOLUTION SUBCUTANEOUS
Qty: 2 ML | Refills: 6 | Status: SHIPPED | OUTPATIENT
Start: 2021-04-28 | End: 2021-12-27 | Stop reason: SDUPTHER

## 2021-04-28 RX ORDER — EVOLOCUMAB 140 MG/ML
140 INJECTION, SOLUTION SUBCUTANEOUS
Qty: 2 ML | Refills: 6 | Status: SHIPPED | OUTPATIENT
Start: 2021-04-28 | End: 2021-04-28 | Stop reason: SDUPTHER

## 2021-04-29 ENCOUNTER — CLINICAL SUPPORT (OUTPATIENT)
Dept: CARDIOLOGY | Facility: CLINIC | Age: 81
End: 2021-04-29
Attending: INTERNAL MEDICINE
Payer: MEDICARE

## 2021-04-29 VITALS — BODY MASS INDEX: 31.32 KG/M2 | HEIGHT: 65 IN | WEIGHT: 188 LBS

## 2021-04-29 DIAGNOSIS — I08.0 MITRAL AND AORTIC VALVE DISEASE: ICD-10-CM

## 2021-04-29 LAB
AV INDEX (PROSTH): 0.31
AV MEAN GRADIENT: 47 MMHG
AV PEAK GRADIENT: 84 MMHG
AV VALVE AREA: 0.98 CM2
AV VELOCITY RATIO: 0.28
BSA FOR ECHO PROCEDURE: 1.98 M2
CV ECHO LV RWT: 0.39 CM
DOP CALC AO PEAK VEL: 4.59 M/S
DOP CALC AO VTI: 111.34 CM
DOP CALC LVOT AREA: 3.2 CM2
DOP CALC LVOT DIAMETER: 2.02 CM
DOP CALC LVOT PEAK VEL: 1.29 M/S
DOP CALC LVOT STROKE VOLUME: 108.78 CM3
DOP CALCLVOT PEAK VEL VTI: 33.96 CM
E WAVE DECELERATION TIME: 304 MSEC
E/A RATIO: 0.82
E/E' RATIO: 23.4 M/S
ECHO LV POSTERIOR WALL: 1 CM (ref 0.6–1.1)
EJECTION FRACTION: 60 %
FRACTIONAL SHORTENING: 31 % (ref 28–44)
INTERVENTRICULAR SEPTUM: 1.07 CM (ref 0.6–1.1)
LA MAJOR: 5.08 CM
LA MINOR: 5.9 CM
LA WIDTH: 4.38 CM
LEFT ATRIUM SIZE: 4.36 CM
LEFT ATRIUM VOLUME INDEX: 45.9 ML/M2
LEFT ATRIUM VOLUME: 88.62 CM3
LEFT INTERNAL DIMENSION IN SYSTOLE: 3.58 CM (ref 2.1–4)
LEFT VENTRICLE DIASTOLIC VOLUME INDEX: 66.63 ML/M2
LEFT VENTRICLE DIASTOLIC VOLUME: 128.59 ML
LEFT VENTRICLE MASS INDEX: 105 G/M2
LEFT VENTRICLE SYSTOLIC VOLUME INDEX: 27.8 ML/M2
LEFT VENTRICLE SYSTOLIC VOLUME: 53.6 ML
LEFT VENTRICULAR INTERNAL DIMENSION IN DIASTOLE: 5.18 CM (ref 3.5–6)
LEFT VENTRICULAR MASS: 202.02 G
LV LATERAL E/E' RATIO: 23.4 M/S
LV SEPTAL E/E' RATIO: 23.4 M/S
MV A" WAVE DURATION": 11.59 MSEC
MV PEAK A VEL: 1.43 M/S
MV PEAK E VEL: 1.17 M/S
PISA TR MAX VEL: 2.37 M/S
PULM VEIN S/D RATIO: 1.17
PV PEAK D VEL: 0.42 M/S
PV PEAK S VEL: 0.49 M/S
RA MAJOR: 4.84 CM
RA PRESSURE: 3 MMHG
RA WIDTH: 3.31 CM
RIGHT VENTRICULAR END-DIASTOLIC DIMENSION: 4.22 CM
RV TISSUE DOPPLER FREE WALL SYSTOLIC VELOCITY 1 (APICAL 4 CHAMBER VIEW): 11 CM/S
SINUS: 2.7 CM
STJ: 2.46 CM
TDI LATERAL: 0.05 M/S
TDI SEPTAL: 0.05 M/S
TDI: 0.05 M/S
TR MAX PG: 22 MMHG
TRICUSPID ANNULAR PLANE SYSTOLIC EXCURSION: 2.09 CM
TV REST PULMONARY ARTERY PRESSURE: 25 MMHG

## 2021-04-29 PROCEDURE — 93306 TTE W/DOPPLER COMPLETE: CPT | Mod: S$GLB,,, | Performed by: INTERNAL MEDICINE

## 2021-04-29 PROCEDURE — 93306 ECHO (CUPID ONLY): ICD-10-PCS | Mod: S$GLB,,, | Performed by: INTERNAL MEDICINE

## 2021-05-03 ENCOUNTER — SPECIALTY PHARMACY (OUTPATIENT)
Dept: PHARMACY | Facility: CLINIC | Age: 81
End: 2021-05-03

## 2021-05-06 DIAGNOSIS — I10 ESSENTIAL HYPERTENSION: ICD-10-CM

## 2021-05-06 RX ORDER — OLMESARTAN MEDOXOMIL 40 MG/1
40 TABLET ORAL DAILY
Qty: 90 TABLET | Refills: 0 | Status: SHIPPED | OUTPATIENT
Start: 2021-05-06 | End: 2021-06-07 | Stop reason: SDUPTHER

## 2021-05-18 ENCOUNTER — PATIENT OUTREACH (OUTPATIENT)
Dept: ADMINISTRATIVE | Facility: OTHER | Age: 81
End: 2021-05-18

## 2021-05-19 ENCOUNTER — OFFICE VISIT (OUTPATIENT)
Dept: CARDIOLOGY | Facility: CLINIC | Age: 81
End: 2021-05-19
Payer: MEDICARE

## 2021-05-19 VITALS
HEIGHT: 65 IN | BODY MASS INDEX: 31.36 KG/M2 | HEART RATE: 69 BPM | DIASTOLIC BLOOD PRESSURE: 56 MMHG | SYSTOLIC BLOOD PRESSURE: 118 MMHG | OXYGEN SATURATION: 97 % | WEIGHT: 188.25 LBS

## 2021-05-19 DIAGNOSIS — I25.709 CORONARY ARTERY DISEASE INVOLVING CORONARY BYPASS GRAFT OF NATIVE HEART WITH ANGINA PECTORIS: Chronic | ICD-10-CM

## 2021-05-19 DIAGNOSIS — I49.49 PREMATURE BEATS: Chronic | ICD-10-CM

## 2021-05-19 DIAGNOSIS — I77.1 STRICTURE OF ARTERY: Primary | ICD-10-CM

## 2021-05-19 DIAGNOSIS — I48.0 PAROXYSMAL ATRIAL FIBRILLATION: ICD-10-CM

## 2021-05-19 DIAGNOSIS — E78.2 MIXED HYPERLIPIDEMIA: Chronic | ICD-10-CM

## 2021-05-19 DIAGNOSIS — E66.9 OBESITY (BMI 30.0-34.9): Chronic | ICD-10-CM

## 2021-05-19 DIAGNOSIS — I35.0 AORTIC VALVE STENOSIS, ETIOLOGY OF CARDIAC VALVE DISEASE UNSPECIFIED: Primary | Chronic | ICD-10-CM

## 2021-05-19 DIAGNOSIS — I10 ESSENTIAL HYPERTENSION: Chronic | ICD-10-CM

## 2021-05-19 DIAGNOSIS — R09.89 BRUIT OF LEFT CAROTID ARTERY: ICD-10-CM

## 2021-05-19 PROCEDURE — 99214 PR OFFICE/OUTPT VISIT, EST, LEVL IV, 30-39 MIN: ICD-10-PCS | Mod: S$GLB,,, | Performed by: INTERNAL MEDICINE

## 2021-05-19 PROCEDURE — 1126F PR PAIN SEVERITY QUANTIFIED, NO PAIN PRESENT: ICD-10-PCS | Mod: S$GLB,,, | Performed by: INTERNAL MEDICINE

## 2021-05-19 PROCEDURE — 3288F PR FALLS RISK ASSESSMENT DOCUMENTED: ICD-10-PCS | Mod: CPTII,S$GLB,, | Performed by: INTERNAL MEDICINE

## 2021-05-19 PROCEDURE — 1159F PR MEDICATION LIST DOCUMENTED IN MEDICAL RECORD: ICD-10-PCS | Mod: S$GLB,,, | Performed by: INTERNAL MEDICINE

## 2021-05-19 PROCEDURE — 3288F FALL RISK ASSESSMENT DOCD: CPT | Mod: CPTII,S$GLB,, | Performed by: INTERNAL MEDICINE

## 2021-05-19 PROCEDURE — 99214 OFFICE O/P EST MOD 30 MIN: CPT | Mod: S$GLB,,, | Performed by: INTERNAL MEDICINE

## 2021-05-19 PROCEDURE — 1126F AMNT PAIN NOTED NONE PRSNT: CPT | Mod: S$GLB,,, | Performed by: INTERNAL MEDICINE

## 2021-05-19 PROCEDURE — 1159F MED LIST DOCD IN RCRD: CPT | Mod: S$GLB,,, | Performed by: INTERNAL MEDICINE

## 2021-05-19 PROCEDURE — 1101F PR PT FALLS ASSESS DOC 0-1 FALLS W/OUT INJ PAST YR: ICD-10-PCS | Mod: CPTII,S$GLB,, | Performed by: INTERNAL MEDICINE

## 2021-05-19 PROCEDURE — 1101F PT FALLS ASSESS-DOCD LE1/YR: CPT | Mod: CPTII,S$GLB,, | Performed by: INTERNAL MEDICINE

## 2021-05-19 RX ORDER — ROSUVASTATIN CALCIUM 20 MG/1
20 TABLET, COATED ORAL NIGHTLY
Qty: 90 TABLET | Refills: 1 | Status: SHIPPED | OUTPATIENT
Start: 2021-05-19 | End: 2021-07-12 | Stop reason: SDUPTHER

## 2021-05-28 ENCOUNTER — SPECIALTY PHARMACY (OUTPATIENT)
Dept: PHARMACY | Facility: CLINIC | Age: 81
End: 2021-05-28

## 2021-06-07 ENCOUNTER — OFFICE VISIT (OUTPATIENT)
Dept: PRIMARY CARE CLINIC | Facility: CLINIC | Age: 81
End: 2021-06-07
Payer: MEDICARE

## 2021-06-07 ENCOUNTER — TELEPHONE (OUTPATIENT)
Dept: CARDIOLOGY | Facility: CLINIC | Age: 81
End: 2021-06-07

## 2021-06-07 VITALS
SYSTOLIC BLOOD PRESSURE: 120 MMHG | HEIGHT: 65 IN | OXYGEN SATURATION: 97 % | HEART RATE: 70 BPM | BODY MASS INDEX: 31.63 KG/M2 | TEMPERATURE: 98 F | DIASTOLIC BLOOD PRESSURE: 60 MMHG | WEIGHT: 189.81 LBS

## 2021-06-07 DIAGNOSIS — R79.9 ABNORMAL FINDING OF BLOOD CHEMISTRY, UNSPECIFIED: ICD-10-CM

## 2021-06-07 DIAGNOSIS — E78.2 MIXED HYPERLIPIDEMIA: ICD-10-CM

## 2021-06-07 DIAGNOSIS — E66.9 OBESITY (BMI 30.0-34.9): ICD-10-CM

## 2021-06-07 DIAGNOSIS — I10 ESSENTIAL HYPERTENSION: Primary | ICD-10-CM

## 2021-06-07 PROCEDURE — 99214 OFFICE O/P EST MOD 30 MIN: CPT | Mod: S$GLB,,, | Performed by: NURSE PRACTITIONER

## 2021-06-07 PROCEDURE — 3074F SYST BP LT 130 MM HG: CPT | Mod: CPTII,S$GLB,, | Performed by: NURSE PRACTITIONER

## 2021-06-07 PROCEDURE — 3078F DIAST BP <80 MM HG: CPT | Mod: CPTII,S$GLB,, | Performed by: NURSE PRACTITIONER

## 2021-06-07 PROCEDURE — 1159F MED LIST DOCD IN RCRD: CPT | Mod: S$GLB,,, | Performed by: NURSE PRACTITIONER

## 2021-06-07 PROCEDURE — 3078F PR MOST RECENT DIASTOLIC BLOOD PRESSURE < 80 MM HG: ICD-10-PCS | Mod: CPTII,S$GLB,, | Performed by: NURSE PRACTITIONER

## 2021-06-07 PROCEDURE — 3074F PR MOST RECENT SYSTOLIC BLOOD PRESSURE < 130 MM HG: ICD-10-PCS | Mod: CPTII,S$GLB,, | Performed by: NURSE PRACTITIONER

## 2021-06-07 PROCEDURE — 99214 PR OFFICE/OUTPT VISIT, EST, LEVL IV, 30-39 MIN: ICD-10-PCS | Mod: S$GLB,,, | Performed by: NURSE PRACTITIONER

## 2021-06-07 PROCEDURE — 1159F PR MEDICATION LIST DOCUMENTED IN MEDICAL RECORD: ICD-10-PCS | Mod: S$GLB,,, | Performed by: NURSE PRACTITIONER

## 2021-06-07 PROCEDURE — 1126F AMNT PAIN NOTED NONE PRSNT: CPT | Mod: S$GLB,,, | Performed by: NURSE PRACTITIONER

## 2021-06-07 PROCEDURE — 1126F PR PAIN SEVERITY QUANTIFIED, NO PAIN PRESENT: ICD-10-PCS | Mod: S$GLB,,, | Performed by: NURSE PRACTITIONER

## 2021-06-07 RX ORDER — OLMESARTAN MEDOXOMIL 40 MG/1
40 TABLET ORAL DAILY
Qty: 90 TABLET | Refills: 1 | Status: SHIPPED | OUTPATIENT
Start: 2021-06-07 | End: 2021-07-12 | Stop reason: SDUPTHER

## 2021-06-07 RX ORDER — POTASSIUM CHLORIDE 750 MG/1
10 TABLET, EXTENDED RELEASE ORAL
Qty: 24 TABLET | Refills: 2 | Status: SHIPPED | OUTPATIENT
Start: 2021-06-07 | End: 2021-07-12 | Stop reason: SDUPTHER

## 2021-06-07 RX ORDER — AMLODIPINE BESYLATE 5 MG/1
5 TABLET ORAL DAILY
Qty: 90 TABLET | Refills: 1 | Status: SHIPPED | OUTPATIENT
Start: 2021-06-07 | End: 2021-07-12 | Stop reason: SDUPTHER

## 2021-06-10 ENCOUNTER — LAB VISIT (OUTPATIENT)
Dept: LAB | Facility: CLINIC | Age: 81
End: 2021-06-10
Payer: MEDICARE

## 2021-06-10 DIAGNOSIS — E66.9 OBESITY (BMI 30.0-34.9): ICD-10-CM

## 2021-06-10 DIAGNOSIS — R79.9 ABNORMAL FINDING OF BLOOD CHEMISTRY, UNSPECIFIED: ICD-10-CM

## 2021-06-10 DIAGNOSIS — I25.709 CORONARY ARTERY DISEASE INVOLVING CORONARY BYPASS GRAFT OF NATIVE HEART WITH ANGINA PECTORIS: Chronic | ICD-10-CM

## 2021-06-10 DIAGNOSIS — E78.2 MIXED HYPERLIPIDEMIA: Chronic | ICD-10-CM

## 2021-06-10 DIAGNOSIS — I10 ESSENTIAL HYPERTENSION: ICD-10-CM

## 2021-06-10 DIAGNOSIS — I10 ESSENTIAL HYPERTENSION: Chronic | ICD-10-CM

## 2021-06-10 LAB
ALBUMIN SERPL BCP-MCNC: 3.4 G/DL (ref 3.5–5.2)
ALP SERPL-CCNC: 94 U/L (ref 55–135)
ALT SERPL W/O P-5'-P-CCNC: 22 U/L (ref 10–44)
ANION GAP SERPL CALC-SCNC: 8 MMOL/L (ref 8–16)
AST SERPL-CCNC: 34 U/L (ref 10–40)
BILIRUB SERPL-MCNC: 0.7 MG/DL (ref 0.1–1)
BUN SERPL-MCNC: 11 MG/DL (ref 8–23)
CALCIUM SERPL-MCNC: 9.5 MG/DL (ref 8.7–10.5)
CHLORIDE SERPL-SCNC: 111 MMOL/L (ref 95–110)
CHOLEST SERPL-MCNC: 157 MG/DL (ref 120–199)
CHOLEST/HDLC SERPL: 3.1 {RATIO} (ref 2–5)
CO2 SERPL-SCNC: 19 MMOL/L (ref 23–29)
CREAT SERPL-MCNC: 0.7 MG/DL (ref 0.5–1.4)
EST. GFR  (AFRICAN AMERICAN): >60 ML/MIN/1.73 M^2
EST. GFR  (NON AFRICAN AMERICAN): >60 ML/MIN/1.73 M^2
ESTIMATED AVG GLUCOSE: 91 MG/DL (ref 68–131)
GLUCOSE SERPL-MCNC: 94 MG/DL (ref 70–110)
HBA1C MFR BLD: 4.8 % (ref 4–5.6)
HDLC SERPL-MCNC: 51 MG/DL (ref 40–75)
HDLC SERPL: 32.5 % (ref 20–50)
LDLC SERPL CALC-MCNC: 95.6 MG/DL (ref 63–159)
NONHDLC SERPL-MCNC: 106 MG/DL
POTASSIUM SERPL-SCNC: 4.5 MMOL/L (ref 3.5–5.1)
PROT SERPL-MCNC: 7.2 G/DL (ref 6–8.4)
SODIUM SERPL-SCNC: 138 MMOL/L (ref 136–145)
TRIGL SERPL-MCNC: 52 MG/DL (ref 30–150)

## 2021-06-10 PROCEDURE — 36415 COLL VENOUS BLD VENIPUNCTURE: CPT | Mod: ,,, | Performed by: INTERNAL MEDICINE

## 2021-06-10 PROCEDURE — 80053 COMPREHEN METABOLIC PANEL: CPT | Performed by: INTERNAL MEDICINE

## 2021-06-10 PROCEDURE — 83036 HEMOGLOBIN GLYCOSYLATED A1C: CPT | Performed by: NURSE PRACTITIONER

## 2021-06-10 PROCEDURE — 80061 LIPID PANEL: CPT | Performed by: INTERNAL MEDICINE

## 2021-06-10 PROCEDURE — 36415 PR COLLECTION VENOUS BLOOD,VENIPUNCTURE: ICD-10-PCS | Mod: ,,, | Performed by: INTERNAL MEDICINE

## 2021-06-14 DIAGNOSIS — I25.709 CORONARY ARTERY DISEASE INVOLVING CORONARY BYPASS GRAFT OF NATIVE HEART WITH ANGINA PECTORIS: ICD-10-CM

## 2021-06-18 RX ORDER — NITROGLYCERIN 0.4 MG/1
0.4 TABLET SUBLINGUAL EVERY 5 MIN PRN
Qty: 25 TABLET | Refills: 1 | Status: SHIPPED | OUTPATIENT
Start: 2021-06-18 | End: 2021-11-10 | Stop reason: SDUPTHER

## 2021-06-25 ENCOUNTER — SPECIALTY PHARMACY (OUTPATIENT)
Dept: PHARMACY | Facility: CLINIC | Age: 81
End: 2021-06-25

## 2021-06-29 ENCOUNTER — TELEPHONE (OUTPATIENT)
Dept: FAMILY MEDICINE | Facility: CLINIC | Age: 81
End: 2021-06-29

## 2021-06-29 NOTE — TELEPHONE ENCOUNTER
----- Message from Renay Lopez sent at 6/29/2021 11:00 AM CDT -----  Regarding: Returning a call  Contact: Ms. Claire  Type:  Patient Returning Call    Who Called:  Ms. Claire  Who Left Message for Patient:    Does the patient know what this is regarding?:  not sure  Best Call Back Number:  [phone]  Additional Information:  Patient would like a call back.

## 2021-07-12 ENCOUNTER — OFFICE VISIT (OUTPATIENT)
Dept: PRIMARY CARE CLINIC | Facility: CLINIC | Age: 81
End: 2021-07-12
Payer: MEDICARE

## 2021-07-12 VITALS
TEMPERATURE: 98 F | WEIGHT: 185.88 LBS | HEIGHT: 65 IN | OXYGEN SATURATION: 97 % | BODY MASS INDEX: 30.97 KG/M2 | DIASTOLIC BLOOD PRESSURE: 60 MMHG | HEART RATE: 75 BPM | SYSTOLIC BLOOD PRESSURE: 130 MMHG

## 2021-07-12 DIAGNOSIS — E78.2 MIXED HYPERLIPIDEMIA: Chronic | ICD-10-CM

## 2021-07-12 DIAGNOSIS — Z78.0 ASYMPTOMATIC MENOPAUSAL STATE: ICD-10-CM

## 2021-07-12 DIAGNOSIS — Z12.31 ENCOUNTER FOR SCREENING MAMMOGRAM FOR BREAST CANCER: ICD-10-CM

## 2021-07-12 DIAGNOSIS — I10 ESSENTIAL HYPERTENSION: Primary | ICD-10-CM

## 2021-07-12 PROCEDURE — 3075F SYST BP GE 130 - 139MM HG: CPT | Mod: CPTII,S$GLB,, | Performed by: NURSE PRACTITIONER

## 2021-07-12 PROCEDURE — 1159F MED LIST DOCD IN RCRD: CPT | Mod: S$GLB,,, | Performed by: NURSE PRACTITIONER

## 2021-07-12 PROCEDURE — 1159F PR MEDICATION LIST DOCUMENTED IN MEDICAL RECORD: ICD-10-PCS | Mod: S$GLB,,, | Performed by: NURSE PRACTITIONER

## 2021-07-12 PROCEDURE — 1125F AMNT PAIN NOTED PAIN PRSNT: CPT | Mod: S$GLB,,, | Performed by: NURSE PRACTITIONER

## 2021-07-12 PROCEDURE — 99214 PR OFFICE/OUTPT VISIT, EST, LEVL IV, 30-39 MIN: ICD-10-PCS | Mod: S$GLB,,, | Performed by: NURSE PRACTITIONER

## 2021-07-12 PROCEDURE — 3075F PR MOST RECENT SYSTOLIC BLOOD PRESS GE 130-139MM HG: ICD-10-PCS | Mod: CPTII,S$GLB,, | Performed by: NURSE PRACTITIONER

## 2021-07-12 PROCEDURE — 99214 OFFICE O/P EST MOD 30 MIN: CPT | Mod: S$GLB,,, | Performed by: NURSE PRACTITIONER

## 2021-07-12 PROCEDURE — 1125F PR PAIN SEVERITY QUANTIFIED, PAIN PRESENT: ICD-10-PCS | Mod: S$GLB,,, | Performed by: NURSE PRACTITIONER

## 2021-07-12 PROCEDURE — 3078F DIAST BP <80 MM HG: CPT | Mod: CPTII,S$GLB,, | Performed by: NURSE PRACTITIONER

## 2021-07-12 PROCEDURE — 3078F PR MOST RECENT DIASTOLIC BLOOD PRESSURE < 80 MM HG: ICD-10-PCS | Mod: CPTII,S$GLB,, | Performed by: NURSE PRACTITIONER

## 2021-07-12 RX ORDER — AMLODIPINE BESYLATE 5 MG/1
5 TABLET ORAL DAILY
Qty: 90 TABLET | Refills: 1 | Status: SHIPPED | OUTPATIENT
Start: 2021-07-12 | End: 2021-12-20

## 2021-07-12 RX ORDER — FUROSEMIDE 20 MG/1
20 TABLET ORAL DAILY PRN
Qty: 30 TABLET | Refills: 1 | Status: SHIPPED | OUTPATIENT
Start: 2021-07-12 | End: 2022-06-06 | Stop reason: SDUPTHER

## 2021-07-12 RX ORDER — ROSUVASTATIN CALCIUM 20 MG/1
20 TABLET, COATED ORAL NIGHTLY
Qty: 90 TABLET | Refills: 1 | Status: SHIPPED | OUTPATIENT
Start: 2021-07-12 | End: 2021-11-01 | Stop reason: SDUPTHER

## 2021-07-12 RX ORDER — POTASSIUM CHLORIDE 750 MG/1
10 TABLET, EXTENDED RELEASE ORAL
Qty: 24 TABLET | Refills: 2 | Status: SHIPPED | OUTPATIENT
Start: 2021-07-12 | End: 2022-06-06 | Stop reason: SDUPTHER

## 2021-07-12 RX ORDER — OLMESARTAN MEDOXOMIL 40 MG/1
40 TABLET ORAL DAILY
Qty: 90 TABLET | Refills: 1 | Status: SHIPPED | OUTPATIENT
Start: 2021-07-12 | End: 2021-11-01 | Stop reason: SDUPTHER

## 2021-07-12 RX ORDER — METOPROLOL SUCCINATE 25 MG/1
12.5 TABLET, EXTENDED RELEASE ORAL 2 TIMES DAILY
Qty: 90 TABLET | Refills: 1 | Status: SHIPPED | OUTPATIENT
Start: 2021-07-12 | End: 2021-12-10

## 2021-07-23 ENCOUNTER — SPECIALTY PHARMACY (OUTPATIENT)
Dept: PHARMACY | Facility: CLINIC | Age: 81
End: 2021-07-23

## 2021-09-10 ENCOUNTER — SPECIALTY PHARMACY (OUTPATIENT)
Dept: PHARMACY | Facility: CLINIC | Age: 81
End: 2021-09-10

## 2021-10-08 ENCOUNTER — SPECIALTY PHARMACY (OUTPATIENT)
Dept: PHARMACY | Facility: CLINIC | Age: 81
End: 2021-10-08

## 2021-10-13 ENCOUNTER — TELEPHONE (OUTPATIENT)
Dept: CARDIOLOGY | Facility: CLINIC | Age: 81
End: 2021-10-13

## 2021-10-26 ENCOUNTER — TELEPHONE (OUTPATIENT)
Dept: CARDIOLOGY | Facility: CLINIC | Age: 81
End: 2021-10-26
Payer: MEDICARE

## 2021-10-29 ENCOUNTER — CLINICAL SUPPORT (OUTPATIENT)
Dept: CARDIOLOGY | Facility: HOSPITAL | Age: 81
End: 2021-10-29
Attending: INTERNAL MEDICINE
Payer: MEDICARE

## 2021-10-29 DIAGNOSIS — R09.89 BRUIT OF LEFT CAROTID ARTERY: ICD-10-CM

## 2021-10-29 PROCEDURE — 93880 CV US DOPPLER CAROTID (CUPID ONLY): ICD-10-PCS | Mod: 26,,, | Performed by: INTERNAL MEDICINE

## 2021-10-29 PROCEDURE — 93880 EXTRACRANIAL BILAT STUDY: CPT | Mod: 26,,, | Performed by: INTERNAL MEDICINE

## 2021-10-29 PROCEDURE — 93880 EXTRACRANIAL BILAT STUDY: CPT | Mod: PO

## 2021-10-30 LAB
LEFT CBA DIAS: 9 CM/S
LEFT CBA SYS: 71 CM/S
LEFT CCA DIST DIAS: 7 CM/S
LEFT CCA DIST SYS: 81 CM/S
LEFT CCA MID DIAS: 8 CM/S
LEFT CCA MID SYS: 78 CM/S
LEFT CCA PROX DIAS: 10 CM/S
LEFT CCA PROX SYS: 80 CM/S
LEFT ECA DIAS: 0 CM/S
LEFT ECA SYS: 69 CM/S
LEFT ICA DIST DIAS: 16 CM/S
LEFT ICA DIST SYS: 95 CM/S
LEFT ICA MID DIAS: 23 CM/S
LEFT ICA MID SYS: 99 CM/S
LEFT ICA PROX DIAS: 21 CM/S
LEFT ICA PROX SYS: 105 CM/S
LEFT VERTEBRAL DIAS: 7 CM/S
LEFT VERTEBRAL SYS: 36 CM/S
OHS CV CAROTID RIGHT ICA EDV HIGHEST: 18
OHS CV CAROTID ULTRASOUND LEFT ICA/CCA RATIO: 1.3
OHS CV CAROTID ULTRASOUND RIGHT ICA/CCA RATIO: 1.27
OHS CV PV CAROTID LEFT HIGHEST CCA: 81
OHS CV PV CAROTID LEFT HIGHEST ICA: 105
OHS CV PV CAROTID RIGHT HIGHEST CCA: 85
OHS CV PV CAROTID RIGHT HIGHEST ICA: 99
OHS CV US CAROTID LEFT HIGHEST EDV: 23
RIGHT CBA DIAS: 3 CM/S
RIGHT CBA SYS: 103 CM/S
RIGHT CCA DIST DIAS: 3 CM/S
RIGHT CCA DIST SYS: 78 CM/S
RIGHT CCA MID DIAS: 5 CM/S
RIGHT CCA MID SYS: 85 CM/S
RIGHT CCA PROX DIAS: 0 CM/S
RIGHT CCA PROX SYS: 77 CM/S
RIGHT ECA DIAS: 0 CM/S
RIGHT ECA SYS: 71 CM/S
RIGHT ICA DIST DIAS: 17 CM/S
RIGHT ICA DIST SYS: 95 CM/S
RIGHT ICA MID DIAS: 18 CM/S
RIGHT ICA MID SYS: 99 CM/S
RIGHT ICA PROX DIAS: 13 CM/S
RIGHT ICA PROX SYS: 69 CM/S
RIGHT VERTEBRAL DIAS: 6 CM/S
RIGHT VERTEBRAL SYS: 86 CM/S

## 2021-11-01 ENCOUNTER — OFFICE VISIT (OUTPATIENT)
Dept: PRIMARY CARE CLINIC | Facility: CLINIC | Age: 81
End: 2021-11-01
Payer: MEDICARE

## 2021-11-01 ENCOUNTER — TELEPHONE (OUTPATIENT)
Dept: CARDIOLOGY | Facility: CLINIC | Age: 81
End: 2021-11-01
Payer: MEDICARE

## 2021-11-01 VITALS
OXYGEN SATURATION: 100 % | BODY MASS INDEX: 30.08 KG/M2 | WEIGHT: 180.75 LBS | HEART RATE: 82 BPM | DIASTOLIC BLOOD PRESSURE: 48 MMHG | TEMPERATURE: 97 F | SYSTOLIC BLOOD PRESSURE: 124 MMHG

## 2021-11-01 DIAGNOSIS — G89.29 CHRONIC PAIN OF LEFT KNEE: ICD-10-CM

## 2021-11-01 DIAGNOSIS — R35.81 NOCTURNAL POLYURIA: ICD-10-CM

## 2021-11-01 DIAGNOSIS — M25.562 CHRONIC PAIN OF LEFT KNEE: ICD-10-CM

## 2021-11-01 DIAGNOSIS — E78.2 MIXED HYPERLIPIDEMIA: Chronic | ICD-10-CM

## 2021-11-01 DIAGNOSIS — I10 ESSENTIAL HYPERTENSION: Primary | ICD-10-CM

## 2021-11-01 DIAGNOSIS — E66.9 OBESITY (BMI 30.0-34.9): ICD-10-CM

## 2021-11-01 PROCEDURE — 3074F SYST BP LT 130 MM HG: CPT | Mod: CPTII,S$GLB,, | Performed by: NURSE PRACTITIONER

## 2021-11-01 PROCEDURE — 99214 PR OFFICE/OUTPT VISIT, EST, LEVL IV, 30-39 MIN: ICD-10-PCS | Mod: S$GLB,,, | Performed by: NURSE PRACTITIONER

## 2021-11-01 PROCEDURE — 3078F PR MOST RECENT DIASTOLIC BLOOD PRESSURE < 80 MM HG: ICD-10-PCS | Mod: CPTII,S$GLB,, | Performed by: NURSE PRACTITIONER

## 2021-11-01 PROCEDURE — 3078F DIAST BP <80 MM HG: CPT | Mod: CPTII,S$GLB,, | Performed by: NURSE PRACTITIONER

## 2021-11-01 PROCEDURE — 1126F AMNT PAIN NOTED NONE PRSNT: CPT | Mod: CPTII,S$GLB,, | Performed by: NURSE PRACTITIONER

## 2021-11-01 PROCEDURE — 1126F PR PAIN SEVERITY QUANTIFIED, NO PAIN PRESENT: ICD-10-PCS | Mod: CPTII,S$GLB,, | Performed by: NURSE PRACTITIONER

## 2021-11-01 PROCEDURE — 99214 OFFICE O/P EST MOD 30 MIN: CPT | Mod: S$GLB,,, | Performed by: NURSE PRACTITIONER

## 2021-11-01 PROCEDURE — 3074F PR MOST RECENT SYSTOLIC BLOOD PRESSURE < 130 MM HG: ICD-10-PCS | Mod: CPTII,S$GLB,, | Performed by: NURSE PRACTITIONER

## 2021-11-01 PROCEDURE — 1160F RVW MEDS BY RX/DR IN RCRD: CPT | Mod: CPTII,S$GLB,, | Performed by: NURSE PRACTITIONER

## 2021-11-01 PROCEDURE — 1101F PT FALLS ASSESS-DOCD LE1/YR: CPT | Mod: CPTII,S$GLB,, | Performed by: NURSE PRACTITIONER

## 2021-11-01 PROCEDURE — 1159F PR MEDICATION LIST DOCUMENTED IN MEDICAL RECORD: ICD-10-PCS | Mod: CPTII,S$GLB,, | Performed by: NURSE PRACTITIONER

## 2021-11-01 PROCEDURE — 3288F FALL RISK ASSESSMENT DOCD: CPT | Mod: CPTII,S$GLB,, | Performed by: NURSE PRACTITIONER

## 2021-11-01 PROCEDURE — 1159F MED LIST DOCD IN RCRD: CPT | Mod: CPTII,S$GLB,, | Performed by: NURSE PRACTITIONER

## 2021-11-01 PROCEDURE — 1160F PR REVIEW ALL MEDS BY PRESCRIBER/CLIN PHARMACIST DOCUMENTED: ICD-10-PCS | Mod: CPTII,S$GLB,, | Performed by: NURSE PRACTITIONER

## 2021-11-01 PROCEDURE — 1101F PR PT FALLS ASSESS DOC 0-1 FALLS W/OUT INJ PAST YR: ICD-10-PCS | Mod: CPTII,S$GLB,, | Performed by: NURSE PRACTITIONER

## 2021-11-01 PROCEDURE — 3288F PR FALLS RISK ASSESSMENT DOCUMENTED: ICD-10-PCS | Mod: CPTII,S$GLB,, | Performed by: NURSE PRACTITIONER

## 2021-11-01 RX ORDER — OLMESARTAN MEDOXOMIL 40 MG/1
20 TABLET ORAL DAILY
Qty: 90 TABLET | Refills: 1
Start: 2021-11-01 | End: 2021-11-26

## 2021-11-01 RX ORDER — DICLOFENAC SODIUM 10 MG/G
GEL TOPICAL
Qty: 1 EACH | Refills: 3 | Status: SHIPPED | OUTPATIENT
Start: 2021-11-01 | End: 2022-04-01

## 2021-11-01 RX ORDER — ROSUVASTATIN CALCIUM 20 MG/1
20 TABLET, COATED ORAL NIGHTLY
Qty: 90 TABLET | Refills: 1 | Status: SHIPPED | OUTPATIENT
Start: 2021-11-01 | End: 2022-03-07 | Stop reason: SDUPTHER

## 2021-11-10 DIAGNOSIS — I25.709 CORONARY ARTERY DISEASE INVOLVING CORONARY BYPASS GRAFT OF NATIVE HEART WITH ANGINA PECTORIS: ICD-10-CM

## 2021-11-10 RX ORDER — NITROGLYCERIN 0.4 MG/1
0.4 TABLET SUBLINGUAL EVERY 5 MIN PRN
Qty: 25 TABLET | Refills: 1 | Status: SHIPPED | OUTPATIENT
Start: 2021-11-10 | End: 2021-11-17 | Stop reason: SDUPTHER

## 2021-11-11 ENCOUNTER — LAB VISIT (OUTPATIENT)
Dept: LAB | Facility: CLINIC | Age: 81
End: 2021-11-11
Payer: MEDICARE

## 2021-11-11 DIAGNOSIS — R35.81 NOCTURNAL POLYURIA: ICD-10-CM

## 2021-11-11 PROCEDURE — 81001 URINALYSIS AUTO W/SCOPE: CPT | Performed by: NURSE PRACTITIONER

## 2021-11-12 DIAGNOSIS — R82.71 BACTERIA IN URINE: ICD-10-CM

## 2021-11-12 DIAGNOSIS — R82.4 URINE KETONES: Primary | ICD-10-CM

## 2021-11-12 LAB
BACTERIA #/AREA URNS AUTO: ABNORMAL /HPF
BILIRUB UR QL STRIP: NEGATIVE
CLARITY UR REFRACT.AUTO: ABNORMAL
COLOR UR AUTO: ABNORMAL
GLUCOSE UR QL STRIP: NEGATIVE
HGB UR QL STRIP: NEGATIVE
HYALINE CASTS UR QL AUTO: 0 /LPF
KETONES UR QL STRIP: ABNORMAL
LEUKOCYTE ESTERASE UR QL STRIP: NEGATIVE
MICROSCOPIC COMMENT: ABNORMAL
NITRITE UR QL STRIP: NEGATIVE
PH UR STRIP: 5 [PH] (ref 5–8)
PROT UR QL STRIP: ABNORMAL
RBC #/AREA URNS AUTO: 9 /HPF (ref 0–4)
SP GR UR STRIP: 1.02 (ref 1–1.03)
SQUAMOUS #/AREA URNS AUTO: 19 /HPF
URN SPEC COLLECT METH UR: ABNORMAL
WBC #/AREA URNS AUTO: 6 /HPF (ref 0–5)

## 2021-11-12 RX ORDER — CIPROFLOXACIN 500 MG/1
500 TABLET ORAL 2 TIMES DAILY
Qty: 6 TABLET | Refills: 0 | Status: SHIPPED | OUTPATIENT
Start: 2021-11-12 | End: 2021-11-15

## 2021-11-15 ENCOUNTER — TELEPHONE (OUTPATIENT)
Dept: FAMILY MEDICINE | Facility: CLINIC | Age: 81
End: 2021-11-15
Payer: MEDICARE

## 2021-11-17 ENCOUNTER — OFFICE VISIT (OUTPATIENT)
Dept: CARDIOLOGY | Facility: CLINIC | Age: 81
End: 2021-11-17
Payer: MEDICARE

## 2021-11-17 VITALS
BODY MASS INDEX: 29.79 KG/M2 | SYSTOLIC BLOOD PRESSURE: 134 MMHG | WEIGHT: 178.81 LBS | HEART RATE: 75 BPM | DIASTOLIC BLOOD PRESSURE: 61 MMHG | HEIGHT: 65 IN

## 2021-11-17 DIAGNOSIS — I77.9 MILD CAROTID ARTERY DISEASE: Chronic | ICD-10-CM

## 2021-11-17 DIAGNOSIS — E66.3 OVERWEIGHT (BMI 25.0-29.9): Chronic | ICD-10-CM

## 2021-11-17 DIAGNOSIS — I25.709 CORONARY ARTERY DISEASE INVOLVING CORONARY BYPASS GRAFT OF NATIVE HEART WITH ANGINA PECTORIS: Primary | Chronic | ICD-10-CM

## 2021-11-17 DIAGNOSIS — I08.0 MITRAL AND AORTIC VALVE DISEASE: Chronic | ICD-10-CM

## 2021-11-17 DIAGNOSIS — I10 ESSENTIAL HYPERTENSION: Chronic | ICD-10-CM

## 2021-11-17 DIAGNOSIS — I49.49 PREMATURE BEATS: Chronic | ICD-10-CM

## 2021-11-17 DIAGNOSIS — E78.2 MIXED HYPERLIPIDEMIA: Chronic | ICD-10-CM

## 2021-11-17 PROBLEM — E66.9 OBESITY (BMI 30.0-34.9): Status: RESOLVED | Noted: 2017-04-19 | Resolved: 2021-11-17

## 2021-11-17 PROBLEM — E66.811 OBESITY (BMI 30.0-34.9): Status: RESOLVED | Noted: 2017-04-19 | Resolved: 2021-11-17

## 2021-11-17 PROCEDURE — 1101F PT FALLS ASSESS-DOCD LE1/YR: CPT | Mod: CPTII,S$GLB,, | Performed by: INTERNAL MEDICINE

## 2021-11-17 PROCEDURE — 1126F PR PAIN SEVERITY QUANTIFIED, NO PAIN PRESENT: ICD-10-PCS | Mod: CPTII,S$GLB,, | Performed by: INTERNAL MEDICINE

## 2021-11-17 PROCEDURE — 3075F PR MOST RECENT SYSTOLIC BLOOD PRESS GE 130-139MM HG: ICD-10-PCS | Mod: CPTII,S$GLB,, | Performed by: INTERNAL MEDICINE

## 2021-11-17 PROCEDURE — 1101F PR PT FALLS ASSESS DOC 0-1 FALLS W/OUT INJ PAST YR: ICD-10-PCS | Mod: CPTII,S$GLB,, | Performed by: INTERNAL MEDICINE

## 2021-11-17 PROCEDURE — 3288F PR FALLS RISK ASSESSMENT DOCUMENTED: ICD-10-PCS | Mod: CPTII,S$GLB,, | Performed by: INTERNAL MEDICINE

## 2021-11-17 PROCEDURE — 1159F MED LIST DOCD IN RCRD: CPT | Mod: CPTII,S$GLB,, | Performed by: INTERNAL MEDICINE

## 2021-11-17 PROCEDURE — 1159F PR MEDICATION LIST DOCUMENTED IN MEDICAL RECORD: ICD-10-PCS | Mod: CPTII,S$GLB,, | Performed by: INTERNAL MEDICINE

## 2021-11-17 PROCEDURE — 3078F DIAST BP <80 MM HG: CPT | Mod: CPTII,S$GLB,, | Performed by: INTERNAL MEDICINE

## 2021-11-17 PROCEDURE — 99214 OFFICE O/P EST MOD 30 MIN: CPT | Mod: S$GLB,,, | Performed by: INTERNAL MEDICINE

## 2021-11-17 PROCEDURE — 3288F FALL RISK ASSESSMENT DOCD: CPT | Mod: CPTII,S$GLB,, | Performed by: INTERNAL MEDICINE

## 2021-11-17 PROCEDURE — 3078F PR MOST RECENT DIASTOLIC BLOOD PRESSURE < 80 MM HG: ICD-10-PCS | Mod: CPTII,S$GLB,, | Performed by: INTERNAL MEDICINE

## 2021-11-17 PROCEDURE — 1126F AMNT PAIN NOTED NONE PRSNT: CPT | Mod: CPTII,S$GLB,, | Performed by: INTERNAL MEDICINE

## 2021-11-17 PROCEDURE — 99214 PR OFFICE/OUTPT VISIT, EST, LEVL IV, 30-39 MIN: ICD-10-PCS | Mod: S$GLB,,, | Performed by: INTERNAL MEDICINE

## 2021-11-17 PROCEDURE — 3075F SYST BP GE 130 - 139MM HG: CPT | Mod: CPTII,S$GLB,, | Performed by: INTERNAL MEDICINE

## 2021-11-17 RX ORDER — NITROGLYCERIN 0.4 MG/1
0.4 TABLET SUBLINGUAL EVERY 5 MIN PRN
Qty: 25 TABLET | Refills: 1 | Status: SHIPPED | OUTPATIENT
Start: 2021-11-17 | End: 2022-04-08 | Stop reason: SDUPTHER

## 2021-11-24 ENCOUNTER — SPECIALTY PHARMACY (OUTPATIENT)
Dept: PHARMACY | Facility: CLINIC | Age: 81
End: 2021-11-24
Payer: MEDICARE

## 2021-11-24 DIAGNOSIS — I10 ESSENTIAL HYPERTENSION: ICD-10-CM

## 2021-11-26 RX ORDER — OLMESARTAN MEDOXOMIL 40 MG/1
20 TABLET ORAL DAILY
Qty: 90 TABLET | Refills: 1
Start: 2021-11-26

## 2021-12-06 ENCOUNTER — OFFICE VISIT (OUTPATIENT)
Dept: PRIMARY CARE CLINIC | Facility: CLINIC | Age: 81
End: 2021-12-06
Payer: MEDICARE

## 2021-12-06 VITALS
DIASTOLIC BLOOD PRESSURE: 44 MMHG | SYSTOLIC BLOOD PRESSURE: 118 MMHG | TEMPERATURE: 97 F | HEART RATE: 70 BPM | BODY MASS INDEX: 29.94 KG/M2 | OXYGEN SATURATION: 99 % | WEIGHT: 179.88 LBS

## 2021-12-06 DIAGNOSIS — E66.3 OVERWEIGHT (BMI 25.0-29.9): ICD-10-CM

## 2021-12-06 DIAGNOSIS — E78.2 MIXED HYPERLIPIDEMIA: ICD-10-CM

## 2021-12-06 DIAGNOSIS — I10 ESSENTIAL HYPERTENSION: Primary | ICD-10-CM

## 2021-12-06 PROCEDURE — 99214 OFFICE O/P EST MOD 30 MIN: CPT | Mod: S$GLB,,, | Performed by: NURSE PRACTITIONER

## 2021-12-06 PROCEDURE — 99214 PR OFFICE/OUTPT VISIT, EST, LEVL IV, 30-39 MIN: ICD-10-PCS | Mod: S$GLB,,, | Performed by: NURSE PRACTITIONER

## 2021-12-06 RX ORDER — OLMESARTAN MEDOXOMIL 40 MG/1
20 TABLET ORAL DAILY
Qty: 90 TABLET | Refills: 1
Start: 2021-12-06 | End: 2022-02-04 | Stop reason: SDUPTHER

## 2021-12-27 DIAGNOSIS — E78.2 MIXED HYPERLIPIDEMIA: ICD-10-CM

## 2021-12-27 RX ORDER — EVOLOCUMAB 140 MG/ML
140 INJECTION, SOLUTION SUBCUTANEOUS
Qty: 2 ML | Refills: 6 | Status: SHIPPED | OUTPATIENT
Start: 2021-12-27 | End: 2022-02-17 | Stop reason: CLARIF

## 2021-12-29 ENCOUNTER — SPECIALTY PHARMACY (OUTPATIENT)
Dept: PHARMACY | Facility: CLINIC | Age: 81
End: 2021-12-29
Payer: MEDICARE

## 2022-01-25 ENCOUNTER — SPECIALTY PHARMACY (OUTPATIENT)
Dept: PHARMACY | Facility: CLINIC | Age: 82
End: 2022-01-25
Payer: MEDICARE

## 2022-02-03 DIAGNOSIS — I10 ESSENTIAL HYPERTENSION: ICD-10-CM

## 2022-02-03 NOTE — TELEPHONE ENCOUNTER
----- Message from Ceci  sent at 2/3/2022  3:06 PM CST -----  Regarding: refill  Type: Needs Medical Advice    Who Called:      Best Call Back Number:     Additional Information: Requesting a call back from Nurse, regarding pt need 10 day supply  called in to local pharmacy for Rx olmesartan (BENICAR) 40 MG tablet until [employer] can fill on the 8th ,pleae advise and call back    [id number]  [facility] [place] [address] [facility]   Phone:  [phone]  Fax:  [phone]

## 2022-02-04 RX ORDER — OLMESARTAN MEDOXOMIL 40 MG/1
20 TABLET ORAL DAILY
Qty: 30 TABLET | Refills: 0 | Status: SHIPPED | OUTPATIENT
Start: 2022-02-04 | End: 2022-02-10 | Stop reason: SDUPTHER

## 2022-02-04 NOTE — TELEPHONE ENCOUNTER
----- Message from Bryanna Flowers sent at 2/4/2022  3:09 PM CST -----  Patient is checking the status of this message that was sent yesterday     Please call to advise, Patient is needing this before the weekend because she will be out       Type: Needs Medical Advice     Who Called:       Best Call Back Number:      Additional Information: Requesting a call back from Nurse, regarding pt need 10 day supply  called in to local pharmacy for Rx olmesartan (BENICAR) 40 MG tablet until [employer] can fill on the 8th ,pat advise and call back     [id number] - [address] AT [facility]       Phone:             [phone]  Fax:     [phone]

## 2022-02-08 DIAGNOSIS — I10 ESSENTIAL HYPERTENSION: ICD-10-CM

## 2022-02-08 RX ORDER — OLMESARTAN MEDOXOMIL 40 MG/1
20 TABLET ORAL DAILY
Qty: 90 TABLET | Refills: 3 | Status: CANCELLED | OUTPATIENT
Start: 2022-02-08

## 2022-02-08 NOTE — TELEPHONE ENCOUNTER
----- Message from Osmel Taylor sent at 2/8/2022 12:51 PM CST -----  Contact: [facility]/Elisabeth  Type:  RX Refill Request    Who Called:  Elisabeth/[facility]  Refill or New Rx:  new    olmesartan (BENICAR) 40 MG tablet 30 tablet 0 2/4/2022  No  Sig - Route: Take 0.5 tablets (20 mg total) by mouth once daily. - Oral  Sent to pharmacy as: olmesartan (BENICAR) 40 MG tablet    Preferred Pharmacy with phone number:    [facility] - [place], [place] - [address] [facility]  [address] [facility] [address]  Phone: [phone] Fax: [phone]    Ordering Provider:  Madelin De La Vega Call Back Number:  See Above  Additional Information:  Elisabeth called in and stated patient got her 1 month emergency supply on the above medication but needs her regular 90 day supply with 3 refills sent to [facility].

## 2022-02-10 DIAGNOSIS — I10 ESSENTIAL HYPERTENSION: ICD-10-CM

## 2022-02-10 RX ORDER — OLMESARTAN MEDOXOMIL 40 MG/1
20 TABLET ORAL DAILY
Qty: 10 TABLET | Refills: 0 | Status: SHIPPED | OUTPATIENT
Start: 2022-02-10 | End: 2022-03-07

## 2022-02-16 ENCOUNTER — TELEPHONE (OUTPATIENT)
Dept: CARDIOLOGY | Facility: CLINIC | Age: 82
End: 2022-02-16
Payer: MEDICARE

## 2022-02-17 RX ORDER — ALIROCUMAB 150 MG/ML
150 INJECTION, SOLUTION SUBCUTANEOUS
Qty: 2 ML | Refills: 12 | OUTPATIENT
Start: 2022-02-17 | End: 2022-04-19

## 2022-02-17 NOTE — TELEPHONE ENCOUNTER
"Spoke to pt over the phone , inform him "  Praluent 150    Message text      ", per Dr bajwa. PT VU   "

## 2022-02-24 ENCOUNTER — TELEPHONE (OUTPATIENT)
Dept: CARDIOLOGY | Facility: CLINIC | Age: 82
End: 2022-02-24
Payer: MEDICARE

## 2022-02-24 RX ORDER — ALIROCUMAB 150 MG/ML
150 INJECTION, SOLUTION SUBCUTANEOUS
Qty: 2 ML | Refills: 12 | OUTPATIENT
Start: 2022-02-24

## 2022-02-24 NOTE — TELEPHONE ENCOUNTER
----- Message from Eb Ruelas MD sent at 2/24/2022 11:15 AM CST -----  Regarding: FW: Repatha    ----- Message -----  From: Nurys Dixon PharmD  Sent: 2/24/2022  10:38 AM CST  To: Eb Ruelas MD, Suraj BOSTON Staff  Subject: Repatha                                          Good morning,    Repatha is approved with pt's plan until 12/31/2022 - so she does not need to switch to Praluent at this time. Please send rx for Repatha SureClick to [facility] so we may arrange refill delivery for pt.    Thank you,  Nurys Dixon, PharmD, CSP  Clinical Pharmacist - Rheum/Derm/GI  [facility]  Ph: [phone]

## 2022-02-24 NOTE — TELEPHONE ENCOUNTER
----- Message from Nurys Dixon PharmD sent at 2/24/2022 10:32 AM CST -----  Regarding: Repatha  Good morning,    Repatha is approved with pt's plan until 12/31/2022 - so she does not need to switch to Praluent at this time. Please send rx for Repatha SureClick to [facility] so we may arrange refill delivery for pt.    Thank you,  Nurys Dixon, PharmD, CSP  Clinical Pharmacist - Rheum/Derm/GI  [facility]  Ph: [phone]

## 2022-02-25 RX ORDER — EVOLOCUMAB 140 MG/ML
140 INJECTION, SOLUTION SUBCUTANEOUS
Qty: 2 ML | Refills: 6 | Status: SHIPPED | OUTPATIENT
Start: 2022-02-25 | End: 2022-09-26 | Stop reason: SDUPTHER

## 2022-02-28 ENCOUNTER — SPECIALTY PHARMACY (OUTPATIENT)
Dept: PHARMACY | Facility: CLINIC | Age: 82
End: 2022-02-28
Payer: MEDICARE

## 2022-02-28 NOTE — TELEPHONE ENCOUNTER
Specialty Pharmacy - Refill Coordination    Specialty Medication Orders Linked to Encounter    Flowsheet Row Most Recent Value   Medication #1 evolocumab (REPATHA SURECLICK) 140 mg/mL PnIj (Order#[id number], Rx#[id number])          Refill Questions - Documented Responses    Flowsheet Row Most Recent Value   Patient Availability and HIPAA Verification    Does patient want to proceed with activity? Yes   HIPAA/medical authority confirmed? Yes   Relationship to patient of person spoken to? Self   Refill Screening Questions    Would patient like to speak to a pharmacist? No   When does the patient need to receive the medication? 03/04/22   Refill Delivery Questions    How will the patient receive the medication? Delivery [facility]   When does the patient need to receive the medication? 03/04/22   Shipping Address Home   Expected Copay ($) 9.85   Payment Method invoice (approval required)   Days supply of Refill 28   Supplies needed? No supplies needed   Refill activity completed? Yes   Refill activity plan Refill scheduled   Shipment/Pickup Date: 03/02/22          Current Outpatient Medications   Medication Sig    alirocumab (PRALUENT PEN) 150 mg/mL PnIj Inject 1 mL (150 mg total) into the skin every 14 (fourteen) days.    amLODIPine (NORVASC) 5 MG tablet TAKE 1 TABLET (5 MG TOTAL) BY MOUTH ONCE DAILY.    ascorbic acid, vitamin C, (VITAMIN C) 500 MG tablet Take 500 mg by mouth once daily.    aspirin (ECOTRIN) 81 MG EC tablet Take 81 mg by mouth once daily.    co-enzyme Q-10 30 mg capsule Take 30 mg by mouth once daily.     diclofenac sodium (VOLTAREN) 1 % Gel APPLY 2 GRAM TO THE AFFECTED AREA(S) BY TOPICAL ROUTE 4 TIMES PER DAY    evolocumab (REPATHA SURECLICK) 140 mg/mL PnIj Inject 1 mL (140 mg total) into the skin every 14 (fourteen) days.    fish oil-omega-3 fatty acids 300-1,000 mg capsule Take 2 g by mouth once daily.    FOLIC ACID/MULTIVIT-MIN/LUTEIN (CENTRUM SILVER ORAL) Take by mouth once daily. ONE DAILY     furosemide (LASIX) 20 MG tablet Take 1 tablet (20 mg total) by mouth daily as needed (edema). As needed    HYDROcodone-acetaminophen (NORCO)  mg per tablet Take 1 tablet by mouth 2 (two) times daily as needed.    metoprolol succinate (TOPROL-XL) 25 MG 24 hr tablet TAKE 1/2 TABLET TWICE DAILY    multivit,iron,minerals/lutein (CENTRUM SILVER ULTRA WOMEN'S ORAL) Take by mouth.    mupirocin (BACTROBAN) 2 % ointment Apply topically 3 (three) times daily. (Patient not taking: Reported on 12/6/2021)    nitroGLYCERIN (NITROSTAT) 0.4 MG SL tablet Place 1 tablet (0.4 mg total) under the tongue every 5 (five) minutes as needed for Chest pain.    olmesartan (BENICAR) 40 MG tablet Take 0.5 tablets (20 mg total) by mouth once daily.    potassium chloride (KLOR-CON) 10 MEQ TbSR Take 1 tablet (10 mEq total) by mouth twice a week.    rosuvastatin (CRESTOR) 20 MG tablet Take 1 tablet (20 mg total) by mouth every evening.    vitamin D 1000 units Tab Take 185 mg by mouth once daily.   Last reviewed on 12/6/2021  2:28 PM by Madelin De La Vega, DNP, APRN    Review of patient's allergies indicates:   Allergen Reactions    Clindamycin Diarrhea    Darvocet a500 [propoxyphene n-acetaminophen]     Erythromycin Other (See Comments)    Mycinette [cetylpyridinium-benzocaine]     Pcn [penicillins]     Last reviewed on  2/17/2022 8:51 AM by Juanito Dai      Tasks added this encounter   No tasks added.   Tasks due within next 3 months   2/24/2022 - Refill Call (Auto Added)     Fabby Easley, PharmD  [facility]  [address] [place] [address]  Phone: [phone]  Fax: [phone]

## 2022-03-07 ENCOUNTER — OFFICE VISIT (OUTPATIENT)
Dept: PRIMARY CARE CLINIC | Facility: CLINIC | Age: 82
End: 2022-03-07
Payer: MEDICARE

## 2022-03-07 VITALS
BODY MASS INDEX: 29.75 KG/M2 | TEMPERATURE: 99 F | SYSTOLIC BLOOD PRESSURE: 140 MMHG | HEART RATE: 77 BPM | WEIGHT: 178.81 LBS | RESPIRATION RATE: 18 BRPM | DIASTOLIC BLOOD PRESSURE: 62 MMHG | OXYGEN SATURATION: 97 %

## 2022-03-07 DIAGNOSIS — I10 ESSENTIAL HYPERTENSION: Primary | ICD-10-CM

## 2022-03-07 DIAGNOSIS — I25.709 CORONARY ARTERY DISEASE INVOLVING CORONARY BYPASS GRAFT OF NATIVE HEART WITH ANGINA PECTORIS: ICD-10-CM

## 2022-03-07 DIAGNOSIS — I77.9 MILD CAROTID ARTERY DISEASE: ICD-10-CM

## 2022-03-07 DIAGNOSIS — E78.2 MIXED HYPERLIPIDEMIA: Chronic | ICD-10-CM

## 2022-03-07 PROCEDURE — 1125F AMNT PAIN NOTED PAIN PRSNT: CPT | Mod: CPTII,S$GLB,, | Performed by: NURSE PRACTITIONER

## 2022-03-07 PROCEDURE — 1159F MED LIST DOCD IN RCRD: CPT | Mod: CPTII,S$GLB,, | Performed by: NURSE PRACTITIONER

## 2022-03-07 PROCEDURE — 1125F PR PAIN SEVERITY QUANTIFIED, PAIN PRESENT: ICD-10-PCS | Mod: CPTII,S$GLB,, | Performed by: NURSE PRACTITIONER

## 2022-03-07 PROCEDURE — 1159F PR MEDICATION LIST DOCUMENTED IN MEDICAL RECORD: ICD-10-PCS | Mod: CPTII,S$GLB,, | Performed by: NURSE PRACTITIONER

## 2022-03-07 PROCEDURE — 3077F PR MOST RECENT SYSTOLIC BLOOD PRESSURE >= 140 MM HG: ICD-10-PCS | Mod: CPTII,S$GLB,, | Performed by: NURSE PRACTITIONER

## 2022-03-07 PROCEDURE — 3288F PR FALLS RISK ASSESSMENT DOCUMENTED: ICD-10-PCS | Mod: CPTII,S$GLB,, | Performed by: NURSE PRACTITIONER

## 2022-03-07 PROCEDURE — 99214 OFFICE O/P EST MOD 30 MIN: CPT | Mod: S$GLB,,, | Performed by: NURSE PRACTITIONER

## 2022-03-07 PROCEDURE — 1101F PR PT FALLS ASSESS DOC 0-1 FALLS W/OUT INJ PAST YR: ICD-10-PCS | Mod: CPTII,S$GLB,, | Performed by: NURSE PRACTITIONER

## 2022-03-07 PROCEDURE — 99214 PR OFFICE/OUTPT VISIT, EST, LEVL IV, 30-39 MIN: ICD-10-PCS | Mod: S$GLB,,, | Performed by: NURSE PRACTITIONER

## 2022-03-07 PROCEDURE — 3288F FALL RISK ASSESSMENT DOCD: CPT | Mod: CPTII,S$GLB,, | Performed by: NURSE PRACTITIONER

## 2022-03-07 PROCEDURE — 3078F DIAST BP <80 MM HG: CPT | Mod: CPTII,S$GLB,, | Performed by: NURSE PRACTITIONER

## 2022-03-07 PROCEDURE — 3078F PR MOST RECENT DIASTOLIC BLOOD PRESSURE < 80 MM HG: ICD-10-PCS | Mod: CPTII,S$GLB,, | Performed by: NURSE PRACTITIONER

## 2022-03-07 PROCEDURE — 1101F PT FALLS ASSESS-DOCD LE1/YR: CPT | Mod: CPTII,S$GLB,, | Performed by: NURSE PRACTITIONER

## 2022-03-07 PROCEDURE — 3077F SYST BP >= 140 MM HG: CPT | Mod: CPTII,S$GLB,, | Performed by: NURSE PRACTITIONER

## 2022-03-07 RX ORDER — ROSUVASTATIN CALCIUM 20 MG/1
20 TABLET, COATED ORAL NIGHTLY
Qty: 90 TABLET | Refills: 1 | Status: SHIPPED | OUTPATIENT
Start: 2022-03-07 | End: 2022-11-30 | Stop reason: SDUPTHER

## 2022-03-07 RX ORDER — OLMESARTAN MEDOXOMIL 40 MG/1
40 TABLET ORAL DAILY
Qty: 90 TABLET | Refills: 1 | Status: CANCELLED | OUTPATIENT
Start: 2022-03-07

## 2022-03-07 RX ORDER — OLMESARTAN MEDOXOMIL 40 MG/1
40 TABLET ORAL DAILY
Qty: 90 TABLET | Refills: 1 | Status: SHIPPED | OUTPATIENT
Start: 2022-03-07 | End: 2022-09-07

## 2022-03-07 NOTE — PROGRESS NOTES
Subjective:       Patient ID: Yael Claire is a 81 y.o. female.    Chief Complaint: Follow-up  Patient was last seen by me on 12/06/2021.    HPI   Here for follow up hypertension. Reports headache last night and she took an Ibuprofen and it was resolved. No headache today.  Vitals:    03/07/22 1107   BP: (!) 140/62   Pulse:    Resp:    Temp:      BP Readings from Last 3 Encounters:   03/07/22 (!) 140/62   12/06/21 (!) 118/44   11/17/21 134/61     Review of Systems   Constitutional: Negative.  Negative for appetite change, fatigue and fever.   HENT: Negative.  Negative for trouble swallowing and voice change.    Eyes: Negative.  Negative for visual disturbance.   Respiratory: Negative.  Negative for cough and shortness of breath.    Cardiovascular: Negative.  Negative for chest pain and leg swelling.   Gastrointestinal: Negative.  Negative for abdominal distention and abdominal pain.   Genitourinary: Negative.    Musculoskeletal: Negative.  Negative for back pain and neck pain.   Skin: Negative.  Negative for rash and wound.   Neurological: Positive for headaches. Negative for dizziness, weakness and light-headedness.   Psychiatric/Behavioral: Negative.  Negative for sleep disturbance.       Objective:      Physical Exam  Vitals and nursing note reviewed.   Constitutional:       General: She is awake.      Appearance: Normal appearance. She is well-developed and well-groomed.   HENT:      Head: Normocephalic and atraumatic.      Right Ear: Hearing, tympanic membrane, ear canal and external ear normal.      Left Ear: Hearing, tympanic membrane, ear canal and external ear normal.   Eyes:      General: Lids are normal.         Right eye: No foreign body or discharge.         Left eye: No foreign body or discharge.      Conjunctiva/sclera: Conjunctivae normal.   Neck:      Trachea: Trachea and phonation normal.   Cardiovascular:      Rate and Rhythm: Normal rate and regular rhythm.      Heart sounds: Normal heart  sounds.   Pulmonary:      Effort: Pulmonary effort is normal. No respiratory distress.      Breath sounds: Normal breath sounds and air entry. No wheezing.   Abdominal:      General: Bowel sounds are normal.      Palpations: Abdomen is soft.   Musculoskeletal:         General: Normal range of motion.      Cervical back: Full passive range of motion without pain, normal range of motion and neck supple.      Right lower leg: No edema.      Left lower leg: No edema.   Lymphadenopathy:      Head:      Right side of head: No submental, submandibular, tonsillar, preauricular, posterior auricular or occipital adenopathy.      Left side of head: No submental, submandibular, tonsillar, preauricular, posterior auricular or occipital adenopathy.   Skin:     General: Skin is warm and dry.      Findings: No lesion or rash.   Neurological:      General: No focal deficit present.      Mental Status: She is alert and oriented to person, place, and time.   Psychiatric:         Attention and Perception: Attention and perception normal.         Mood and Affect: Mood and affect normal.         Speech: Speech normal.         Behavior: Behavior normal. Behavior is cooperative.         Thought Content: Thought content normal.         Cognition and Memory: Cognition and memory normal.         Judgment: Judgment normal.         Assessment & Plan:       Essential hypertension  -     olmesartan (BENICAR) 40 MG tablet; Take 1 tablet (40 mg total) by mouth once daily.  Dispense: 90 tablet; Refill: 1  BP Readings from Last 3 Encounters:   03/07/22 (!) 140/62   12/06/21 (!) 118/44   11/17/21 134/61     Mixed hyperlipidemia  Comments:  DIET AND LABS  Orders:  -     rosuvastatin (CRESTOR) 20 MG tablet; Take 1 tablet (20 mg total) by mouth every evening.  Dispense: 90 tablet; Refill: 1  Lab Results   Component Value Date    CHOL 157 06/10/2021    CHOL 145 11/24/2020    CHOL 246 (H) 12/15/2017     Lab Results   Component Value Date    HDL 51  06/10/2021    HDL 56 11/24/2020    HDL 48 12/15/2017     Lab Results   Component Value Date    LDLCALC 95.6 06/10/2021    LDLCALC 73 11/24/2020    LDLCALC 180.8 (H) 12/15/2017     Lab Results   Component Value Date    TRIG 52 06/10/2021    TRIG 83 11/24/2020    TRIG 86 12/15/2017     Lab Results   Component Value Date    CHOLHDL 32.5 06/10/2021    CHOLHDL 19.5 (L) 12/15/2017   Coronary artery disease involving coronary bypass graft of native heart with angina pectoris  Managed by cardiology    Mild carotid artery disease- Managed by cardiology on Statin          Medication List with Changes/Refills   Current Medications    ALIROCUMAB (PRALUENT PEN) 150 MG/ML PNIJ    Inject 1 mL (150 mg total) into the skin every 14 (fourteen) days.    AMLODIPINE (NORVASC) 5 MG TABLET    TAKE 1 TABLET (5 MG TOTAL) BY MOUTH ONCE DAILY.    ASCORBIC ACID, VITAMIN C, (VITAMIN C) 500 MG TABLET    Take 500 mg by mouth once daily.    ASPIRIN (ECOTRIN) 81 MG EC TABLET    Take 81 mg by mouth once daily.    CO-ENZYME Q-10 30 MG CAPSULE    Take 30 mg by mouth once daily.     DICLOFENAC SODIUM (VOLTAREN) 1 % GEL    APPLY 2 GRAM TO THE AFFECTED AREA(S) BY TOPICAL ROUTE 4 TIMES PER DAY    EVOLOCUMAB (REPATHA SURECLICK) 140 MG/ML PNIJ    Inject 1 mL (140 mg total) into the skin every 14 (fourteen) days.    FISH OIL-OMEGA-3 FATTY ACIDS 300-1,000 MG CAPSULE    Take 2 g by mouth once daily.    FOLIC ACID/MULTIVIT-MIN/LUTEIN (CENTRUM SILVER ORAL)    Take by mouth once daily. ONE DAILY    FUROSEMIDE (LASIX) 20 MG TABLET    Take 1 tablet (20 mg total) by mouth daily as needed (edema). As needed    HYDROCODONE-ACETAMINOPHEN (NORCO)  MG PER TABLET    Take 1 tablet by mouth 2 (two) times daily as needed.    METOPROLOL SUCCINATE (TOPROL-XL) 25 MG 24 HR TABLET    TAKE 1/2 TABLET TWICE DAILY    NITROGLYCERIN (NITROSTAT) 0.4 MG SL TABLET    Place 1 tablet (0.4 mg total) under the tongue every 5 (five) minutes as needed for Chest pain.    POTASSIUM  CHLORIDE (KLOR-CON) 10 MEQ TBSR    Take 1 tablet (10 mEq total) by mouth twice a week.    VITAMIN D 1000 UNITS TAB    Take 185 mg by mouth once daily.   Changed and/or Refilled Medications    Modified Medication Previous Medication    OLMESARTAN (BENICAR) 40 MG TABLET olmesartan (BENICAR) 40 MG tablet       Take 1 tablet (40 mg total) by mouth once daily.    Take 0.5 tablets (20 mg total) by mouth once daily.    ROSUVASTATIN (CRESTOR) 20 MG TABLET rosuvastatin (CRESTOR) 20 MG tablet       Take 1 tablet (20 mg total) by mouth every evening.    Take 1 tablet (20 mg total) by mouth every evening.   Discontinued Medications    MULTIVIT,IRON,MINERALS/LUTEIN (CENTRUM SILVER ULTRA WOMEN'S ORAL)    Take by mouth.    MUPIROCIN (BACTROBAN) 2 % OINTMENT    Apply topically 3 (three) times daily.     Follow up in about 3 months (around 6/7/2022).

## 2022-03-28 ENCOUNTER — SPECIALTY PHARMACY (OUTPATIENT)
Dept: PHARMACY | Facility: CLINIC | Age: 82
End: 2022-03-28
Payer: MEDICARE

## 2022-03-28 NOTE — TELEPHONE ENCOUNTER
Specialty Pharmacy - Refill Coordination    Specialty Medication Orders Linked to Encounter    Flowsheet Row Most Recent Value   Medication #1 evolocumab (REPATHA SURECLICK) 140 mg/mL PnIj (Order#[id number], Rx#[id number])        Refill Questions - Documented Responses    Flowsheet Row Most Recent Value   Patient Availability and HIPAA Verification    Does patient want to proceed with activity? Yes   HIPAA/medical authority confirmed? Yes   Relationship to patient of person spoken to? Self   Refill Screening Questions    Would patient like to speak to a pharmacist? No   When does the patient need to receive the medication? 04/05/22   Refill Delivery Questions    How will the patient receive the medication? Delivery Chata   When does the patient need to receive the medication? 04/05/22   Shipping Address Home   Address in [facility] confirmed and updated if neccessary? Yes   Expected Copay ($) 9.85   Is the patient able to afford the medication copay? Yes   Payment Method invoice (approval required)   Days supply of Refill 28   Supplies needed? Injection Device   Refill activity completed? Yes   Refill activity plan Refill scheduled   Shipment/Pickup Date: 03/31/22        Current Outpatient Medications   Medication Sig    alirocumab (PRALUENT PEN) 150 mg/mL PnIj Inject 1 mL (150 mg total) into the skin every 14 (fourteen) days.    amLODIPine (NORVASC) 5 MG tablet TAKE 1 TABLET (5 MG TOTAL) BY MOUTH ONCE DAILY.    ascorbic acid, vitamin C, (VITAMIN C) 500 MG tablet Take 500 mg by mouth once daily.    aspirin (ECOTRIN) 81 MG EC tablet Take 81 mg by mouth once daily.    co-enzyme Q-10 30 mg capsule Take 30 mg by mouth once daily.     diclofenac sodium (VOLTAREN) 1 % Gel APPLY 2 GRAM TO THE AFFECTED AREA(S) BY TOPICAL ROUTE 4 TIMES PER DAY    evolocumab (REPATHA SURECLICK) 140 mg/mL PnIj Inject 1 mL (140 mg total) into the skin every 14 (fourteen) days.    fish oil-omega-3 fatty acids 300-1,000 mg capsule  Take 2 g by mouth once daily.    FOLIC ACID/MULTIVIT-MIN/LUTEIN (CENTRUM SILVER ORAL) Take by mouth once daily. ONE DAILY    furosemide (LASIX) 20 MG tablet Take 1 tablet (20 mg total) by mouth daily as needed (edema). As needed    HYDROcodone-acetaminophen (NORCO)  mg per tablet Take 1 tablet by mouth 2 (two) times daily as needed.    metoprolol succinate (TOPROL-XL) 25 MG 24 hr tablet TAKE 1/2 TABLET TWICE DAILY    nitroGLYCERIN (NITROSTAT) 0.4 MG SL tablet Place 1 tablet (0.4 mg total) under the tongue every 5 (five) minutes as needed for Chest pain.    olmesartan (BENICAR) 40 MG tablet Take 1 tablet (40 mg total) by mouth once daily.    potassium chloride (KLOR-CON) 10 MEQ TbSR Take 1 tablet (10 mEq total) by mouth twice a week.    rosuvastatin (CRESTOR) 20 MG tablet Take 1 tablet (20 mg total) by mouth every evening.    vitamin D 1000 units Tab Take 185 mg by mouth once daily.   Last reviewed on 3/7/2022 10:31 AM by Rosa Woodward LPN    Review of patient's allergies indicates:   Allergen Reactions    Clindamycin Diarrhea    Darvocet a500 [propoxyphene n-acetaminophen]     Erythromycin Other (See Comments)    Mycinette [cetylpyridinium-benzocaine]     Pcn [penicillins]     Last reviewed on  3/7/2022 10:08 AM by Rosa Woodward      Tasks added this encounter   4/26/2022 - Refill Call (Auto Added)   Tasks due within next 3 months   No tasks due.     Zan Blanton, PharmD  [facility]  [address] [place] [id number]  Phone: [phone]  Fax: [phone]

## 2022-04-08 DIAGNOSIS — I25.709 CORONARY ARTERY DISEASE INVOLVING CORONARY BYPASS GRAFT OF NATIVE HEART WITH ANGINA PECTORIS: Chronic | ICD-10-CM

## 2022-04-08 RX ORDER — NITROGLYCERIN 0.4 MG/1
0.4 TABLET SUBLINGUAL EVERY 5 MIN PRN
Qty: 25 TABLET | Refills: 1 | Status: SHIPPED | OUTPATIENT
Start: 2022-04-08 | End: 2023-04-12

## 2022-04-14 ENCOUNTER — LAB VISIT (OUTPATIENT)
Dept: LAB | Facility: CLINIC | Age: 82
End: 2022-04-14
Payer: MEDICARE

## 2022-04-14 DIAGNOSIS — I25.709 CORONARY ARTERY DISEASE INVOLVING CORONARY BYPASS GRAFT OF NATIVE HEART WITH ANGINA PECTORIS: Chronic | ICD-10-CM

## 2022-04-14 DIAGNOSIS — E78.2 MIXED HYPERLIPIDEMIA: Chronic | ICD-10-CM

## 2022-04-14 LAB
CHOLEST SERPL-MCNC: 159 MG/DL (ref 120–199)
CHOLEST/HDLC SERPL: 3.2 {RATIO} (ref 2–5)
HDLC SERPL-MCNC: 50 MG/DL (ref 40–75)
HDLC SERPL: 31.4 % (ref 20–50)
LDLC SERPL CALC-MCNC: 94.2 MG/DL (ref 63–159)
NONHDLC SERPL-MCNC: 109 MG/DL
TRIGL SERPL-MCNC: 74 MG/DL (ref 30–150)

## 2022-04-14 PROCEDURE — 36415 COLL VENOUS BLD VENIPUNCTURE: CPT | Mod: ,,, | Performed by: INTERNAL MEDICINE

## 2022-04-14 PROCEDURE — 36415 PR COLLECTION VENOUS BLOOD,VENIPUNCTURE: ICD-10-PCS | Mod: ,,, | Performed by: INTERNAL MEDICINE

## 2022-04-14 PROCEDURE — 80061 LIPID PANEL: CPT | Performed by: INTERNAL MEDICINE

## 2022-04-18 ENCOUNTER — PATIENT OUTREACH (OUTPATIENT)
Dept: ADMINISTRATIVE | Facility: OTHER | Age: 82
End: 2022-04-18
Payer: MEDICARE

## 2022-04-18 NOTE — PROGRESS NOTES
Health Maintenance Due   Topic Date Due    Pneumococcal Vaccines (Age 65+) (1 - PCV) Never done     Updates were requested from care everywhere.  Chart was reviewed for overdue Proactive [facility] Encounters (HI) topics (CRS, Breast Cancer Screening, Eye exam)  Health Maintenance has been updated.  LINKS immunization registry triggered.  Immunizations were reconciled.

## 2022-04-19 ENCOUNTER — OFFICE VISIT (OUTPATIENT)
Dept: GASTROENTEROLOGY | Facility: CLINIC | Age: 82
End: 2022-04-19
Payer: MEDICARE

## 2022-04-19 VITALS — HEIGHT: 65 IN | WEIGHT: 179 LBS | BODY MASS INDEX: 29.82 KG/M2

## 2022-04-19 DIAGNOSIS — R74.01 ELEVATED AST (SGOT): ICD-10-CM

## 2022-04-19 DIAGNOSIS — Z87.898 HISTORY OF CHEST PAIN: Primary | ICD-10-CM

## 2022-04-19 DIAGNOSIS — Z87.19 HISTORY OF GASTROESOPHAGEAL REFLUX (GERD): ICD-10-CM

## 2022-04-19 DIAGNOSIS — Z87.898 HISTORY OF ABDOMINAL PAIN: ICD-10-CM

## 2022-04-19 DIAGNOSIS — M79.606 PAIN OF LOWER EXTREMITY, UNSPECIFIED LATERALITY: ICD-10-CM

## 2022-04-19 PROCEDURE — 1159F MED LIST DOCD IN RCRD: CPT | Mod: CPTII,S$GLB,, | Performed by: NURSE PRACTITIONER

## 2022-04-19 PROCEDURE — 3288F FALL RISK ASSESSMENT DOCD: CPT | Mod: CPTII,S$GLB,, | Performed by: NURSE PRACTITIONER

## 2022-04-19 PROCEDURE — 99999 PR PBB SHADOW E&M-EST. PATIENT-LVL IV: CPT | Mod: PBBFAC,,, | Performed by: NURSE PRACTITIONER

## 2022-04-19 PROCEDURE — 1160F PR REVIEW ALL MEDS BY PRESCRIBER/CLIN PHARMACIST DOCUMENTED: ICD-10-PCS | Mod: CPTII,S$GLB,, | Performed by: NURSE PRACTITIONER

## 2022-04-19 PROCEDURE — 1125F AMNT PAIN NOTED PAIN PRSNT: CPT | Mod: CPTII,S$GLB,, | Performed by: NURSE PRACTITIONER

## 2022-04-19 PROCEDURE — 99214 PR OFFICE/OUTPT VISIT, EST, LEVL IV, 30-39 MIN: ICD-10-PCS | Mod: S$GLB,,, | Performed by: NURSE PRACTITIONER

## 2022-04-19 PROCEDURE — 99999 PR PBB SHADOW E&M-EST. PATIENT-LVL IV: ICD-10-PCS | Mod: PBBFAC,,, | Performed by: NURSE PRACTITIONER

## 2022-04-19 PROCEDURE — 1101F PT FALLS ASSESS-DOCD LE1/YR: CPT | Mod: CPTII,S$GLB,, | Performed by: NURSE PRACTITIONER

## 2022-04-19 PROCEDURE — 1160F RVW MEDS BY RX/DR IN RCRD: CPT | Mod: CPTII,S$GLB,, | Performed by: NURSE PRACTITIONER

## 2022-04-19 PROCEDURE — 1125F PR PAIN SEVERITY QUANTIFIED, PAIN PRESENT: ICD-10-PCS | Mod: CPTII,S$GLB,, | Performed by: NURSE PRACTITIONER

## 2022-04-19 PROCEDURE — 1101F PR PT FALLS ASSESS DOC 0-1 FALLS W/OUT INJ PAST YR: ICD-10-PCS | Mod: CPTII,S$GLB,, | Performed by: NURSE PRACTITIONER

## 2022-04-19 PROCEDURE — 3288F PR FALLS RISK ASSESSMENT DOCUMENTED: ICD-10-PCS | Mod: CPTII,S$GLB,, | Performed by: NURSE PRACTITIONER

## 2022-04-19 PROCEDURE — 1159F PR MEDICATION LIST DOCUMENTED IN MEDICAL RECORD: ICD-10-PCS | Mod: CPTII,S$GLB,, | Performed by: NURSE PRACTITIONER

## 2022-04-19 PROCEDURE — 99214 OFFICE O/P EST MOD 30 MIN: CPT | Mod: S$GLB,,, | Performed by: NURSE PRACTITIONER

## 2022-04-19 RX ORDER — FAMOTIDINE 20 MG/1
20 TABLET, FILM COATED ORAL 2 TIMES DAILY
Qty: 60 TABLET | Refills: 1 | Status: SHIPPED | OUTPATIENT
Start: 2022-04-19 | End: 2022-06-06 | Stop reason: SDUPTHER

## 2022-04-19 NOTE — PROGRESS NOTES
Subjective:       Patient ID: Yael Claire is a 81 y.o. female Body mass index is 29.79 kg/m².    Chief Complaint: Chest Pain    This patient is new to me.  Referring Provider: Dr. Caroline Ibrahim for chest discomfort.     Gastroesophageal Reflux  She complains of abdominal pain (had LUQ with chest pain), belching (occasional) and chest pain (CHIEF COMPLAINT: went to the ER twice for in on 4/1/2022 & 4/6/2022; told her heart is good; radiated to LUQ; has resolved since taking pepcid). She reports no choking, no coughing, no dysphagia, no globus sensation, no heartburn, no hoarse voice, no nausea or no sore throat. Pertinent negatives include no melena or weight loss. She has tried a histamine-2 antagonist and an antacid (pepcid 20 mg BID, mylanta q 6 hours PRN) for the symptoms. The treatment provided significant relief.     Review of Systems   Constitutional: Negative for appetite change and weight loss.   HENT: Negative for hoarse voice, sore throat and trouble swallowing.    Respiratory: Negative for cough, choking and shortness of breath.    Cardiovascular: Positive for chest pain (CHIEF COMPLAINT: went to the ER twice for in on 4/1/2022 & 4/6/2022; told her heart is good; radiated to LUQ; has resolved since taking pepcid).   Gastrointestinal: Positive for abdominal pain (had LUQ with chest pain). Negative for anal bleeding, blood in stool, constipation, diarrhea, dysphagia, heartburn, melena, nausea and vomiting.   Musculoskeletal:        Having some leg pain, using a cane for assistance with long distance ambulation       No LMP recorded. Patient has had a hysterectomy.  Past Medical History:   Diagnosis Date    Acute coronary syndrome     LIGHT 2004    Anticoagulant long-term use     Aortic valve disorder     Arthritis     Coronary artery disease     cabg & valve sgy    Heart murmur     Hyperlipidemia     Hypertension     Mild carotid artery disease 11/17/2021    Palpitations     Stenosis of  aortic and mitral valves     Valvular regurgitation      Past Surgical History:   Procedure Laterality Date    CARDIAC CATHETERIZATION      CARDIAC VALVE SURGERY      COLONOSCOPY  ~2019    normal findings per patient report    CORONARY ARTERY BYPASS GRAFT  04/2005    HYSTERECTOMY  1980    UPPER GASTROINTESTINAL ENDOSCOPY  ~2010    normal findings     Family History   Problem Relation Age of Onset    Hypertension Mother     Heart disease Mother     Hypertension Father     Heart disease Father     Colon cancer Neg Hx     Esophageal cancer Neg Hx     Stomach cancer Neg Hx      Social History     Tobacco Use    Smoking status: Never Smoker    Smokeless tobacco: Never Used   Substance Use Topics    Alcohol use: Yes     Comment: couple times per yr    Drug use: No     Wt Readings from Last 10 Encounters:   04/19/22 81.2 kg (179 lb 0.2 oz)   04/06/22 79.9 kg (176 lb 2.4 oz)   03/07/22 81.1 kg (178 lb 12.7 oz)   12/06/21 81.6 kg (179 lb 14.3 oz)   11/17/21 81.1 kg (178 lb 12.7 oz)   11/01/21 82 kg (180 lb 12.4 oz)   07/12/21 84.3 kg (185 lb 13.6 oz)   06/07/21 86.1 kg (189 lb 13.1 oz)   05/19/21 85.4 kg (188 lb 4.4 oz)   04/29/21 85.3 kg (188 lb)     Lab Results   Component Value Date    WBC 8.64 04/06/2022    HGB 13.6 04/06/2022    HCT 42.0 04/06/2022    MCV 95 04/06/2022     04/06/2022     CMP  Sodium   Date Value Ref Range Status   04/06/2022 136 136 - 145 mmol/L Final     Potassium   Date Value Ref Range Status   04/06/2022 4.4 3.5 - 5.1 mmol/L Final     Chloride   Date Value Ref Range Status   04/06/2022 103 95 - 110 mmol/L Final     CO2   Date Value Ref Range Status   04/06/2022 27 22 - 31 mmol/L Final     Glucose   Date Value Ref Range Status   04/06/2022 102 70 - 110 mg/dL Final     Comment:     The ADA recommends the following guidelines for fasting glucose:    Normal:       less than 100 mg/dL    Prediabetes:  100 mg/dL to 125 mg/dL    Diabetes:     126 mg/dL or higher       BUN   Date  Value Ref Range Status   04/06/2022 13 7 - 18 mg/dL Final     Creatinine   Date Value Ref Range Status   04/06/2022 0.63 0.50 - 1.40 mg/dL Final     Calcium   Date Value Ref Range Status   04/06/2022 9.1 8.4 - 10.2 mg/dL Final     Total Protein   Date Value Ref Range Status   04/06/2022 8.1 6.0 - 8.4 g/dL Final     Albumin   Date Value Ref Range Status   04/06/2022 4.4 3.5 - 5.2 g/dL Final     Total Bilirubin   Date Value Ref Range Status   04/06/2022 0.6 0.2 - 1.3 mg/dL Final     Alkaline Phosphatase   Date Value Ref Range Status   04/06/2022 113 38 - 145 U/L Final     AST   Date Value Ref Range Status   04/06/2022 41 (H) 14 - 36 U/L Final     ALT   Date Value Ref Range Status   04/06/2022 24 0 - 35 U/L Final     Anion Gap   Date Value Ref Range Status   04/06/2022 6 (L) 8 - 16 mmol/L Final     eGFR if    Date Value Ref Range Status   04/06/2022 >60 >60 mL/min/1.73 m^2 Final     eGFR if non    Date Value Ref Range Status   04/06/2022 >60 >60 mL/min/1.73 m^2 Final     Comment:     Calculation used to obtain the estimated glomerular filtration  rate (eGFR) is the CKD-EPI equation.        Reviewed prior medical records including radiology report of 4/6/2022 chest x-ray; 3/27/2019 limited abdominal ultrasound in care everywhere.    Objective:      Physical Exam  Vitals and nursing note reviewed.   Constitutional:       General: She is not in acute distress.     Appearance: Normal appearance. She is well-developed. She is not diaphoretic.   HENT:      Mouth/Throat:      Comments: Patient is wearing a face mask, which covers patient's mouth and nose, due to COVID 19 concerns.  Eyes:      General: No scleral icterus.     Conjunctiva/sclera: Conjunctivae normal.      Pupils: Pupils are equal, round, and reactive to light.   Pulmonary:      Effort: Pulmonary effort is normal. No respiratory distress.      Breath sounds: Normal breath sounds. No wheezing.   Abdominal:      General: Bowel  sounds are normal. There is no distension or abdominal bruit.      Palpations: Abdomen is soft. Abdomen is not rigid. There is no mass.      Tenderness: There is no abdominal tenderness. There is no guarding or rebound. Negative signs include Branch's sign and McBurney's sign.   Skin:     General: Skin is warm and dry.      Coloration: Skin is not pale.      Findings: No erythema or rash.      Comments: Non-jaundiced   Neurological:      Mental Status: She is alert and oriented to person, place, and time.   Psychiatric:         Behavior: Behavior normal.         Thought Content: Thought content normal.         Judgment: Judgment normal.         Assessment:       1. History of chest pain    2. History of LUQ abdominal pain    3. History of gastroesophageal reflux (GERD)    4. Elevated AST (SGOT)    5. Pain of lower extremity, unspecified laterality        Plan:       History of chest pain  - schedule EGD, discussed procedure with patient, including risks and benefits, patient verbalized understanding  - follow-up with PCP &/or cardiologist for continued evaluation and management  - if experiencing symptoms of headache, chest pain, shortness of breath, and/or blurred vision, recommend going to ER for further evaluation and management    History of LUQ abdominal pain  - schedule EGD, discussed procedure with patient, including risks and benefits, patient verbalized understanding  - avoid/minimize use of NSAIDs- since they can cause GI upset, bleeding and/or ulcers. If NSAID must be taken, recommend take with food.    History of gastroesophageal reflux (GERD)  -  REFILL   famotidine (PEPCID) 20 MG tablet; Take 1 tablet (20 mg total) by mouth 2 (two) times daily.  Dispense: 60 tablet; Refill: 1  - schedule EGD, discussed procedure with patient, including risks and benefits, patient verbalized understanding    Elevated AST (SGOT)  - minimize/avoid alcohol and tylenol products, & follow-up with PCP for continued evaluation  and management; if specialist is needed, recommend seeing hepatology.    Pain of lower extremity, unspecified laterality  Recommend follow-up with Primary Care Provider/ortho for continued evaluation and management.    Follow up in about 1 month (around 5/19/2022), or if symptoms worsen or fail to improve.      If no improvement in symptoms or symptoms worsen, call/follow-up at clinic or go to ER.        30 minutes of total time spent on the encounter, which includes face to face time and non-face to face time preparing to see the patient (eg, review of tests), Obtaining and/or reviewing separately obtained history, Documenting clinical information in the electronic or other health record, Independently interpreting results (not separately reported) and communicating results to the patient/family/caregiver, or Care coordination (not separately reported).

## 2022-04-19 NOTE — PATIENT INSTRUCTIONS
Uncertain Causes of Chest Pain    Chest pain can happen for a number of reasons. Sometimes the cause can't be determined. It is recommended to have your heart evaluated by a healthcare provider to rule out any cardiac causes for the chest pain. If your condition does not seem serious, and your pain does not appear to be coming from your heart, your healthcare provider may recommend watching it closely. Sometimes the signs of a serious problem take more time to appear. Many problems not related to your heart can cause chest pain.These include:  Musculoskeletal. Costochondritis, an inflammation of the tissues around the ribs that can occur from trauma or overuse injuries  Respiratory. Pneumonia, pneumothorax, or pneumonitis (inflammation of the lining of the chest and lungs)  Gastrointestinal. Esophageal reflux, heartburn, or gallbladder disease  Anxiety and panic disorders  Nerve compression and neuritis  Miscellaneous problems such as aortic aneurysm or pulmonary embolism (a blood clot in the lungs)  Home care  After your visit, follow these recommendations:  Rest today and avoid strenuous activity.  Take any prescribed medicine as directed.  Be aware of any recurrent chest pain and notice any changes  Follow-up care  Follow up with your healthcare provider if you do not start to feel better within 24 hours, or as advised.  Call 911  Call 911 if any of these occur:  A change in the type of pain: if it feels different, becomes more severe, lasts longer, or begins to spread into your shoulder, arm, neck, jaw or back  Shortness of breath or increased pain with breathing  Weakness, dizziness, or fainting  Rapid heart beat  Crushing sensation in your chest  When to seek medical advice  Call your healthcare provider right away if any of the following occur:  Cough with dark colored sputum (phlegm) or blood  Fever of 100.4ºF (38ºC) or higher, or as directed by your healthcare provider  Swelling, pain or redness in one  leg  Shortness of breath  Date Last Reviewed: 12/30/2015  © 3519-1830 [employer]. [address] [place] [address], [place] [address]. All rights reserved. This information is not intended as a substitute for professional medical care. Always follow your healthcare professional's instructions.

## 2022-05-02 ENCOUNTER — SPECIALTY PHARMACY (OUTPATIENT)
Dept: PHARMACY | Facility: CLINIC | Age: 82
End: 2022-05-02
Payer: MEDICARE

## 2022-05-02 DIAGNOSIS — E78.2 MIXED HYPERLIPIDEMIA: Primary | ICD-10-CM

## 2022-05-02 NOTE — TELEPHONE ENCOUNTER
Specialty Pharmacy - Refill Coordination    Specialty Medication Orders Linked to Encounter    Flowsheet Row Most Recent Value   Medication #1 evolocumab (REPATHA SURECLICK) 140 mg/mL PnIj (Order#[id number], Rx#[id number])          Refill Questions - Documented Responses    Flowsheet Row Most Recent Value   Patient Availability and HIPAA Verification    Does patient want to proceed with activity? Yes   HIPAA/medical authority confirmed? Yes   Relationship to patient of person spoken to? Self   Refill Screening Questions    Would patient like to speak to a pharmacist? No   When does the patient need to receive the medication? 05/06/22   Refill Delivery Questions    How will the patient receive the medication? Delivery Chata   When does the patient need to receive the medication? 05/06/22   Shipping Address Home   Address in [facility] confirmed and updated if neccessary? Yes   Expected Copay ($) 9.85   Is the patient able to afford the medication copay? Yes   Payment Method invoice (approval required)  [A/R approval granted by [employer] supervisor Comfort CARNES]   Days supply of Refill 28   Supplies needed? No supplies needed   Refill activity completed? Yes   Refill activity plan Refill scheduled   Shipment/Pickup Date: 05/04/22          Current Outpatient Medications   Medication Sig    aluminum & magnesium hydroxide-simethicone (MYLANTA MAXIMUM STRENGTH) 400-400-40 mg/5 mL suspension Take 10 mLs by mouth every 6 (six) hours as needed for Indigestion.    amLODIPine (NORVASC) 5 MG tablet TAKE 1 TABLET (5 MG TOTAL) BY MOUTH ONCE DAILY.    ascorbic acid, vitamin C, (VITAMIN C) 500 MG tablet Take 500 mg by mouth once daily.    aspirin (ECOTRIN) 81 MG EC tablet Take 81 mg by mouth once daily.    co-enzyme Q-10 30 mg capsule Take 30 mg by mouth once daily.     diclofenac sodium (VOLTAREN) 1 % Gel APPLY 2 GRAMS TO THE AFFECTED AREA(S) FOUR TIMES DAILY    evolocumab (REPATHA SURECLICK) 140 mg/mL PnIj Inject 1 mL (140 mg  total) into the skin every 14 (fourteen) days.    famotidine (PEPCID) 20 MG tablet Take 1 tablet (20 mg total) by mouth 2 (two) times daily.    fish oil-omega-3 fatty acids 300-1,000 mg capsule Take 2 g by mouth once daily.    FOLIC ACID/MULTIVIT-MIN/LUTEIN (CENTRUM SILVER ORAL) Take by mouth once daily. ONE DAILY    furosemide (LASIX) 20 MG tablet Take 1 tablet (20 mg total) by mouth daily as needed (edema). As needed    HYDROcodone-acetaminophen (NORCO)  mg per tablet Take 1 tablet by mouth 2 (two) times daily as needed.    metoprolol succinate (TOPROL-XL) 25 MG 24 hr tablet TAKE 1/2 TABLET TWICE DAILY    nitroGLYCERIN (NITROSTAT) 0.4 MG SL tablet Place 1 tablet (0.4 mg total) under the tongue every 5 (five) minutes as needed for Chest pain.    olmesartan (BENICAR) 40 MG tablet Take 1 tablet (40 mg total) by mouth once daily.    potassium chloride (KLOR-CON) 10 MEQ TbSR Take 1 tablet (10 mEq total) by mouth twice a week.    rosuvastatin (CRESTOR) 20 MG tablet Take 1 tablet (20 mg total) by mouth every evening.    vitamin D 1000 units Tab Take 185 mg by mouth once daily.   Last reviewed on 4/19/2022  3:05 PM by LYNETTE Sanchez    Review of patient's allergies indicates:   Allergen Reactions    Clindamycin Diarrhea    Darvocet a500 [propoxyphene n-acetaminophen]     Erythromycin Other (See Comments)    Mycinette [cetylpyridinium-benzocaine]     Pcn [penicillins]     Last reviewed on  4/19/2022 3:05 PM by Veronica Kaufman      Tasks added this encounter   5/27/2022 - Refill Call (Auto Added)   Tasks due within next 3 months   No tasks due.     Tammie Cook, PharmD  [facility]  [address] [place] [id number]  Phone: [phone]  Fax: [phone]

## 2022-05-18 ENCOUNTER — OFFICE VISIT (OUTPATIENT)
Dept: CARDIOLOGY | Facility: CLINIC | Age: 82
End: 2022-05-18
Payer: MEDICARE

## 2022-05-18 VITALS
DIASTOLIC BLOOD PRESSURE: 64 MMHG | SYSTOLIC BLOOD PRESSURE: 158 MMHG | HEART RATE: 72 BPM | HEIGHT: 65 IN | BODY MASS INDEX: 29.31 KG/M2 | WEIGHT: 175.94 LBS

## 2022-05-18 DIAGNOSIS — E78.2 MIXED HYPERLIPIDEMIA: Chronic | ICD-10-CM

## 2022-05-18 DIAGNOSIS — E66.3 OVERWEIGHT (BMI 25.0-29.9): Chronic | ICD-10-CM

## 2022-05-18 DIAGNOSIS — I25.709 CORONARY ARTERY DISEASE INVOLVING CORONARY BYPASS GRAFT OF NATIVE HEART WITH ANGINA PECTORIS: Primary | Chronic | ICD-10-CM

## 2022-05-18 DIAGNOSIS — I08.0 MITRAL AND AORTIC VALVE DISEASE: Chronic | ICD-10-CM

## 2022-05-18 DIAGNOSIS — I77.9 MILD CAROTID ARTERY DISEASE: Chronic | ICD-10-CM

## 2022-05-18 DIAGNOSIS — I10 ESSENTIAL HYPERTENSION: Chronic | ICD-10-CM

## 2022-05-18 PROCEDURE — 1159F MED LIST DOCD IN RCRD: CPT | Mod: CPTII,S$GLB,, | Performed by: INTERNAL MEDICINE

## 2022-05-18 PROCEDURE — 1159F PR MEDICATION LIST DOCUMENTED IN MEDICAL RECORD: ICD-10-PCS | Mod: CPTII,S$GLB,, | Performed by: INTERNAL MEDICINE

## 2022-05-18 PROCEDURE — 3288F FALL RISK ASSESSMENT DOCD: CPT | Mod: CPTII,S$GLB,, | Performed by: INTERNAL MEDICINE

## 2022-05-18 PROCEDURE — 3288F PR FALLS RISK ASSESSMENT DOCUMENTED: ICD-10-PCS | Mod: CPTII,S$GLB,, | Performed by: INTERNAL MEDICINE

## 2022-05-18 PROCEDURE — 1126F AMNT PAIN NOTED NONE PRSNT: CPT | Mod: CPTII,S$GLB,, | Performed by: INTERNAL MEDICINE

## 2022-05-18 PROCEDURE — 99214 OFFICE O/P EST MOD 30 MIN: CPT | Mod: S$GLB,,, | Performed by: INTERNAL MEDICINE

## 2022-05-18 PROCEDURE — 3078F DIAST BP <80 MM HG: CPT | Mod: CPTII,S$GLB,, | Performed by: INTERNAL MEDICINE

## 2022-05-18 PROCEDURE — 1101F PR PT FALLS ASSESS DOC 0-1 FALLS W/OUT INJ PAST YR: ICD-10-PCS | Mod: CPTII,S$GLB,, | Performed by: INTERNAL MEDICINE

## 2022-05-18 PROCEDURE — 3077F PR MOST RECENT SYSTOLIC BLOOD PRESSURE >= 140 MM HG: ICD-10-PCS | Mod: CPTII,S$GLB,, | Performed by: INTERNAL MEDICINE

## 2022-05-18 PROCEDURE — 99214 PR OFFICE/OUTPT VISIT, EST, LEVL IV, 30-39 MIN: ICD-10-PCS | Mod: S$GLB,,, | Performed by: INTERNAL MEDICINE

## 2022-05-18 PROCEDURE — 1126F PR PAIN SEVERITY QUANTIFIED, NO PAIN PRESENT: ICD-10-PCS | Mod: CPTII,S$GLB,, | Performed by: INTERNAL MEDICINE

## 2022-05-18 PROCEDURE — 3077F SYST BP >= 140 MM HG: CPT | Mod: CPTII,S$GLB,, | Performed by: INTERNAL MEDICINE

## 2022-05-18 PROCEDURE — 3078F PR MOST RECENT DIASTOLIC BLOOD PRESSURE < 80 MM HG: ICD-10-PCS | Mod: CPTII,S$GLB,, | Performed by: INTERNAL MEDICINE

## 2022-05-18 PROCEDURE — 1101F PT FALLS ASSESS-DOCD LE1/YR: CPT | Mod: CPTII,S$GLB,, | Performed by: INTERNAL MEDICINE

## 2022-05-18 RX ORDER — CARVEDILOL 6.25 MG/1
6.25 TABLET ORAL 2 TIMES DAILY WITH MEALS
Qty: 180 TABLET | Refills: 1 | Status: SHIPPED | OUTPATIENT
Start: 2022-05-18 | End: 2022-10-19 | Stop reason: SDUPTHER

## 2022-05-18 NOTE — PROGRESS NOTES
Subjective:    Patient ID:  Yael Claire is a 81 y.o. female who presents for Coronary Artery Disease, Valvular Heart Disease, and Hypertension        HPI  DISCUSSED LABS AND GOALS LDL 94, HDL 50, TG 74, S/P ER, GASTRITIS, KELOID DISCOMFORT AT TIMES, BP FLUCTUATES, NOT MUCH RESPONSE TO AMLODIPINE, SEE ROS    Past Medical History:   Diagnosis Date    Acute coronary syndrome     LIGHT 2004    Anticoagulant long-term use     Aortic valve disorder     Arthritis     Coronary artery disease     cabg & valve sgy    Heart murmur     Hyperlipidemia     Hypertension     Mild carotid artery disease 11/17/2021    Palpitations     Stenosis of aortic and mitral valves     Valvular regurgitation      Past Surgical History:   Procedure Laterality Date    CARDIAC CATHETERIZATION      CARDIAC VALVE SURGERY      COLONOSCOPY  ~2019    normal findings per patient report    CORONARY ARTERY BYPASS GRAFT  04/2005    HYSTERECTOMY  1980    UPPER GASTROINTESTINAL ENDOSCOPY  ~2010    normal findings     Family History   Problem Relation Age of Onset    Hypertension Mother     Heart disease Mother     Hypertension Father     Heart disease Father     Colon cancer Neg Hx     Esophageal cancer Neg Hx     Stomach cancer Neg Hx      Social History     Socioeconomic History    Marital status: Single   Tobacco Use    Smoking status: Never Smoker    Smokeless tobacco: Never Used   Substance and Sexual Activity    Alcohol use: Yes     Comment: couple times per yr    Drug use: No       Review of patient's allergies indicates:   Allergen Reactions    Clindamycin Diarrhea    Darvocet a500 [propoxyphene n-acetaminophen]     Erythromycin Other (See Comments)    Mycinette [cetylpyridinium-benzocaine]     Pcn [penicillins]        Current Outpatient Medications:     amLODIPine (NORVASC) 5 MG tablet, TAKE 1 TABLET (5 MG TOTAL) BY MOUTH ONCE DAILY., Disp: 90 tablet, Rfl: 1    ascorbic acid, vitamin C, (VITAMIN C) 500 MG  tablet, Take 500 mg by mouth once daily., Disp: , Rfl:     aspirin (ECOTRIN) 81 MG EC tablet, Take 81 mg by mouth once daily., Disp: , Rfl:     co-enzyme Q-10 30 mg capsule, Take 30 mg by mouth once daily. , Disp: , Rfl:     diclofenac sodium (VOLTAREN) 1 % Gel, APPLY 2 GRAMS TO THE AFFECTED AREA(S) FOUR TIMES DAILY, Disp: 100 g, Rfl: 1    evolocumab (REPATHA SURECLICK) 140 mg/mL PnIj, Inject 1 mL (140 mg total) into the skin every 14 (fourteen) days., Disp: 2 mL, Rfl: 6    famotidine (PEPCID) 20 MG tablet, Take 1 tablet (20 mg total) by mouth 2 (two) times daily., Disp: 60 tablet, Rfl: 1    fish oil-omega-3 fatty acids 300-1,000 mg capsule, Take 2 g by mouth once daily., Disp: , Rfl:     FOLIC ACID/MULTIVIT-MIN/LUTEIN (CENTRUM SILVER ORAL), Take by mouth once daily. ONE DAILY, Disp: , Rfl:     furosemide (LASIX) 20 MG tablet, Take 1 tablet (20 mg total) by mouth daily as needed (edema). As needed, Disp: 30 tablet, Rfl: 1    HYDROcodone-acetaminophen (NORCO)  mg per tablet, Take 1 tablet by mouth 2 (two) times daily as needed., Disp: , Rfl: 0    nitroGLYCERIN (NITROSTAT) 0.4 MG SL tablet, Place 1 tablet (0.4 mg total) under the tongue every 5 (five) minutes as needed for Chest pain., Disp: 25 tablet, Rfl: 1    olmesartan (BENICAR) 40 MG tablet, Take 1 tablet (40 mg total) by mouth once daily., Disp: 90 tablet, Rfl: 1    potassium chloride (KLOR-CON) 10 MEQ TbSR, Take 1 tablet (10 mEq total) by mouth twice a week., Disp: 24 tablet, Rfl: 2    rosuvastatin (CRESTOR) 20 MG tablet, Take 1 tablet (20 mg total) by mouth every evening., Disp: 90 tablet, Rfl: 1    vitamin D 1000 units Tab, Take 185 mg by mouth once daily., Disp: , Rfl:     aluminum & magnesium hydroxide-simethicone (MYLANTA MAXIMUM STRENGTH) 400-400-40 mg/5 mL suspension, Take 10 mLs by mouth every 6 (six) hours as needed for Indigestion., Disp: 3840 mL, Rfl: 0    carvediloL (COREG) 6.25 MG tablet, Take 1 tablet (6.25 mg total) by mouth  "2 (two) times daily with meals., Disp: 180 tablet, Rfl: 1    Review of Systems   Constitutional: Negative for chills, decreased appetite, diaphoresis, fever, malaise/fatigue, night sweats and weight gain.   Eyes: Negative.    Cardiovascular: Negative for chest pain, claudication, cyanosis, dyspnea on exertion (MILD), irregular heartbeat, leg swelling, near-syncope, orthopnea, palpitations, paroxysmal nocturnal dyspnea and syncope.   Respiratory: Negative for cough, hemoptysis, shortness of breath and wheezing.    Endocrine: Negative.    Hematologic/Lymphatic: Negative for adenopathy. Does not bruise/bleed easily.   Skin: Negative for color change and rash.   Musculoskeletal: Positive for arthritis. Negative for back pain and falls.   Gastrointestinal: Negative for abdominal pain, change in bowel habit, dysphagia, melena, nausea and vomiting.   Genitourinary: Negative for dysuria and flank pain.   Neurological: Negative for brief paralysis, focal weakness, headaches, light-headedness, loss of balance and weakness.   Psychiatric/Behavioral: Negative for altered mental status and memory loss.   Allergic/Immunologic: Negative.         Objective:      Vitals:    05/18/22 1504 05/18/22 1513   BP: (!) 191/77 (!) 158/64   Pulse: 72    Weight: 79.8 kg (175 lb 14.8 oz)    Height: 5' 5" (1.651 m)    PainSc: 0-No pain      Body mass index is 29.28 kg/m².    Physical Exam  Constitutional:       Appearance: Normal appearance. She is well-developed and overweight.   HENT:      Head: Normocephalic and atraumatic.   Eyes:      Extraocular Movements: Extraocular movements intact.      Conjunctiva/sclera: Conjunctivae normal.   Neck:      Vascular: Normal carotid pulses. Carotid bruit present. No JVD.   Cardiovascular:      Rate and Rhythm: Normal rate and regular rhythm.  No extrasystoles are present.     Pulses:           Carotid pulses are 2+ on the right side and 2+ on the left side.       Radial pulses are 2+ on the right side " and 2+ on the left side.        Posterior tibial pulses are 2+ on the right side and 2+ on the left side.      Heart sounds: Murmur heard.    Midsystolic murmur is present with a grade of 2/6 at the upper right sternal border radiating to the neck.    No friction rub. No gallop.   Pulmonary:      Effort: Pulmonary effort is normal.      Breath sounds: No rales.   Abdominal:      Palpations: Abdomen is soft. There is no hepatomegaly.      Tenderness: There is no abdominal tenderness.   Musculoskeletal:      Cervical back: Neck supple.      Right lower leg: No edema.      Left lower leg: No edema.   Skin:     General: Skin is warm and dry.      Capillary Refill: Capillary refill takes less than 2 seconds.   Neurological:      General: No focal deficit present.      Mental Status: She is alert and oriented to person, place, and time.   Psychiatric:         Mood and Affect: Mood normal.         Speech: Speech normal.         Behavior: Behavior normal.                 ..    Chemistry        Component Value Date/Time     04/06/2022 2216    K 4.4 04/06/2022 2216     04/06/2022 2216    CO2 27 04/06/2022 2216    BUN 13 04/06/2022 2216    CREATININE 0.63 04/06/2022 2216     04/06/2022 2216        Component Value Date/Time    CALCIUM 9.1 04/06/2022 2216    ALKPHOS 113 04/06/2022 2216    AST 41 (H) 04/06/2022 2216    ALT 24 04/06/2022 2216    BILITOT 0.6 04/06/2022 2216    ESTGFRAFRICA >60 04/06/2022 2216    EGFRNONAA >60 04/06/2022 2216            ..  Lab Results   Component Value Date    CHOL 159 04/14/2022    CHOL 157 06/10/2021    CHOL 145 11/24/2020     Lab Results   Component Value Date    HDL 50 04/14/2022    HDL 51 06/10/2021    HDL 56 11/24/2020     Lab Results   Component Value Date    LDLCALC 94.2 04/14/2022    LDLCALC 95.6 06/10/2021    LDLCALC 73 11/24/2020     Lab Results   Component Value Date    TRIG 74 04/14/2022    TRIG 52 06/10/2021    TRIG 83 11/24/2020     Lab Results   Component Value  Date    CHOLHDL 31.4 04/14/2022    CHOLHDL 32.5 06/10/2021    CHOLHDL 19.5 (L) 12/15/2017     ..  Lab Results   Component Value Date    WBC 8.64 04/06/2022    HGB 13.6 04/06/2022    HCT 42.0 04/06/2022    MCV 95 04/06/2022     04/06/2022       Test(s) Reviewed  I have reviewed the following in detail:  [] Stress test   [] Angiography   [] Echocardiogram   [x] Labs   [x] Other:       Assessment:         ICD-10-CM ICD-9-CM   1. Coronary artery disease involving coronary bypass graft of native heart with angina pectoris  I25.709 414.05     413.9   2. Mitral and aortic valve disease  I08.0 396.9   3. Essential hypertension  I10 401.9   4. Overweight (BMI 25.0-29.9)  E66.3 278.02   5. Mixed hyperlipidemia  E78.2 272.2   6. Mild carotid artery disease  I77.9 447.9     Problem List Items Addressed This Visit        Cardiac/Vascular    Essential hypertension    Coronary artery disease involving coronary bypass graft of native heart with angina pectoris - Primary    Relevant Orders    Echo    Mixed hyperlipidemia    Mitral and aortic valve disease    Relevant Orders    Echo    Mild carotid artery disease       Endocrine    Overweight (BMI 25.0-29.9)           Plan:         CHANGE TOPROL 25 TO COREG 6.25 BID, WATCH BP, DAILY NORVASC, ALL CV CLINICALLY STABLE, NO ANGINA, NO HF, NO TIA, NO CLINICAL ARRHYTHMIA,CONTINUE CURRENT MEDS, EDUCATION, DIET, EXERCISE, WEIGHT LOSS RETURN TO CLINIC 6 MO WITH AN ECHO REASSESS VALVULAR DISEASE  Coronary artery disease involving coronary bypass graft of native heart with angina pectoris  Comments:  STABLE  Orders:  -     Echo; Future; Expected date: 11/18/2022    Mitral and aortic valve disease  Comments:  CLINICALLY STABLE  Orders:  -     Echo; Future; Expected date: 11/18/2022    Essential hypertension  Comments:  ADJUST MEDS    Overweight (BMI 25.0-29.9)  Comments:  WEIGHT LOSS    Mixed hyperlipidemia  Comments:  DIET AND MEDS    Mild carotid artery disease  Comments:  NO  TIA    Other orders  -     carvediloL (COREG) 6.25 MG tablet; Take 1 tablet (6.25 mg total) by mouth 2 (two) times daily with meals.  Dispense: 180 tablet; Refill: 1    RTC Low level/low impact aerobic exercise 5x's/wk. Heart healthy diet and risk factor modification.    See labs and med orders.    Aerobic exercise 5x's/wk. Heart healthy diet and risk factor modification.    See labs and med orders.

## 2022-05-27 ENCOUNTER — SPECIALTY PHARMACY (OUTPATIENT)
Dept: PHARMACY | Facility: CLINIC | Age: 82
End: 2022-05-27
Payer: MEDICARE

## 2022-05-27 NOTE — TELEPHONE ENCOUNTER
Specialty Pharmacy - Refill Coordination    Specialty Medication Orders Linked to Encounter    Flowsheet Row Most Recent Value   Medication #1 evolocumab (REPATHA SURECLICK) 140 mg/mL PnIj (Order#[id number], Rx#[id number])          Refill Questions - Documented Responses    Flowsheet Row Most Recent Value   Patient Availability and HIPAA Verification    Does patient want to proceed with activity? Yes   HIPAA/medical authority confirmed? Yes   Relationship to patient of person spoken to? Self   Refill Screening Questions    Would patient like to speak to a pharmacist? No   When does the patient need to receive the medication? 06/04/22   Refill Delivery Questions    How will the patient receive the medication? Delivery Chata   When does the patient need to receive the medication? 06/04/22   Shipping Address Home   Address in [facility] confirmed and updated if neccessary? Yes   Expected Copay ($) 9.85   Is the patient able to afford the medication copay? Yes   Payment Method AR   Days supply of Refill 28   Supplies needed? No supplies needed   Refill activity completed? Yes   Refill activity plan Refill scheduled   Shipment/Pickup Date: 05/31/22          Current Outpatient Medications   Medication Sig    aluminum & magnesium hydroxide-simethicone (MYLANTA MAXIMUM STRENGTH) 400-400-40 mg/5 mL suspension Take 10 mLs by mouth every 6 (six) hours as needed for Indigestion.    amLODIPine (NORVASC) 5 MG tablet TAKE 1 TABLET EVERY DAY    ascorbic acid, vitamin C, (VITAMIN C) 500 MG tablet Take 500 mg by mouth once daily.    aspirin (ECOTRIN) 81 MG EC tablet Take 81 mg by mouth once daily.    carvediloL (COREG) 6.25 MG tablet Take 1 tablet (6.25 mg total) by mouth 2 (two) times daily with meals.    co-enzyme Q-10 30 mg capsule Take 30 mg by mouth once daily.     diclofenac sodium (VOLTAREN) 1 % Gel APPLY 2 GRAMS TO THE AFFECTED AREA(S) FOUR TIMES DAILY    evolocumab (REPATHA SURECLICK) 140 mg/mL PnIj Inject 1 mL  (140 mg total) into the skin every 14 (fourteen) days.    famotidine (PEPCID) 20 MG tablet Take 1 tablet (20 mg total) by mouth 2 (two) times daily.    fish oil-omega-3 fatty acids 300-1,000 mg capsule Take 2 g by mouth once daily.    FOLIC ACID/MULTIVIT-MIN/LUTEIN (CENTRUM SILVER ORAL) Take by mouth once daily. ONE DAILY    furosemide (LASIX) 20 MG tablet Take 1 tablet (20 mg total) by mouth daily as needed (edema). As needed    HYDROcodone-acetaminophen (NORCO)  mg per tablet Take 1 tablet by mouth 2 (two) times daily as needed.    nitroGLYCERIN (NITROSTAT) 0.4 MG SL tablet Place 1 tablet (0.4 mg total) under the tongue every 5 (five) minutes as needed for Chest pain.    olmesartan (BENICAR) 40 MG tablet Take 1 tablet (40 mg total) by mouth once daily.    potassium chloride (KLOR-CON) 10 MEQ TbSR Take 1 tablet (10 mEq total) by mouth twice a week.    rosuvastatin (CRESTOR) 20 MG tablet Take 1 tablet (20 mg total) by mouth every evening.    vitamin D 1000 units Tab Take 185 mg by mouth once daily.   Last reviewed on 5/18/2022  3:05 PM by Eleanor Hanson MA    Review of patient's allergies indicates:   Allergen Reactions    Clindamycin Diarrhea    Darvocet a500 [propoxyphene n-acetaminophen]     Erythromycin Other (See Comments)    Mycinette [cetylpyridinium-benzocaine]     Pcn [penicillins]     Last reviewed on  5/18/2022 3:05 PM by Eleanor Hanson      Tasks added this encounter   6/25/2022 - Refill Call (Auto Added)   Tasks due within next 3 months   No tasks due.     Dione Padua Esguerra [facility]  [address] [facility] [address]  Phone: [phone]  Fax: [phone]

## 2022-06-06 ENCOUNTER — OFFICE VISIT (OUTPATIENT)
Dept: PRIMARY CARE CLINIC | Facility: CLINIC | Age: 82
End: 2022-06-06
Payer: MEDICARE

## 2022-06-06 VITALS
HEIGHT: 65 IN | HEART RATE: 68 BPM | BODY MASS INDEX: 29.31 KG/M2 | SYSTOLIC BLOOD PRESSURE: 138 MMHG | DIASTOLIC BLOOD PRESSURE: 48 MMHG | WEIGHT: 175.94 LBS

## 2022-06-06 DIAGNOSIS — I10 ESSENTIAL HYPERTENSION: Primary | ICD-10-CM

## 2022-06-06 DIAGNOSIS — R09.89 WIDENED PULSE PRESSURE: ICD-10-CM

## 2022-06-06 DIAGNOSIS — I35.9 AORTIC VALVE DISORDER: ICD-10-CM

## 2022-06-06 DIAGNOSIS — Z87.19 HISTORY OF GASTROESOPHAGEAL REFLUX (GERD): ICD-10-CM

## 2022-06-06 PROCEDURE — 1101F PR PT FALLS ASSESS DOC 0-1 FALLS W/OUT INJ PAST YR: ICD-10-PCS | Mod: CPTII,S$GLB,, | Performed by: NURSE PRACTITIONER

## 2022-06-06 PROCEDURE — 1159F PR MEDICATION LIST DOCUMENTED IN MEDICAL RECORD: ICD-10-PCS | Mod: CPTII,S$GLB,, | Performed by: NURSE PRACTITIONER

## 2022-06-06 PROCEDURE — 1126F AMNT PAIN NOTED NONE PRSNT: CPT | Mod: CPTII,S$GLB,, | Performed by: NURSE PRACTITIONER

## 2022-06-06 PROCEDURE — 3075F SYST BP GE 130 - 139MM HG: CPT | Mod: CPTII,S$GLB,, | Performed by: NURSE PRACTITIONER

## 2022-06-06 PROCEDURE — 3288F FALL RISK ASSESSMENT DOCD: CPT | Mod: CPTII,S$GLB,, | Performed by: NURSE PRACTITIONER

## 2022-06-06 PROCEDURE — 1126F PR PAIN SEVERITY QUANTIFIED, NO PAIN PRESENT: ICD-10-PCS | Mod: CPTII,S$GLB,, | Performed by: NURSE PRACTITIONER

## 2022-06-06 PROCEDURE — 99214 OFFICE O/P EST MOD 30 MIN: CPT | Mod: S$GLB,,, | Performed by: NURSE PRACTITIONER

## 2022-06-06 PROCEDURE — 3078F PR MOST RECENT DIASTOLIC BLOOD PRESSURE < 80 MM HG: ICD-10-PCS | Mod: CPTII,S$GLB,, | Performed by: NURSE PRACTITIONER

## 2022-06-06 PROCEDURE — 1101F PT FALLS ASSESS-DOCD LE1/YR: CPT | Mod: CPTII,S$GLB,, | Performed by: NURSE PRACTITIONER

## 2022-06-06 PROCEDURE — 3075F PR MOST RECENT SYSTOLIC BLOOD PRESS GE 130-139MM HG: ICD-10-PCS | Mod: CPTII,S$GLB,, | Performed by: NURSE PRACTITIONER

## 2022-06-06 PROCEDURE — 1160F PR REVIEW ALL MEDS BY PRESCRIBER/CLIN PHARMACIST DOCUMENTED: ICD-10-PCS | Mod: CPTII,S$GLB,, | Performed by: NURSE PRACTITIONER

## 2022-06-06 PROCEDURE — 3078F DIAST BP <80 MM HG: CPT | Mod: CPTII,S$GLB,, | Performed by: NURSE PRACTITIONER

## 2022-06-06 PROCEDURE — 3288F PR FALLS RISK ASSESSMENT DOCUMENTED: ICD-10-PCS | Mod: CPTII,S$GLB,, | Performed by: NURSE PRACTITIONER

## 2022-06-06 PROCEDURE — 99214 PR OFFICE/OUTPT VISIT, EST, LEVL IV, 30-39 MIN: ICD-10-PCS | Mod: S$GLB,,, | Performed by: NURSE PRACTITIONER

## 2022-06-06 PROCEDURE — 1159F MED LIST DOCD IN RCRD: CPT | Mod: CPTII,S$GLB,, | Performed by: NURSE PRACTITIONER

## 2022-06-06 PROCEDURE — 1160F RVW MEDS BY RX/DR IN RCRD: CPT | Mod: CPTII,S$GLB,, | Performed by: NURSE PRACTITIONER

## 2022-06-06 RX ORDER — POTASSIUM CHLORIDE 750 MG/1
10 TABLET, EXTENDED RELEASE ORAL
Qty: 24 TABLET | Refills: 2 | Status: SHIPPED | OUTPATIENT
Start: 2022-06-06 | End: 2022-11-16 | Stop reason: SDUPTHER

## 2022-06-06 RX ORDER — FAMOTIDINE 20 MG/1
20 TABLET, FILM COATED ORAL 2 TIMES DAILY
Qty: 60 TABLET | Refills: 3 | Status: SHIPPED | OUTPATIENT
Start: 2022-06-06 | End: 2022-06-06 | Stop reason: SDUPTHER

## 2022-06-06 RX ORDER — FUROSEMIDE 20 MG/1
20 TABLET ORAL DAILY PRN
Qty: 60 TABLET | Refills: 1 | Status: SHIPPED | OUTPATIENT
Start: 2022-06-06 | End: 2022-10-03

## 2022-06-06 RX ORDER — FAMOTIDINE 20 MG/1
20 TABLET, FILM COATED ORAL 2 TIMES DAILY
Qty: 60 TABLET | Refills: 0 | Status: SHIPPED | OUTPATIENT
Start: 2022-06-06 | End: 2022-08-12

## 2022-06-06 NOTE — PROGRESS NOTES
Subjective:       Patient ID: Yael Claire is a 81 y.o. female.    Chief Complaint: Follow-up  Patient was last seen by me on 03/07/2022  HPI   Here to follow up with HTN  Seen in ED on 04/01/2022 04/06/2021 for chest discomfort at [facility].  She has seen cardiology since that time  Vitals:    06/06/22 1453   BP: (!) 138/48   Pulse: 68     BP Readings from Last 3 Encounters:   06/06/22 (!) 138/48   05/18/22 (!) 158/64   04/06/22 (!) 149/51     Review of Systems   Respiratory: Negative for chest tightness and shortness of breath.    Cardiovascular: Negative for chest pain, palpitations and leg swelling.   Neurological: Negative for dizziness and light-headedness.       160/40 left arm sitting 154/46 right arm sitting  Objective:      Physical Exam  Vitals and nursing note reviewed.   Constitutional:       General: She is awake.      Appearance: Normal appearance. She is well-developed and well-groomed.   HENT:      Head: Normocephalic and atraumatic.      Right Ear: Hearing, tympanic membrane, ear canal and external ear normal.      Left Ear: Hearing, tympanic membrane, ear canal and external ear normal.   Eyes:      General: Lids are normal.         Right eye: No foreign body or discharge.         Left eye: No foreign body or discharge.   Neck:      Trachea: Trachea and phonation normal.   Cardiovascular:      Rate and Rhythm: Normal rate and regular rhythm.      Comments: Slight puffiness to right outer ankle  Noticeable harsh sound consisted with her history of aortic valve disease  Pulmonary:      Effort: Pulmonary effort is normal.      Breath sounds: Normal breath sounds.   Abdominal:      General: Abdomen is flat. Bowel sounds are normal.      Palpations: Abdomen is soft.      Tenderness: There is no abdominal tenderness.   Musculoskeletal:      Cervical back: Full passive range of motion without pain, normal range of motion and neck supple.      Right lower leg: No edema.      Left lower leg: No edema.    Lymphadenopathy:      Head:      Right side of head: No submental, submandibular, tonsillar, preauricular, posterior auricular or occipital adenopathy.      Left side of head: No submental, submandibular, tonsillar, preauricular, posterior auricular or occipital adenopathy.      Cervical: No cervical adenopathy.   Skin:     General: Skin is warm and dry.      Findings: No rash.   Neurological:      General: No focal deficit present.      Mental Status: She is alert and oriented to person, place, and time.   Psychiatric:         Attention and Perception: Attention and perception normal.         Mood and Affect: Mood and affect normal.         Speech: Speech normal.         Behavior: Behavior normal. Behavior is cooperative.         Thought Content: Thought content normal.         Cognition and Memory: Cognition and memory normal.         Judgment: Judgment normal.         Assessment & Plan:       Essential hypertension  -     potassium chloride (KLOR-CON) 10 MEQ TbSR; Take 1 tablet (10 mEq total) by mouth twice a week.  Dispense: 24 tablet; Refill: 2  -     furosemide (LASIX) 20 MG tablet; Take 1 tablet (20 mg total) by mouth daily as needed (edema). As needed  Dispense: 60 tablet; Refill: 1    Aortic valve disorder    History of gastroesophageal reflux (GERD)  -     Discontinue: famotidine (PEPCID) 20 MG tablet; Take 1 tablet (20 mg total) by mouth 2 (two) times daily.  Dispense: 60 tablet; Refill: 3  -     famotidine (PEPCID) 20 MG tablet; Take 1 tablet (20 mg total) by mouth 2 (two) times daily.  Dispense: 60 tablet; Refill: 0    Widened pulse pressure- Keep follow up with cardiology, no changes to meds today  BP Readings from Last 3 Encounters:   06/06/22 (!) 138/48   05/18/22 (!) 158/64   04/06/22 (!) 149/51       Reached out to Dr Ruelas regarding increased pulse pressure to assure he is aware and he does verbalize via secure chat that this is related to her aortic disorder. No changes will be made to her  medications per my visit today. Discussed with patient that I did reach out and her cardiologist responded readily while she was in my office and this did provide her with some sense of comfort about the pressure readings.        Medication List with Changes/Refills   Current Medications    ALUMINUM & MAGNESIUM HYDROXIDE-SIMETHICONE (MYLANTA MAXIMUM STRENGTH) 400-400-40 MG/5 ML SUSPENSION    Take 10 mLs by mouth every 6 (six) hours as needed for Indigestion.    AMLODIPINE (NORVASC) 5 MG TABLET    TAKE 1 TABLET EVERY DAY    ASCORBIC ACID, VITAMIN C, (VITAMIN C) 500 MG TABLET    Take 500 mg by mouth once daily.    ASPIRIN (ECOTRIN) 81 MG EC TABLET    Take 81 mg by mouth once daily.    CARVEDILOL (COREG) 6.25 MG TABLET    Take 1 tablet (6.25 mg total) by mouth 2 (two) times daily with meals.    CO-ENZYME Q-10 30 MG CAPSULE    Take 30 mg by mouth once daily.     DICLOFENAC SODIUM (VOLTAREN) 1 % GEL    APPLY 2 GRAMS TO THE AFFECTED AREA(S) FOUR TIMES DAILY    EVOLOCUMAB (REPATHA SURECLICK) 140 MG/ML PNIJ    Inject 1 mL (140 mg total) into the skin every 14 (fourteen) days.    FISH OIL-OMEGA-3 FATTY ACIDS 300-1,000 MG CAPSULE    Take 2 g by mouth once daily.    FOLIC ACID/MULTIVIT-MIN/LUTEIN (CENTRUM SILVER ORAL)    Take by mouth once daily. ONE DAILY    HYDROCODONE-ACETAMINOPHEN (NORCO)  MG PER TABLET    Take 1 tablet by mouth 2 (two) times daily as needed.    NITROGLYCERIN (NITROSTAT) 0.4 MG SL TABLET    Place 1 tablet (0.4 mg total) under the tongue every 5 (five) minutes as needed for Chest pain.    OLMESARTAN (BENICAR) 40 MG TABLET    Take 1 tablet (40 mg total) by mouth once daily.    ROSUVASTATIN (CRESTOR) 20 MG TABLET    Take 1 tablet (20 mg total) by mouth every evening.    VITAMIN D 1000 UNITS TAB    Take 185 mg by mouth once daily.   Changed and/or Refilled Medications    Modified Medication Previous Medication    FAMOTIDINE (PEPCID) 20 MG TABLET famotidine (PEPCID) 20 MG tablet       Take 1 tablet (20 mg  total) by mouth 2 (two) times daily.    Take 1 tablet (20 mg total) by mouth 2 (two) times daily.    FUROSEMIDE (LASIX) 20 MG TABLET furosemide (LASIX) 20 MG tablet       Take 1 tablet (20 mg total) by mouth daily as needed (edema). As needed    Take 1 tablet (20 mg total) by mouth daily as needed (edema). As needed    POTASSIUM CHLORIDE (KLOR-CON) 10 MEQ TBSR potassium chloride (KLOR-CON) 10 MEQ TbSR       Take 1 tablet (10 mEq total) by mouth twice a week.    Take 1 tablet (10 mEq total) by mouth twice a week.     Follow up in about 3 months (around 9/6/2022), or if symptoms worsen or fail to improve.

## 2022-06-07 ENCOUNTER — TELEPHONE (OUTPATIENT)
Dept: GASTROENTEROLOGY | Facility: CLINIC | Age: 82
End: 2022-06-07
Payer: MEDICARE

## 2022-06-07 NOTE — TELEPHONE ENCOUNTER
Spoke with pt. Verified pt still has prep instructions for upcoming procedure with Dr. Vo. Pt stated they do. Pt informed surgery center will call day or two prior with arrival time. Pt verbalized understanding to all.

## 2022-06-27 ENCOUNTER — SPECIALTY PHARMACY (OUTPATIENT)
Dept: PHARMACY | Facility: CLINIC | Age: 82
End: 2022-06-27
Payer: MEDICARE

## 2022-06-27 NOTE — TELEPHONE ENCOUNTER
Specialty Pharmacy - Refill Coordination    Specialty Medication Orders Linked to Encounter    Flowsheet Row Most Recent Value   Medication #1 evolocumab (REPATHA SURECLICK) 140 mg/mL PnIj (Order#[id number], Rx#[id number])          Refill Questions - Documented Responses    Flowsheet Row Most Recent Value   Patient Availability and HIPAA Verification    Does patient want to proceed with activity? Yes   HIPAA/medical authority confirmed? Yes   Relationship to patient of person spoken to? Self   Refill Screening Questions    Would patient like to speak to a pharmacist? No   When does the patient need to receive the medication? 07/05/22   Refill Delivery Questions    How will the patient receive the medication? Delivery Chata   When does the patient need to receive the medication? 07/05/22   Shipping Address Home   Address in [facility] confirmed and updated if neccessary? Yes   Expected Copay ($) 9.85   Is the patient able to afford the medication copay? Yes   Payment Method invoice (approval required)   Days supply of Refill 28   Refill activity completed? Yes   Refill activity plan Refill scheduled   Shipment/Pickup Date: 06/30/22          Current Outpatient Medications   Medication Sig    aluminum & magnesium hydroxide-simethicone (MYLANTA MAXIMUM STRENGTH) 400-400-40 mg/5 mL suspension Take 10 mLs by mouth every 6 (six) hours as needed for Indigestion.    amLODIPine (NORVASC) 5 MG tablet TAKE 1 TABLET EVERY DAY    ascorbic acid, vitamin C, (VITAMIN C) 500 MG tablet Take 500 mg by mouth once daily.    aspirin (ECOTRIN) 81 MG EC tablet Take 81 mg by mouth once daily.    carvediloL (COREG) 6.25 MG tablet Take 1 tablet (6.25 mg total) by mouth 2 (two) times daily with meals.    co-enzyme Q-10 30 mg capsule Take 30 mg by mouth once daily.     diclofenac sodium (VOLTAREN) 1 % Gel APPLY 2 GRAMS TO THE AFFECTED AREA(S) FOUR TIMES DAILY    evolocumab (REPATHA SURECLICK) 140 mg/mL PnIj Inject 1 mL (140 mg  total) into the skin every 14 (fourteen) days.    famotidine (PEPCID) 20 MG tablet Take 1 tablet (20 mg total) by mouth 2 (two) times daily.    fish oil-omega-3 fatty acids 300-1,000 mg capsule Take 2 g by mouth once daily.    FOLIC ACID/MULTIVIT-MIN/LUTEIN (CENTRUM SILVER ORAL) Take by mouth once daily. ONE DAILY    furosemide (LASIX) 20 MG tablet Take 1 tablet (20 mg total) by mouth daily as needed (edema). As needed    HYDROcodone-acetaminophen (NORCO)  mg per tablet Take 1 tablet by mouth 2 (two) times daily as needed.    nitroGLYCERIN (NITROSTAT) 0.4 MG SL tablet Place 1 tablet (0.4 mg total) under the tongue every 5 (five) minutes as needed for Chest pain.    olmesartan (BENICAR) 40 MG tablet Take 1 tablet (40 mg total) by mouth once daily.    potassium chloride (KLOR-CON) 10 MEQ TbSR Take 1 tablet (10 mEq total) by mouth twice a week.    rosuvastatin (CRESTOR) 20 MG tablet Take 1 tablet (20 mg total) by mouth every evening.    vitamin D 1000 units Tab Take 185 mg by mouth once daily.   Last reviewed on 6/6/2022  3:27 PM by Madelin De La Vega, DNP, APRN    Review of patient's allergies indicates:   Allergen Reactions    Clindamycin Diarrhea    Darvocet a500 [propoxyphene n-acetaminophen]     Erythromycin Other (See Comments)    Mycinette [cetylpyridinium-benzocaine]     Pcn [penicillins]     Last reviewed on  6/10/2022 3:13 PM by Tanya Araiza      Tasks added this encounter   7/26/2022 - Refill Call (Auto Added)   Tasks due within next 3 months   No tasks due.     Claudia Chiang [facility] - [facility] Pharm[facility]  [address] [address]  Phone: [phone]  Fax: [phone]

## 2022-07-28 ENCOUNTER — SPECIALTY PHARMACY (OUTPATIENT)
Dept: PHARMACY | Facility: CLINIC | Age: 82
End: 2022-07-28
Payer: MEDICARE

## 2022-07-28 NOTE — TELEPHONE ENCOUNTER
Specialty Pharmacy - Refill Coordination    Specialty Medication Orders Linked to Encounter    Flowsheet Row Most Recent Value   Medication #1 evolocumab (REPATHA SURECLICK) 140 mg/mL PnIj (Order#[id number], Rx#[id number])          Refill Questions - Documented Responses    Flowsheet Row Most Recent Value   Patient Availability and HIPAA Verification    Does patient want to proceed with activity? Yes   HIPAA/medical authority confirmed? Yes   Relationship to patient of person spoken to? Self   Refill Screening Questions    Would patient like to speak to a pharmacist? No   When does the patient need to receive the medication? 08/05/22   Refill Delivery Questions    How will the patient receive the medication? Delivery Chata   When does the patient need to receive the medication? 08/05/22   Shipping Address Home   Address in [facility] confirmed and updated if neccessary? Yes   Expected Copay ($) 9.85   Is the patient able to afford the medication copay? Yes   Payment Method invoice (approval required)   Days supply of Refill 9.85   Supplies needed? No supplies needed   Refill activity completed? Yes   Refill activity plan Refill scheduled   Shipment/Pickup Date: 08/01/22          Current Outpatient Medications   Medication Sig    aluminum & magnesium hydroxide-simethicone (MYLANTA MAXIMUM STRENGTH) 400-400-40 mg/5 mL suspension Take 10 mLs by mouth every 6 (six) hours as needed for Indigestion.    amLODIPine (NORVASC) 5 MG tablet TAKE 1 TABLET EVERY DAY    ascorbic acid, vitamin C, (VITAMIN C) 500 MG tablet Take 500 mg by mouth once daily.    aspirin (ECOTRIN) 81 MG EC tablet Take 81 mg by mouth once daily.    carvediloL (COREG) 6.25 MG tablet Take 1 tablet (6.25 mg total) by mouth 2 (two) times daily with meals.    co-enzyme Q-10 30 mg capsule Take 30 mg by mouth once daily.     diclofenac sodium (VOLTAREN) 1 % Gel APPLY 2 GRAMS TO THE AFFECTED AREA(S) FOUR TIMES DAILY    evolocumab (REPATHA SURECLICK)  140 mg/mL PnIj Inject 1 mL (140 mg total) into the skin every 14 (fourteen) days.    famotidine (PEPCID) 20 MG tablet Take 1 tablet (20 mg total) by mouth 2 (two) times daily.    fish oil-omega-3 fatty acids 300-1,000 mg capsule Take 2 g by mouth once daily.    FOLIC ACID/MULTIVIT-MIN/LUTEIN (CENTRUM SILVER ORAL) Take by mouth once daily. ONE DAILY    furosemide (LASIX) 20 MG tablet Take 1 tablet (20 mg total) by mouth daily as needed (edema). As needed    HYDROcodone-acetaminophen (NORCO)  mg per tablet Take 1 tablet by mouth 2 (two) times daily as needed.    nitroGLYCERIN (NITROSTAT) 0.4 MG SL tablet Place 1 tablet (0.4 mg total) under the tongue every 5 (five) minutes as needed for Chest pain.    olmesartan (BENICAR) 40 MG tablet Take 1 tablet (40 mg total) by mouth once daily.    potassium chloride (KLOR-CON) 10 MEQ TbSR Take 1 tablet (10 mEq total) by mouth twice a week.    rosuvastatin (CRESTOR) 20 MG tablet Take 1 tablet (20 mg total) by mouth every evening.    vitamin D 1000 units Tab Take 185 mg by mouth once daily.   Last reviewed on 6/6/2022  3:27 PM by Madelin De La Vega, DNP, APRN    Review of patient's allergies indicates:   Allergen Reactions    Clindamycin Diarrhea    Darvocet a500 [propoxyphene n-acetaminophen]     Erythromycin Other (See Comments)    Mycinette [cetylpyridinium-benzocaine]     Pcn [penicillins]     Last reviewed on  6/10/2022 3:13 PM by Tanya Araiza      Tasks added this encounter   8/8/2022 - Refill Call (Auto Added)   Tasks due within next 3 months   No tasks due.     Cholo Leyva, PharmD  [facility] - [facility]  [address] [place] [id number]  Phone: [phone]  Fax: [phone]

## 2022-08-08 ENCOUNTER — SPECIALTY PHARMACY (OUTPATIENT)
Dept: PHARMACY | Facility: CLINIC | Age: 82
End: 2022-08-08
Payer: MEDICARE

## 2022-08-24 ENCOUNTER — TELEPHONE (OUTPATIENT)
Dept: CARDIOLOGY | Facility: CLINIC | Age: 82
End: 2022-08-24
Payer: MEDICARE

## 2022-08-24 DIAGNOSIS — I48.0 PAROXYSMAL ATRIAL FIBRILLATION: Primary | ICD-10-CM

## 2022-08-24 NOTE — TELEPHONE ENCOUNTER
----- Message from Myra Griffin sent at 8/24/2022 11:17 AM CDT -----  Contact: Pt  Type:  Same Day Appointment Request    Caller is requesting a same day appointment.  Caller declined first available appointment listed below.      Name of Caller:  Pt  When is the first available appointment?  11/23  Symptoms:  Irregular heartbeat  Best Call Back Number:  [phone]  Additional Information:   Please call back. Thanks.

## 2022-08-24 NOTE — TELEPHONE ENCOUNTER
Spoke with pt, pt states that her b/p machine is telling her that she has an irregular heart beat. Please advise

## 2022-08-29 NOTE — TELEPHONE ENCOUNTER
Specialty Pharmacy - Refill Coordination    Specialty Medication Orders Linked to Encounter      Flowsheet Row Most Recent Value   Medication #1 evolocumab (REPATHA SURECLICK) 140 mg/mL PnIj (Order#[id number], Rx#[id number])            Refill Questions - Documented Responses      Flowsheet Row Most Recent Value   Patient Availability and HIPAA Verification    Does patient want to proceed with activity? Yes   HIPAA/medical authority confirmed? Yes   Relationship to patient of person spoken to? Self   Refill Screening Questions    Would patient like to speak to a pharmacist? No   When does the patient need to receive the medication? 09/05/22   Refill Delivery Questions    How will the patient receive the medication? Delivery Chata   When does the patient need to receive the medication? 09/05/22   Shipping Address Home   Address in [facility] confirmed and updated if neccessary? Yes   Expected Copay ($) 9.85   Is the patient able to afford the medication copay? Yes   Payment Method invoice (approval required)   Days supply of Refill 28   Refill activity completed? Yes   Refill activity plan Refill scheduled   Shipment/Pickup Date: 08/30/22            Current Outpatient Medications   Medication Sig    aluminum & magnesium hydroxide-simethicone (MYLANTA MAXIMUM STRENGTH) 400-400-40 mg/5 mL suspension Take 10 mLs by mouth every 6 (six) hours as needed for Indigestion.    amLODIPine (NORVASC) 5 MG tablet TAKE 1 TABLET EVERY DAY    ascorbic acid, vitamin C, (VITAMIN C) 500 MG tablet Take 500 mg by mouth once daily.    aspirin (ECOTRIN) 81 MG EC tablet Take 81 mg by mouth once daily.    carvediloL (COREG) 6.25 MG tablet Take 1 tablet (6.25 mg total) by mouth 2 (two) times daily with meals.    co-enzyme Q-10 30 mg capsule Take 30 mg by mouth once daily.     diclofenac sodium (VOLTAREN) 1 % Gel APPLY 2 GRAMS TO THE AFFECTED AREA(S) FOUR TIMES DAILY    evolocumab (REPATHA SURECLICK) 140 mg/mL PnIj Inject 1 mL (140 mg  total) into the skin every 14 (fourteen) days.    famotidine (PEPCID) 20 MG tablet TAKE 1 TABLET TWICE DAILY    fish oil-omega-3 fatty acids 300-1,000 mg capsule Take 2 g by mouth once daily.    FOLIC ACID/MULTIVIT-MIN/LUTEIN (CENTRUM SILVER ORAL) Take by mouth once daily. ONE DAILY    furosemide (LASIX) 20 MG tablet Take 1 tablet (20 mg total) by mouth daily as needed (edema). As needed    HYDROcodone-acetaminophen (NORCO)  mg per tablet Take 1 tablet by mouth 2 (two) times daily as needed.    nitroGLYCERIN (NITROSTAT) 0.4 MG SL tablet Place 1 tablet (0.4 mg total) under the tongue every 5 (five) minutes as needed for Chest pain.    olmesartan (BENICAR) 40 MG tablet Take 1 tablet (40 mg total) by mouth once daily.    potassium chloride (KLOR-CON) 10 MEQ TbSR Take 1 tablet (10 mEq total) by mouth twice a week.    rosuvastatin (CRESTOR) 20 MG tablet Take 1 tablet (20 mg total) by mouth every evening.    vitamin D 1000 units Tab Take 185 mg by mouth once daily.   Last reviewed on 6/6/2022  3:27 PM by Madelin De La Vega, DNP, APRN    Review of patient's allergies indicates:   Allergen Reactions    Clindamycin Diarrhea    Darvocet a500 [propoxyphene n-acetaminophen]     Erythromycin Other (See Comments)    Mycinette [cetylpyridinium-benzocaine]     Pcn [penicillins]     Last reviewed on  6/10/2022 3:13 PM by Tanya Araiza      Tasks added this encounter   9/26/2022 - Refill Call (Auto Added)   Tasks due within next 3 months   No tasks due.     Claudia Chiang [facility] - Specialty Pharm[facility]  [address] [facility] [address] [address]  Phone: [phone]  Fax: [phone]

## 2022-08-31 ENCOUNTER — CLINICAL SUPPORT (OUTPATIENT)
Dept: CARDIOLOGY | Facility: HOSPITAL | Age: 82
End: 2022-08-31
Attending: INTERNAL MEDICINE
Payer: MEDICARE

## 2022-08-31 DIAGNOSIS — I48.0 PAROXYSMAL ATRIAL FIBRILLATION: ICD-10-CM

## 2022-08-31 PROCEDURE — 93227 HOLTER MONITOR - 24 HOUR (CUPID ONLY): ICD-10-PCS | Mod: ,,, | Performed by: INTERNAL MEDICINE

## 2022-08-31 PROCEDURE — 93227 XTRNL ECG REC<48 HR R&I: CPT | Mod: ,,, | Performed by: INTERNAL MEDICINE

## 2022-08-31 PROCEDURE — 93226 XTRNL ECG REC<48 HR SCAN A/R: CPT | Mod: PO

## 2022-09-06 LAB
OHS CV EVENT MONITOR DAY: 0
OHS CV HOLTER LENGTH DECIMAL HOURS: 24
OHS CV HOLTER LENGTH HOURS: 24
OHS CV HOLTER LENGTH MINUTES: 0
OHS CV HOLTER SINUS AVERAGE HR: 70
OHS CV HOLTER SINUS MAX HR: 110
OHS CV HOLTER SINUS MIN HR: 57

## 2022-09-09 ENCOUNTER — TELEPHONE (OUTPATIENT)
Dept: CARDIOLOGY | Facility: CLINIC | Age: 82
End: 2022-09-09
Payer: MEDICARE

## 2022-09-09 NOTE — TELEPHONE ENCOUNTER
Pt is concered about feet and legs swelling she started taking her lasix every day 20mg , not helping can she go up on her dose

## 2022-09-26 ENCOUNTER — SPECIALTY PHARMACY (OUTPATIENT)
Dept: PHARMACY | Facility: CLINIC | Age: 82
End: 2022-09-26
Payer: MEDICARE

## 2022-09-26 RX ORDER — EVOLOCUMAB 140 MG/ML
140 INJECTION, SOLUTION SUBCUTANEOUS
Qty: 2 ML | Refills: 6 | Status: SHIPPED | OUTPATIENT
Start: 2022-09-26 | End: 2023-06-02 | Stop reason: SDUPTHER

## 2022-09-26 NOTE — TELEPHONE ENCOUNTER
Incoming call for repatha refill. Script out of refills, MD faxed. Patient due to inject 10/6. Will f/u when refills received.

## 2022-09-29 NOTE — TELEPHONE ENCOUNTER
Specialty Pharmacy - Refill Coordination    Specialty Medication Orders Linked to Encounter      Flowsheet Row Most Recent Value   Medication #1 evolocumab (REPATHA SURECLICK) 140 mg/mL PnIj (Order#[id number], Rx#[id number])            Refill Questions - Documented Responses      Flowsheet Row Most Recent Value   Patient Availability and HIPAA Verification    Does patient want to proceed with activity? Yes   HIPAA/medical authority confirmed? Yes   Relationship to patient of person spoken to? Self   Refill Screening Questions    Would patient like to speak to a pharmacist? No   When does the patient need to receive the medication? 10/06/22   Refill Delivery Questions    How will the patient receive the medication? MED[facility]   When does the patient need to receive the medication? 10/06/22   Shipping Address Home   Address in [facility] confirmed and updated if neccessary? Yes   Expected Copay ($) 9.85   Payment Method invoice (approval required)   Days supply of Refill 28   Supplies needed? No supplies needed   Refill activity completed? Yes   Refill activity plan Refill scheduled   Shipment/Pickup Date: 10/03/22            Current Outpatient Medications   Medication Sig    aluminum & magnesium hydroxide-simethicone (MYLANTA MAXIMUM STRENGTH) 400-400-40 mg/5 mL suspension Take 10 mLs by mouth every 6 (six) hours as needed for Indigestion.    amLODIPine (NORVASC) 5 MG tablet TAKE 1 TABLET EVERY DAY    ascorbic acid, vitamin C, (VITAMIN C) 500 MG tablet Take 500 mg by mouth once daily.    aspirin (ECOTRIN) 81 MG EC tablet Take 81 mg by mouth once daily.    carvediloL (COREG) 6.25 MG tablet Take 1 tablet (6.25 mg total) by mouth 2 (two) times daily with meals.    co-enzyme Q-10 30 mg capsule Take 30 mg by mouth once daily.     diclofenac sodium (VOLTAREN) 1 % Gel APPLY 2 GRAMS TO THE AFFECTED AREA(S) FOUR TIMES DAILY    evolocumab (REPATHA SURECLICK) 140 mg/mL PnIj Inject 1 mL (140 mg total) into the skin every 14  (fourteen) days.    famotidine (PEPCID) 20 MG tablet TAKE 1 TABLET TWICE DAILY    fish oil-omega-3 fatty acids 300-1,000 mg capsule Take 2 g by mouth once daily.    FOLIC ACID/MULTIVIT-MIN/LUTEIN (CENTRUM SILVER ORAL) Take by mouth once daily. ONE DAILY    furosemide (LASIX) 20 MG tablet Take 1 tablet (20 mg total) by mouth daily as needed (edema). As needed    HYDROcodone-acetaminophen (NORCO)  mg per tablet Take 1 tablet by mouth 2 (two) times daily as needed.    nitroGLYCERIN (NITROSTAT) 0.4 MG SL tablet Place 1 tablet (0.4 mg total) under the tongue every 5 (five) minutes as needed for Chest pain.    olmesartan (BENICAR) 40 MG tablet TAKE 1 TABLET EVERY DAY    potassium chloride (KLOR-CON) 10 MEQ TbSR Take 1 tablet (10 mEq total) by mouth twice a week.    rosuvastatin (CRESTOR) 20 MG tablet Take 1 tablet (20 mg total) by mouth every evening.    vitamin D 1000 units Tab Take 185 mg by mouth once daily.   Last reviewed on 6/6/2022  3:27 PM by Madelin De La Vega, DNP, APRN    Review of patient's allergies indicates:   Allergen Reactions    Clindamycin Diarrhea    Darvocet a500 [propoxyphene n-acetaminophen]     Erythromycin Other (See Comments)    Mycinette [cetylpyridinium-benzocaine]     Pcn [penicillins]     Last reviewed on  6/10/2022 3:13 PM by Tanya Araiza      Tasks added this encounter   10/27/2022 - Refill Call (Auto Added)   Tasks due within next 3 months   No tasks due.     Ely Chiang [facility] - Specialty Pharmacy  [facility] [facility] [address]  Phone: [phone]  Fax: [phone]

## 2022-10-19 DIAGNOSIS — I48.3 TYPICAL ATRIAL FLUTTER: ICD-10-CM

## 2022-10-19 DIAGNOSIS — I50.31 ACUTE DIASTOLIC HEART FAILURE: ICD-10-CM

## 2022-10-19 DIAGNOSIS — I48.0 PAROXYSMAL ATRIAL FIBRILLATION: Primary | ICD-10-CM

## 2022-10-19 RX ORDER — CARVEDILOL 6.25 MG/1
6.25 TABLET ORAL 2 TIMES DAILY WITH MEALS
Qty: 180 TABLET | Refills: 1 | Status: SHIPPED | OUTPATIENT
Start: 2022-10-19 | End: 2022-11-30 | Stop reason: DRUGHIGH

## 2022-10-27 ENCOUNTER — SPECIALTY PHARMACY (OUTPATIENT)
Dept: PHARMACY | Facility: CLINIC | Age: 82
End: 2022-10-27
Payer: MEDICARE

## 2022-10-27 NOTE — TELEPHONE ENCOUNTER
Specialty Pharmacy - Refill Coordination    Specialty Medication Orders Linked to Encounter      Flowsheet Row Most Recent Value   Medication #1 evolocumab (REPATHA SURECLICK) 140 mg/mL PnIj (Order#[id number], Rx#[id number])            Refill Questions - Documented Responses      Flowsheet Row Most Recent Value   Patient Availability and HIPAA Verification    Does patient want to proceed with activity? Yes   HIPAA/medical authority confirmed? Yes   Relationship to patient of person spoken to? Self   Refill Screening Questions    Would patient like to speak to a pharmacist? No   When does the patient need to receive the medication? 11/05/22   Refill Delivery Questions    How will the patient receive the medication? MEDRx   When does the patient need to receive the medication? 11/05/22   Shipping Address Prescription   Address in [facility] confirmed and updated if neccessary? Yes   Expected Copay ($) 9.85   Is the patient able to afford the medication copay? Yes   Payment Method invoice (approval required)   Days supply of Refill 28   Supplies needed? No supplies needed   Refill activity completed? Yes   Refill activity plan Refill scheduled   Shipment/Pickup Date: 11/01/22            Current Outpatient Medications   Medication Sig    aluminum & magnesium hydroxide-simethicone (MYLANTA MAXIMUM STRENGTH) 400-400-40 mg/5 mL suspension Take 10 mLs by mouth every 6 (six) hours as needed for Indigestion.    amLODIPine (NORVASC) 5 MG tablet TAKE 1 TABLET EVERY DAY    ascorbic acid, vitamin C, (VITAMIN C) 500 MG tablet Take 500 mg by mouth once daily.    aspirin (ECOTRIN) 81 MG EC tablet Take 81 mg by mouth once daily.    carvediloL (COREG) 6.25 MG tablet Take 1 tablet (6.25 mg total) by mouth 2 (two) times daily with meals.    co-enzyme Q-10 30 mg capsule Take 30 mg by mouth once daily.     diclofenac sodium (VOLTAREN) 1 % Gel APPLY 2 GRAMS TO THE AFFECTED AREA(S) FOUR TIMES DAILY    evolocumab (REPATHA SURECLICK)  140 mg/mL PnIj Inject 1 mL (140 mg total) into the skin every 14 (fourteen) days.    famotidine (PEPCID) 20 MG tablet TAKE 1 TABLET TWICE DAILY    fish oil-omega-3 fatty acids 300-1,000 mg capsule Take 2 g by mouth once daily.    FOLIC ACID/MULTIVIT-MIN/LUTEIN (CENTRUM SILVER ORAL) Take by mouth once daily. ONE DAILY    furosemide (LASIX) 20 MG tablet TAKE 1 TABLET EVERY DAY AS NEEDED FOR EDEMA    HYDROcodone-acetaminophen (NORCO)  mg per tablet Take 1 tablet by mouth 2 (two) times daily as needed.    nitroGLYCERIN (NITROSTAT) 0.4 MG SL tablet Place 1 tablet (0.4 mg total) under the tongue every 5 (five) minutes as needed for Chest pain.    olmesartan (BENICAR) 40 MG tablet TAKE 1 TABLET EVERY DAY    potassium chloride (KLOR-CON) 10 MEQ TbSR Take 1 tablet (10 mEq total) by mouth twice a week.    rosuvastatin (CRESTOR) 20 MG tablet Take 1 tablet (20 mg total) by mouth every evening.    vitamin D 1000 units Tab Take 185 mg by mouth once daily.   Last reviewed on 6/6/2022  3:27 PM by Madeiln De La Vega, NABILA, APRN    Review of patient's allergies indicates:   Allergen Reactions    Clindamycin Diarrhea    Darvocet a500 [propoxyphene n-acetaminophen]     Erythromycin Other (See Comments)    Mycinette [cetylpyridinium-benzocaine]     Pcn [penicillins]     Last reviewed on  6/10/2022 3:13 PM by Tanya Araiza      Tasks added this encounter   11/26/2022 - Refill Call (Auto Added)   Tasks due within next 3 months   No tasks due.     Tanya Kennedy, PharmD  [facility]  [address] [place] [id number]  Phone: [phone]  Fax: [phone]

## 2022-10-31 ENCOUNTER — OFFICE VISIT (OUTPATIENT)
Dept: PRIMARY CARE CLINIC | Facility: CLINIC | Age: 82
End: 2022-10-31
Payer: MEDICARE

## 2022-10-31 VITALS
SYSTOLIC BLOOD PRESSURE: 128 MMHG | BODY MASS INDEX: 28.5 KG/M2 | HEART RATE: 96 BPM | HEIGHT: 65 IN | WEIGHT: 171.06 LBS | DIASTOLIC BLOOD PRESSURE: 76 MMHG | OXYGEN SATURATION: 70 %

## 2022-10-31 DIAGNOSIS — M25.472 SWELLING OF BOTH ANKLES: ICD-10-CM

## 2022-10-31 DIAGNOSIS — J18.9 PNEUMONIA OF RIGHT LOWER LOBE DUE TO INFECTIOUS ORGANISM: ICD-10-CM

## 2022-10-31 DIAGNOSIS — Z92.89 HISTORY OF RECENT HOSPITALIZATION: Primary | ICD-10-CM

## 2022-10-31 DIAGNOSIS — I10 ESSENTIAL HYPERTENSION: ICD-10-CM

## 2022-10-31 DIAGNOSIS — M25.471 SWELLING OF BOTH ANKLES: ICD-10-CM

## 2022-10-31 PROCEDURE — 1126F AMNT PAIN NOTED NONE PRSNT: CPT | Mod: CPTII,S$GLB,, | Performed by: NURSE PRACTITIONER

## 2022-10-31 PROCEDURE — 99214 PR OFFICE/OUTPT VISIT, EST, LEVL IV, 30-39 MIN: ICD-10-PCS | Mod: S$GLB,,, | Performed by: NURSE PRACTITIONER

## 2022-10-31 PROCEDURE — 1159F PR MEDICATION LIST DOCUMENTED IN MEDICAL RECORD: ICD-10-PCS | Mod: CPTII,S$GLB,, | Performed by: NURSE PRACTITIONER

## 2022-10-31 PROCEDURE — 1101F PT FALLS ASSESS-DOCD LE1/YR: CPT | Mod: CPTII,S$GLB,, | Performed by: NURSE PRACTITIONER

## 2022-10-31 PROCEDURE — 1101F PR PT FALLS ASSESS DOC 0-1 FALLS W/OUT INJ PAST YR: ICD-10-PCS | Mod: CPTII,S$GLB,, | Performed by: NURSE PRACTITIONER

## 2022-10-31 PROCEDURE — 3074F SYST BP LT 130 MM HG: CPT | Mod: CPTII,S$GLB,, | Performed by: NURSE PRACTITIONER

## 2022-10-31 PROCEDURE — 3288F PR FALLS RISK ASSESSMENT DOCUMENTED: ICD-10-PCS | Mod: CPTII,S$GLB,, | Performed by: NURSE PRACTITIONER

## 2022-10-31 PROCEDURE — 3288F FALL RISK ASSESSMENT DOCD: CPT | Mod: CPTII,S$GLB,, | Performed by: NURSE PRACTITIONER

## 2022-10-31 PROCEDURE — 1126F PR PAIN SEVERITY QUANTIFIED, NO PAIN PRESENT: ICD-10-PCS | Mod: CPTII,S$GLB,, | Performed by: NURSE PRACTITIONER

## 2022-10-31 PROCEDURE — 1159F MED LIST DOCD IN RCRD: CPT | Mod: CPTII,S$GLB,, | Performed by: NURSE PRACTITIONER

## 2022-10-31 PROCEDURE — 99214 OFFICE O/P EST MOD 30 MIN: CPT | Mod: S$GLB,,, | Performed by: NURSE PRACTITIONER

## 2022-10-31 PROCEDURE — 3074F PR MOST RECENT SYSTOLIC BLOOD PRESSURE < 130 MM HG: ICD-10-PCS | Mod: CPTII,S$GLB,, | Performed by: NURSE PRACTITIONER

## 2022-10-31 PROCEDURE — 3078F PR MOST RECENT DIASTOLIC BLOOD PRESSURE < 80 MM HG: ICD-10-PCS | Mod: CPTII,S$GLB,, | Performed by: NURSE PRACTITIONER

## 2022-10-31 PROCEDURE — 3078F DIAST BP <80 MM HG: CPT | Mod: CPTII,S$GLB,, | Performed by: NURSE PRACTITIONER

## 2022-10-31 RX ORDER — VALSARTAN 80 MG/1
80 TABLET ORAL DAILY
COMMUNITY
End: 2022-11-30

## 2022-10-31 RX ORDER — METOPROLOL TARTRATE 25 MG/1
25 TABLET, FILM COATED ORAL 2 TIMES DAILY
COMMUNITY
End: 2022-11-30

## 2022-10-31 RX ORDER — DOXYCYCLINE 100 MG/1
100 CAPSULE ORAL 2 TIMES DAILY
COMMUNITY
Start: 2022-10-24 | End: 2022-11-03

## 2022-10-31 RX ORDER — ALBUTEROL SULFATE 90 UG/1
2 AEROSOL, METERED RESPIRATORY (INHALATION) EVERY 6 HOURS PRN
COMMUNITY
Start: 2022-08-15 | End: 2023-03-27 | Stop reason: CLARIF

## 2022-10-31 NOTE — PROGRESS NOTES
Subjective:       Patient ID: Yael Claire is a 81 y.o. female.    Chief Complaint: Hospital Follow Up (Discharged from [facility] on 10/24/22 with pneumonia/)  Last seen me in clinic on 06/06/2020.   HPI  Hospitalized at [facility] on 10/22/2022-10/24/2022 and diagnosis with pneumonia of right lower lobe.  Vitals:    10/31/22 1538   BP: 128/76   Pulse:      Review of Systems    Transitional Care Note    Family and/or Caretaker present at visit?  No.  Diagnostic tests reviewed/disposition: No diagnosic tests pending after this hospitalization.  Disease/illness education: pneumonia  Home health/community services discussion/referrals: Patient does not have home health established from hospital visit.  They do not need home health.  If needed, we will set up home health for the patient.   Establishment or re-establishment of referral orders for community resources: No other necessary community resources.   Discussion with other health care providers: No discussion with other health care providers necessary.     Last Chest xray showed some continued:     . There is interval improved aeration in the bilateral lung bases compared to chest x-ray dated 10/22/2022.   Narrative    CHEST, 2 VIEWS     INDICATION: Dyspnea.     COMPARISON: Chest x-ray 10/22/2022     FINDINGS: The cardiac silhouette is borderline enlarged, grossly stable. The aorta is tortuous and partially calcific. Postoperative changes of prior cardiac surgery are seen.There is interval improved aeration in the bilateral lung bases. Minimal residual hazy and reticular opacities in the right lung base are noted. No visible pneumothorax is appreciated. Slight blunting of the left costophrenic sulcus suggestive of trace left pleural effusion is seen. Mild thoracic levoscoliosis is seen. Osseous degenerative changes are noted. The visualized osseous structures appear demineralized.  Procedure Note    Nash Canseco MD - 10/24/2022   Formatting of this note might be  different from the original.   CHEST, 2 VIEWS     INDICATION: Dyspnea.     COMPARISON: Chest x-ray 10/22/2022     FINDINGS: The cardiac silhouette is borderline enlarged, grossly stable. The aorta is tortuous and partially calcific. Postoperative changes of prior cardiac surgery are seen.There is interval improved aeration in the bilateral lung bases. Minimal residual hazy and reticular opacities in the right lung base are noted. No visible pneumothorax is appreciated. Slight blunting of the left costophrenic sulcus suggestive of trace left pleural effusion is seen. Mild thoracic levoscoliosis is seen. Osseous degenerative changes are noted. The visualized osseous structures appear demineralized.     IMPRESSION:   1. There is interval improved aeration in the bilateral lung bases compared to chest x-ray dated 10/22/2022.   Exam End: 10/24/22 09:05 Last Resulted: 10/24/22 09:09   Received From: [facility]  Result Received: 10/31/22 14:56        Objective:      Physical Exam  Vitals and nursing note reviewed.   Constitutional:       General: She is awake.      Appearance: Normal appearance. She is well-developed and well-groomed.   HENT:      Head: Normocephalic and atraumatic.      Right Ear: Hearing, tympanic membrane, ear canal and external ear normal.      Left Ear: Hearing, tympanic membrane, ear canal and external ear normal.      Nose: Nose normal.   Eyes:      General: Lids are normal.         Right eye: No foreign body.         Left eye: No foreign body.   Cardiovascular:      Rate and Rhythm: Normal rate and regular rhythm.      Heart sounds: Murmur heard.      Comments: Loud murmur consistent with aortic disease.  Trace edema bilaterally ankles  Pulmonary:      Effort: Pulmonary effort is normal.      Breath sounds: Normal breath sounds and air entry.   Musculoskeletal:         General: Normal range of motion.      Cervical back:  Full passive range of motion without pain, normal range of motion and neck supple.   Lymphadenopathy:      Head:      Right side of head: No submental, submandibular, tonsillar, preauricular, posterior auricular or occipital adenopathy.      Left side of head: No submental, submandibular, tonsillar, preauricular, posterior auricular or occipital adenopathy.   Skin:     General: Skin is warm and dry.   Neurological:      Mental Status: She is alert and oriented to person, place, and time.   Psychiatric:         Attention and Perception: Attention and perception normal.         Mood and Affect: Mood and affect normal.         Speech: Speech normal.         Behavior: Behavior is cooperative.         Thought Content: Thought content normal.         Cognition and Memory: Cognition and memory normal.         Judgment: Judgment normal.       Assessment & Plan:       History of recent hospitalization- She states improving much, continues to take her antibiotics as ordered    Pneumonia of right lower lobe due to infectious organism- completed doxycycline; I have explained to her that no need to repeat today but can follow up in a few weeks  -     X-Ray Chest PA And Lateral; Future; Expected date: 11/14/2022    Essential hypertension- Controlled  BP Readings from Last 3 Encounters:   10/31/22 128/76   06/06/22 (!) 138/48   05/18/22 (!) 158/64      Swelling of both ankles- Controlled at this time with use of lasix, She does hold it in the morning if going out but then will take when she returns home.  Take Lasix when get home today and follow up with Suraj on 11/16/2022        Medication List with Changes/Refills   Current Medications    ALBUTEROL (PROVENTIL/VENTOLIN HFA) 90 MCG/ACTUATION INHALER    INHALE 2 PUFFS INTO THE LUNGS FOUR TIMES DAILY    ALUMINUM & MAGNESIUM HYDROXIDE-SIMETHICONE (MYLANTA MAXIMUM STRENGTH) 400-400-40 MG/5 ML SUSPENSION    Take 10 mLs by mouth every 6 (six) hours as needed for Indigestion.     AMLODIPINE (NORVASC) 5 MG TABLET    TAKE 1 TABLET EVERY DAY    ASCORBIC ACID, VITAMIN C, (VITAMIN C) 500 MG TABLET    Take 500 mg by mouth once daily.    ASPIRIN (ECOTRIN) 81 MG EC TABLET    Take 81 mg by mouth once daily.    CARVEDILOL (COREG) 6.25 MG TABLET    Take 1 tablet (6.25 mg total) by mouth 2 (two) times daily with meals.    CO-ENZYME Q-10 30 MG CAPSULE    Take 30 mg by mouth once daily.     DICLOFENAC SODIUM (VOLTAREN) 1 % GEL    APPLY 2 GRAMS TO THE AFFECTED AREA(S) FOUR TIMES DAILY    DOXYCYCLINE (MONODOX) 100 MG CAPSULE    Take 100 mg by mouth 2 (two) times a day.    EVOLOCUMAB (REPATHA SURECLICK) 140 MG/ML PNIJ    Inject 1 mL (140 mg total) into the skin every 14 (fourteen) days.    FAMOTIDINE (PEPCID) 20 MG TABLET    TAKE 1 TABLET TWICE DAILY    FISH OIL-OMEGA-3 FATTY ACIDS 300-1,000 MG CAPSULE    Take 2 g by mouth once daily.    FOLIC ACID/MULTIVIT-MIN/LUTEIN (CENTRUM SILVER ORAL)    Take by mouth once daily. ONE DAILY    FUROSEMIDE (LASIX) 20 MG TABLET    TAKE 1 TABLET EVERY DAY AS NEEDED FOR EDEMA    HYDROCODONE-ACETAMINOPHEN (NORCO)  MG PER TABLET    Take 1 tablet by mouth 2 (two) times daily as needed.    METOPROLOL TARTRATE (LOPRESSOR) 25 MG TABLET    Take 25 mg by mouth 2 (two) times a day.    NITROGLYCERIN (NITROSTAT) 0.4 MG SL TABLET    Place 1 tablet (0.4 mg total) under the tongue every 5 (five) minutes as needed for Chest pain.    OLMESARTAN (BENICAR) 40 MG TABLET    TAKE 1 TABLET EVERY DAY    POTASSIUM CHLORIDE (KLOR-CON) 10 MEQ TBSR    Take 1 tablet (10 mEq total) by mouth twice a week.    ROSUVASTATIN (CRESTOR) 20 MG TABLET    Take 1 tablet (20 mg total) by mouth every evening.    VALSARTAN (DIOVAN) 80 MG TABLET    Take 80 mg by mouth once daily.    VITAMIN D 1000 UNITS TAB    Take 185 mg by mouth once daily.      Follow up in about 3 months (around 1/31/2023).

## 2022-11-16 ENCOUNTER — OFFICE VISIT (OUTPATIENT)
Dept: CARDIOLOGY | Facility: CLINIC | Age: 82
End: 2022-11-16
Payer: MEDICARE

## 2022-11-16 VITALS
WEIGHT: 167.56 LBS | SYSTOLIC BLOOD PRESSURE: 113 MMHG | BODY MASS INDEX: 27.92 KG/M2 | HEART RATE: 71 BPM | DIASTOLIC BLOOD PRESSURE: 53 MMHG | HEIGHT: 65 IN

## 2022-11-16 DIAGNOSIS — I08.0 MITRAL AND AORTIC VALVE DISEASE: Chronic | ICD-10-CM

## 2022-11-16 DIAGNOSIS — I25.709 CORONARY ARTERY DISEASE INVOLVING CORONARY BYPASS GRAFT OF NATIVE HEART WITH ANGINA PECTORIS: Primary | Chronic | ICD-10-CM

## 2022-11-16 DIAGNOSIS — E87.6 HYPOKALEMIA: ICD-10-CM

## 2022-11-16 DIAGNOSIS — E78.2 MIXED HYPERLIPIDEMIA: Chronic | ICD-10-CM

## 2022-11-16 DIAGNOSIS — I77.9 MILD CAROTID ARTERY DISEASE: Chronic | ICD-10-CM

## 2022-11-16 DIAGNOSIS — I10 ESSENTIAL HYPERTENSION: Chronic | ICD-10-CM

## 2022-11-16 DIAGNOSIS — E66.3 OVERWEIGHT (BMI 25.0-29.9): Chronic | ICD-10-CM

## 2022-11-16 PROCEDURE — 1159F PR MEDICATION LIST DOCUMENTED IN MEDICAL RECORD: ICD-10-PCS | Mod: CPTII,S$GLB,, | Performed by: INTERNAL MEDICINE

## 2022-11-16 PROCEDURE — 1126F PR PAIN SEVERITY QUANTIFIED, NO PAIN PRESENT: ICD-10-PCS | Mod: CPTII,S$GLB,, | Performed by: INTERNAL MEDICINE

## 2022-11-16 PROCEDURE — 1126F AMNT PAIN NOTED NONE PRSNT: CPT | Mod: CPTII,S$GLB,, | Performed by: INTERNAL MEDICINE

## 2022-11-16 PROCEDURE — 3288F FALL RISK ASSESSMENT DOCD: CPT | Mod: CPTII,S$GLB,, | Performed by: INTERNAL MEDICINE

## 2022-11-16 PROCEDURE — 3078F PR MOST RECENT DIASTOLIC BLOOD PRESSURE < 80 MM HG: ICD-10-PCS | Mod: CPTII,S$GLB,, | Performed by: INTERNAL MEDICINE

## 2022-11-16 PROCEDURE — 3078F DIAST BP <80 MM HG: CPT | Mod: CPTII,S$GLB,, | Performed by: INTERNAL MEDICINE

## 2022-11-16 PROCEDURE — 3074F SYST BP LT 130 MM HG: CPT | Mod: CPTII,S$GLB,, | Performed by: INTERNAL MEDICINE

## 2022-11-16 PROCEDURE — 99214 PR OFFICE/OUTPT VISIT, EST, LEVL IV, 30-39 MIN: ICD-10-PCS | Mod: S$GLB,,, | Performed by: INTERNAL MEDICINE

## 2022-11-16 PROCEDURE — 1101F PR PT FALLS ASSESS DOC 0-1 FALLS W/OUT INJ PAST YR: ICD-10-PCS | Mod: CPTII,S$GLB,, | Performed by: INTERNAL MEDICINE

## 2022-11-16 PROCEDURE — 1159F MED LIST DOCD IN RCRD: CPT | Mod: CPTII,S$GLB,, | Performed by: INTERNAL MEDICINE

## 2022-11-16 PROCEDURE — 1101F PT FALLS ASSESS-DOCD LE1/YR: CPT | Mod: CPTII,S$GLB,, | Performed by: INTERNAL MEDICINE

## 2022-11-16 PROCEDURE — 3074F PR MOST RECENT SYSTOLIC BLOOD PRESSURE < 130 MM HG: ICD-10-PCS | Mod: CPTII,S$GLB,, | Performed by: INTERNAL MEDICINE

## 2022-11-16 PROCEDURE — 99214 OFFICE O/P EST MOD 30 MIN: CPT | Mod: S$GLB,,, | Performed by: INTERNAL MEDICINE

## 2022-11-16 PROCEDURE — 3288F PR FALLS RISK ASSESSMENT DOCUMENTED: ICD-10-PCS | Mod: CPTII,S$GLB,, | Performed by: INTERNAL MEDICINE

## 2022-11-16 RX ORDER — POTASSIUM CHLORIDE 750 MG/1
10 TABLET, EXTENDED RELEASE ORAL EVERY OTHER DAY
Qty: 45 TABLET | Refills: 1 | Status: SHIPPED | OUTPATIENT
Start: 2022-11-16 | End: 2023-04-13

## 2022-11-16 RX ORDER — AMLODIPINE BESYLATE 2.5 MG/1
2.5 TABLET ORAL DAILY
Qty: 90 TABLET | Refills: 1 | Status: SHIPPED | OUTPATIENT
Start: 2022-11-16 | End: 2022-11-30 | Stop reason: SINTOL

## 2022-11-16 NOTE — PROGRESS NOTES
Subjective:    Patient ID:  Yael Claire is a 82 y.o. female who presents for Hypertension        HPI     RECENT LABS, K  3.4, , MG 2.0, S/P ADMISSION AT [facility] WITH PNEUMONIA, ECHO MILD TO MOD MR, AI AND MILD AS, HOLTER FREQUENT PAC'S, OA KNEES, RECORDS FROM [facility] REVIEWED, SEE ROS  Past Medical History:   Diagnosis Date    Acute coronary syndrome     LIGHT 2004    Anticoagulant long-term use     Aortic valve disorder     Arthritis     Coronary artery disease     cabg & valve sgy    Heart murmur     Hyperlipidemia     Hypertension     Mild carotid artery disease 11/17/2021    Palpitations     Stenosis of aortic and mitral valves     Valvular regurgitation      Past Surgical History:   Procedure Laterality Date    CARDIAC CATHETERIZATION      CARDIAC VALVE SURGERY      COLONOSCOPY  ~2019    normal findings per patient report    CORONARY ARTERY BYPASS GRAFT  04/2005    HYSTERECTOMY  1980    UPPER GASTROINTESTINAL ENDOSCOPY  ~2010    normal findings     Family History   Problem Relation Age of Onset    Hypertension Mother     Heart disease Mother     Hypertension Father     Heart disease Father     Colon cancer Neg Hx     Esophageal cancer Neg Hx     Stomach cancer Neg Hx      Social History     Socioeconomic History    Marital status: Single   Tobacco Use    Smoking status: Never    Smokeless tobacco: Never   Substance and Sexual Activity    Alcohol use: Yes     Comment: couple times per yr    Drug use: No       Review of patient's allergies indicates:   Allergen Reactions    Clindamycin Diarrhea    Darvocet a500 [propoxyphene n-acetaminophen]     Erythromycin Other (See Comments)    Mycinette [cetylpyridinium-benzocaine]     Pcn [penicillins]        Current Outpatient Medications:     aluminum & magnesium hydroxide-simethicone (MYLANTA MAXIMUM STRENGTH) 400-400-40 mg/5 mL suspension, Take 10 mLs by mouth every 6 (six) hours as needed for Indigestion., Disp: 3840 mL, Rfl: 0    ascorbic acid, vitamin C, (VITAMIN  C) 500 MG tablet, Take 500 mg by mouth once daily., Disp: , Rfl:     aspirin (ECOTRIN) 81 MG EC tablet, Take 81 mg by mouth once daily., Disp: , Rfl:     carvediloL (COREG) 6.25 MG tablet, Take 1 tablet (6.25 mg total) by mouth 2 (two) times daily with meals., Disp: 180 tablet, Rfl: 1    co-enzyme Q-10 30 mg capsule, Take 30 mg by mouth once daily. , Disp: , Rfl:     diclofenac sodium (VOLTAREN) 1 % Gel, APPLY 2 GRAMS TO THE AFFECTED AREA(S) FOUR TIMES DAILY, Disp: 200 g, Rfl: 1    evolocumab (REPATHA SURECLICK) 140 mg/mL PnIj, Inject 1 mL (140 mg total) into the skin every 14 (fourteen) days., Disp: 2 mL, Rfl: 6    famotidine (PEPCID) 20 MG tablet, TAKE 1 TABLET TWICE DAILY, Disp: 180 tablet, Rfl: 0    fish oil-omega-3 fatty acids 300-1,000 mg capsule, Take 2 g by mouth once daily., Disp: , Rfl:     FOLIC ACID/MULTIVIT-MIN/LUTEIN (CENTRUM SILVER ORAL), Take by mouth once daily. ONE DAILY, Disp: , Rfl:     furosemide (LASIX) 20 MG tablet, TAKE 1 TABLET EVERY DAY AS NEEDED FOR EDEMA, Disp: 60 tablet, Rfl: 1    HYDROcodone-acetaminophen (NORCO)  mg per tablet, Take 1 tablet by mouth 2 (two) times daily as needed., Disp: , Rfl: 0    metoprolol tartrate (LOPRESSOR) 25 MG tablet, Take 25 mg by mouth 2 (two) times a day., Disp: , Rfl:     nitroGLYCERIN (NITROSTAT) 0.4 MG SL tablet, Place 1 tablet (0.4 mg total) under the tongue every 5 (five) minutes as needed for Chest pain., Disp: 25 tablet, Rfl: 1    olmesartan (BENICAR) 40 MG tablet, TAKE 1 TABLET EVERY DAY, Disp: 90 tablet, Rfl: 1    rosuvastatin (CRESTOR) 20 MG tablet, Take 1 tablet (20 mg total) by mouth every evening., Disp: 90 tablet, Rfl: 1    valsartan (DIOVAN) 80 MG tablet, Take 80 mg by mouth once daily., Disp: , Rfl:     albuterol (PROVENTIL/VENTOLIN HFA) 90 mcg/actuation inhaler, INHALE 2 PUFFS INTO THE LUNGS FOUR TIMES DAILY, Disp: , Rfl:     amLODIPine (NORVASC) 2.5 MG tablet, Take 1 tablet (2.5 mg total) by mouth once daily., Disp: 90 tablet, Rfl:  "1    potassium chloride (KLOR-CON) 10 MEQ TbSR, Take 1 tablet (10 mEq total) by mouth every other day., Disp: 45 tablet, Rfl: 1    Review of Systems   Constitutional: Negative for chills, decreased appetite, diaphoresis, fever, malaise/fatigue, night sweats and weight gain.   HENT:  Negative for sore throat.    Eyes:  Negative for blurred vision and visual disturbance.   Cardiovascular:  Negative for chest pain, claudication, cyanosis, dyspnea on exertion (MILD), irregular heartbeat, leg swelling (FEET), near-syncope, orthopnea, palpitations, paroxysmal nocturnal dyspnea and syncope.   Respiratory:  Negative for cough, hemoptysis, shortness of breath and wheezing.    Endocrine: Negative.    Hematologic/Lymphatic: Negative for adenopathy. Does not bruise/bleed easily.   Skin:  Negative for color change and rash.   Musculoskeletal:  Positive for joint pain. Negative for back pain and falls.   Gastrointestinal:  Negative for abdominal pain, change in bowel habit, dysphagia, melena, nausea and vomiting.   Genitourinary:  Negative for dysuria and flank pain.   Neurological:  Negative for brief paralysis, focal weakness, headaches, light-headedness, loss of balance (OCC), numbness and weakness.   Psychiatric/Behavioral:  Negative for altered mental status and memory loss.    Allergic/Immunologic: Negative.       Objective:      Vitals:    11/16/22 1403   BP: (!) 113/53   Pulse: 71   Weight: 76 kg (167 lb 8.8 oz)   Height: 5' 5" (1.651 m)   PainSc: 0-No pain     Body mass index is 27.88 kg/m².    Physical Exam  Constitutional:       Appearance: Normal appearance. She is well-developed and overweight.   HENT:      Head: Normocephalic and atraumatic.   Eyes:      Extraocular Movements: Extraocular movements intact.      Conjunctiva/sclera: Conjunctivae normal.   Neck:      Vascular: Normal carotid pulses. No hepatojugular reflux or JVD.   Cardiovascular:      Rate and Rhythm: Normal rate and regular rhythm. No extrasystoles " are present.     Pulses:           Carotid pulses are 2+ on the right side and 2+ on the left side.       Radial pulses are 2+ on the right side and 2+ on the left side.        Posterior tibial pulses are 2+ on the right side and 2+ on the left side.      Heart sounds: Murmur heard.   Systolic murmur is present with a grade of 2/6 at the upper right sternal border radiating to the neck.     No friction rub. No gallop.   Pulmonary:      Effort: Pulmonary effort is normal.      Breath sounds: No rales.   Abdominal:      Palpations: Abdomen is soft. There is no hepatomegaly.      Tenderness: There is no abdominal tenderness.   Musculoskeletal:      Cervical back: Neck supple.      Right lower leg: No edema (TRACE).      Left lower leg: No edema.      Comments: SLOW GAIT   Skin:     General: Skin is warm and dry.      Capillary Refill: Capillary refill takes less than 2 seconds.   Neurological:      General: No focal deficit present.      Mental Status: She is alert and oriented to person, place, and time.   Psychiatric:         Mood and Affect: Mood normal.         Speech: Speech normal.         Behavior: Behavior normal.               ..    Chemistry        Component Value Date/Time     04/06/2022 2216    K 4.4 04/06/2022 2216     04/06/2022 2216    CO2 27 04/06/2022 2216    BUN 13 04/06/2022 2216    CREATININE 0.63 04/06/2022 2216     04/06/2022 2216        Component Value Date/Time    CALCIUM 9.1 04/06/2022 2216    ALKPHOS 113 04/06/2022 2216    AST 41 (H) 04/06/2022 2216    ALT 24 04/06/2022 2216    BILITOT 0.6 04/06/2022 2216    ESTGFRAFRICA >60 04/06/2022 2216    EGFRNONAA >60 04/06/2022 2216            ..  Lab Results   Component Value Date    CHOL 159 04/14/2022    CHOL 157 06/10/2021    CHOL 145 11/24/2020     Lab Results   Component Value Date    HDL 50 04/14/2022    HDL 51 06/10/2021    HDL 56 11/24/2020     Lab Results   Component Value Date    LDLCALC 94.2 04/14/2022    LDLCALC 95.6  06/10/2021    LDLCALC 73 11/24/2020     Lab Results   Component Value Date    TRIG 74 04/14/2022    TRIG 52 06/10/2021    TRIG 83 11/24/2020     Lab Results   Component Value Date    CHOLHDL 31.4 04/14/2022    CHOLHDL 32.5 06/10/2021    CHOLHDL 19.5 (L) 12/15/2017     ..  Lab Results   Component Value Date    WBC 8.64 04/06/2022    HGB 13.6 04/06/2022    HCT 42.0 04/06/2022    MCV 95 04/06/2022     04/06/2022       Test(s) Reviewed  I have reviewed the following in detail:  [] Stress test   [] Angiography   [x] Echocardiogram   [x] Labs   [x] Other:       Assessment:         ICD-10-CM ICD-9-CM   1. Coronary artery disease involving coronary bypass graft of native heart with angina pectoris  I25.709 414.05     413.9   2. Mitral and aortic valve disease  I08.0 396.9   3. Hypokalemia  E87.6 276.8   4. Overweight (BMI 25.0-29.9)  E66.3 278.02   5. Mild carotid artery disease  I77.9 447.9   6. Essential hypertension  I10 401.9   7. Mixed hyperlipidemia  E78.2 272.2     Problem List Items Addressed This Visit          Cardiac/Vascular    Essential hypertension    Relevant Medications    potassium chloride (KLOR-CON) 10 MEQ TbSR    Other Relevant Orders    Comprehensive Metabolic Panel    Coronary artery disease involving coronary bypass graft of native heart with angina pectoris - Primary    Relevant Orders    Comprehensive Metabolic Panel    Mixed hyperlipidemia    Relevant Orders    Comprehensive Metabolic Panel    Mitral and aortic valve disease    Mild carotid artery disease       Renal/    Hypokalemia    Relevant Orders    Comprehensive Metabolic Panel       Endocrine    Overweight (BMI 25.0-29.9)        Plan:     INCREASE KCLTO QOD, WITH ECH LASIX,IF SBP < 13O DECREASE NORVASC TO 2.5 MG, WILL DO NOW AND WATCH BLOOD PRESSURE, CLASS 1 ANGINA NO OVERT HEART FAILURE NO TIA TYPE SYMPTOMS NO NEAR-SYNCOPE , NO ARRHYTHMIA DIET EXERCISE WEIGHT LOSS, RETURN TO CLINIC IN 6 MONTHS WITH LABS       Coronary artery  disease involving coronary bypass graft of native heart with angina pectoris  -     Comprehensive Metabolic Panel; Future; Expected date: 05/16/2023    Mitral and aortic valve disease    Hypokalemia  Comments:  SUPPLEMENT  Orders:  -     Comprehensive Metabolic Panel; Future; Expected date: 05/16/2023    Overweight (BMI 25.0-29.9)    Mild carotid artery disease  Comments:  NO TIA    Essential hypertension  Comments:  DECREASE MEDS  Orders:  -     potassium chloride (KLOR-CON) 10 MEQ TbSR; Take 1 tablet (10 mEq total) by mouth every other day.  Dispense: 45 tablet; Refill: 1  -     Comprehensive Metabolic Panel; Future; Expected date: 05/16/2023    Mixed hyperlipidemia  -     Comprehensive Metabolic Panel; Future; Expected date: 05/16/2023    Other orders  -     amLODIPine (NORVASC) 2.5 MG tablet; Take 1 tablet (2.5 mg total) by mouth once daily.  Dispense: 90 tablet; Refill: 1  RTC Low level/low impact aerobic exercise 5x's/wk. Heart healthy diet and risk factor modification.    See labs and med orders.    Aerobic exercise 5x's/wk. Heart healthy diet and risk factor modification.    See labs and med orders.

## 2022-11-28 ENCOUNTER — SPECIALTY PHARMACY (OUTPATIENT)
Dept: PHARMACY | Facility: CLINIC | Age: 82
End: 2022-11-28
Payer: MEDICARE

## 2022-11-28 NOTE — TELEPHONE ENCOUNTER
Specialty Pharmacy - Refill Coordination    Specialty Medication Orders Linked to Encounter      Flowsheet Row Most Recent Value   Medication #1 evolocumab (REPATHA SURECLICK) 140 mg/mL PnIj (Order#[id number], Rx#[id number])            Refill Questions - Documented Responses      Flowsheet Row Most Recent Value   Patient Availability and HIPAA Verification    Does patient want to proceed with activity? Yes   HIPAA/medical authority confirmed? Yes   Relationship to patient of person spoken to? Self   Refill Screening Questions    Would patient like to speak to a pharmacist? No   When does the patient need to receive the medication? 12/06/22   Refill Delivery Questions    How will the patient receive the medication? MEDRx   When does the patient need to receive the medication? 12/06/22   Shipping Address Home   Address in [facility] confirmed and updated if neccessary? Yes   Expected Copay ($) 9.85   Is the patient able to afford the medication copay? Yes   Payment Method invoice (approval required)   Days supply of Refill 28   Supplies needed? No supplies needed   Refill activity completed? Yes   Refill activity plan Refill scheduled   Shipment/Pickup Date: 11/29/22            Current Outpatient Medications   Medication Sig    albuterol (PROVENTIL/VENTOLIN HFA) 90 mcg/actuation inhaler INHALE 2 PUFFS INTO THE LUNGS FOUR TIMES DAILY    aluminum & magnesium hydroxide-simethicone (MYLANTA MAXIMUM STRENGTH) 400-400-40 mg/5 mL suspension Take 10 mLs by mouth every 6 (six) hours as needed for Indigestion.    amLODIPine (NORVASC) 2.5 MG tablet Take 1 tablet (2.5 mg total) by mouth once daily.    ascorbic acid, vitamin C, (VITAMIN C) 500 MG tablet Take 500 mg by mouth once daily.    aspirin (ECOTRIN) 81 MG EC tablet Take 81 mg by mouth once daily.    carvediloL (COREG) 6.25 MG tablet Take 1 tablet (6.25 mg total) by mouth 2 (two) times daily with meals.    co-enzyme Q-10 30 mg capsule Take 30 mg by mouth once daily.      diclofenac sodium (VOLTAREN) 1 % Gel APPLY 2 GRAMS TO THE AFFECTED AREA(S) FOUR TIMES DAILY    evolocumab (REPATHA SURECLICK) 140 mg/mL PnIj Inject 1 mL (140 mg total) into the skin every 14 (fourteen) days.    famotidine (PEPCID) 20 MG tablet TAKE 1 TABLET TWICE DAILY    fish oil-omega-3 fatty acids 300-1,000 mg capsule Take 2 g by mouth once daily.    FOLIC ACID/MULTIVIT-MIN/LUTEIN (CENTRUM SILVER ORAL) Take by mouth once daily. ONE DAILY    furosemide (LASIX) 20 MG tablet TAKE 1 TABLET EVERY DAY AS NEEDED FOR EDEMA    HYDROcodone-acetaminophen (NORCO)  mg per tablet Take 1 tablet by mouth 2 (two) times daily as needed.    metoprolol tartrate (LOPRESSOR) 25 MG tablet Take 25 mg by mouth 2 (two) times a day.    nitroGLYCERIN (NITROSTAT) 0.4 MG SL tablet Place 1 tablet (0.4 mg total) under the tongue every 5 (five) minutes as needed for Chest pain.    olmesartan (BENICAR) 40 MG tablet TAKE 1 TABLET EVERY DAY    potassium chloride (KLOR-CON) 10 MEQ TbSR Take 1 tablet (10 mEq total) by mouth every other day.    rosuvastatin (CRESTOR) 20 MG tablet Take 1 tablet (20 mg total) by mouth every evening.    valsartan (DIOVAN) 80 MG tablet Take 80 mg by mouth once daily.   Last reviewed on 11/16/2022  2:02 PM by Sara Coats MA    Review of patient's allergies indicates:   Allergen Reactions    Clindamycin Diarrhea    Darvocet a500 [propoxyphene n-acetaminophen]     Erythromycin Other (See Comments)    Mycinette [cetylpyridinium-benzocaine]     Pcn [penicillins]     Last reviewed on  11/16/2022 2:00 PM by Sara Coats      Tasks added this encounter   12/27/2022 - Refill Call (Auto Added)   Tasks due within next 3 months   No tasks due.     Tanya Kennedy, PharmD  [facility]  [address] [facility] [address]  Phone: [phone]  Fax: [phone]

## 2022-11-30 ENCOUNTER — OFFICE VISIT (OUTPATIENT)
Dept: CARDIOLOGY | Facility: CLINIC | Age: 82
End: 2022-11-30
Payer: MEDICARE

## 2022-11-30 ENCOUNTER — TELEPHONE (OUTPATIENT)
Dept: CARDIOLOGY | Facility: CLINIC | Age: 82
End: 2022-11-30
Payer: MEDICARE

## 2022-11-30 VITALS
HEIGHT: 65 IN | WEIGHT: 171.5 LBS | SYSTOLIC BLOOD PRESSURE: 138 MMHG | DIASTOLIC BLOOD PRESSURE: 66 MMHG | HEART RATE: 76 BPM | BODY MASS INDEX: 28.57 KG/M2

## 2022-11-30 DIAGNOSIS — E78.2 MIXED HYPERLIPIDEMIA: Chronic | ICD-10-CM

## 2022-11-30 DIAGNOSIS — R60.0 LOWER LEG EDEMA: Primary | ICD-10-CM

## 2022-11-30 DIAGNOSIS — I10 ESSENTIAL HYPERTENSION: Chronic | ICD-10-CM

## 2022-11-30 DIAGNOSIS — E66.3 OVERWEIGHT (BMI 25.0-29.9): Chronic | ICD-10-CM

## 2022-11-30 PROCEDURE — 1159F MED LIST DOCD IN RCRD: CPT | Mod: CPTII,S$GLB,, | Performed by: INTERNAL MEDICINE

## 2022-11-30 PROCEDURE — 3078F PR MOST RECENT DIASTOLIC BLOOD PRESSURE < 80 MM HG: ICD-10-PCS | Mod: CPTII,S$GLB,, | Performed by: INTERNAL MEDICINE

## 2022-11-30 PROCEDURE — 1159F PR MEDICATION LIST DOCUMENTED IN MEDICAL RECORD: ICD-10-PCS | Mod: CPTII,S$GLB,, | Performed by: INTERNAL MEDICINE

## 2022-11-30 PROCEDURE — 1126F AMNT PAIN NOTED NONE PRSNT: CPT | Mod: CPTII,S$GLB,, | Performed by: INTERNAL MEDICINE

## 2022-11-30 PROCEDURE — 99213 OFFICE O/P EST LOW 20 MIN: CPT | Mod: S$GLB,,, | Performed by: INTERNAL MEDICINE

## 2022-11-30 PROCEDURE — 3288F FALL RISK ASSESSMENT DOCD: CPT | Mod: CPTII,S$GLB,, | Performed by: INTERNAL MEDICINE

## 2022-11-30 PROCEDURE — 1101F PR PT FALLS ASSESS DOC 0-1 FALLS W/OUT INJ PAST YR: ICD-10-PCS | Mod: CPTII,S$GLB,, | Performed by: INTERNAL MEDICINE

## 2022-11-30 PROCEDURE — 3075F PR MOST RECENT SYSTOLIC BLOOD PRESS GE 130-139MM HG: ICD-10-PCS | Mod: CPTII,S$GLB,, | Performed by: INTERNAL MEDICINE

## 2022-11-30 PROCEDURE — 3078F DIAST BP <80 MM HG: CPT | Mod: CPTII,S$GLB,, | Performed by: INTERNAL MEDICINE

## 2022-11-30 PROCEDURE — 99213 PR OFFICE/OUTPT VISIT, EST, LEVL III, 20-29 MIN: ICD-10-PCS | Mod: S$GLB,,, | Performed by: INTERNAL MEDICINE

## 2022-11-30 PROCEDURE — 1101F PT FALLS ASSESS-DOCD LE1/YR: CPT | Mod: CPTII,S$GLB,, | Performed by: INTERNAL MEDICINE

## 2022-11-30 PROCEDURE — 3075F SYST BP GE 130 - 139MM HG: CPT | Mod: CPTII,S$GLB,, | Performed by: INTERNAL MEDICINE

## 2022-11-30 PROCEDURE — 1126F PR PAIN SEVERITY QUANTIFIED, NO PAIN PRESENT: ICD-10-PCS | Mod: CPTII,S$GLB,, | Performed by: INTERNAL MEDICINE

## 2022-11-30 PROCEDURE — 3288F PR FALLS RISK ASSESSMENT DOCUMENTED: ICD-10-PCS | Mod: CPTII,S$GLB,, | Performed by: INTERNAL MEDICINE

## 2022-11-30 RX ORDER — CARVEDILOL 12.5 MG/1
12.5 TABLET ORAL 2 TIMES DAILY WITH MEALS
Qty: 180 TABLET | Refills: 1 | Status: ON HOLD | OUTPATIENT
Start: 2022-11-30 | End: 2023-04-27 | Stop reason: HOSPADM

## 2022-11-30 RX ORDER — ROSUVASTATIN CALCIUM 20 MG/1
TABLET, COATED ORAL
Qty: 90 TABLET | Refills: 1 | OUTPATIENT
Start: 2022-11-30

## 2022-11-30 NOTE — TELEPHONE ENCOUNTER
----- Message from Danny De La Vega sent at 11/30/2022 12:18 PM CST -----      Name of Who is Calling:PT          What is the request in detail:PT is requesting a call back to discuss if she can be seen today, she's having swelling in both feet really bad and she is very concerned.          Can the clinic reply by [facility]:no          What Number to Call Back if not in [phone]

## 2022-11-30 NOTE — PROGRESS NOTES
Subjective:    Patient ID:  Yael Claire is a 82 y.o. female who presents for Hypertension and Edema        Hypertension  Pertinent negatives include no chest pain, orthopnea, palpitations, PND or shortness of breath.   Edema  Pertinent negatives include no abdominal pain, chest pain or coughing.     REQUESTED OV B/O EDEMA, TRIED TO SEE PCP, BUT COULD NOT, NO CHEST PAIN NO SIGNIFICANT SHORTNESS OF BREATH NO TIA TYPE SYMPTOMS NO NEAR-SYNCOPE, SEE ROS  Past Medical History:   Diagnosis Date    Acute coronary syndrome     LIGHT 2004    Anticoagulant long-term use     Aortic valve disorder     Arthritis     Coronary artery disease     cabg & valve sgy    Heart murmur     Hyperlipidemia     Hypertension     Mild carotid artery disease 11/17/2021    Palpitations     Stenosis of aortic and mitral valves     Valvular regurgitation      Past Surgical History:   Procedure Laterality Date    CARDIAC CATHETERIZATION      CARDIAC VALVE SURGERY      COLONOSCOPY  ~2019    normal findings per patient report    CORONARY ARTERY BYPASS GRAFT  04/2005    HYSTERECTOMY  1980    UPPER GASTROINTESTINAL ENDOSCOPY  ~2010    normal findings     Family History   Problem Relation Age of Onset    Hypertension Mother     Heart disease Mother     Hypertension Father     Heart disease Father     Colon cancer Neg Hx     Esophageal cancer Neg Hx     Stomach cancer Neg Hx      Social History     Socioeconomic History    Marital status: Single   Tobacco Use    Smoking status: Never    Smokeless tobacco: Never   Substance and Sexual Activity    Alcohol use: Yes     Comment: couple times per yr    Drug use: No       Review of patient's allergies indicates:   Allergen Reactions    Clindamycin Diarrhea    Darvocet a500 [propoxyphene n-acetaminophen]     Erythromycin Other (See Comments)    Mycinette [cetylpyridinium-benzocaine]     Pcn [penicillins]        Current Outpatient Medications:     albuterol (PROVENTIL/VENTOLIN HFA) 90 mcg/actuation inhaler,  INHALE 2 PUFFS INTO THE LUNGS FOUR TIMES DAILY, Disp: , Rfl:     ascorbic acid, vitamin C, (VITAMIN C) 500 MG tablet, Take 500 mg by mouth once daily., Disp: , Rfl:     aspirin (ECOTRIN) 81 MG EC tablet, Take 81 mg by mouth once daily., Disp: , Rfl:     co-enzyme Q-10 30 mg capsule, Take 30 mg by mouth once daily. , Disp: , Rfl:     diclofenac sodium (VOLTAREN) 1 % Gel, APPLY 2 GRAMS TO THE AFFECTED AREA(S) FOUR TIMES DAILY, Disp: 200 g, Rfl: 1    evolocumab (REPATHA SURECLICK) 140 mg/mL PnIj, Inject 1 mL (140 mg total) into the skin every 14 (fourteen) days., Disp: 2 mL, Rfl: 6    famotidine (PEPCID) 20 MG tablet, TAKE 1 TABLET TWICE DAILY, Disp: 180 tablet, Rfl: 0    fish oil-omega-3 fatty acids 300-1,000 mg capsule, Take 2 g by mouth once daily., Disp: , Rfl:     FOLIC ACID/MULTIVIT-MIN/LUTEIN (CENTRUM SILVER ORAL), Take by mouth once daily. ONE DAILY, Disp: , Rfl:     furosemide (LASIX) 20 MG tablet, TAKE 1 TABLET EVERY DAY AS NEEDED FOR EDEMA, Disp: 60 tablet, Rfl: 1    HYDROcodone-acetaminophen (NORCO)  mg per tablet, Take 1 tablet by mouth 2 (two) times daily as needed., Disp: , Rfl: 0    nitroGLYCERIN (NITROSTAT) 0.4 MG SL tablet, Place 1 tablet (0.4 mg total) under the tongue every 5 (five) minutes as needed for Chest pain., Disp: 25 tablet, Rfl: 1    olmesartan (BENICAR) 40 MG tablet, TAKE 1 TABLET EVERY DAY, Disp: 90 tablet, Rfl: 1    potassium chloride (KLOR-CON) 10 MEQ TbSR, Take 1 tablet (10 mEq total) by mouth every other day., Disp: 45 tablet, Rfl: 1    rosuvastatin (CRESTOR) 20 MG tablet, Take 1 tablet (20 mg total) by mouth every evening., Disp: 90 tablet, Rfl: 1    aluminum & magnesium hydroxide-simethicone (MYLANTA MAXIMUM STRENGTH) 400-400-40 mg/5 mL suspension, Take 10 mLs by mouth every 6 (six) hours as needed for Indigestion., Disp: 3840 mL, Rfl: 0    carvediloL (COREG) 12.5 MG tablet, Take 1 tablet (12.5 mg total) by mouth 2 (two) times daily with meals., Disp: 180 tablet, Rfl:  "1    Review of Systems   Constitutional: Negative.   Cardiovascular:  Positive for dyspnea on exertion (MILD) and leg swelling. Negative for chest pain, claudication, cyanosis, irregular heartbeat, near-syncope, orthopnea, palpitations, paroxysmal nocturnal dyspnea and syncope.   Respiratory:  Negative for cough, hemoptysis and shortness of breath.    Hematologic/Lymphatic: Negative.    Musculoskeletal:  Positive for arthritis. Negative for falls.   Gastrointestinal:  Negative for abdominal pain, dysphagia and melena.   Genitourinary:  Negative for dysuria and flank pain.   Neurological:  Negative for brief paralysis and focal weakness.   Psychiatric/Behavioral:  Negative for altered mental status and depression.    Allergic/Immunologic: Negative for persistent infections.      Objective:      Vitals:    11/30/22 1512 11/30/22 1521   BP: (!) 165/69 138/66   Pulse: 76    Weight: 77.8 kg (171 lb 8.3 oz)    Height: 5' 5" (1.651 m)    PainSc: 0-No pain      Body mass index is 28.54 kg/m².    Physical Exam  Constitutional:       Appearance: Normal appearance. She is well-developed and overweight.   HENT:      Head: Normocephalic and atraumatic.   Eyes:      Extraocular Movements: Extraocular movements intact.      Conjunctiva/sclera: Conjunctivae normal.   Neck:      Vascular: Normal carotid pulses. No hepatojugular reflux or JVD.   Cardiovascular:      Rate and Rhythm: Normal rate and regular rhythm.      Pulses:           Carotid pulses are 2+ on the right side and 2+ on the left side.       Radial pulses are 2+ on the right side and 2+ on the left side.        Posterior tibial pulses are 2+ on the right side and 2+ on the left side.      Heart sounds: Murmur heard.   Systolic murmur is present with a grade of 2/6 at the upper right sternal border.     No friction rub. No gallop. No S4 sounds.   Pulmonary:      Effort: Pulmonary effort is normal.      Breath sounds: No rales.   Abdominal:      Palpations: Abdomen is " soft. There is no hepatomegaly.      Tenderness: There is no abdominal tenderness.   Musculoskeletal:      Cervical back: Neck supple.      Right lower le+ Edema present.      Left lower le+ Edema present.      Comments: SLOW GAIT, LARGE LEGS   Skin:     General: Skin is warm and dry.      Capillary Refill: Capillary refill takes less than 2 seconds.   Neurological:      General: No focal deficit present.      Mental Status: She is alert and oriented to person, place, and time.   Psychiatric:         Mood and Affect: Mood normal.         Speech: Speech normal.         Behavior: Behavior normal.               ..    Chemistry        Component Value Date/Time     2022    K 4.4 2022     2022    CO2 27 2022    BUN 13 2022    CREATININE 0.63 2022     2022        Component Value Date/Time    CALCIUM 9.1 2022    ALKPHOS 113 2022    AST 41 (H) 2022    ALT 24 2022    BILITOT 0.6 2022    ESTGFRAFRICA >60 2022    EGFRNONAA >60 2022 2216            ..  Lab Results   Component Value Date    CHOL 159 2022    CHOL 157 06/10/2021    CHOL 145 2020     Lab Results   Component Value Date    HDL 50 2022    HDL 51 06/10/2021    HDL 56 2020     Lab Results   Component Value Date    LDLCALC 94.2 2022    LDLCALC 95.6 06/10/2021    LDLCALC 73 2020     Lab Results   Component Value Date    TRIG 74 2022    TRIG 52 06/10/2021    TRIG 83 2020     Lab Results   Component Value Date    CHOLHDL 31.4 2022    CHOLHDL 32.5 06/10/2021    CHOLHDL 19.5 (L) 12/15/2017     ..  Lab Results   Component Value Date    WBC 8.64 2022    HGB 13.6 2022    HCT 42.0 2022    MCV 95 2022     2022       Test(s) Reviewed  I have reviewed the following in detail:  [] Stress test   [] Angiography   []  Echocardiogram   [] Labs   [] Other:       Assessment:         ICD-10-CM ICD-9-CM   1. Lower leg edema  R60.0 782.3   2. Essential hypertension  I10 401.9   3. Overweight (BMI 25.0-29.9)  E66.3 278.02     Problem List Items Addressed This Visit          Cardiac/Vascular    Essential hypertension       Endocrine    Overweight (BMI 25.0-29.9)       Other    Lower leg edema - Primary    Relevant Orders    CV Ultrasound doppler venous legs bilat        Plan:     DC NORVASC, COULD BE THE CULPRIT FOR EDEMA CHECK VENOUS DOPPLER,INCREASE CARVEDILOL TO 12.5 BID, FOR BLOOD PRESSURE CONTROL WATCH BP,ALL CV CLINICALLY STABLE, NO ANGINA, NO HF, NO TIA, NO CLINICAL ARRHYTHMIA,CONTINUE CURRENT MEDS, EDUCATION, DIET, EXERCISE OTHER, WEIGHT LOSS RETURN TO CLINIC IN FEW MONTHS AS SCHEDULED      Lower leg edema  -     CV Ultrasound doppler venous legs bilat; Future    Essential hypertension    Overweight (BMI 25.0-29.9)    Other orders  -     carvediloL (COREG) 12.5 MG tablet; Take 1 tablet (12.5 mg total) by mouth 2 (two) times daily with meals.  Dispense: 180 tablet; Refill: 1  RTC Low level/low impact aerobic exercise 5x's/wk. Heart healthy diet and risk factor modification.    See labs and med orders.    Aerobic exercise 5x's/wk. Heart healthy diet and risk factor modification.    See labs and med orders.

## 2022-11-30 NOTE — TELEPHONE ENCOUNTER
----- Message from Nakia Fitch, Patient Care Assistant sent at 11/29/2022  1:45 PM CST -----  Regarding: advice  Contact: pt  Type: Needs Medical Advice  Who Called:  pt   Pharmacy name and phone #:    [id number] - [place], [place] - [facility] [address]  [address]  [facility] [address]  Phone: [phone] Fax: [phone]    Best Call Back Number: [phone] (home)     Additional Information: pt states she would like a callback regarding an unknown medication. Please call pt to advise. Thanks!

## 2022-11-30 NOTE — TELEPHONE ENCOUNTER
Spoke to pt regarding unknown medication, pt stated needed a refill on Rosuvastatin. VU, advised will send request over. Pt VU.    Refill pended. Please advise.

## 2022-12-01 DIAGNOSIS — E78.2 MIXED HYPERLIPIDEMIA: Chronic | ICD-10-CM

## 2022-12-01 RX ORDER — ROSUVASTATIN CALCIUM 20 MG/1
20 TABLET, COATED ORAL NIGHTLY
Qty: 90 TABLET | Refills: 1 | Status: SHIPPED | OUTPATIENT
Start: 2022-12-01 | End: 2022-12-05 | Stop reason: SDUPTHER

## 2022-12-01 RX ORDER — ROSUVASTATIN CALCIUM 20 MG/1
20 TABLET, COATED ORAL NIGHTLY
Qty: 90 TABLET | Refills: 1 | OUTPATIENT
Start: 2022-12-01 | End: 2023-05-30

## 2022-12-01 NOTE — TELEPHONE ENCOUNTER
----- Message from Carla Franklin sent at 12/1/2022 11:22 AM CST -----  Regarding: refill  Can the clinic reply in [facility]:      Please refill the medication(s) listed below. Please call the patient when the prescription(s) is ready for  at this phone number    MARGARET BARDALES [[id number]] Pt is completely out. Please refill.       Medication #1 rosuvastatin (CRESTOR) 20 MG tablet    Medication #2      [facility]    [id number] [address] [address]  Phone: [phone] Fax: [phone]

## 2022-12-01 NOTE — TELEPHONE ENCOUNTER
----- Message from Guera Middleton sent at 12/1/2022  8:27 AM CST -----  Regarding: refill concern  Name of Who is Calling:  MARGARET BARDALES [[id number]]        What is the request in detail:rosuvastatin (CRESTOR) 20 MG tablet pt wants to know why her refill was denied           Can the clinic reply by [facility]: no           [facility] [phone] (home)

## 2022-12-05 DIAGNOSIS — E78.2 MIXED HYPERLIPIDEMIA: Chronic | ICD-10-CM

## 2022-12-05 RX ORDER — ROSUVASTATIN CALCIUM 20 MG/1
20 TABLET, COATED ORAL NIGHTLY
Qty: 30 TABLET | Refills: 0 | Status: SHIPPED | OUTPATIENT
Start: 2022-12-05 | End: 2023-07-07

## 2022-12-05 NOTE — TELEPHONE ENCOUNTER
Patient Requesting Refill  LOV:10/31/22  Last fill date:  Allergies reviewed  Medication Pended    Pt is completely out and wants to know if she can get a few sent to [facility] until she receive her mail order  Please advise

## 2022-12-06 ENCOUNTER — CLINICAL SUPPORT (OUTPATIENT)
Dept: CARDIOLOGY | Facility: HOSPITAL | Age: 82
End: 2022-12-06
Attending: INTERNAL MEDICINE
Payer: MEDICARE

## 2022-12-06 DIAGNOSIS — R60.0 LOWER LEG EDEMA: ICD-10-CM

## 2022-12-06 PROCEDURE — 93970 CV US DOPPLER VENOUS LEGS BILATERAL (CUPID ONLY): ICD-10-PCS | Mod: 26,,, | Performed by: INTERNAL MEDICINE

## 2022-12-06 PROCEDURE — 93970 EXTREMITY STUDY: CPT | Mod: 26,,, | Performed by: INTERNAL MEDICINE

## 2022-12-06 PROCEDURE — 93970 EXTREMITY STUDY: CPT | Mod: PO

## 2022-12-12 ENCOUNTER — TELEPHONE (OUTPATIENT)
Dept: FAMILY MEDICINE | Facility: CLINIC | Age: 82
End: 2022-12-12
Payer: MEDICARE

## 2022-12-12 NOTE — TELEPHONE ENCOUNTER
Spoke with patient informed her that Mrs. Yusuf is not in clinic today.  Patient stated if symptoms worsen she will go to the emergency department

## 2022-12-12 NOTE — TELEPHONE ENCOUNTER
----- Message from Juliana Carpenter sent at 12/12/2022 12:31 PM CST -----  Contact: pt at [phone]  Type: Needs Medical Advice  Who Called:  pt   Best Call Back Number: [phone]    Additional Information: pt called in  today to get a squeeze in for today in [place] to be seen today can someone call the pt back to be seen

## 2022-12-19 ENCOUNTER — TELEPHONE (OUTPATIENT)
Dept: PRIMARY CARE CLINIC | Facility: CLINIC | Age: 82
End: 2022-12-19

## 2022-12-19 ENCOUNTER — OFFICE VISIT (OUTPATIENT)
Dept: PRIMARY CARE CLINIC | Facility: CLINIC | Age: 82
End: 2022-12-19
Payer: MEDICARE

## 2022-12-19 VITALS
SYSTOLIC BLOOD PRESSURE: 132 MMHG | WEIGHT: 182.13 LBS | OXYGEN SATURATION: 97 % | RESPIRATION RATE: 18 BRPM | TEMPERATURE: 98 F | HEART RATE: 68 BPM | DIASTOLIC BLOOD PRESSURE: 60 MMHG | BODY MASS INDEX: 30.3 KG/M2

## 2022-12-19 DIAGNOSIS — I10 ESSENTIAL HYPERTENSION: ICD-10-CM

## 2022-12-19 DIAGNOSIS — R60.0 LOWER LEG EDEMA: Primary | ICD-10-CM

## 2022-12-19 PROCEDURE — 1126F AMNT PAIN NOTED NONE PRSNT: CPT | Mod: CPTII,S$GLB,, | Performed by: NURSE PRACTITIONER

## 2022-12-19 PROCEDURE — 1101F PR PT FALLS ASSESS DOC 0-1 FALLS W/OUT INJ PAST YR: ICD-10-PCS | Mod: CPTII,S$GLB,, | Performed by: NURSE PRACTITIONER

## 2022-12-19 PROCEDURE — 1101F PT FALLS ASSESS-DOCD LE1/YR: CPT | Mod: CPTII,S$GLB,, | Performed by: NURSE PRACTITIONER

## 2022-12-19 PROCEDURE — 3288F FALL RISK ASSESSMENT DOCD: CPT | Mod: CPTII,S$GLB,, | Performed by: NURSE PRACTITIONER

## 2022-12-19 PROCEDURE — 3075F PR MOST RECENT SYSTOLIC BLOOD PRESS GE 130-139MM HG: ICD-10-PCS | Mod: CPTII,S$GLB,, | Performed by: NURSE PRACTITIONER

## 2022-12-19 PROCEDURE — 99213 PR OFFICE/OUTPT VISIT, EST, LEVL III, 20-29 MIN: ICD-10-PCS | Mod: S$GLB,,, | Performed by: NURSE PRACTITIONER

## 2022-12-19 PROCEDURE — 3078F PR MOST RECENT DIASTOLIC BLOOD PRESSURE < 80 MM HG: ICD-10-PCS | Mod: CPTII,S$GLB,, | Performed by: NURSE PRACTITIONER

## 2022-12-19 PROCEDURE — 3078F DIAST BP <80 MM HG: CPT | Mod: CPTII,S$GLB,, | Performed by: NURSE PRACTITIONER

## 2022-12-19 PROCEDURE — 1160F PR REVIEW ALL MEDS BY PRESCRIBER/CLIN PHARMACIST DOCUMENTED: ICD-10-PCS | Mod: CPTII,S$GLB,, | Performed by: NURSE PRACTITIONER

## 2022-12-19 PROCEDURE — 1160F RVW MEDS BY RX/DR IN RCRD: CPT | Mod: CPTII,S$GLB,, | Performed by: NURSE PRACTITIONER

## 2022-12-19 PROCEDURE — 3075F SYST BP GE 130 - 139MM HG: CPT | Mod: CPTII,S$GLB,, | Performed by: NURSE PRACTITIONER

## 2022-12-19 PROCEDURE — 1159F MED LIST DOCD IN RCRD: CPT | Mod: CPTII,S$GLB,, | Performed by: NURSE PRACTITIONER

## 2022-12-19 PROCEDURE — 99213 OFFICE O/P EST LOW 20 MIN: CPT | Mod: S$GLB,,, | Performed by: NURSE PRACTITIONER

## 2022-12-19 PROCEDURE — 1126F PR PAIN SEVERITY QUANTIFIED, NO PAIN PRESENT: ICD-10-PCS | Mod: CPTII,S$GLB,, | Performed by: NURSE PRACTITIONER

## 2022-12-19 PROCEDURE — 3288F PR FALLS RISK ASSESSMENT DOCUMENTED: ICD-10-PCS | Mod: CPTII,S$GLB,, | Performed by: NURSE PRACTITIONER

## 2022-12-19 PROCEDURE — 1159F PR MEDICATION LIST DOCUMENTED IN MEDICAL RECORD: ICD-10-PCS | Mod: CPTII,S$GLB,, | Performed by: NURSE PRACTITIONER

## 2022-12-19 RX ORDER — FUROSEMIDE 20 MG/1
40 TABLET ORAL DAILY
Qty: 60 TABLET | Refills: 1 | Status: SHIPPED | OUTPATIENT
Start: 2022-12-19 | End: 2023-03-15

## 2022-12-19 NOTE — TELEPHONE ENCOUNTER
----- Message from Leilani Dejesus sent at 12/19/2022  9:42 AM CST -----  Regarding: pt call back  Name of Who is Calling: MARGARET BARDALES [[id number]]      What is the request in detail: Pt is requesting a call back to schedule an appt. Pt is requesting to be seen today due to swelling in legs. She declined next available appt in 2023. Please advise!        Can the clinic reply by [facility]: NO        What Number to Call Back if not in [facility]:

## 2022-12-19 NOTE — TELEPHONE ENCOUNTER
Pt wants to know if she can be seen today. Aware that schedule has no opening. Offered next available, pt refused. Please advise

## 2022-12-19 NOTE — PROGRESS NOTES
"Subjective:       Patient ID: Yael Claire is a 82 y.o. female.    Chief Complaint: Edema    Edema  This is a new problem. The current episode started in the past 7 days. The problem occurs constantly. The problem has been gradually worsening. Pertinent negatives include no chest pain or congestion. Nothing aggravates the symptoms. She has tried position changes (lasix) for the symptoms. The treatment provided mild relief.   Swelling of legs bilaterally for 5 days,  She states has been taking lasix daily and started taking 2 about one week ago.  She took 2 lasix on yesterday and states did help a little but not much  Vitals:    22 1432   BP: 132/60   Pulse: 68   Resp: 18   Temp: 97.6 °F (36.4 °C)     Review of Systems   HENT:  Negative for congestion.    Cardiovascular:  Positive for leg swelling. Negative for chest pain.        For one week     The only changes in medication has been stopping amlodipine on 2022 and increased carvedilol to 12.5 twice daily.  She states feels like something is "pounding in head" with the increased strength of carvedilol  Objective:      Physical Exam  Vitals and nursing note reviewed.   Constitutional:       General: She is awake.      Appearance: Normal appearance. She is well-developed and well-groomed.   HENT:      Head: Normocephalic and atraumatic.   Cardiovascular:      Rate and Rhythm: Normal rate and regular rhythm.   Pulmonary:      Effort: Pulmonary effort is normal.      Breath sounds: Normal breath sounds and air entry.   Musculoskeletal:      Cervical back: Normal range of motion and neck supple.      Right lower le+ Edema present.      Left lower le+ Edema present.   Skin:     General: Skin is warm and dry.      Findings: No rash.   Neurological:      General: No focal deficit present.      Mental Status: She is alert and oriented to person, place, and time.   Psychiatric:         Attention and Perception: Attention and perception normal.         " Mood and Affect: Mood and affect normal.         Speech: Speech normal.         Behavior: Behavior normal. Behavior is cooperative.         Thought Content: Thought content normal.         Cognition and Memory: Cognition and memory normal.         Judgment: Judgment normal.       Assessment & Plan:       Lower leg edema  -     furosemide (LASIX) 20 MG tablet; Take 2 tablets (40 mg total) by mouth once daily.  Dispense: 60 tablet; Refill: 1    Essential hypertension  -     furosemide (LASIX) 20 MG tablet; Take 2 tablets (40 mg total) by mouth once daily.  Dispense: 60 tablet; Refill: 1          Medication List with Changes/Refills   Current Medications    ALBUTEROL (PROVENTIL/VENTOLIN HFA) 90 MCG/ACTUATION INHALER    INHALE 2 PUFFS INTO THE LUNGS FOUR TIMES DAILY    ALUMINUM & MAGNESIUM HYDROXIDE-SIMETHICONE (MYLANTA MAXIMUM STRENGTH) 400-400-40 MG/5 ML SUSPENSION    Take 10 mLs by mouth every 6 (six) hours as needed for Indigestion.    ASCORBIC ACID, VITAMIN C, (VITAMIN C) 500 MG TABLET    Take 500 mg by mouth once daily.    ASPIRIN (ECOTRIN) 81 MG EC TABLET    Take 81 mg by mouth once daily.    CARVEDILOL (COREG) 12.5 MG TABLET    Take 1 tablet (12.5 mg total) by mouth 2 (two) times daily with meals.    CO-ENZYME Q-10 30 MG CAPSULE    Take 30 mg by mouth once daily.     DICLOFENAC SODIUM (VOLTAREN) 1 % GEL    APPLY 2 GRAMS TO THE AFFECTED AREA(S) FOUR TIMES DAILY    EVOLOCUMAB (REPATHA SURECLICK) 140 MG/ML PNIJ    Inject 1 mL (140 mg total) into the skin every 14 (fourteen) days.    FAMOTIDINE (PEPCID) 20 MG TABLET    TAKE 1 TABLET TWICE DAILY    FISH OIL-OMEGA-3 FATTY ACIDS 300-1,000 MG CAPSULE    Take 2 g by mouth once daily.    FOLIC ACID/MULTIVIT-MIN/LUTEIN (CENTRUM SILVER ORAL)    Take by mouth once daily. ONE DAILY    HYDROCODONE-ACETAMINOPHEN (NORCO)  MG PER TABLET    Take 1 tablet by mouth 2 (two) times daily as needed.    NITROGLYCERIN (NITROSTAT) 0.4 MG SL TABLET    Place 1 tablet (0.4 mg total)  under the tongue every 5 (five) minutes as needed for Chest pain.    OLMESARTAN (BENICAR) 40 MG TABLET    TAKE 1 TABLET EVERY DAY    POTASSIUM CHLORIDE (KLOR-CON) 10 MEQ TBSR    Take 1 tablet (10 mEq total) by mouth every other day.    ROSUVASTATIN (CRESTOR) 20 MG TABLET    Take 1 tablet (20 mg total) by mouth every evening.   Changed and/or Refilled Medications    Modified Medication Previous Medication    FUROSEMIDE (LASIX) 20 MG TABLET furosemide (LASIX) 20 MG tablet       Take 2 tablets (40 mg total) by mouth once daily.    TAKE 1 TABLET EVERY DAY AS NEEDED FOR EDEMA      Follow up in about 9 days (around 12/28/2022), or if symptoms worsen or fail to improve, for Virtual Visit.

## 2022-12-21 ENCOUNTER — TELEPHONE (OUTPATIENT)
Dept: FAMILY MEDICINE | Facility: CLINIC | Age: 82
End: 2022-12-21
Payer: MEDICARE

## 2022-12-21 ENCOUNTER — TELEPHONE (OUTPATIENT)
Dept: FAMILY MEDICINE | Facility: CLINIC | Age: 82
End: 2022-12-21

## 2022-12-21 NOTE — TELEPHONE ENCOUNTER
Unable to reach via telephone to follow up regarding visit on 12/19/2021 regarding lower extremity edema.

## 2022-12-21 NOTE — TELEPHONE ENCOUNTER
----- Message from Liseth Naidu sent at 12/21/2022  2:05 PM CST -----  Contact: patient  Type:  Patient Returning Call    Who Called:  patient  Who Left Message for Patient:   Madelin  Does the patient know what this is regarding?:    Best Call Back Number:  [phone] (home)   Additional Information:

## 2022-12-21 NOTE — TELEPHONE ENCOUNTER
----- Message from Christiane Washington sent at 12/21/2022 12:57 PM CST -----  Contact: [phone]  Type:  Patient Returning Call    Who Called:Pt  Who Left Message for Patient:Madelin De La Vega  Does the patient know what this is regarding?:no  Would the patient rather a call back or a response via [facility]? call  Best Call Back Number:[phone]   Additional Information: Pt is returning a phone call. Please call back to advise.

## 2022-12-28 ENCOUNTER — OFFICE VISIT (OUTPATIENT)
Dept: FAMILY MEDICINE | Facility: CLINIC | Age: 82
End: 2022-12-28
Payer: MEDICARE

## 2022-12-28 ENCOUNTER — SPECIALTY PHARMACY (OUTPATIENT)
Dept: PHARMACY | Facility: CLINIC | Age: 82
End: 2022-12-28
Payer: MEDICARE

## 2022-12-28 DIAGNOSIS — R60.0 LOWER LEG EDEMA: Primary | ICD-10-CM

## 2022-12-28 DIAGNOSIS — R06.02 SHORTNESS OF BREATH: ICD-10-CM

## 2022-12-28 DIAGNOSIS — R93.89 ABNORMAL CHEST XRAY: ICD-10-CM

## 2022-12-28 PROCEDURE — 99213 OFFICE O/P EST LOW 20 MIN: CPT | Mod: S$GLB,,, | Performed by: NURSE PRACTITIONER

## 2022-12-28 PROCEDURE — 99213 PR OFFICE/OUTPT VISIT, EST, LEVL III, 20-29 MIN: ICD-10-PCS | Mod: S$GLB,,, | Performed by: NURSE PRACTITIONER

## 2022-12-28 PROCEDURE — 99999 PR PBB SHADOW E&M-EST. PATIENT-LVL I: ICD-10-PCS | Mod: PBBFAC,,, | Performed by: NURSE PRACTITIONER

## 2022-12-28 PROCEDURE — 99999 PR PBB SHADOW E&M-EST. PATIENT-LVL I: CPT | Mod: PBBFAC,,, | Performed by: NURSE PRACTITIONER

## 2022-12-28 NOTE — PROGRESS NOTES
"Subjective:       Patient ID: Yael Claire is a 82 y.o. female.    Chief Complaint: No chief complaint on file.    HPI  There were no vitals filed for this visit.  Review of Systems   Respiratory:  Positive for shortness of breath.         Shortness of breath if walking and trying to talk a lot   Cardiovascular:  Positive for leg swelling.       The patient location is: [place]  The chief complaint leading to consultation is: swelling in lower extremities, unable to log on to audio  States swelling has gone down but not as bad as it was and it is helping when keep propped up on pillow. When "stirring around in daytime it is coming back and fluid pill helping some"   Visit type: audio only    Face to Face time with patient: 10:05-10:21  18 minutes of total time spent on the encounter, which includes face to face time and non-face to face time preparing to see the patient (eg, review of tests), Obtaining and/or reviewing separately obtained history, Documenting clinical information in the electronic or other health record, Independently interpreting results (not separately reported) and communicating results to the patient/family/caregiver, or Care coordination (not separately reported).       Each patient to whom he or she provides medical services by telemedicine is:  (1) informed of the relationship between the physician and patient and the respective role of any other health care provider with respect to management of the patient; and (2) notified that he or she may decline to receive medical services by telemedicine and may withdraw from such care at any time.    Notes:    Objective:      Physical Exam    Assessment & Plan:       Lower leg edema- States noticed slight improvement but not completely resolved. Continue to keep elevated    Shortness of breath  -     X-Ray Chest PA And Lateral; Future; Expected date: 12/28/2022    Abnormal chest xray  -     X-Ray Chest PA And Lateral; Future; Expected date: " 12/28/2022          Medication List with Changes/Refills   Current Medications    ALBUTEROL (PROVENTIL/VENTOLIN HFA) 90 MCG/ACTUATION INHALER    INHALE 2 PUFFS INTO THE LUNGS FOUR TIMES DAILY    ALUMINUM & MAGNESIUM HYDROXIDE-SIMETHICONE (MYLANTA MAXIMUM STRENGTH) 400-400-40 MG/5 ML SUSPENSION    Take 10 mLs by mouth every 6 (six) hours as needed for Indigestion.    ASCORBIC ACID, VITAMIN C, (VITAMIN C) 500 MG TABLET    Take 500 mg by mouth once daily.    ASPIRIN (ECOTRIN) 81 MG EC TABLET    Take 81 mg by mouth once daily.    CARVEDILOL (COREG) 12.5 MG TABLET    Take 1 tablet (12.5 mg total) by mouth 2 (two) times daily with meals.    CO-ENZYME Q-10 30 MG CAPSULE    Take 30 mg by mouth once daily.     DICLOFENAC SODIUM (VOLTAREN) 1 % GEL    APPLY 2 GRAMS TO THE AFFECTED AREA(S) FOUR TIMES DAILY    EVOLOCUMAB (REPATHA SURECLICK) 140 MG/ML PNIJ    Inject 1 mL (140 mg total) into the skin every 14 (fourteen) days.    FAMOTIDINE (PEPCID) 20 MG TABLET    TAKE 1 TABLET TWICE DAILY    FISH OIL-OMEGA-3 FATTY ACIDS 300-1,000 MG CAPSULE    Take 2 g by mouth once daily.    FOLIC ACID/MULTIVIT-MIN/LUTEIN (CENTRUM SILVER ORAL)    Take by mouth once daily. ONE DAILY    FUROSEMIDE (LASIX) 20 MG TABLET    Take 2 tablets (40 mg total) by mouth once daily.    HYDROCODONE-ACETAMINOPHEN (NORCO)  MG PER TABLET    Take 1 tablet by mouth 2 (two) times daily as needed.    NITROGLYCERIN (NITROSTAT) 0.4 MG SL TABLET    Place 1 tablet (0.4 mg total) under the tongue every 5 (five) minutes as needed for Chest pain.    OLMESARTAN (BENICAR) 40 MG TABLET    TAKE 1 TABLET EVERY DAY    POTASSIUM CHLORIDE (KLOR-CON) 10 MEQ TBSR    Take 1 tablet (10 mEq total) by mouth every other day.    ROSUVASTATIN (CRESTOR) 20 MG TABLET    Take 1 tablet (20 mg total) by mouth every evening.      No follow-ups on file.

## 2022-12-28 NOTE — TELEPHONE ENCOUNTER
Specialty Pharmacy - Refill Coordination    Specialty Medication Orders Linked to Encounter      Flowsheet Row Most Recent Value   Medication #1 evolocumab (REPATHA SURECLICK) 140 mg/mL PnIj (Order#[id number], Rx#[id number])            Refill Questions - Documented Responses      Flowsheet Row Most Recent Value   Patient Availability and HIPAA Verification    Does patient want to proceed with activity? Yes   HIPAA/medical authority confirmed? Yes   Relationship to patient of person spoken to? Self   Refill Screening Questions    Would patient like to speak to a pharmacist? No   When does the patient need to receive the medication? 01/06/23   Refill Delivery Questions    How will the patient receive the medication? MEDRx   When does the patient need to receive the medication? 01/06/23   Shipping Address Home   Address in [facility] confirmed and updated if neccessary? Yes   Expected Copay ($) 9.85   Is the patient able to afford the medication copay? Yes   Payment Method invoice (approval required)   Days supply of Refill 28   Supplies needed? No supplies needed   Refill activity completed? Yes   Refill activity plan Refill scheduled   Shipment/Pickup Date: 01/03/23            Current Outpatient Medications   Medication Sig    albuterol (PROVENTIL/VENTOLIN HFA) 90 mcg/actuation inhaler INHALE 2 PUFFS INTO THE LUNGS FOUR TIMES DAILY    aluminum & magnesium hydroxide-simethicone (MYLANTA MAXIMUM STRENGTH) 400-400-40 mg/5 mL suspension Take 10 mLs by mouth every 6 (six) hours as needed for Indigestion.    ascorbic acid, vitamin C, (VITAMIN C) 500 MG tablet Take 500 mg by mouth once daily.    aspirin (ECOTRIN) 81 MG EC tablet Take 81 mg by mouth once daily.    carvediloL (COREG) 12.5 MG tablet Take 1 tablet (12.5 mg total) by mouth 2 (two) times daily with meals.    co-enzyme Q-10 30 mg capsule Take 30 mg by mouth once daily.     diclofenac sodium (VOLTAREN) 1 % Gel APPLY 2 GRAMS TO THE AFFECTED AREA(S) FOUR TIMES  DAILY    evolocumab (REPATHA SURECLICK) 140 mg/mL PnIj Inject 1 mL (140 mg total) into the skin every 14 (fourteen) days.    famotidine (PEPCID) 20 MG tablet TAKE 1 TABLET TWICE DAILY    fish oil-omega-3 fatty acids 300-1,000 mg capsule Take 2 g by mouth once daily.    FOLIC ACID/MULTIVIT-MIN/LUTEIN (CENTRUM SILVER ORAL) Take by mouth once daily. ONE DAILY    furosemide (LASIX) 20 MG tablet Take 2 tablets (40 mg total) by mouth once daily.    HYDROcodone-acetaminophen (NORCO)  mg per tablet Take 1 tablet by mouth 2 (two) times daily as needed.    nitroGLYCERIN (NITROSTAT) 0.4 MG SL tablet Place 1 tablet (0.4 mg total) under the tongue every 5 (five) minutes as needed for Chest pain.    olmesartan (BENICAR) 40 MG tablet TAKE 1 TABLET EVERY DAY    potassium chloride (KLOR-CON) 10 MEQ TbSR Take 1 tablet (10 mEq total) by mouth every other day.    rosuvastatin (CRESTOR) 20 MG tablet Take 1 tablet (20 mg total) by mouth every evening.   Last reviewed on 12/19/2022  2:56 PM by Madelin De La Vega, NABILA, APRN    Review of patient's allergies indicates:   Allergen Reactions    Clindamycin Diarrhea    Darvocet a500 [propoxyphene n-acetaminophen]     Erythromycin Other (See Comments)    Mycinette [cetylpyridinium-benzocaine]     Pcn [penicillins]     Last reviewed on  12/19/2022 2:32 PM by Linnette Bradford      Tasks added this encounter   1/27/2023 - Refill Call (Auto Added)   Tasks due within next 3 months   No tasks due.     Emmanuel Aguilar, PharmD  [facility]  [address] [facility] [address]  Phone: [phone]  Fax: [phone]

## 2023-01-10 PROBLEM — E87.1 HYPONATREMIA: Status: ACTIVE | Noted: 2023-01-10

## 2023-01-10 PROBLEM — I50.43 ACUTE ON CHRONIC COMBINED SYSTOLIC AND DIASTOLIC HEART FAILURE: Status: ACTIVE | Noted: 2023-01-10

## 2023-01-10 PROBLEM — I35.9 AORTIC VALVE DISORDER: Status: RESOLVED | Noted: 2017-04-18 | Resolved: 2023-01-10

## 2023-01-12 ENCOUNTER — TELEPHONE (OUTPATIENT)
Dept: CARDIOLOGY | Facility: CLINIC | Age: 83
End: 2023-01-12
Payer: MEDICARE

## 2023-01-12 NOTE — TELEPHONE ENCOUNTER
----- Message from Eb Ruelas MD sent at 1/12/2023  1:41 PM CST -----  Regarding: RE: OUTPATIENT CARDIAC REHAB  yes  ----- Message -----  From: Luisito Barragan LPN  Sent: 1/12/2023   1:22 PM CST  To: Eb Ruelas MD  Subject: FW: OUTPATIENT CARDIAC REHAB                       ----- Message -----  From: Jayjay Lopez RRT  Sent: 1/12/2023   1:19 PM CST  To: [facility] Staff  Subject: OUTPATIENT CARDIAC REHAB                         This pt is interested in attending outpatient cardiac rehab (CRR4-cardiac phase II).  Approval is needed. Thank you.

## 2023-01-15 PROBLEM — I35.1 AORTIC VALVE REGURGITATION: Status: ACTIVE | Noted: 2023-01-15

## 2023-01-15 PROBLEM — Z95.2 S/P AVR (AORTIC VALVE REPLACEMENT): Status: ACTIVE | Noted: 2017-04-18

## 2023-01-18 ENCOUNTER — TELEPHONE (OUTPATIENT)
Dept: FAMILY MEDICINE | Facility: CLINIC | Age: 83
End: 2023-01-18
Payer: MEDICARE

## 2023-01-18 NOTE — TELEPHONE ENCOUNTER
----- Message from Ana Laura Smith sent at 1/17/2023  1:24 PM CST -----  Who Called: [facility] Discharge Nurse    What is the reqeust in detail: Requesting call back to schedule Hospital Follow up for acute/chronic heart failure for patient in one week since discharge 01/16/2023. Please advise.     Can the clinic reply by [facility]? No    Best Call Back Number: [phone]      Additional Information:

## 2023-01-20 ENCOUNTER — TELEPHONE (OUTPATIENT)
Dept: FAMILY MEDICINE | Facility: CLINIC | Age: 83
End: 2023-01-20
Payer: MEDICARE

## 2023-01-20 NOTE — TELEPHONE ENCOUNTER
Spoke with patient she stated she is having the lower leg edema again.  Patient stated the furosemide is not working.  Patient was informed her message will be sent to Mrs. Yusuf.  But Mrs. Yusuf is not in the office today.  If symptoms worsen with the edema to go back to the Emergency Department

## 2023-01-20 NOTE — TELEPHONE ENCOUNTER
----- Message from Alethea De La Vega sent at 1/19/2023  3:24 PM CST -----  Contact: pt at [phone]  Type: Needs Medical Advice  Who Called:  pt  Best Call Back Number: [phone]  Additional Information: pt is calling the office because she has fluid build up in both legs.Please call back ASAP

## 2023-01-24 NOTE — TELEPHONE ENCOUNTER
"Spoke with patient via telephone. States no swelling in morning but it returns as soon as she starts "moving around in the house" and by evening every few days legs are swollen. Very minimal SOB. Advised to take an additional 20mg of Lasix on Tuesday and Thursday and I will follow up with call on Monday to see if improving. Also advised go to ER if any severe SOB and she verbalized understanding. She also states waiting to hear from cardiology team about moving appt up to February. I will reach out to Dr. Ruelas team to help with that appt.  "

## 2023-01-27 ENCOUNTER — SPECIALTY PHARMACY (OUTPATIENT)
Dept: PHARMACY | Facility: CLINIC | Age: 83
End: 2023-01-27
Payer: MEDICARE

## 2023-01-27 NOTE — TELEPHONE ENCOUNTER
Specialty Pharmacy - Refill Coordination    Specialty Medication Orders Linked to Encounter      Flowsheet Row Most Recent Value   Medication #1 evolocumab (REPATHA SURECLICK) 140 mg/mL PnIj (Order#[id number], Rx#[id number])            Refill Questions - Documented Responses      Flowsheet Row Most Recent Value   Patient Availability and HIPAA Verification    Does patient want to proceed with activity? Yes   HIPAA/medical authority confirmed? Yes   Relationship to patient of person spoken to? Self   Refill Screening Questions    Would patient like to speak to a pharmacist? No   When does the patient need to receive the medication? 02/06/23   Refill Delivery Questions    How will the patient receive the medication? MEDRx   When does the patient need to receive the medication? 02/06/23   Shipping Address Home   Address in [facility] confirmed and updated if neccessary? Yes   Expected Copay ($) 0   Is the patient able to afford the medication copay? Yes   Payment Method zero copay   Days supply of Refill 28   Supplies needed? No supplies needed   Refill activity completed? Yes   Refill activity plan Refill scheduled   Shipment/Pickup Date: 02/01/23            Current Outpatient Medications   Medication Sig    albuterol (PROVENTIL/VENTOLIN HFA) 90 mcg/actuation inhaler Inhale 2 puffs into the lungs every 6 (six) hours as needed for Wheezing.    aluminum & magnesium hydroxide-simethicone (MYLANTA MAXIMUM STRENGTH) 400-400-40 mg/5 mL suspension Take 10 mLs by mouth every 6 (six) hours as needed for Indigestion.    ascorbic acid, vitamin C, (VITAMIN C) 500 MG tablet Take 500 mg by mouth once daily.    aspirin (ECOTRIN) 81 MG EC tablet Take 81 mg by mouth once daily.    carvediloL (COREG) 12.5 MG tablet Take 1 tablet (12.5 mg total) by mouth 2 (two) times daily with meals.    co-enzyme Q-10 30 mg capsule Take 30 mg by mouth once daily.     diclofenac sodium (VOLTAREN) 1 % Gel Apply 2 g topically 4 (four) times daily.     evolocumab (REPATHA SURECLICK) 140 mg/mL PnIj Inject 1 mL (140 mg total) into the skin every 14 (fourteen) days.    famotidine (PEPCID) 20 MG tablet TAKE 1 TABLET TWICE DAILY    fish oil-omega-3 fatty acids 300-1,000 mg capsule Take 2 g by mouth once daily.    FOLIC ACID/MULTIVIT-MIN/LUTEIN (CENTRUM SILVER ORAL) Take 1 tablet by mouth once daily. ONE DAILY    furosemide (LASIX) 20 MG tablet Take 2 tablets (40 mg total) by mouth once daily.    HYDROcodone-acetaminophen (NORCO)  mg per tablet Take 1 tablet by mouth 2 (two) times daily as needed for Pain.    nitroGLYCERIN (NITROSTAT) 0.4 MG SL tablet Place 1 tablet (0.4 mg total) under the tongue every 5 (five) minutes as needed for Chest pain.    olmesartan (BENICAR) 40 MG tablet Take 1 tablet (40 mg total) by mouth once daily.    potassium chloride (KLOR-CON) 10 MEQ TbSR Take 1 tablet (10 mEq total) by mouth every other day.    rosuvastatin (CRESTOR) 20 MG tablet Take 1 tablet (20 mg total) by mouth every evening.   Last reviewed on 1/10/2023  7:00 AM by Dominic Laguna MD    Review of patient's allergies indicates:   Allergen Reactions    Clindamycin Diarrhea    Darvocet a500 [propoxyphene n-acetaminophen]     Erythromycin Other (See Comments)    Mycinette [cetylpyridinium-benzocaine]     Pcn [penicillins]     Last reviewed on  1/10/2023 6:59 AM by Dominic Laguna      Tasks added this encounter   No tasks added.   Tasks due within next 3 months   1/27/2023 - Refill Call (Auto Added)     Dheeraj MillsD  [facility]  [address] [facility] [address]  Phone: [phone]  Fax: [phone]

## 2023-02-07 ENCOUNTER — TELEPHONE (OUTPATIENT)
Dept: FAMILY MEDICINE | Facility: CLINIC | Age: 83
End: 2023-02-07
Payer: MEDICARE

## 2023-02-07 NOTE — TELEPHONE ENCOUNTER
----- Message from Leilani Dejesus sent at 2/6/2023  4:05 PM CST -----  Regarding: patient call back  Contact: patient  Name of Who is Calling: MARGARET BARDALES [[id number]]      What is the request in detail: Patient is requesting a call back to discuss fluid in her legs. Please advise!         Can the clinic reply by [facility]: NO        [facility] [phone]

## 2023-02-07 NOTE — TELEPHONE ENCOUNTER
Spoke w/ pt. C/o bilat lower extrem swelling. Currently taking lasix 20mg 2 tablets daily without relief. Seen in December. No c/o of sob. Would like to know what else she can do. Please advise.

## 2023-02-08 ENCOUNTER — OFFICE VISIT (OUTPATIENT)
Dept: CARDIOLOGY | Facility: CLINIC | Age: 83
End: 2023-02-08
Payer: MEDICARE

## 2023-02-08 VITALS
BODY MASS INDEX: 29.99 KG/M2 | SYSTOLIC BLOOD PRESSURE: 104 MMHG | WEIGHT: 180 LBS | DIASTOLIC BLOOD PRESSURE: 49 MMHG | HEART RATE: 73 BPM | HEIGHT: 65 IN

## 2023-02-08 DIAGNOSIS — E87.1 HYPONATREMIA: ICD-10-CM

## 2023-02-08 DIAGNOSIS — E66.3 OVERWEIGHT (BMI 25.0-29.9): Chronic | ICD-10-CM

## 2023-02-08 DIAGNOSIS — I25.709 CORONARY ARTERY DISEASE INVOLVING CORONARY BYPASS GRAFT OF NATIVE HEART WITH ANGINA PECTORIS: Chronic | ICD-10-CM

## 2023-02-08 DIAGNOSIS — I35.1 NONRHEUMATIC AORTIC VALVE INSUFFICIENCY: ICD-10-CM

## 2023-02-08 DIAGNOSIS — I35.0 NONRHEUMATIC AORTIC VALVE STENOSIS: Chronic | ICD-10-CM

## 2023-02-08 DIAGNOSIS — I50.43 ACUTE ON CHRONIC COMBINED SYSTOLIC AND DIASTOLIC HEART FAILURE: Primary | ICD-10-CM

## 2023-02-08 PROCEDURE — 3288F FALL RISK ASSESSMENT DOCD: CPT | Mod: CPTII,S$GLB,, | Performed by: INTERNAL MEDICINE

## 2023-02-08 PROCEDURE — 3288F PR FALLS RISK ASSESSMENT DOCUMENTED: ICD-10-PCS | Mod: CPTII,S$GLB,, | Performed by: INTERNAL MEDICINE

## 2023-02-08 PROCEDURE — 99214 OFFICE O/P EST MOD 30 MIN: CPT | Mod: S$GLB,,, | Performed by: INTERNAL MEDICINE

## 2023-02-08 PROCEDURE — 1111F PR DISCHARGE MEDS RECONCILED W/ CURRENT OUTPATIENT MED LIST: ICD-10-PCS | Mod: CPTII,S$GLB,, | Performed by: INTERNAL MEDICINE

## 2023-02-08 PROCEDURE — 99214 PR OFFICE/OUTPT VISIT, EST, LEVL IV, 30-39 MIN: ICD-10-PCS | Mod: S$GLB,,, | Performed by: INTERNAL MEDICINE

## 2023-02-08 PROCEDURE — 1159F PR MEDICATION LIST DOCUMENTED IN MEDICAL RECORD: ICD-10-PCS | Mod: CPTII,S$GLB,, | Performed by: INTERNAL MEDICINE

## 2023-02-08 PROCEDURE — 1101F PR PT FALLS ASSESS DOC 0-1 FALLS W/OUT INJ PAST YR: ICD-10-PCS | Mod: CPTII,S$GLB,, | Performed by: INTERNAL MEDICINE

## 2023-02-08 PROCEDURE — 1126F PR PAIN SEVERITY QUANTIFIED, NO PAIN PRESENT: ICD-10-PCS | Mod: CPTII,S$GLB,, | Performed by: INTERNAL MEDICINE

## 2023-02-08 PROCEDURE — 1111F DSCHRG MED/CURRENT MED MERGE: CPT | Mod: CPTII,S$GLB,, | Performed by: INTERNAL MEDICINE

## 2023-02-08 PROCEDURE — 3078F PR MOST RECENT DIASTOLIC BLOOD PRESSURE < 80 MM HG: ICD-10-PCS | Mod: CPTII,S$GLB,, | Performed by: INTERNAL MEDICINE

## 2023-02-08 PROCEDURE — 3078F DIAST BP <80 MM HG: CPT | Mod: CPTII,S$GLB,, | Performed by: INTERNAL MEDICINE

## 2023-02-08 PROCEDURE — 3074F SYST BP LT 130 MM HG: CPT | Mod: CPTII,S$GLB,, | Performed by: INTERNAL MEDICINE

## 2023-02-08 PROCEDURE — 3074F PR MOST RECENT SYSTOLIC BLOOD PRESSURE < 130 MM HG: ICD-10-PCS | Mod: CPTII,S$GLB,, | Performed by: INTERNAL MEDICINE

## 2023-02-08 PROCEDURE — 1101F PT FALLS ASSESS-DOCD LE1/YR: CPT | Mod: CPTII,S$GLB,, | Performed by: INTERNAL MEDICINE

## 2023-02-08 PROCEDURE — 1159F MED LIST DOCD IN RCRD: CPT | Mod: CPTII,S$GLB,, | Performed by: INTERNAL MEDICINE

## 2023-02-08 PROCEDURE — 1126F AMNT PAIN NOTED NONE PRSNT: CPT | Mod: CPTII,S$GLB,, | Performed by: INTERNAL MEDICINE

## 2023-02-08 RX ORDER — CLOPIDOGREL BISULFATE 75 MG/1
300 TABLET ORAL ONCE
Status: CANCELLED | OUTPATIENT
Start: 2023-02-08 | End: 2023-02-08

## 2023-02-08 RX ORDER — SPIRONOLACTONE 25 MG/1
25 TABLET ORAL DAILY
Qty: 30 TABLET | Refills: 1 | Status: SHIPPED | OUTPATIENT
Start: 2023-02-08 | End: 2023-05-19

## 2023-02-08 RX ORDER — SODIUM CHLORIDE 9 MG/ML
INJECTION, SOLUTION INTRAVENOUS ONCE
Status: CANCELLED | OUTPATIENT
Start: 2023-02-08 | End: 2023-02-08

## 2023-02-08 NOTE — PROGRESS NOTES
Subjective:    Patient ID:  Yael Claire is a 82 y.o. female who presents for Hypertension, Edema, Valvular Heart Disease, and Congestive Heart Failure        Hypertension  Associated symptoms include malaise/fatigue. Pertinent negatives include no blurred vision, chest pain, orthopnea, palpitations, PND or shortness of breath.   Edema  Pertinent negatives include no abdominal pain, chest pain, coughing, fever, nausea or vomiting.   Congestive Heart Failure  Pertinent negatives include no abdominal pain, chest pain, claudication, near-syncope, palpitations or shortness of breath.   S/P [facility] THEN [facility] WITH PNEUMONIA AND DHF, EDEMA, ECHO HILLARY 0.7, MOD AI, AVR 2005, NOT MUCH DIEURSESIS WITH LASIX, NA WAS LOW, REFERRED BACK BY PCP FOR WORSENING EDEMA, NO CHEST PAIN PER SE SEE REVIEW OF SYSTEMS    Past Medical History:   Diagnosis Date    Acute coronary syndrome     LIGHT 2004    Anticoagulant long-term use     Aortic valve disorder     Arthritis     Coronary artery disease     cabg & valve sgy    Heart murmur     Hyperlipidemia     Hypertension     Mild carotid artery disease 11/17/2021    Palpitations     Stenosis of aortic and mitral valves     Valvular regurgitation      Past Surgical History:   Procedure Laterality Date    CARDIAC CATHETERIZATION      CARDIAC VALVE SURGERY      COLONOSCOPY  ~2019    normal findings per patient report    CORONARY ARTERY BYPASS GRAFT  04/2005    HYSTERECTOMY  1980    UPPER GASTROINTESTINAL ENDOSCOPY  ~2010    normal findings     Family History   Problem Relation Age of Onset    Hypertension Mother     Heart disease Mother     Hypertension Father     Heart disease Father     Colon cancer Neg Hx     Esophageal cancer Neg Hx     Stomach cancer Neg Hx      Social History     Socioeconomic History    Marital status: Single   Tobacco Use    Smoking status: Never    Smokeless tobacco: Never   Substance and Sexual Activity    Alcohol use: Yes     Comment: couple times per yr    Drug use: No        Review of patient's allergies indicates:   Allergen Reactions    Clindamycin Diarrhea    Darvocet a500 [propoxyphene n-acetaminophen]     Erythromycin Other (See Comments)    Mycinette [cetylpyridinium-benzocaine]     Pcn [penicillins]        Current Outpatient Medications:     albuterol (PROVENTIL/VENTOLIN HFA) 90 mcg/actuation inhaler, Inhale 2 puffs into the lungs every 6 (six) hours as needed for Wheezing., Disp: , Rfl:     aluminum & magnesium hydroxide-simethicone (MYLANTA MAXIMUM STRENGTH) 400-400-40 mg/5 mL suspension, Take 10 mLs by mouth every 6 (six) hours as needed for Indigestion., Disp: 3840 mL, Rfl: 0    ascorbic acid, vitamin C, (VITAMIN C) 500 MG tablet, Take 500 mg by mouth once daily., Disp: , Rfl:     aspirin (ECOTRIN) 81 MG EC tablet, Take 81 mg by mouth once daily., Disp: , Rfl:     carvediloL (COREG) 12.5 MG tablet, Take 1 tablet (12.5 mg total) by mouth 2 (two) times daily with meals., Disp: 180 tablet, Rfl: 1    co-enzyme Q-10 30 mg capsule, Take 30 mg by mouth once daily. , Disp: , Rfl:     diclofenac sodium (VOLTAREN) 1 % Gel, Apply 2 g topically 4 (four) times daily., Disp: , Rfl:     evolocumab (REPATHA SURECLICK) 140 mg/mL PnIj, Inject 1 mL (140 mg total) into the skin every 14 (fourteen) days., Disp: 2 mL, Rfl: 6    famotidine (PEPCID) 20 MG tablet, TAKE 1 TABLET TWICE DAILY, Disp: 180 tablet, Rfl: 0    fish oil-omega-3 fatty acids 300-1,000 mg capsule, Take 2 g by mouth once daily., Disp: , Rfl:     FOLIC ACID/MULTIVIT-MIN/LUTEIN (CENTRUM SILVER ORAL), Take 1 tablet by mouth once daily. ONE DAILY, Disp: , Rfl:     furosemide (LASIX) 20 MG tablet, Take 2 tablets (40 mg total) by mouth once daily., Disp: 60 tablet, Rfl: 1    HYDROcodone-acetaminophen (NORCO)  mg per tablet, Take 1 tablet by mouth 2 (two) times daily as needed for Pain., Disp: , Rfl: 0    nitroGLYCERIN (NITROSTAT) 0.4 MG SL tablet, Place 1 tablet (0.4 mg total) under the tongue every 5 (five) minutes as  "needed for Chest pain., Disp: 25 tablet, Rfl: 1    olmesartan (BENICAR) 40 MG tablet, Take 1 tablet (40 mg total) by mouth once daily., Disp: , Rfl:     potassium chloride (KLOR-CON) 10 MEQ TbSR, Take 1 tablet (10 mEq total) by mouth every other day., Disp: 45 tablet, Rfl: 1    rosuvastatin (CRESTOR) 20 MG tablet, Take 1 tablet (20 mg total) by mouth every evening., Disp: 30 tablet, Rfl: 0    spironolactone (ALDACTONE) 25 MG tablet, Take 1 tablet (25 mg total) by mouth once daily., Disp: 30 tablet, Rfl: 1    Review of Systems   Constitutional: Positive for malaise/fatigue and weight gain. Negative for fever.   Eyes:  Negative for blurred vision and visual disturbance.   Cardiovascular:  Positive for dyspnea on exertion (MILD. MOD) and leg swelling. Negative for chest pain, claudication, cyanosis, irregular heartbeat, near-syncope, orthopnea, palpitations, paroxysmal nocturnal dyspnea and syncope.   Respiratory:  Negative for cough, hemoptysis and shortness of breath.    Endocrine: Negative for polyphagia and polyuria.   Hematologic/Lymphatic: Negative.    Musculoskeletal:  Positive for arthritis. Negative for falls.   Gastrointestinal:  Negative for abdominal pain, dysphagia, melena, nausea and vomiting.   Genitourinary:  Negative for dysuria and flank pain.   Neurological:  Negative for brief paralysis, focal weakness and loss of balance.   Psychiatric/Behavioral:  Negative for altered mental status and depression.    Allergic/Immunologic: Negative for hives and persistent infections.      Objective:      Vitals:    02/08/23 1534   BP: (!) 104/49   Pulse: 73   Weight: 81.6 kg (180 lb)   Height: 5' 5" (1.651 m)   PainSc: 0-No pain     Body mass index is 29.95 kg/m².    Physical Exam  Constitutional:       Appearance: Normal appearance. She is well-developed and overweight.   HENT:      Head: Normocephalic and atraumatic.   Eyes:      Extraocular Movements: Extraocular movements intact.      Conjunctiva/sclera: " Conjunctivae normal.   Neck:      Vascular: Normal carotid pulses. No hepatojugular reflux or JVD.   Cardiovascular:      Rate and Rhythm: Normal rate and regular rhythm. No extrasystoles are present.     Pulses:           Carotid pulses are 2+ on the right side and 2+ on the left side.       Radial pulses are 2+ on the right side and 2+ on the left side.      Heart sounds: Murmur heard.   Harsh midsystolic murmur is present with a grade of 2/6 at the upper right sternal border radiating to the neck.     No friction rub. No gallop. No S4 sounds.   Pulmonary:      Effort: Pulmonary effort is normal.      Breath sounds: No rales.   Abdominal:      Palpations: Abdomen is soft. There is no hepatomegaly.      Tenderness: There is no abdominal tenderness.   Musculoskeletal:      Cervical back: Neck supple.      Right lower le+ Edema present.      Left lower le+ Edema present.      Comments: SLOW GAIT, LARGE LEGS   Skin:     General: Skin is warm and dry.      Capillary Refill: Capillary refill takes less than 2 seconds.   Neurological:      General: No focal deficit present.      Mental Status: She is alert and oriented to person, place, and time.      Cranial Nerves: Cranial nerves 2-12 are intact.   Psychiatric:         Mood and Affect: Mood normal.         Speech: Speech normal.         Behavior: Behavior normal.               ..    Chemistry        Component Value Date/Time     (L) 2023 0817    K 4.0 2023 0817    CL 95 2023 0817    CO2 30 2023 0817    BUN 12 2023 0817    CREATININE 0.66 2023 0817    GLU 76 2023 0817        Component Value Date/Time    CALCIUM 8.0 (L) 2023 0817    ALKPHOS 128 2023 1504    AST 46 (H) 2023 1504    ALT 28 2023 1504    BILITOT 0.9 2023 1504    ESTGFRAFRICA >60 2022 2216    EGFRNONAA >60 2022 2216            ..  Lab Results   Component Value Date    CHOL 99 (L) 01/10/2023    CHOL 159 2022     CHOL 157 06/10/2021     Lab Results   Component Value Date    HDL 37 (L) 01/10/2023    HDL 50 04/14/2022    HDL 51 06/10/2021     Lab Results   Component Value Date    LDLCALC 50.4 (L) 01/10/2023    LDLCALC 94.2 04/14/2022    LDLCALC 95.6 06/10/2021     Lab Results   Component Value Date    TRIG 58 01/10/2023    TRIG 74 04/14/2022    TRIG 52 06/10/2021     Lab Results   Component Value Date    CHOLHDL 37.4 01/10/2023    CHOLHDL 31.4 04/14/2022    CHOLHDL 32.5 06/10/2021     ..  Lab Results   Component Value Date    WBC 5.09 01/16/2023    HGB 11.2 (L) 01/16/2023    HCT 34.4 (L) 01/16/2023    MCV 95 01/16/2023     01/16/2023       Test(s) Reviewed  I have reviewed the following in detail:  [] Stress test   [] Angiography   [x] Echocardiogram   [x] Labs   [] Other:       Assessment:         ICD-10-CM ICD-9-CM   1. Acute on chronic combined systolic and diastolic heart failure  I50.43 428.43   2. Nonrheumatic aortic valve stenosis  I35.0 424.1   3. Coronary artery disease involving coronary bypass graft of native heart with angina pectoris  I25.709 414.05     413.9   4. Hyponatremia  E87.1 276.1   5. Overweight (BMI 25.0-29.9)  E66.3 278.02   6. Nonrheumatic aortic valve insufficiency  I35.1 424.1     Problem List Items Addressed This Visit          Cardiac/Vascular    Nonrheumatic aortic valve stenosis    Relevant Orders    Case Request-Cath Lab: ANGIOGRAM, CORONARY, INCLUDING BYPASS GRAFT, WITH LEFT HEART CATHETERIZATION, INSERTION, CATHETER, RIGHT HEART (Completed)    Vital signs    Cardiac Monitoring - Adult    Basic metabolic panel    Full code    Coronary artery disease involving coronary bypass graft of native heart with angina pectoris    Relevant Orders    Comprehensive Metabolic Panel    Vital signs    Cardiac Monitoring - Adult    Basic metabolic panel    Full code    Acute on chronic combined systolic and diastolic heart failure - Primary    Relevant Orders    Comprehensive Metabolic Panel    Case  Request-Cath Lab: ANGIOGRAM, CORONARY, INCLUDING BYPASS GRAFT, WITH LEFT HEART CATHETERIZATION, INSERTION, CATHETER, RIGHT HEART (Completed)    Vital signs    Cardiac Monitoring - Adult    Basic metabolic panel    Full code    Aortic valve regurgitation    Relevant Orders    Vital signs    Cardiac Monitoring - Adult    Basic metabolic panel    Full code       Renal/    Hyponatremia    Relevant Orders    Comprehensive Metabolic Panel       Endocrine    Overweight (BMI 25.0-29.9)        Plan:         DAILY WEIGHT, ADD SPIRONOLACTONE, WILL NEED FURTHER EVALUATION OF HER VALVULAR DISEASE THAT IS CAUSING HER HEART FAILURE SHE HAS SIGNIFICANT AORTIC STENOSIS SO WILL NEED RIGHT AND LEFT HEART CATHETERIZATION WITH GRAFT ANGIOGRAM, FOR SYMPTOMATIC SIGNIFICANT AORTIC STENOSIS AND MIXED DISEASE WITH MODERATE AORTIC INSUFFICIENCY, ANGINA EQUIVALENT, R/LHC GRAFTS IN 2 WEEKS, CLASS 2-3 HEART FAILURE CLASS 2 ANGINA EQUIVALENT NO ARRHYTHMIA TRY TO INCREASE ACTIVITY DIET DAILY WEIGHT  Acute on chronic combined systolic and diastolic heart failure  -     Comprehensive Metabolic Panel; Future; Expected date: 02/15/2023  -     Case Request-Cath Lab: ANGIOGRAM, CORONARY, INCLUDING BYPASS GRAFT, WITH LEFT HEART CATHETERIZATION, INSERTION, CATHETER, RIGHT HEART; Standing  -     Establish IV access and maintain saline lock; Standing  -     Hold Warfarin; Standing  -     Hold Enoxaparin/subcutaneous Heparin; Standing  -     Hold Heparin drip; Standing  -     Height and weight; Standing  -     Verify informed consent; Standing  -     Vital signs; Standing  -     Cardiac Monitoring - Adult; Standing  -     Pulse Oximetry Q4H; Standing  -     Diet NPO; Standing  -     CBC auto differential; Standing  -     Basic metabolic panel; Standing  -     Full code; Standing    Nonrheumatic aortic valve stenosis  -     Case Request-Cath Lab: ANGIOGRAM, CORONARY, INCLUDING BYPASS GRAFT, WITH LEFT HEART CATHETERIZATION, INSERTION, CATHETER, RIGHT HEART;  Standing  -     Establish IV access and maintain saline lock; Standing  -     Hold Warfarin; Standing  -     Hold Enoxaparin/subcutaneous Heparin; Standing  -     Hold Heparin drip; Standing  -     Height and weight; Standing  -     Verify informed consent; Standing  -     Vital signs; Standing  -     Cardiac Monitoring - Adult; Standing  -     Pulse Oximetry Q4H; Standing  -     Diet NPO; Standing  -     CBC auto differential; Standing  -     Basic metabolic panel; Standing  -     Full code; Standing    Coronary artery disease involving coronary bypass graft of native heart with angina pectoris  -     Comprehensive Metabolic Panel; Future; Expected date: 02/15/2023  -     Establish IV access and maintain saline lock; Standing  -     Hold Warfarin; Standing  -     Hold Enoxaparin/subcutaneous Heparin; Standing  -     Hold Heparin drip; Standing  -     Height and weight; Standing  -     Verify informed consent; Standing  -     Vital signs; Standing  -     Cardiac Monitoring - Adult; Standing  -     Pulse Oximetry Q4H; Standing  -     Diet NPO; Standing  -     CBC auto differential; Standing  -     Basic metabolic panel; Standing  -     Full code; Standing    Hyponatremia  -     Comprehensive Metabolic Panel; Future; Expected date: 02/15/2023    Overweight (BMI 25.0-29.9)    Nonrheumatic aortic valve insufficiency  -     Establish IV access and maintain saline lock; Standing  -     Hold Warfarin; Standing  -     Hold Enoxaparin/subcutaneous Heparin; Standing  -     Hold Heparin drip; Standing  -     Height and weight; Standing  -     Verify informed consent; Standing  -     Vital signs; Standing  -     Cardiac Monitoring - Adult; Standing  -     Pulse Oximetry Q4H; Standing  -     Diet NPO; Standing  -     CBC auto differential; Standing  -     Basic metabolic panel; Standing  -     Full code; Standing    Other orders  -     spironolactone (ALDACTONE) 25 MG tablet; Take 1 tablet (25 mg total) by mouth once daily.   Dispense: 30 tablet; Refill: 1  -     0.9%  NaCl infusion  -     clopidogreL tablet 300 mg    RTC Low level/low impact aerobic exercise 5x's/wk. Heart healthy diet and risk factor modification.    See labs and med orders.    Aerobic exercise 5x's/wk. Heart healthy diet and risk factor modification.    See labs and med orders.

## 2023-02-08 NOTE — TELEPHONE ENCOUNTER
Spoke with patient via telephone and states legs swelling up toward thigh even on 40 mg lasix in morning and 20 mg in evening. Message to Dr. Ruelas in [place] to have her seen before her next appt which is in May. Appt made to see him today at 3p

## 2023-02-16 ENCOUNTER — LAB VISIT (OUTPATIENT)
Dept: LAB | Facility: CLINIC | Age: 83
End: 2023-02-16
Payer: MEDICARE

## 2023-02-16 DIAGNOSIS — E87.1 HYPONATREMIA: ICD-10-CM

## 2023-02-16 DIAGNOSIS — I50.43 ACUTE ON CHRONIC COMBINED SYSTOLIC AND DIASTOLIC HEART FAILURE: ICD-10-CM

## 2023-02-16 DIAGNOSIS — I25.709 CORONARY ARTERY DISEASE INVOLVING CORONARY BYPASS GRAFT OF NATIVE HEART WITH ANGINA PECTORIS: Chronic | ICD-10-CM

## 2023-02-16 LAB
ALBUMIN SERPL BCP-MCNC: 3.1 G/DL (ref 3.5–5.2)
ALP SERPL-CCNC: 127 U/L (ref 55–135)
ALT SERPL W/O P-5'-P-CCNC: 19 U/L (ref 10–44)
ANION GAP SERPL CALC-SCNC: 6 MMOL/L (ref 8–16)
AST SERPL-CCNC: 21 U/L (ref 10–40)
BILIRUB SERPL-MCNC: 1 MG/DL (ref 0.1–1)
BUN SERPL-MCNC: 13 MG/DL (ref 8–23)
CALCIUM SERPL-MCNC: 8.7 MG/DL (ref 8.7–10.5)
CHLORIDE SERPL-SCNC: 102 MMOL/L (ref 95–110)
CO2 SERPL-SCNC: 24 MMOL/L (ref 23–29)
CREAT SERPL-MCNC: 0.9 MG/DL (ref 0.5–1.4)
EST. GFR  (NO RACE VARIABLE): >60 ML/MIN/1.73 M^2
GLUCOSE SERPL-MCNC: 93 MG/DL (ref 70–110)
POTASSIUM SERPL-SCNC: 4.4 MMOL/L (ref 3.5–5.1)
PROT SERPL-MCNC: 6 G/DL (ref 6–8.4)
SODIUM SERPL-SCNC: 132 MMOL/L (ref 136–145)

## 2023-02-16 PROCEDURE — 36415 COLL VENOUS BLD VENIPUNCTURE: CPT | Mod: ,,, | Performed by: STUDENT IN AN ORGANIZED HEALTH CARE EDUCATION/TRAINING PROGRAM

## 2023-02-16 PROCEDURE — 80053 COMPREHEN METABOLIC PANEL: CPT | Performed by: INTERNAL MEDICINE

## 2023-02-16 PROCEDURE — 36415 PR COLLECTION VENOUS BLOOD,VENIPUNCTURE: ICD-10-PCS | Mod: ,,, | Performed by: STUDENT IN AN ORGANIZED HEALTH CARE EDUCATION/TRAINING PROGRAM

## 2023-02-23 DIAGNOSIS — R06.02 SOB (SHORTNESS OF BREATH): Primary | ICD-10-CM

## 2023-02-23 DIAGNOSIS — I35.0 SEVERE AORTIC STENOSIS: ICD-10-CM

## 2023-02-27 ENCOUNTER — SPECIALTY PHARMACY (OUTPATIENT)
Dept: PHARMACY | Facility: CLINIC | Age: 83
End: 2023-02-27
Payer: MEDICARE

## 2023-02-27 NOTE — TELEPHONE ENCOUNTER
Specialty Pharmacy - Refill Coordination    Specialty Medication Orders Linked to Encounter      Flowsheet Row Most Recent Value   Medication #1 evolocumab (REPATHA SURECLICK) 140 mg/mL PnIj (Order#[id number], Rx#[id number])            Refill Questions - Documented Responses      Flowsheet Row Most Recent Value   Patient Availability and HIPAA Verification    Does patient want to proceed with activity? Yes   HIPAA/medical authority confirmed? Yes   Relationship to patient of person spoken to? Self   Refill Screening Questions    Would patient like to speak to a pharmacist? No   When does the patient need to receive the medication? 03/06/23   Refill Delivery Questions    How will the patient receive the medication? [facility]   When does the patient need to receive the medication? 03/06/23   Shipping Address Home   Address in [facility] confirmed and updated if neccessary? Yes   Expected Copay ($) 0   Is the patient able to afford the medication copay? Yes   Payment Method zero copay   Days supply of Refill 28   Supplies needed? No supplies needed   Refill activity completed? Yes   Refill activity plan Refill scheduled   Shipment/Pickup Date: 03/01/23            Current Outpatient Medications   Medication Sig    albuterol (PROVENTIL/VENTOLIN HFA) 90 mcg/actuation inhaler Inhale 2 puffs into the lungs every 6 (six) hours as needed for Wheezing. Not using    aluminum & magnesium hydroxide-simethicone (MYLANTA MAXIMUM STRENGTH) 400-400-40 mg/5 mL suspension Take 10 mLs by mouth every 6 (six) hours as needed for Indigestion.    aspirin (ECOTRIN) 81 MG EC tablet Take 81 mg by mouth once daily.    carvediloL (COREG) 12.5 MG tablet Take 1 tablet (12.5 mg total) by mouth 2 (two) times daily with meals.    co-enzyme Q-10 30 mg capsule Take 30 mg by mouth once daily.     diclofenac sodium (VOLTAREN) 1 % Gel Apply 2 g topically 4 (four) times daily.    evolocumab (REPATHA SURECLICK) 140 mg/mL PnIj Inject 1 mL (140 mg total)  into the skin every 14 (fourteen) days.    famotidine (PEPCID) 20 MG tablet TAKE 1 TABLET TWICE DAILY (Patient taking differently: Take 20 mg by mouth 2 (two) times daily as needed.)    fish oil-omega-3 fatty acids 300-1,000 mg capsule Take 2 g by mouth once daily.    FOLIC ACID/MULTIVIT-MIN/LUTEIN (CENTRUM SILVER ORAL) Take 1 tablet by mouth once daily. ONE DAILY    furosemide (LASIX) 20 MG tablet Take 2 tablets (40 mg total) by mouth once daily.    HYDROcodone-acetaminophen (NORCO)  mg per tablet Take 1 tablet by mouth 2 (two) times daily as needed for Pain.    nitroGLYCERIN (NITROSTAT) 0.4 MG SL tablet Place 1 tablet (0.4 mg total) under the tongue every 5 (five) minutes as needed for Chest pain.    olmesartan (BENICAR) 40 MG tablet Take 1 tablet (40 mg total) by mouth once daily.    potassium chloride (KLOR-CON) 10 MEQ TbSR Take 1 tablet (10 mEq total) by mouth every other day.    rosuvastatin (CRESTOR) 20 MG tablet Take 1 tablet (20 mg total) by mouth every evening.    spironolactone (ALDACTONE) 25 MG tablet Take 1 tablet (25 mg total) by mouth once daily.   Last reviewed on 2/23/2023  7:30 AM by Vivek Fowler RN    Review of patient's allergies indicates:   Allergen Reactions    Clindamycin Diarrhea    Darvocet a500 [propoxyphene n-acetaminophen]     Erythromycin Other (See Comments)    Mycinette [cetylpyridinium-benzocaine]     Pcn [penicillins]     Last reviewed on  2/23/2023 7:27 AM by Vivek Fowler      Tasks added this encounter   3/27/2023 - Refill Call (Auto Added)   Tasks due within next 3 months   No tasks due.     [facility]  [address] [place] [id number]  Phone: [phone]  Fax: [phone]

## 2023-03-17 DIAGNOSIS — I50.43 ACUTE ON CHRONIC COMBINED SYSTOLIC AND DIASTOLIC HEART FAILURE: Primary | ICD-10-CM

## 2023-03-17 DIAGNOSIS — I35.0 SEVERE AORTIC STENOSIS: ICD-10-CM

## 2023-03-17 RX ORDER — SODIUM CHLORIDE 9 MG/ML
INJECTION, SOLUTION INTRAVENOUS ONCE
Status: CANCELLED | OUTPATIENT
Start: 2023-03-17 | End: 2023-03-17

## 2023-03-17 RX ORDER — SODIUM CHLORIDE 0.9 % (FLUSH) 0.9 %
10 SYRINGE (ML) INJECTION
Status: DISCONTINUED | OUTPATIENT
Start: 2023-03-17 | End: 2023-03-27 | Stop reason: CLARIF

## 2023-03-20 ENCOUNTER — OFFICE VISIT (OUTPATIENT)
Dept: PRIMARY CARE CLINIC | Facility: CLINIC | Age: 83
End: 2023-03-20
Payer: MEDICARE

## 2023-03-20 DIAGNOSIS — R60.0 LOWER LEG EDEMA: Primary | ICD-10-CM

## 2023-03-20 DIAGNOSIS — I35.0 SEVERE AORTIC STENOSIS: ICD-10-CM

## 2023-03-20 DIAGNOSIS — R60.0 FLUID COLLECTION (EDEMA) IN THE ARMS, LEGS, HANDS AND FEET: ICD-10-CM

## 2023-03-20 PROCEDURE — 99213 PR OFFICE/OUTPT VISIT, EST, LEVL III, 20-29 MIN: ICD-10-PCS | Mod: S$GLB,,, | Performed by: NURSE PRACTITIONER

## 2023-03-20 PROCEDURE — 99213 OFFICE O/P EST LOW 20 MIN: CPT | Mod: S$GLB,,, | Performed by: NURSE PRACTITIONER

## 2023-03-20 NOTE — PROGRESS NOTES
"Subjective:       Patient ID: Yael Claire is a 82 y.o. female.    Chief Complaint: No chief complaint on file.    HPI  There were no vitals filed for this visit.  Review of Systems      Patient pulled up outside clinic and called in to state she could not get out of the car good. She drove herself from home and has some minimal SOB but has noticeable fluid to her hands, feet and face. She is not in imminent distress but I have assured her that she needs to be seen in ED for all of the fluid.  She can talk without any distress but she also has tight +2 edema to lower legs ans thighs feel "tight"  With her history of aortic valvular disease I have recommended she goes directly to the ED for evaluation. She states can drive herself there after going home to assure everything is locked up.  Objective:      Physical Exam  Cardiovascular:      Comments: Swelling to both hands, feet, face  Musculoskeletal:         General: Swelling present.      Right lower le+ Edema present.      Left lower le+ Edema present.   Skin:     General: Skin is warm.       Assessment & Plan:       There are no diagnoses linked to this encounter.      No follow-ups on file.    "

## 2023-03-22 ENCOUNTER — TELEPHONE (OUTPATIENT)
Dept: CARDIOLOGY | Facility: CLINIC | Age: 83
End: 2023-03-22
Payer: MEDICARE

## 2023-03-22 NOTE — TELEPHONE ENCOUNTER
Confirmed that procedure with Dr. Meadows scheduled on 3/29/23 for Pacemaker at [facility]. Confirmed appointment with Dr Castro on 3/30/23

## 2023-03-22 NOTE — TELEPHONE ENCOUNTER
----- Message from Kristy Mcfarland sent at 3/22/2023  9:01 AM CDT -----  Contact: patient  Type:  Needs Medical Advice    Who Called: patient     Would the patient rather a call back or a response via [facility]? Call     Best Call Back Number: [phone] (home)      Additional Information:  Patient would like to speak with the nurse in regards to a procedure that she is having next week.     Please call to advise

## 2023-03-23 PROCEDURE — G0180 MD CERTIFICATION HHA PATIENT: HCPCS | Mod: ,,, | Performed by: NURSE PRACTITIONER

## 2023-03-23 PROCEDURE — G0180 PR HOME HEALTH MD CERTIFICATION: ICD-10-PCS | Mod: ,,, | Performed by: NURSE PRACTITIONER

## 2023-03-29 DIAGNOSIS — I50.43 ACUTE ON CHRONIC COMBINED SYSTOLIC AND DIASTOLIC HEART FAILURE: ICD-10-CM

## 2023-03-29 DIAGNOSIS — Z95.0 CARDIAC PACEMAKER IN SITU: Primary | ICD-10-CM

## 2023-03-29 PROBLEM — I49.5 SSS (SICK SINUS SYNDROME): Status: ACTIVE | Noted: 2023-03-29

## 2023-03-31 ENCOUNTER — DOCUMENTATION ONLY (OUTPATIENT)
Dept: CARDIOLOGY | Facility: HOSPITAL | Age: 83
End: 2023-03-31
Payer: MEDICARE

## 2023-04-09 NOTE — PROGRESS NOTES
Subjective:    Patient ID:  Yael Claire is a 82 y.o. female who presents for Follow-up        HPI  PATIENT HAD CARDIAC CATHETERIZATION REVEALED PATENT GRAFTS SHE NEEDS TAVR, SEVERE PULMONARY HYPERTENSION WITH MEAN PA PRESSURE OF 49 MM OF MERCURY, WENT TO ER AT [facility] WITH FLUID RETENTION, WAS CHANGED TO ENTRESTO,AND FARXIGA,  SEE ROS    Past Medical History:   Diagnosis Date    Acid reflux     Acute coronary syndrome     LIGHT 2004    Acute on chronic combined systolic and diastolic heart failure 02/2023    Anticoagulant long-term use     Aortic valve disorder     nonrheumatic    Arthritis     CHF (congestive heart failure)     Coronary artery disease     cabg & valve sgy    H/O: pneumonia     Heart murmur     Hyperlipidemia     Hypertension     Lower leg edema     Mild carotid artery disease 11/17/2021    Palpitations     Stenosis of aortic and mitral valves     Valvular regurgitation      Past Surgical History:   Procedure Laterality Date    A-V CARDIAC PACEMAKER INSERTION N/A 3/29/2023    Procedure: Dual PPM (Rodriguez);  Surgeon: Too Farrell MD;  Location: [facility] CATH;  Service: Cardiology;  Laterality: N/A;    ARTERIOGRAPHY OF AORTIC ROOT N/A 02/23/2023    Procedure: ARTERIOGRAM, AORTIC ROOT;  Surgeon: Eb Ruelas MD;  Location: [facility] CATH;  Service: Cardiology;  Laterality: N/A;    CARDIAC CATHETERIZATION      CARDIAC VALVE SURGERY      CATARACT EXTRACTION W/  INTRAOCULAR LENS IMPLANT Bilateral     COLONOSCOPY  ~2019    normal findings per patient report    CORONARY ANGIOGRAPHY INCLUDING BYPASS GRAFTS WITH CATHETERIZATION OF LEFT HEART N/A 02/23/2023    Procedure: Right/Left heart Cath w/Grafts;  Surgeon: Eb Ruelas MD;  Location: [facility] CATH;  Service: Cardiology;  Laterality: N/A;    CORONARY ARTERY BYPASS GRAFT  04/2005    HYSTERECTOMY  1980    RIGHT HEART CATHETERIZATION N/A 02/23/2023    Procedure: INSERTION, CATHETER, RIGHT HEART;  Surgeon: Eb Ruelas MD;  Location: [facility] CATH;  Service:  Cardiology;  Laterality: N/A;    UPPER GASTROINTESTINAL ENDOSCOPY  ~2010    normal findings     Family History   Problem Relation Age of Onset    Hypertension Mother     Hypertension Father     Heart disease Father     Colon cancer Neg Hx     Esophageal cancer Neg Hx     Stomach cancer Neg Hx      Social History     Socioeconomic History    Marital status: Single   Tobacco Use    Smoking status: Never    Smokeless tobacco: Never   Substance and Sexual Activity    Alcohol use: Not Currently     Comment: couple times per yr    Drug use: No       Review of patient's allergies indicates:   Allergen Reactions    Clindamycin Diarrhea    Darvocet a500 [propoxyphene n-acetaminophen]     Erythromycin Other (See Comments)    Mycinette [cetylpyridinium-benzocaine]     Pcn [penicillins]        Current Outpatient Medications:     aspirin (ECOTRIN) 81 MG EC tablet, Take 81 mg by mouth once daily., Disp: , Rfl:     carvediloL (COREG) 12.5 MG tablet, Take 1 tablet (12.5 mg total) by mouth 2 (two) times daily with meals., Disp: 180 tablet, Rfl: 1    co-enzyme Q-10 30 mg capsule, Take 30 mg by mouth once daily. , Disp: , Rfl:     diclofenac sodium (VOLTAREN) 1 % Gel, Apply 2 g topically 4 (four) times daily., Disp: , Rfl:     evolocumab (REPATHA SURECLICK) 140 mg/mL PnIj, Inject 1 mL (140 mg total) into the skin every 14 (fourteen) days., Disp: 2 mL, Rfl: 6    famotidine (PEPCID) 20 MG tablet, TAKE 1 TABLET TWICE DAILY (Patient taking differently: Take 20 mg by mouth 2 (two) times daily as needed.), Disp: 180 tablet, Rfl: 0    fish oil-omega-3 fatty acids 300-1,000 mg capsule, Take 2 g by mouth once daily., Disp: , Rfl:     FOLIC ACID/MULTIVIT-MIN/LUTEIN (CENTRUM SILVER ORAL), Take 1 tablet by mouth once daily. ONE DAILY, Disp: , Rfl:     furosemide (LASIX) 20 MG tablet, TAKE 1 TABLET EVERY DAY AS NEEDED FOR EDEMA (Patient taking differently: Take 20 mg by mouth daily as needed.), Disp: 60 tablet, Rfl: 1    HYDROcodone-acetaminophen  (NORCO)  mg per tablet, Take 1 tablet by mouth 2 (two) times daily as needed for Pain., Disp: , Rfl: 0    nitroGLYCERIN (NITROSTAT) 0.4 MG SL tablet, Place 1 tablet (0.4 mg total) under the tongue every 5 (five) minutes as needed for Chest pain., Disp: 25 tablet, Rfl: 1    rosuvastatin (CRESTOR) 20 MG tablet, Take 1 tablet (20 mg total) by mouth every evening., Disp: 30 tablet, Rfl: 0    sacubitriL-valsartan (ENTRESTO) 24-26 mg per tablet, Take 1 tablet by mouth 2 (two) times daily., Disp: , Rfl:     spironolactone (ALDACTONE) 25 MG tablet, Take 1 tablet (25 mg total) by mouth once daily., Disp: 30 tablet, Rfl: 1    aluminum & magnesium hydroxide-simethicone (MYLANTA MAXIMUM STRENGTH) 400-400-40 mg/5 mL suspension, Take 10 mLs by mouth every 6 (six) hours as needed for Indigestion., Disp: 3840 mL, Rfl: 0    dapagliflozin (FARXIGA) 10 mg tablet, Take 10 mg by mouth once daily. PT STATES SHE HARDLY TAKES IT AND WAS UNSURE OF MG, Disp: , Rfl:     potassium chloride (KLOR-CON) 10 MEQ TbSR, Take 1 tablet (10 mEq total) by mouth every other day. (Patient not taking: Reported on 4/10/2023), Disp: 45 tablet, Rfl: 1    sildenafil (REVATIO) 20 mg Tab, Take 1 tablet (20 mg total) by mouth 3 (three) times daily., Disp: 90 tablet, Rfl: 11  No current facility-administered medications for this visit.    Facility-Administered Medications Ordered in Other Visits:     chlorhexidine 0.12 % solution 15 mL, 15 mL, Mouth/Throat, Once, Too Farrell MD    mupirocin 2 % ointment, , Nasal, Once, Too Farrell MD    Review of Systems   Constitutional: Positive for malaise/fatigue. Negative for decreased appetite and fever.   HENT: Negative.     Eyes:  Negative for blurred vision, vision loss in left eye and visual disturbance.   Cardiovascular:  Positive for dyspnea on exertion and leg swelling. Negative for chest pain, claudication, cyanosis, irregular heartbeat, near-syncope, orthopnea, palpitations and paroxysmal  "nocturnal dyspnea.   Respiratory:  Positive for shortness of breath. Negative for cough and wheezing.    Hematologic/Lymphatic: Negative for adenopathy. Does not bruise/bleed easily.   Skin: Negative.    Musculoskeletal:  Positive for joint pain (KNEE). Negative for back pain and falls.   Gastrointestinal:  Negative for abdominal pain, dysphagia, melena and nausea.   Genitourinary:  Negative for dysuria and flank pain.   Neurological: Negative.  Negative for difficulty with concentration and disturbances in coordination.   Psychiatric/Behavioral:  Negative for altered mental status and depression.       Objective:      Vitals:    04/10/23 1408   BP: (!) 119/50   Pulse: 78   Weight: 100.7 kg (222 lb 0.1 oz)   Height: 5' 5" (1.651 m)   PainSc: 0-No pain     Body mass index is 36.94 kg/m².    Physical Exam  Constitutional:       Appearance: She is well-developed. She is obese.   HENT:      Head: Normocephalic and atraumatic.   Eyes:      General: No scleral icterus.     Extraocular Movements: Extraocular movements intact.   Neck:      Vascular: Normal carotid pulses. No carotid bruit, hepatojugular reflux or JVD.   Cardiovascular:      Rate and Rhythm: Normal rate and regular rhythm. No extrasystoles are present.     Pulses:           Carotid pulses are 2+ on the right side and 2+ on the left side.       Radial pulses are 2+ on the right side and 2+ on the left side.      Heart sounds: Murmur heard.   Harsh midsystolic murmur is present with a grade of 2/6 at the upper right sternal border radiating to the neck.     No friction rub. No gallop. No S4 sounds.   Pulmonary:      Effort: Pulmonary effort is normal.      Breath sounds: No rales.   Abdominal:      Palpations: Abdomen is soft. There is no hepatomegaly.      Tenderness: There is no abdominal tenderness.   Musculoskeletal:      Cervical back: Neck supple.      Right lower le+ Edema present.      Left lower le+ Edema present.      Comments: IN WC "   Skin:     General: Skin is warm and dry.      Capillary Refill: Capillary refill takes less than 2 seconds.   Neurological:      General: No focal deficit present.      Mental Status: She is alert and oriented to person, place, and time.      Cranial Nerves: Cranial nerves 2-12 are intact.   Psychiatric:         Mood and Affect: Mood normal.         Speech: Speech normal.         Behavior: Behavior normal.               ..    Chemistry        Component Value Date/Time     (L) 03/29/2023 0847    K 4.2 03/29/2023 0847    CL 99 03/29/2023 0847    CO2 29 03/29/2023 0847    BUN 15 03/29/2023 0847    CREATININE 0.92 03/29/2023 0847    GLU 97 03/29/2023 0847        Component Value Date/Time    CALCIUM 8.3 (L) 03/29/2023 0847    ALKPHOS 132 02/23/2023 0803    AST 34 02/23/2023 0803    ALT 26 02/23/2023 0803    BILITOT 1.1 02/23/2023 0803    ESTGFRAFRICA >60 04/06/2022 2216    EGFRNONAA >60 04/06/2022 2216            ..  Lab Results   Component Value Date    CHOL 99 (L) 01/10/2023    CHOL 159 04/14/2022    CHOL 157 06/10/2021     Lab Results   Component Value Date    HDL 37 (L) 01/10/2023    HDL 50 04/14/2022    HDL 51 06/10/2021     Lab Results   Component Value Date    LDLCALC 50.4 (L) 01/10/2023    LDLCALC 94.2 04/14/2022    LDLCALC 95.6 06/10/2021     Lab Results   Component Value Date    TRIG 58 01/10/2023    TRIG 74 04/14/2022    TRIG 52 06/10/2021     Lab Results   Component Value Date    CHOLHDL 37.4 01/10/2023    CHOLHDL 31.4 04/14/2022    CHOLHDL 32.5 06/10/2021     ..  Lab Results   Component Value Date    WBC 4.82 03/29/2023    HGB 12.0 03/29/2023    HCT 37.9 03/29/2023    MCV 97 03/29/2023     (L) 03/29/2023       Test(s) Reviewed  I have reviewed the following in detail:  [] Stress test   [x] Angiography   [] Echocardiogram   [x] Labs   [x] Other:       Assessment:         ICD-10-CM ICD-9-CM   1. WHO group 1 pulmonary arterial hypertension  I27.21 416.8   2. Acute on chronic combined systolic and  diastolic heart failure  I50.43 428.43   3. Coronary artery disease involving coronary bypass graft of native heart with angina pectoris  I25.709 414.05     413.9   4. Severe obesity (BMI 35.0-35.9 with comorbidity)  E66.01 278.01    Z68.35 V85.35     Problem List Items Addressed This Visit          Pulmonary    Pulmonary hypertension - Primary    Relevant Medications    sildenafil (REVATIO) 20 mg Tab       Cardiac/Vascular    Coronary artery disease involving coronary bypass graft of native heart with angina pectoris    Acute on chronic combined systolic and diastolic heart failure    Relevant Medications    sildenafil (REVATIO) 20 mg Tab       Endocrine    Severe obesity (BMI 35.0-35.9 with comorbidity)        Plan:         DAILY WEIGHT, INSTRUCTION GIVEN 2 LB PER DAY 5 LB PER WEEK HAS GAINED SOME WEIGHT AND BEEN RETAINING FLUIDS, TAKE LASIX DAILY NOT PRN START REVATIO, FOR SIGNIFICANT PULMONARY HYPERTENSION, FOR TAVR, SOON, CLASS 2-3 HEART FAILURE CLASS 1 ANGINA NO ARRHYTHMIA DIET WEIGHT LOSS, RETURN TO CLINIC IN FEW WEEKS AFTER TESTS  WHO group 1 pulmonary arterial hypertension  -     sildenafil (REVATIO) 20 mg Tab; Take 1 tablet (20 mg total) by mouth 3 (three) times daily.  Dispense: 90 tablet; Refill: 11    Acute on chronic combined systolic and diastolic heart failure  -     sildenafil (REVATIO) 20 mg Tab; Take 1 tablet (20 mg total) by mouth 3 (three) times daily.  Dispense: 90 tablet; Refill: 11    Coronary artery disease involving coronary bypass graft of native heart with angina pectoris    Severe obesity (BMI 35.0-35.9 with comorbidity)    RTC Low level/low impact aerobic exercise 5x's/wk. Heart healthy diet and risk factor modification.    See labs and med orders.    Aerobic exercise 5x's/wk. Heart healthy diet and risk factor modification.    See labs and med orders.

## 2023-04-10 ENCOUNTER — TELEPHONE (OUTPATIENT)
Dept: CARDIOLOGY | Facility: HOSPITAL | Age: 83
End: 2023-04-10
Payer: MEDICARE

## 2023-04-10 ENCOUNTER — OFFICE VISIT (OUTPATIENT)
Dept: CARDIOLOGY | Facility: CLINIC | Age: 83
End: 2023-04-10
Payer: MEDICARE

## 2023-04-10 ENCOUNTER — CLINICAL SUPPORT (OUTPATIENT)
Dept: CARDIOLOGY | Facility: HOSPITAL | Age: 83
End: 2023-04-10
Attending: INTERNAL MEDICINE
Payer: MEDICARE

## 2023-04-10 VITALS
HEART RATE: 78 BPM | WEIGHT: 222 LBS | HEIGHT: 65 IN | SYSTOLIC BLOOD PRESSURE: 119 MMHG | DIASTOLIC BLOOD PRESSURE: 50 MMHG | BODY MASS INDEX: 36.99 KG/M2

## 2023-04-10 DIAGNOSIS — I25.709 CORONARY ARTERY DISEASE INVOLVING CORONARY BYPASS GRAFT OF NATIVE HEART WITH ANGINA PECTORIS: Chronic | ICD-10-CM

## 2023-04-10 DIAGNOSIS — Z95.0 CARDIAC PACEMAKER IN SITU: ICD-10-CM

## 2023-04-10 DIAGNOSIS — I27.21 WHO GROUP 1 PULMONARY ARTERIAL HYPERTENSION: Primary | ICD-10-CM

## 2023-04-10 DIAGNOSIS — I50.43 ACUTE ON CHRONIC COMBINED SYSTOLIC AND DIASTOLIC HEART FAILURE: ICD-10-CM

## 2023-04-10 DIAGNOSIS — E66.01 SEVERE OBESITY (BMI 35.0-35.9 WITH COMORBIDITY): Chronic | ICD-10-CM

## 2023-04-10 DIAGNOSIS — I50.43 ACUTE ON CHRONIC COMBINED SYSTOLIC AND DIASTOLIC HEART FAILURE: Chronic | ICD-10-CM

## 2023-04-10 PROBLEM — I27.20 PULMONARY HYPERTENSION: Status: ACTIVE | Noted: 2023-04-10

## 2023-04-10 PROBLEM — E66.3 OVERWEIGHT (BMI 25.0-29.9): Status: RESOLVED | Noted: 2021-11-17 | Resolved: 2023-04-10

## 2023-04-10 PROCEDURE — 93280 PM DEVICE PROGR EVAL DUAL: CPT | Mod: 26,,, | Performed by: INTERNAL MEDICINE

## 2023-04-10 PROCEDURE — 1126F PR PAIN SEVERITY QUANTIFIED, NO PAIN PRESENT: ICD-10-PCS | Mod: CPTII,S$GLB,, | Performed by: INTERNAL MEDICINE

## 2023-04-10 PROCEDURE — 3288F PR FALLS RISK ASSESSMENT DOCUMENTED: ICD-10-PCS | Mod: CPTII,S$GLB,, | Performed by: INTERNAL MEDICINE

## 2023-04-10 PROCEDURE — 3078F PR MOST RECENT DIASTOLIC BLOOD PRESSURE < 80 MM HG: ICD-10-PCS | Mod: CPTII,S$GLB,, | Performed by: INTERNAL MEDICINE

## 2023-04-10 PROCEDURE — 1101F PR PT FALLS ASSESS DOC 0-1 FALLS W/OUT INJ PAST YR: ICD-10-PCS | Mod: CPTII,S$GLB,, | Performed by: INTERNAL MEDICINE

## 2023-04-10 PROCEDURE — 99214 PR OFFICE/OUTPT VISIT, EST, LEVL IV, 30-39 MIN: ICD-10-PCS | Mod: S$GLB,,, | Performed by: INTERNAL MEDICINE

## 2023-04-10 PROCEDURE — 99999 PR PBB SHADOW E&M-EST. PATIENT-LVL IV: CPT | Mod: PBBFAC,,, | Performed by: INTERNAL MEDICINE

## 2023-04-10 PROCEDURE — 3288F FALL RISK ASSESSMENT DOCD: CPT | Mod: CPTII,S$GLB,, | Performed by: INTERNAL MEDICINE

## 2023-04-10 PROCEDURE — 1101F PT FALLS ASSESS-DOCD LE1/YR: CPT | Mod: CPTII,S$GLB,, | Performed by: INTERNAL MEDICINE

## 2023-04-10 PROCEDURE — 3078F DIAST BP <80 MM HG: CPT | Mod: CPTII,S$GLB,, | Performed by: INTERNAL MEDICINE

## 2023-04-10 PROCEDURE — 99999 PR PBB SHADOW E&M-EST. PATIENT-LVL IV: ICD-10-PCS | Mod: PBBFAC,,, | Performed by: INTERNAL MEDICINE

## 2023-04-10 PROCEDURE — 3074F SYST BP LT 130 MM HG: CPT | Mod: CPTII,S$GLB,, | Performed by: INTERNAL MEDICINE

## 2023-04-10 PROCEDURE — 1126F AMNT PAIN NOTED NONE PRSNT: CPT | Mod: CPTII,S$GLB,, | Performed by: INTERNAL MEDICINE

## 2023-04-10 PROCEDURE — 99214 OFFICE O/P EST MOD 30 MIN: CPT | Mod: S$GLB,,, | Performed by: INTERNAL MEDICINE

## 2023-04-10 PROCEDURE — 93280 CARDIAC DEVICE CHECK - IN CLINIC & HOSPITAL: ICD-10-PCS | Mod: 26,,, | Performed by: INTERNAL MEDICINE

## 2023-04-10 PROCEDURE — 1159F PR MEDICATION LIST DOCUMENTED IN MEDICAL RECORD: ICD-10-PCS | Mod: CPTII,S$GLB,, | Performed by: INTERNAL MEDICINE

## 2023-04-10 PROCEDURE — 1159F MED LIST DOCD IN RCRD: CPT | Mod: CPTII,S$GLB,, | Performed by: INTERNAL MEDICINE

## 2023-04-10 PROCEDURE — 3074F PR MOST RECENT SYSTOLIC BLOOD PRESSURE < 130 MM HG: ICD-10-PCS | Mod: CPTII,S$GLB,, | Performed by: INTERNAL MEDICINE

## 2023-04-10 RX ORDER — DAPAGLIFLOZIN 10 MG/1
10 TABLET, FILM COATED ORAL DAILY
COMMUNITY

## 2023-04-10 RX ORDER — SILDENAFIL CITRATE 20 MG/1
20 TABLET ORAL 3 TIMES DAILY
Qty: 90 TABLET | Refills: 11 | Status: SHIPPED | OUTPATIENT
Start: 2023-04-10 | End: 2023-05-18 | Stop reason: SINTOL

## 2023-04-12 ENCOUNTER — SPECIALTY PHARMACY (OUTPATIENT)
Dept: PHARMACY | Facility: CLINIC | Age: 83
End: 2023-04-12
Payer: MEDICARE

## 2023-04-12 DIAGNOSIS — I27.21 WHO GROUP 1 PULMONARY ARTERIAL HYPERTENSION: Primary | ICD-10-CM

## 2023-04-12 NOTE — TELEPHONE ENCOUNTER
Sildenafil RX received for PAH. [employer] LIS-1.PA required. Submitted via [facility].Key: [id number] - PA Case ID: [id number]    Approved 1/1/23 to 12/31/23.-$0 -BI/FA not required.    Patient welcome call completed. See I-vent prior to initial consult.

## 2023-04-13 ENCOUNTER — SPECIALTY PHARMACY (OUTPATIENT)
Dept: PHARMACY | Facility: CLINIC | Age: 83
End: 2023-04-13
Payer: MEDICARE

## 2023-04-13 DIAGNOSIS — I27.21 WHO GROUP 1 PULMONARY ARTERIAL HYPERTENSION: Primary | ICD-10-CM

## 2023-04-13 NOTE — TELEPHONE ENCOUNTER
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 female, who is followed by the specialty pharmacy service for management and education.    Recent Encounters       Date Type Provider Description    04/13/2023 Specialty Pharmacy Liseth Merida, [place] Initial Clinical Assessment    04/12/2023 Specialty Pharmacy Vik Kohler PharmD Referral Authorization    03/28/2023 Specialty Pharmacy Erica Mcfarland Refill Coordination    02/27/2023 Specialty Pharmacy Amari LOGAN Resor Refill Coordination    01/27/2023 Specialty Pharmacy [place] Refill Coordination          Clinical call attempts since last clinical assessment   No call attempts found.     Current Outpatient Medications   Medication Sig    aspirin (ECOTRIN) 81 MG EC tablet Take 81 mg by mouth once daily.    carvediloL (COREG) 12.5 MG tablet Take 1 tablet (12.5 mg total) by mouth 2 (two) times daily with meals.    dapagliflozin (FARXIGA) 10 mg tablet Take 10 mg by mouth once daily. PT STATES SHE HARDLY TAKES IT AND WAS UNSURE OF MG    rosuvastatin (CRESTOR) 20 MG tablet Take 1 tablet (20 mg total) by mouth every evening.    sacubitriL-valsartan (ENTRESTO) 24-26 mg per tablet Take 1 tablet by mouth 2 (two) times daily.    spironolactone (ALDACTONE) 25 MG tablet Take 1 tablet (25 mg total) by mouth once daily.    aluminum & magnesium hydroxide-simethicone (MYLANTA MAXIMUM STRENGTH) 400-400-40 mg/5 mL suspension Take 10 mLs by mouth every 6 (six) hours as needed for Indigestion.    co-enzyme Q-10 30 mg capsule Take 30 mg by mouth once daily.     diclofenac sodium (VOLTAREN) 1 % Gel Apply 2 g topically 4 (four) times daily.    evolocumab (REPATHA SURECLICK) 140 mg/mL PnIj Inject 1 mL  (140 mg total) into the skin every 14 (fourteen) days.    famotidine (PEPCID) 20 MG tablet TAKE 1 TABLET TWICE DAILY (Patient taking differently: Take 20 mg by mouth 2 (two) times daily as needed.)    fish oil-omega-3 fatty acids 300-1,000 mg capsule Take 2 g by mouth once daily.    FOLIC ACID/MULTIVIT-MIN/LUTEIN (CENTRUM SILVER ORAL) Take 1 tablet by mouth once daily. ONE DAILY    furosemide (LASIX) 20 MG tablet TAKE 1 TABLET EVERY DAY AS NEEDED FOR EDEMA (Patient taking differently: Take 20 mg by mouth daily as needed.)    HYDROcodone-acetaminophen (NORCO)  mg per tablet Take 1 tablet by mouth 2 (two) times daily as needed for Pain.    sildenafil (REVATIO) 20 mg Tab Take 1 tablet (20 mg total) by mouth 3 (three) times daily.   Last reviewed on 4/10/2023  2:53 PM by Patience Richards    Review of patient's allergies indicates:   Allergen Reactions    Clindamycin Diarrhea    Darvocet a500 [propoxyphene n-acetaminophen]     Erythromycin Other (See Comments)    Mycinette [cetylpyridinium-benzocaine]     Pcn [penicillins]    Last reviewed on  4/10/2023 2:48 PM by Patience Richards    Drug Interactions    Drug interactions evaluated: yes  Clinically relevant drug interactions identified: yes   Interactions list: See I-vent: Nitrostat - pt aware she is NOT to co-administer and has been advised to dispose of the medication completely.    Provided the patient with educational material regarding drug interactions: not applicable           Assessment Questions - Documented Responses      Flowsheet Row Most Recent Value   Assessment    Medication Reconciliation completed for patient Yes   During the past 4 weeks, has patient missed any activities due to condition or medication? No   During the past 4 weeks, did patient have any of the following urgent care visits? None   Goals of Therapy Status Discussed (new start)   Status of the patients ability to self-administer: Is Able   All education points have been covered with  "patient? Yes, supplemental printed education provided   Welcome packet contents reviewed and discussed with patient? Yes   Assesment completed? Yes   Plan Therapy being initiated   Do you need to open a clinical intervention (i-vent)? No   Do you want to schedule first shipment? Yes   Medication #1 Assessment Info    Patient status New medication, New to [facility]   Is this medication appropriate for the patient? Yes   Is this medication effective? Not yet started          Refill Questions - Documented Responses      Flowsheet Row Most Recent Value   Refill Screening Questions    When does the patient need to receive the medication? 04/14/23   Refill Delivery Questions    How will the patient receive the medication? [facility]   When does the patient need to receive the medication? 04/14/23   Shipping Address Home   Address in [facility] confirmed and updated if neccessary? Yes   Expected Copay ($) 0   Is the patient able to afford the medication copay? Yes   Payment Method zero copay   Days supply of Refill 30   Supplies needed? No supplies needed   Refill activity completed? Yes   Refill activity plan Refill scheduled   Shipment/Pickup Date: 04/13/23            Objective    She has a past medical history of Acid reflux, Acute coronary syndrome, Acute on chronic combined systolic and diastolic heart failure (02/2023), Anticoagulant long-term use, Aortic valve disorder, Arthritis, CHF (congestive heart failure), Coronary artery disease, H/O: pneumonia, Heart murmur, Hyperlipidemia, Hypertension, Lower leg edema, Mild carotid artery disease (11/17/2021), Palpitations, Stenosis of aortic and mitral valves, and Valvular regurgitation.    Tried/failed medications:     BP Readings from Last 4 Encounters:   04/10/23 (!) 119/50   03/29/23 (!) 120/40   03/03/23 (!) 116/52   02/23/23 125/88     Ht Readings from Last 4 Encounters:   04/10/23 5' 5" (1.651 m)   03/29/23 5' 5" (1.651 m)   02/23/23 5' 5" (1.651 m)   02/08/23 5' 5" " (1.651 m)     Wt Readings from Last 4 Encounters:   04/10/23 100.7 kg (222 lb 0.1 oz)   03/29/23 82 kg (180 lb 12.4 oz)   03/03/23 95.7 kg (211 lb)   02/23/23 81.6 kg (180 lb)       The goals of prescribed drug therapy management include:  Supporting patient to meet the prescriber's medical treatment objectives  Improving or maintaining quality of life  Maintaining optimal therapy adherence  Minimizing and managing side effects      Goals of Therapy Status: Discussed (new start)    Assessment/Plan  Patient plans to start therapy on 04/14/23      Indication, dosage, appropriateness, effectiveness, safety and convenience of her specialty medication(s) were reviewed today.     Patient Education   Patient received education on the following:   Expectations and possible outcomes of therapy  Proper use, timely administration, and missed dose management  Duration of therapy  Side effects, including prevention, minimization, and management  Contraindications and safety precautions  New or changed medications, including prescribe and over the counter medications and supplements  Reviews recommended vaccinations, as appropriate  Storage, safe handling, and disposal      Tasks added this encounter   No tasks added.   Tasks due within next 3 months   4/12/2023 - Clinical - Initial Assessment     Liseth Merida, PharmD  [facility]  [address] [place], [place] [address]  [facility] [address]  Phone: [phone]  Fax: [phone]   No pertinent past medical history

## 2023-04-25 ENCOUNTER — EXTERNAL HOME HEALTH (OUTPATIENT)
Dept: HOME HEALTH SERVICES | Facility: HOSPITAL | Age: 83
End: 2023-04-25
Payer: MEDICARE

## 2023-04-25 PROBLEM — R06.02 SOB (SHORTNESS OF BREATH): Status: ACTIVE | Noted: 2023-04-25

## 2023-04-25 PROBLEM — I50.33 ACUTE ON CHRONIC DIASTOLIC HEART FAILURE: Status: ACTIVE | Noted: 2023-01-10

## 2023-04-25 PROBLEM — I35.0 SEVERE AORTIC VALVE STENOSIS: Status: ACTIVE | Noted: 2023-04-25

## 2023-04-25 PROBLEM — I50.9 ACUTE EXACERBATION OF CHF (CONGESTIVE HEART FAILURE): Status: ACTIVE | Noted: 2023-03-20

## 2023-04-29 ENCOUNTER — DOCUMENT SCAN (OUTPATIENT)
Dept: HOME HEALTH SERVICES | Facility: HOSPITAL | Age: 83
End: 2023-04-29
Payer: MEDICARE

## 2023-05-03 ENCOUNTER — DOCUMENT SCAN (OUTPATIENT)
Dept: HOME HEALTH SERVICES | Facility: HOSPITAL | Age: 83
End: 2023-05-03
Payer: MEDICARE

## 2023-05-04 ENCOUNTER — OFFICE VISIT (OUTPATIENT)
Dept: CARDIOLOGY | Facility: CLINIC | Age: 83
End: 2023-05-04
Payer: MEDICARE

## 2023-05-04 VITALS
DIASTOLIC BLOOD PRESSURE: 67 MMHG | HEIGHT: 65 IN | SYSTOLIC BLOOD PRESSURE: 117 MMHG | HEART RATE: 71 BPM | BODY MASS INDEX: 36.83 KG/M2

## 2023-05-04 DIAGNOSIS — I25.709 CORONARY ARTERY DISEASE INVOLVING CORONARY BYPASS GRAFT OF NATIVE HEART WITH ANGINA PECTORIS: ICD-10-CM

## 2023-05-04 DIAGNOSIS — E78.2 MIXED HYPERLIPIDEMIA: ICD-10-CM

## 2023-05-04 DIAGNOSIS — M25.569 KNEE PAIN, UNSPECIFIED CHRONICITY, UNSPECIFIED LATERALITY: ICD-10-CM

## 2023-05-04 DIAGNOSIS — I49.5 SSS (SICK SINUS SYNDROME): ICD-10-CM

## 2023-05-04 DIAGNOSIS — M25.559 HIP PAIN, UNSPECIFIED LATERALITY: ICD-10-CM

## 2023-05-04 DIAGNOSIS — I50.43 ACUTE ON CHRONIC COMBINED SYSTOLIC AND DIASTOLIC CONGESTIVE HEART FAILURE: Primary | ICD-10-CM

## 2023-05-04 DIAGNOSIS — Z95.2 S/P AVR (AORTIC VALVE REPLACEMENT): ICD-10-CM

## 2023-05-04 PROCEDURE — 1101F PR PT FALLS ASSESS DOC 0-1 FALLS W/OUT INJ PAST YR: ICD-10-PCS | Mod: CPTII,S$GLB,,

## 2023-05-04 PROCEDURE — 1111F DSCHRG MED/CURRENT MED MERGE: CPT | Mod: CPTII,S$GLB,,

## 2023-05-04 PROCEDURE — 3074F PR MOST RECENT SYSTOLIC BLOOD PRESSURE < 130 MM HG: ICD-10-PCS | Mod: CPTII,S$GLB,,

## 2023-05-04 PROCEDURE — 99024 PR POST-OP FOLLOW-UP VISIT: ICD-10-PCS | Mod: S$GLB,,,

## 2023-05-04 PROCEDURE — 1101F PT FALLS ASSESS-DOCD LE1/YR: CPT | Mod: CPTII,S$GLB,,

## 2023-05-04 PROCEDURE — 3078F DIAST BP <80 MM HG: CPT | Mod: CPTII,S$GLB,,

## 2023-05-04 PROCEDURE — 1125F PR PAIN SEVERITY QUANTIFIED, PAIN PRESENT: ICD-10-PCS | Mod: CPTII,S$GLB,,

## 2023-05-04 PROCEDURE — 1111F PR DISCHARGE MEDS RECONCILED W/ CURRENT OUTPATIENT MED LIST: ICD-10-PCS | Mod: CPTII,S$GLB,,

## 2023-05-04 PROCEDURE — 1159F MED LIST DOCD IN RCRD: CPT | Mod: CPTII,S$GLB,,

## 2023-05-04 PROCEDURE — 3074F SYST BP LT 130 MM HG: CPT | Mod: CPTII,S$GLB,,

## 2023-05-04 PROCEDURE — 3288F PR FALLS RISK ASSESSMENT DOCUMENTED: ICD-10-PCS | Mod: CPTII,S$GLB,,

## 2023-05-04 PROCEDURE — 3288F FALL RISK ASSESSMENT DOCD: CPT | Mod: CPTII,S$GLB,,

## 2023-05-04 PROCEDURE — 99024 POSTOP FOLLOW-UP VISIT: CPT | Mod: S$GLB,,,

## 2023-05-04 PROCEDURE — 3078F PR MOST RECENT DIASTOLIC BLOOD PRESSURE < 80 MM HG: ICD-10-PCS | Mod: CPTII,S$GLB,,

## 2023-05-04 PROCEDURE — 99999 PR PBB SHADOW E&M-EST. PATIENT-LVL III: CPT | Mod: PBBFAC,,,

## 2023-05-04 PROCEDURE — 1159F PR MEDICATION LIST DOCUMENTED IN MEDICAL RECORD: ICD-10-PCS | Mod: CPTII,S$GLB,,

## 2023-05-04 PROCEDURE — 99999 PR PBB SHADOW E&M-EST. PATIENT-LVL III: ICD-10-PCS | Mod: PBBFAC,,,

## 2023-05-04 PROCEDURE — 1125F AMNT PAIN NOTED PAIN PRSNT: CPT | Mod: CPTII,S$GLB,,

## 2023-05-04 NOTE — ASSESSMENT & PLAN NOTE
Doing well Post TAVR   Groin healing well   Has 1 mo and 1 year follow ups scheduled with valve clinic

## 2023-05-04 NOTE — PROGRESS NOTES
Subjective:    Patient ID:  Yael Claire is a 82 y.o. female patient here for evaluation Coronary Artery Disease    History of Present Illness:     Mrs. Claire is a 82 year old F who follows with Dr. Ruelas here today for her TAVR follow up. She has been feeling very well. Does not really have any complaints today but she does feel like she still has some extra volume on her. Weight stable on chart. BP ok. NO chest pain or SOB.       Most Recent Echocardiogram Results  Results for orders placed during the hospital encounter of 04/25/23    Echo    Interpretation Summary  · The left ventricle is normal in size with concentric remodeling and mildly decreased systolic function.  · Moderate left atrial enlargement.  · The estimated ejection fraction is 45%.  · Grade II left ventricular diastolic dysfunction.  · Normal right ventricular size with low normal right ventricular systolic function.  · Mild right atrial enlargement.  · There is a transcutaneously-placed aortic bioprosthesis present. There is no aortic insufficiency present. Prosthetic aortic valve is normal.  · The aortic valve mean gradient is 26 mmHg with a dimensionless index of 0.36.  · Mild mitral regurgitation.  · The mean diastolic gradient across the mitral valve is 4 mmHg at a heart rate of bpm.  · There is mild mitral stenosis.  · Mild tricuspid regurgitation.  · Normal central venous pressure (3 mmHg).  · The estimated PA systolic pressure is 28 mmHg.    Most Recent Cardiovascular Angiogram results  Results for orders placed during the hospital encounter of 04/25/23    Cardiac catheterization    Narrative  Procedure performed in the Invasive Lab  - See Procedure Log link below for nursing documentation  - See OpNote on Surgeries Tab for physician findings  - See Imaging Tab for radiologist dictation      Other Most Recent Cardiology Results  Results for orders placed during the hospital encounter of 04/25/23    CARDIAC MONITORING STRIPS      REVIEW  OF SYSTEMS: As noted in HPI   CARDIOVASCULAR: No recent chest pain, palpitations, arm, neck, or jaw pain.  RESPIRATORY: No recent fever, cough chills, SOB.  : No blood in the urine  GI: No nausea, vomiting, or blood in stool.   MUSCULOSKELETAL: No myalgias or falls.   NEURO: No syncope, lightheadedness, or dizziness.  EYES: No sudden changes in vision.     Past Medical History:   Diagnosis Date    Acid reflux     Acute coronary syndrome     LIGHT 2004    Acute on chronic combined systolic and diastolic heart failure 02/2023    Anticoagulant long-term use     Aortic valve disorder     nonrheumatic    Arthritis     CHF (congestive heart failure)     Coronary artery disease     cabg & valve sgy    H/O: pneumonia     Heart murmur     Hyperlipidemia     Hypertension     Lower leg edema     Mild carotid artery disease 11/17/2021    Palpitations     Stenosis of aortic and mitral valves     Valvular regurgitation      Past Surgical History:   Procedure Laterality Date    A-V CARDIAC PACEMAKER INSERTION N/A 03/29/2023    Procedure: Dual PPM (Rodriguez);  Surgeon: Too Farrell MD;  [facility];  Service: Cardiology;  Laterality: N/A;    ARTERIOGRAPHY OF AORTIC ROOT N/A 02/23/2023    Procedure: ARTERIOGRAM, AORTIC ROOT;  Surgeon: Eb Ruelas MD;  Location: [facility];  Service: Cardiology;  Laterality: N/A;    CARDIAC CATHETERIZATION      CARDIAC VALVE SURGERY      CATARACT EXTRACTION W/  INTRAOCULAR LENS IMPLANT Bilateral     COLONOSCOPY  ~2019    normal findings per patient report    CORONARY ANGIOGRAPHY INCLUDING BYPASS GRAFTS WITH CATHETERIZATION OF LEFT HEART N/A 02/23/2023    Procedure: Right/Left heart Cath w/Grafts;  Surgeon: Eb Ruelas MD;  [facility];  Service: Cardiology;  Laterality: N/A;    CORONARY ARTERY BYPASS GRAFT  04/2005    HYSTERECTOMY  1980    INSERTION, PACEMAKER, TEMPORARY TRANSVENOUS  4/26/2023    Procedure: Insertion, Pacemaker, Temporary Transvenous;  Surgeon: Ryan  SHEILA Castro MD;  Location: [facility];  Service: Cardiology;;    PERCUTANEOUS TRANSLUMINAL ANGIOPLASTY N/A 4/26/2023    Procedure: PTA (ANGIOPLASTY, PERCUTANEOUS, TRANSLUMINAL);  Surgeon: Ryan Castro MD;  [facility];  Service: Cardiology;  Laterality: N/A;    RIGHT HEART CATHETERIZATION N/A 02/23/2023    Procedure: INSERTION, CATHETER, RIGHT HEART;  Surgeon: Eb Ruelas MD;  [facility];  Service: Cardiology;  Laterality: N/A;    TRANSCATHETER AORTIC VALVE REPLACEMENT (TAVR) Bilateral 4/26/2023    Procedure: (TAVR);  Surgeon: Ryan Castro MD;  Location: [facility];  Service: Cardiology;  Laterality: Bilateral;    TRANSCATHETER AORTIC VALVE REPLACEMENT (TAVR) Bilateral 4/26/2023    Procedure: REPLACEMENT, AORTIC VALVE, TRANSCATHETER (TAVR);  Surgeon: Jarett Mckee MD;  Location: [facility];  Service: Cardiology;  Laterality: Bilateral;    UPPER GASTROINTESTINAL ENDOSCOPY  ~2010    normal findings     Social History     Tobacco Use    Smoking status: Never    Smokeless tobacco: Never   Substance Use Topics    Alcohol use: Not Currently     Comment: couple times per yr    Drug use: No         Objective      Vitals:    05/04/23 1445   BP: 117/67   Pulse: 71     LAST EKG  Results for orders placed or performed during the hospital encounter of 04/25/23   EKG 12-LEAD starting tomorrow    Collection Time: 04/27/23  7:15 AM    Narrative    Test Reason : Z95.2,    Vent. Rate : 080 BPM     Atrial Rate : 080 BPM     P-R Int : 174 ms          QRS Dur : 122 ms      QT Int : 432 ms       P-R-T Axes : 022 -88 114 degrees     QTc Int : 498 ms    Normal sinus rhythm with sinus arrhythmia  Left axis deviation  Right bundle branch block  Inferior infarct (cited on or before 23-FEB-2023)  Abnormal ECG  When compared with ECG of 26-APR-2023 19:34,  QRS duration has decreased  Borderline criteria for Lateral infarct are no longer Present  QT has shortened  Confirmed by Suraj AVELAR, Eb ([id number]) on 4/27/2023 12:12:39  PM    Referred By: ALISE MARTINEZ           Confirmed By:Eb Ruelas MD     LIPIDS - LAST 2   Lab Results   Component Value Date    CHOL 99 (L) 01/10/2023    CHOL 159 04/14/2022    HDL 37 (L) 01/10/2023    HDL 50 04/14/2022    LDLCALC 50.4 (L) 01/10/2023    LDLCALC 94.2 04/14/2022    TRIG 58 01/10/2023    TRIG 74 04/14/2022    CHOLHDL 37.4 01/10/2023    CHOLHDL 31.4 04/14/2022     CARDIAC PROFILE - LAST 2  Lab Results   Component Value Date    TROPONINI 0.016 01/10/2023    TROPONINI 0.013 01/10/2023      CBC - LAST 2  Lab Results   Component Value Date    WBC 8.40 04/27/2023    WBC 8.44 04/26/2023    RBC 3.20 (L) 04/27/2023    RBC 3.66 (L) 04/26/2023    HGB 9.9 (L) 04/27/2023    HGB 11.3 (L) 04/26/2023    HCT 31.4 (L) 04/27/2023    HCT 35.8 (L) 04/26/2023     (L) 04/27/2023     04/26/2023     Lab Results   Component Value Date    LABPT 14.6 04/25/2023    INR 1.1 04/25/2023    APTT 36.2 04/25/2023     CHEMISTRY - LAST 2  Lab Results   Component Value Date     04/28/2023     04/28/2023    K 3.9 04/28/2023    K 3.9 04/28/2023     04/28/2023     04/28/2023    CO2 32 (H) 04/28/2023    CO2 32 (H) 04/28/2023    ANIONGAP 1 (L) 04/28/2023    ANIONGAP 1 (L) 04/28/2023    BUN 25 (H) 04/28/2023    BUN 25 (H) 04/28/2023    CREATININE 1.01 04/28/2023    CREATININE 1.01 04/28/2023    GLU 93 04/28/2023    GLU 93 04/28/2023    CALCIUM 7.8 (L) 04/28/2023    CALCIUM 7.8 (L) 04/28/2023    MG 2.3 04/26/2023    MG 1.9 01/10/2023    ALBUMIN 2.4 (L) 04/27/2023    ALBUMIN 3.0 (L) 04/26/2023    PROT 5.4 (L) 04/27/2023    PROT 6.4 04/26/2023    ALKPHOS 182 (H) 04/27/2023    ALKPHOS 206 (H) 04/26/2023    ALT 20 04/27/2023    ALT 22 04/26/2023    AST 35 04/27/2023    AST 28 04/26/2023    BILITOT 0.9 04/27/2023    BILITOT 1.2 04/26/2023      ENDOCRINE - LAST 2  Lab Results   Component Value Date    HGBA1C 5.0 04/25/2023    HGBA1C 4.8 06/10/2021    TSH 1.420 01/10/2023    TSH 1.530 01/10/2023        PHYSICAL  EXAM  CONSTITUTIONAL: Well built, well nourished in no apparent distress, wheelchair   NECK: no carotid bruit, no JVD  LUNGS: CTA  CHEST WALL: no tenderness  HEART: regular rate and rhythm, S1, S2 normal, no murmur, click, rub or gallop   ABDOMEN: soft, non-tender; bowel sounds normal; no masses,  no organomegaly  EXTREMITIES: Extremities normal, no edema, no calf tenderness noted  NEURO: AAO X 3    I HAVE REVIEWED :    The vital signs, most recent cardiac testing, and most recent pertinent non-cardiology provider notes.    Current Outpatient Medications   Medication Instructions    aluminum & magnesium hydroxide-simethicone (MYLANTA MAXIMUM STRENGTH) 400-400-40 mg/5 mL suspension 10 mLs, Oral, Every 6 hours PRN    aspirin (ECOTRIN) 81 mg, Oral, Daily    carvediloL (COREG) 3.125 mg, Oral, 2 times daily with meals    clopidogreL (PLAVIX) 75 mg, Oral, Daily    co-enzyme Q-10 30 mg, Oral, Daily    diclofenac sodium (VOLTAREN) 2 g, Topical (Top), 4 times daily    famotidine (PEPCID) 20 MG tablet TAKE 1 TABLET TWICE DAILY    FARXIGA 10 mg, Oral, Daily, PT STATES SHE HARDLY TAKES IT AND WAS UNSURE OF MG    fish oil-omega-3 fatty acids 300-1,000 mg capsule 2 g, Oral, Daily    FOLIC ACID/MULTIVIT-MIN/LUTEIN (CENTRUM SILVER ORAL) 1 tablet, Oral, Daily, ONE DAILY    furosemide (LASIX) 20 mg, Oral, 2 times daily    HYDROcodone-acetaminophen (NORCO)  mg per tablet 1 tablet, Oral, 2 times daily PRN    potassium chloride SA (K-DUR,KLOR-CON) 20 MEQ tablet 20 mEq, Oral, Daily    REPATHA SURECLICK 140 mg, Subcutaneous, Every 14 days    rosuvastatin (CRESTOR) 20 mg, Oral, Nightly    sildenafil (REVATIO) 20 mg, Oral, 3 times daily    spironolactone (ALDACTONE) 25 mg, Oral, Daily      Assessment & Plan     SSS (sick sinus syndrome)  S/p PPM implantation   Site CDI   Device check scheduled for July     Acute exacerbation of CHF (congestive heart failure)  Has some volume today on exam   Increase lasix to 80 mg daily for 3 days    Check BNP BMP Monday   Continue coreg, farxiga, aldactone   Briefly mentioned cardiomems to patient to further discuss once recovered from TAVR and PPM       S/P AVR (aortic valve replacement)  Doing well Post TAVR   Groin healing well   Has 1 mo and 1 year follow ups scheduled with valve clinic     Mixed hyperlipidemia  Continue statin therapy     Coronary artery disease involving coronary bypass graft of native heart with angina pectoris  Stable [facility] prior to TAVR         Follow up in about 3 months (around 8/4/2023).     Brenda Weller PA-C   [facility]   Office: [phone]

## 2023-05-04 NOTE — ASSESSMENT & PLAN NOTE
Has some volume today on exam   Increase lasix to 80 mg daily for 3 days   Check BNP BMP Monday   Continue coreg, farxiga, aldactone   Briefly mentioned cardiomems to patient to further discuss once recovered from TAVR and PPM

## 2023-05-08 ENCOUNTER — LAB VISIT (OUTPATIENT)
Dept: LAB | Facility: HOSPITAL | Age: 83
End: 2023-05-08
Payer: MEDICARE

## 2023-05-08 ENCOUNTER — SPECIALTY PHARMACY (OUTPATIENT)
Dept: PHARMACY | Facility: CLINIC | Age: 83
End: 2023-05-08
Payer: MEDICARE

## 2023-05-08 DIAGNOSIS — I50.43 ACUTE ON CHRONIC COMBINED SYSTOLIC AND DIASTOLIC CONGESTIVE HEART FAILURE: ICD-10-CM

## 2023-05-08 LAB
ALBUMIN SERPL BCP-MCNC: 2.5 G/DL (ref 3.5–5.2)
ALP SERPL-CCNC: 184 U/L (ref 55–135)
ALT SERPL W/O P-5'-P-CCNC: 17 U/L (ref 10–44)
ANION GAP SERPL CALC-SCNC: 4 MMOL/L (ref 8–16)
AST SERPL-CCNC: 26 U/L (ref 10–40)
BILIRUB SERPL-MCNC: 0.8 MG/DL (ref 0.1–1)
BNP SERPL-MCNC: 1841 PG/ML (ref 0–99)
BUN SERPL-MCNC: 9 MG/DL (ref 8–23)
CALCIUM SERPL-MCNC: 8.1 MG/DL (ref 8.7–10.5)
CHLORIDE SERPL-SCNC: 106 MMOL/L (ref 95–110)
CO2 SERPL-SCNC: 26 MMOL/L (ref 23–29)
CREAT SERPL-MCNC: 0.7 MG/DL (ref 0.5–1.4)
EST. GFR  (NO RACE VARIABLE): >60 ML/MIN/1.73 M^2
GLUCOSE SERPL-MCNC: 96 MG/DL (ref 70–110)
POTASSIUM SERPL-SCNC: 3.9 MMOL/L (ref 3.5–5.1)
PROT SERPL-MCNC: 5.8 G/DL (ref 6–8.4)
SODIUM SERPL-SCNC: 136 MMOL/L (ref 136–145)

## 2023-05-08 PROCEDURE — 80053 COMPREHEN METABOLIC PANEL: CPT

## 2023-05-08 PROCEDURE — 36415 COLL VENOUS BLD VENIPUNCTURE: CPT | Mod: PO

## 2023-05-08 PROCEDURE — 83880 ASSAY OF NATRIURETIC PEPTIDE: CPT

## 2023-05-08 NOTE — TELEPHONE ENCOUNTER
Specialty Pharmacy - Refill Coordination    Specialty Medication Orders Linked to Encounter      Flowsheet Row Most Recent Value   Medication #1 evolocumab (REPATHA SURECLICK) 140 mg/mL PnIj (Order#[id number], Rx#[id number])            Refill Questions - Documented Responses      Flowsheet Row Most Recent Value   Patient Availability and HIPAA Verification    Does patient want to proceed with activity? Yes   HIPAA/medical authority confirmed? Yes   Relationship to patient of person spoken to? Self   Refill Screening Questions    Would patient like to speak to a pharmacist? No   When does the patient need to receive the medication? 05/13/23   Refill Delivery Questions    How will the patient receive the medication? MEDRx   When does the patient need to receive the medication? 05/13/23   Shipping Address Home   Address in [facility] confirmed and updated if neccessary? Yes   Expected Copay ($) 0   Is the patient able to afford the medication copay? Yes   Payment Method zero copay   Days supply of Refill 28   Supplies needed? No supplies needed   Refill activity completed? Yes   Refill activity plan Refill scheduled   Shipment/Pickup Date: 05/10/23            Current Outpatient Medications   Medication Sig    aluminum & magnesium hydroxide-simethicone (MYLANTA MAXIMUM STRENGTH) 400-400-40 mg/5 mL suspension Take 10 mLs by mouth every 6 (six) hours as needed for Indigestion.    aspirin (ECOTRIN) 81 MG EC tablet Take 81 mg by mouth once daily.    carvediloL (COREG) 3.125 MG tablet Take 1 tablet (3.125 mg total) by mouth 2 (two) times daily with meals.    clopidogreL (PLAVIX) 75 mg tablet Take 1 tablet (75 mg total) by mouth once daily.    co-enzyme Q-10 30 mg capsule Take 30 mg by mouth once daily.     dapagliflozin (FARXIGA) 10 mg tablet Take 10 mg by mouth once daily. PT STATES SHE HARDLY TAKES IT AND WAS UNSURE OF MG    diclofenac sodium (VOLTAREN) 1 % Gel Apply 2 g topically 4 (four) times daily.    evolocumab  (REPATHA SURECLICK) 140 mg/mL PnIj Inject 1 mL (140 mg total) into the skin every 14 (fourteen) days.    famotidine (PEPCID) 20 MG tablet TAKE 1 TABLET TWICE DAILY    fish oil-omega-3 fatty acids 300-1,000 mg capsule Take 2 g by mouth once daily.    FOLIC ACID/MULTIVIT-MIN/LUTEIN (CENTRUM SILVER ORAL) Take 1 tablet by mouth once daily. ONE DAILY    furosemide (LASIX) 20 MG tablet Take 1 tablet (20 mg total) by mouth 2 (two) times a day.    HYDROcodone-acetaminophen (NORCO)  mg per tablet Take 1 tablet by mouth 2 (two) times daily as needed for Pain.    potassium chloride SA (K-DUR,KLOR-CON) 20 MEQ tablet Take 1 tablet (20 mEq total) by mouth once daily.    rosuvastatin (CRESTOR) 20 MG tablet Take 1 tablet (20 mg total) by mouth every evening.    sildenafil (REVATIO) 20 mg Tab Take 1 tablet (20 mg total) by mouth 3 (three) times daily.    spironolactone (ALDACTONE) 25 MG tablet Take 1 tablet (25 mg total) by mouth once daily.   Last reviewed on 5/4/2023  2:46 PM by Kain El MA    Review of patient's allergies indicates:   Allergen Reactions    Pcn [penicillins] Other (See Comments)     SOB    Clindamycin Diarrhea    Darvocet a500 [propoxyphene n-acetaminophen]     Erythromycin Other (See Comments)    Mycinette [cetylpyridinium-benzocaine]     Last reviewed on  5/4/2023 2:45 PM by Kain El      Tasks added this encounter   No tasks added.   Tasks due within next 3 months   5/7/2023 - Refill Coordination Outreach (1 time occurrence)     Krissy Mae, DheerajD  [facility]  [address] [facility] [address]  Phone: [phone]  Fax: [phone]

## 2023-05-09 ENCOUNTER — DOCUMENT SCAN (OUTPATIENT)
Dept: HOME HEALTH SERVICES | Facility: HOSPITAL | Age: 83
End: 2023-05-09
Payer: MEDICARE

## 2023-05-09 ENCOUNTER — TELEPHONE (OUTPATIENT)
Dept: CARDIOLOGY | Facility: CLINIC | Age: 83
End: 2023-05-09
Payer: MEDICARE

## 2023-05-09 ENCOUNTER — TELEPHONE (OUTPATIENT)
Dept: FAMILY MEDICINE | Facility: CLINIC | Age: 83
End: 2023-05-09
Payer: MEDICARE

## 2023-05-09 DIAGNOSIS — I50.33 ACUTE ON CHRONIC DIASTOLIC HEART FAILURE: Primary | ICD-10-CM

## 2023-05-09 RX ORDER — FUROSEMIDE 40 MG/1
40 TABLET ORAL EVERY OTHER DAY
Qty: 45 TABLET | Refills: 3 | Status: SHIPPED | OUTPATIENT
Start: 2023-05-09 | End: 2023-06-15

## 2023-05-09 RX ORDER — FUROSEMIDE 80 MG/1
80 TABLET ORAL EVERY OTHER DAY
Qty: 45 TABLET | Refills: 3 | Status: SHIPPED | OUTPATIENT
Start: 2023-05-09 | End: 2023-05-22

## 2023-05-09 NOTE — TELEPHONE ENCOUNTER
----- Message from Sandy Pierre sent at 5/9/2023 11:58 AM CDT -----  Contact: Rosie  Type: Needs Medical Advice  Who Called:  [facility]    Best Call Back Number: [phone]  Additional Information: Caller would like to inform office NP has home health orders in epic to be signed  Thank you

## 2023-05-09 NOTE — TELEPHONE ENCOUNTER
----- Message from Rosa Maria Wilson sent at 5/9/2023 12:05 PM CDT -----  Contact: Patient  Type:  Patient Returning Call    Who Called:  Patient  Who Left Message for Patient:  SOLEDAD POWELL  Does the patient know what this is regarding?:  no  Best Call Back Number:  [phone]  Additional Information:  Please call the patient back at the phone number listed above to advise. Thank you!

## 2023-05-10 ENCOUNTER — SPECIALTY PHARMACY (OUTPATIENT)
Dept: PHARMACY | Facility: CLINIC | Age: 83
End: 2023-05-10
Payer: MEDICARE

## 2023-05-10 NOTE — TELEPHONE ENCOUNTER
Pt states seh cannot refill sildenafil until she receives ok from provider. States she will reach out to [facility] when needing refill. Routing to [facility] to follow.

## 2023-05-11 ENCOUNTER — OFFICE VISIT (OUTPATIENT)
Dept: ORTHOPEDICS | Facility: CLINIC | Age: 83
End: 2023-05-11
Payer: MEDICARE

## 2023-05-11 ENCOUNTER — HOSPITAL ENCOUNTER (OUTPATIENT)
Dept: RADIOLOGY | Facility: HOSPITAL | Age: 83
Discharge: HOME | End: 2023-05-11
Attending: NURSE PRACTITIONER
Payer: MEDICARE

## 2023-05-11 DIAGNOSIS — M25.552 LEFT HIP PAIN: ICD-10-CM

## 2023-05-11 DIAGNOSIS — M70.62 GREATER TROCHANTERIC BURSITIS OF LEFT HIP: Primary | ICD-10-CM

## 2023-05-11 DIAGNOSIS — M25.552 LEFT HIP PAIN: Primary | ICD-10-CM

## 2023-05-11 PROCEDURE — 1159F MED LIST DOCD IN RCRD: CPT | Mod: CPTII,S$GLB,, | Performed by: NURSE PRACTITIONER

## 2023-05-11 PROCEDURE — 1159F PR MEDICATION LIST DOCUMENTED IN MEDICAL RECORD: ICD-10-PCS | Mod: CPTII,S$GLB,, | Performed by: NURSE PRACTITIONER

## 2023-05-11 PROCEDURE — 73502 X-RAY EXAM HIP UNI 2-3 VIEWS: CPT | Mod: 26,LT,, | Performed by: RADIOLOGY

## 2023-05-11 PROCEDURE — 1111F PR DISCHARGE MEDS RECONCILED W/ CURRENT OUTPATIENT MED LIST: ICD-10-PCS | Mod: CPTII,S$GLB,, | Performed by: NURSE PRACTITIONER

## 2023-05-11 PROCEDURE — 99213 PR OFFICE/OUTPT VISIT, EST, LEVL III, 20-29 MIN: ICD-10-PCS | Mod: 25,S$GLB,, | Performed by: NURSE PRACTITIONER

## 2023-05-11 PROCEDURE — 73502 X-RAY EXAM HIP UNI 2-3 VIEWS: CPT | Mod: TC,PO,LT

## 2023-05-11 PROCEDURE — 20610 LARGE JOINT ASPIRATION/INJECTION: L GREATER TROCHANTERIC BURSA: ICD-10-PCS | Mod: LT,S$GLB,, | Performed by: NURSE PRACTITIONER

## 2023-05-11 PROCEDURE — 99999 PR PBB SHADOW E&M-EST. PATIENT-LVL III: CPT | Mod: PBBFAC,,, | Performed by: NURSE PRACTITIONER

## 2023-05-11 PROCEDURE — 1111F DSCHRG MED/CURRENT MED MERGE: CPT | Mod: CPTII,S$GLB,, | Performed by: NURSE PRACTITIONER

## 2023-05-11 PROCEDURE — 99213 OFFICE O/P EST LOW 20 MIN: CPT | Mod: 25,S$GLB,, | Performed by: NURSE PRACTITIONER

## 2023-05-11 PROCEDURE — 73502 XR HIP WITH PELVIS WHEN PERFORMED, 2 OR 3 VIEWS LEFT: ICD-10-PCS | Mod: 26,LT,, | Performed by: RADIOLOGY

## 2023-05-11 PROCEDURE — 1160F PR REVIEW ALL MEDS BY PRESCRIBER/CLIN PHARMACIST DOCUMENTED: ICD-10-PCS | Mod: CPTII,S$GLB,, | Performed by: NURSE PRACTITIONER

## 2023-05-11 PROCEDURE — 99999 PR PBB SHADOW E&M-EST. PATIENT-LVL III: ICD-10-PCS | Mod: PBBFAC,,, | Performed by: NURSE PRACTITIONER

## 2023-05-11 PROCEDURE — 1160F RVW MEDS BY RX/DR IN RCRD: CPT | Mod: CPTII,S$GLB,, | Performed by: NURSE PRACTITIONER

## 2023-05-11 PROCEDURE — 20610 DRAIN/INJ JOINT/BURSA W/O US: CPT | Mod: LT,S$GLB,, | Performed by: NURSE PRACTITIONER

## 2023-05-11 RX ORDER — TRIAMCINOLONE ACETONIDE 40 MG/ML
40 INJECTION, SUSPENSION INTRA-ARTICULAR; INTRAMUSCULAR
Status: DISCONTINUED | OUTPATIENT
Start: 2023-05-11 | End: 2023-05-11 | Stop reason: HOSPADM

## 2023-05-11 RX ADMIN — TRIAMCINOLONE ACETONIDE 40 MG: 40 INJECTION, SUSPENSION INTRA-ARTICULAR; INTRAMUSCULAR at 01:05

## 2023-05-11 NOTE — PROGRESS NOTES
Chief Complaint   Patient presents with    Left Hip - Pain      HPI:   This is a 82 y.o. who presents to clinic today for left hip pain for the past 2 weeks after no known trauma. Complains of pain while sleeping on side and when descending stairs. Pain is dull and deep. No numbness or tingling. No associated signs or symptoms.      Past Medical History:   Diagnosis Date    Acid reflux     Acute coronary syndrome     LIGHT 2004    Acute on chronic combined systolic and diastolic heart failure 02/2023    Anticoagulant long-term use     Aortic valve disorder     nonrheumatic    Arthritis     CHF (congestive heart failure)     Coronary artery disease     cabg & valve sgy    H/O: pneumonia     Heart murmur     Hyperlipidemia     Hypertension     Lower leg edema     Mild carotid artery disease 11/17/2021    Palpitations     Stenosis of aortic and mitral valves     Valvular regurgitation      Past Surgical History:   Procedure Laterality Date    A-V CARDIAC PACEMAKER INSERTION N/A 03/29/2023    Procedure: Dual PPM (Rodriguez);  Surgeon: Too Farrell MD;  Location: [facility] CATH;  Service: Cardiology;  Laterality: N/A;    ARTERIOGRAPHY OF AORTIC ROOT N/A 02/23/2023    Procedure: ARTERIOGRAM, AORTIC ROOT;  Surgeon: Eb Ruelas MD;  Location: [facility] CATH;  Service: Cardiology;  Laterality: N/A;    CARDIAC CATHETERIZATION      CARDIAC VALVE SURGERY      CATARACT EXTRACTION W/  INTRAOCULAR LENS IMPLANT Bilateral     COLONOSCOPY  ~2019    normal findings per patient report    CORONARY ANGIOGRAPHY INCLUDING BYPASS GRAFTS WITH CATHETERIZATION OF LEFT HEART N/A 02/23/2023    Procedure: Right/Left heart Cath w/Grafts;  Surgeon: Eb Ruelas MD;  [facility];  Service: Cardiology;  Laterality: N/A;    CORONARY ARTERY BYPASS GRAFT  04/2005    HYSTERECTOMY  1980    INSERTION, PACEMAKER, TEMPORARY TRANSVENOUS  4/26/2023    Procedure: Insertion, Pacemaker, Temporary Transvenous;  Surgeon: Ryan Castro MD;  Location:  [facility]ATH;  Service: Cardiology;;    PERCUTANEOUS TRANSLUMINAL ANGIOPLASTY N/A 4/26/2023    Procedure: PTA (ANGIOPLASTY, PERCUTANEOUS, TRANSLUMINAL);  Surgeon: Ryan Castro MD;  Location: [facility]ATH;  Service: Cardiology;  Laterality: N/A;    RIGHT HEART CATHETERIZATION N/A 02/23/2023    Procedure: INSERTION, CATHETER, RIGHT HEART;  Surgeon: Eb Ruelas MD;  [facility]: [facility]ATH;  Service: Cardiology;  Laterality: N/A;    TRANSCATHETER AORTIC VALVE REPLACEMENT (TAVR) Bilateral 4/26/2023    Procedure: (TAVR);  Surgeon: Ryan Castro MD;  [facility]: [facility];  Service: Cardiology;  Laterality: Bilateral;    TRANSCATHETER AORTIC VALVE REPLACEMENT (TAVR) Bilateral 4/26/2023    Procedure: REPLACEMENT, AORTIC VALVE, TRANSCATHETER (TAVR);  Surgeon: Jarett Mckee MD;  Location: [facility] CATH;  Service: Cardiology;  Laterality: Bilateral;    UPPER GASTROINTESTINAL ENDOSCOPY  ~2010    normal findings     Current Outpatient Medications on File Prior to Visit   Medication Sig Dispense Refill    aluminum & magnesium hydroxide-simethicone (MYLANTA MAXIMUM STRENGTH) 400-400-40 mg/5 mL suspension Take 10 mLs by mouth every 6 (six) hours as needed for Indigestion. 3840 mL 0    aspirin (ECOTRIN) 81 MG EC tablet Take 81 mg by mouth once daily.      carvediloL (COREG) 3.125 MG tablet Take 1 tablet (3.125 mg total) by mouth 2 (two) times daily with meals. 60 tablet 11    clopidogreL (PLAVIX) 75 mg tablet Take 1 tablet (75 mg total) by mouth once daily. 90 tablet 0    co-enzyme Q-10 30 mg capsule Take 30 mg by mouth once daily.       dapagliflozin (FARXIGA) 10 mg tablet Take 10 mg by mouth once daily. PT STATES SHE HARDLY TAKES IT AND WAS UNSURE OF MG      diclofenac sodium (VOLTAREN) 1 % Gel Apply 2 g topically 4 (four) times daily.      evolocumab (REPATHA SURECLICK) 140 mg/mL PnIj Inject 1 mL (140 mg total) into the skin every 14 (fourteen) days. 2 mL 6    famotidine (PEPCID) 20 MG tablet TAKE 1 TABLET TWICE DAILY 180 tablet 0     fish oil-omega-3 fatty acids 300-1,000 mg capsule Take 2 g by mouth once daily.      FOLIC ACID/MULTIVIT-MIN/LUTEIN (CENTRUM SILVER ORAL) Take 1 tablet by mouth once daily. ONE DAILY      furosemide (LASIX) 40 MG tablet Take 1 tablet (40 mg total) by mouth every other day. Alternate with 80mg dose 45 tablet 3    furosemide (LASIX) 80 MG tablet Take 1 tablet (80 mg total) by mouth every other day. Alternate with 40mg dose 45 tablet 3    HYDROcodone-acetaminophen (NORCO)  mg per tablet Take 1 tablet by mouth 2 (two) times daily as needed for Pain.  0    potassium chloride SA (K-DUR,KLOR-CON) 20 MEQ tablet Take 1 tablet (20 mEq total) by mouth once daily. 30 tablet 0    rosuvastatin (CRESTOR) 20 MG tablet Take 1 tablet (20 mg total) by mouth every evening. 30 tablet 0    sildenafil (REVATIO) 20 mg Tab Take 1 tablet (20 mg total) by mouth 3 (three) times daily. 90 tablet 11    spironolactone (ALDACTONE) 25 MG tablet Take 1 tablet (25 mg total) by mouth once daily. 30 tablet 1     Current Facility-Administered Medications on File Prior to Visit   Medication Dose Route Frequency Provider Last Rate Last Admin    chlorhexidine 0.12 % solution 15 mL  15 mL Mouth/Throat Once Too Farrell MD        mupirocin 2 % ointment   Nasal Once Too Farrell MD         Review of patient's allergies indicates:   Allergen Reactions    Pcn [penicillins] Other (See Comments)     SOB    Clindamycin Diarrhea    Darvocet a500 [propoxyphene n-acetaminophen]     Erythromycin Other (See Comments)    Mycinette [cetylpyridinium-benzocaine]      Family History   Problem Relation Age of Onset    Hypertension Mother     Hypertension Father     Heart disease Father     Colon cancer Neg Hx     Esophageal cancer Neg Hx     Stomach cancer Neg Hx      Social History     Socioeconomic History    Marital status: Single   Tobacco Use    Smoking status: Never    Smokeless tobacco: Never   Substance and Sexual Activity    Alcohol use:  Not Currently     Comment: couple times per yr    Drug use: No       Review of Systems:  Constitutional:  Denies fever or chills   Eyes:  Denies change in visual acuity   HENT:  Denies nasal congestion or sore throat   Respiratory:  Denies cough or shortness of breath   Cardiovascular:  Denies chest pain or edema   GI:  Denies abdominal pain, nausea, vomiting, bloody stools or diarrhea   :  Denies dysuria   Integument:  Denies rash   Neurologic:  Denies headache, focal weakness or sensory changes   Endocrine:  Denies polyuria or polydipsia   Lymphatic:  Denies swollen glands   Psychiatric:  Denies depression or anxiety     Physical Exam:   Constitutional:  Well developed, well nourished, no acute distress, non-toxic appearance   Integument:  Well hydrated, no rash   Lymphatic:  No lymphadenopathy noted   Neurologic:  Alert & oriented x 3  Psychiatric:  Speech and behavior appropriate     Bilateral Hip Exam    right Hip Exam   right hip exam performed same as contralateral hip and is normal.     left Hip Exam   Tenderness   The patient is experiencing tenderness in the greater trochanter.  Range of Motion   The patient has normal hip ROM.  Muscle Strength   Abduction: 4/5   Other   Erythema: absent  Sensation: normal  Pulse: present    X-rays were personally reviewed by me and findings discussed with the patient.  2 views of the left hip show no fractures or dislocations    Greater trochanteric bursitis of left hip  -     Large Joint Aspiration/Injection: L greater trochanteric bursa           Using an aseptic technique, I injected 5 cc of lidocaine 1% without and 1 cc of kenalog 40mg into the left Hip. The patient tolerated this well.    Rtc in 6 weeks or prn.

## 2023-05-11 NOTE — PROCEDURES
Large Joint Aspiration/Injection: L greater trochanteric bursa    Date/Time: 5/11/2023 1:40 PM  Performed by: LYNETTE Cooper  Authorized by: LYNETTE Cooper     Consent Done?:  Yes (Verbal)  Indications:  Pain  Timeout: prior to procedure the correct patient, procedure, and site was verified    Prep: patient was prepped and draped in usual sterile fashion    Local anesthetic:  Lidocaine 1% without epinephrine  Anesthetic total (ml):  5      Details:  Needle Size:  21 G  Approach:  Lateral  Location:  Hip  Site:  L greater trochanteric bursa  Medications:  40 mg triamcinolone acetonide 40 mg/mL  Patient tolerance:  Patient tolerated the procedure well with no immediate complications

## 2023-05-15 ENCOUNTER — DOCUMENT SCAN (OUTPATIENT)
Dept: HOME HEALTH SERVICES | Facility: HOSPITAL | Age: 83
End: 2023-05-15
Payer: MEDICARE

## 2023-05-18 ENCOUNTER — DOCUMENT SCAN (OUTPATIENT)
Dept: HOME HEALTH SERVICES | Facility: HOSPITAL | Age: 83
End: 2023-05-18
Payer: MEDICARE

## 2023-05-19 RX ORDER — SPIRONOLACTONE 25 MG/1
TABLET ORAL
Qty: 30 TABLET | Refills: 1 | Status: SHIPPED | OUTPATIENT
Start: 2023-05-19 | End: 2023-07-12 | Stop reason: DRUGHIGH

## 2023-05-22 ENCOUNTER — OFFICE VISIT (OUTPATIENT)
Dept: PRIMARY CARE CLINIC | Facility: CLINIC | Age: 83
End: 2023-05-22
Payer: MEDICARE

## 2023-05-22 ENCOUNTER — DOCUMENT SCAN (OUTPATIENT)
Dept: HOME HEALTH SERVICES | Facility: HOSPITAL | Age: 83
End: 2023-05-22
Payer: MEDICARE

## 2023-05-22 VITALS
HEART RATE: 85 BPM | RESPIRATION RATE: 18 BRPM | OXYGEN SATURATION: 98 % | TEMPERATURE: 98 F | BODY MASS INDEX: 28.32 KG/M2 | WEIGHT: 170.19 LBS | SYSTOLIC BLOOD PRESSURE: 139 MMHG | DIASTOLIC BLOOD PRESSURE: 76 MMHG

## 2023-05-22 DIAGNOSIS — R09.89 DECREASED PULSES IN FEET: ICD-10-CM

## 2023-05-22 DIAGNOSIS — I50.9 ACUTE ON CHRONIC CONGESTIVE HEART FAILURE, UNSPECIFIED HEART FAILURE TYPE: ICD-10-CM

## 2023-05-22 DIAGNOSIS — I35.0 SEVERE AORTIC VALVE STENOSIS: Primary | ICD-10-CM

## 2023-05-22 DIAGNOSIS — I10 ESSENTIAL HYPERTENSION: ICD-10-CM

## 2023-05-22 DIAGNOSIS — L81.4 DARKENING OF SKIN: ICD-10-CM

## 2023-05-22 DIAGNOSIS — R23.8 ABNORMAL SKIN COLOR: ICD-10-CM

## 2023-05-22 PROCEDURE — 3075F SYST BP GE 130 - 139MM HG: CPT | Mod: CPTII,S$GLB,, | Performed by: NURSE PRACTITIONER

## 2023-05-22 PROCEDURE — 3075F PR MOST RECENT SYSTOLIC BLOOD PRESS GE 130-139MM HG: ICD-10-PCS | Mod: CPTII,S$GLB,, | Performed by: NURSE PRACTITIONER

## 2023-05-22 PROCEDURE — 1125F AMNT PAIN NOTED PAIN PRSNT: CPT | Mod: CPTII,S$GLB,, | Performed by: NURSE PRACTITIONER

## 2023-05-22 PROCEDURE — 1111F DSCHRG MED/CURRENT MED MERGE: CPT | Mod: CPTII,S$GLB,, | Performed by: NURSE PRACTITIONER

## 2023-05-22 PROCEDURE — 3078F PR MOST RECENT DIASTOLIC BLOOD PRESSURE < 80 MM HG: ICD-10-PCS | Mod: CPTII,S$GLB,, | Performed by: NURSE PRACTITIONER

## 2023-05-22 PROCEDURE — G0179 PR HOME HEALTH MD RECERTIFICATION: ICD-10-PCS | Mod: ,,, | Performed by: NURSE PRACTITIONER

## 2023-05-22 PROCEDURE — 1160F PR REVIEW ALL MEDS BY PRESCRIBER/CLIN PHARMACIST DOCUMENTED: ICD-10-PCS | Mod: CPTII,S$GLB,, | Performed by: NURSE PRACTITIONER

## 2023-05-22 PROCEDURE — 99214 PR OFFICE/OUTPT VISIT, EST, LEVL IV, 30-39 MIN: ICD-10-PCS | Mod: S$GLB,,, | Performed by: NURSE PRACTITIONER

## 2023-05-22 PROCEDURE — 1111F PR DISCHARGE MEDS RECONCILED W/ CURRENT OUTPATIENT MED LIST: ICD-10-PCS | Mod: CPTII,S$GLB,, | Performed by: NURSE PRACTITIONER

## 2023-05-22 PROCEDURE — 1159F PR MEDICATION LIST DOCUMENTED IN MEDICAL RECORD: ICD-10-PCS | Mod: CPTII,S$GLB,, | Performed by: NURSE PRACTITIONER

## 2023-05-22 PROCEDURE — 1125F PR PAIN SEVERITY QUANTIFIED, PAIN PRESENT: ICD-10-PCS | Mod: CPTII,S$GLB,, | Performed by: NURSE PRACTITIONER

## 2023-05-22 PROCEDURE — 99214 OFFICE O/P EST MOD 30 MIN: CPT | Mod: S$GLB,,, | Performed by: NURSE PRACTITIONER

## 2023-05-22 PROCEDURE — 3288F PR FALLS RISK ASSESSMENT DOCUMENTED: ICD-10-PCS | Mod: CPTII,S$GLB,, | Performed by: NURSE PRACTITIONER

## 2023-05-22 PROCEDURE — 1101F PT FALLS ASSESS-DOCD LE1/YR: CPT | Mod: CPTII,S$GLB,, | Performed by: NURSE PRACTITIONER

## 2023-05-22 PROCEDURE — G0179 MD RECERTIFICATION HHA PT: HCPCS | Mod: ,,, | Performed by: NURSE PRACTITIONER

## 2023-05-22 PROCEDURE — 1160F RVW MEDS BY RX/DR IN RCRD: CPT | Mod: CPTII,S$GLB,, | Performed by: NURSE PRACTITIONER

## 2023-05-22 PROCEDURE — 1101F PR PT FALLS ASSESS DOC 0-1 FALLS W/OUT INJ PAST YR: ICD-10-PCS | Mod: CPTII,S$GLB,, | Performed by: NURSE PRACTITIONER

## 2023-05-22 PROCEDURE — 1159F MED LIST DOCD IN RCRD: CPT | Mod: CPTII,S$GLB,, | Performed by: NURSE PRACTITIONER

## 2023-05-22 PROCEDURE — 3288F FALL RISK ASSESSMENT DOCD: CPT | Mod: CPTII,S$GLB,, | Performed by: NURSE PRACTITIONER

## 2023-05-22 PROCEDURE — 3078F DIAST BP <80 MM HG: CPT | Mod: CPTII,S$GLB,, | Performed by: NURSE PRACTITIONER

## 2023-05-22 NOTE — PROGRESS NOTES
Subjective:       Patient ID: Yael Claire is a 82 y.o. female.    Chief Complaint: Follow-up chronic medical and recent changes in health  Last seen in primary care by me on 03/20/2023.  HPI  Since her last visit she has been to ED or hospitalized on 4 occassions with severe aortic valve stenosis and CHF.  Her last inpatient visit was on 04/26/2023  She also is concerned about discoloration of her lower extremities as well as her right arm appears to be darker than left. She states noticed darkness in her lower feet upon going home from the hospital after the procedure.  Vitals:    05/22/23 1453   BP: 139/76   Pulse: 85   Resp: 18   Temp: 97.6 °F (36.4 °C)     Review of Systems   Cardiovascular:  Positive for leg swelling.        States improved greatly but some slight swelling to feet bilaterally     She has had pacemaker and TAVR since last visit  On her visit to the primary care clinic she was sen to the ED on 03/20/2023 here weight was 216 pounds.  She did get injection on 05/11/2023 of lidocaine and kenalog to her left hip  Objective:      Physical Exam  Vitals and nursing note reviewed.   Constitutional:       General: She is awake.      Appearance: Normal appearance. She is well-developed and well-groomed.      Comments: She is using a walker during this visit to decrease safety risks but very good gait   HENT:      Head: Normocephalic and atraumatic.      Right Ear: Hearing and external ear normal.      Left Ear: Hearing and external ear normal.   Cardiovascular:      Rate and Rhythm: Normal rate.      Pulses:           Posterior tibial pulses are 0 on the right side and 0 on the left side.      Comments: Apical heart rate with course loud sound consistent with her history and recent TAVR  Right lower leg and foot, see photos  Pulmonary:      Effort: Pulmonary effort is normal.      Breath sounds: Normal breath sounds and air entry.   Musculoskeletal:      Cervical back: Full passive range of motion  without pain, normal range of motion and neck supple.      Right lower le+ Edema present.      Left lower leg: No edema.   Feet:      Right foot:      Skin integrity: Dry skin present.      Left foot:      Skin integrity: Dry skin present.      Comments: Pedal pulses very faint and barely palpable   Skin:     General: Skin is warm and dry.      Coloration: Skin is mottled.             Comments: Darkened skin changes to lower legs and feet, see photo   Neurological:      General: No focal deficit present.      Mental Status: She is alert and oriented to person, place, and time.   Psychiatric:         Attention and Perception: Attention and perception normal.         Mood and Affect: Mood and affect normal.         Speech: Speech normal.         Behavior: Behavior normal. Behavior is cooperative.         Thought Content: Thought content normal.         Cognition and Memory: Cognition and memory normal.         Judgment: Judgment normal.               Assessment & Plan:       Severe aortic valve stenosis- Stable, had TAVR    Acute on chronic congestive heart failure, unspecified heart failure type- Stable, fluid resolving well, continue current medications    Essential hypertension- Controlled, continue current medications  BP Readings from Last 3 Encounters:   23 139/76   23 117/67   23 (!) 140/84      Decreased pulses in feet  -     CV Ultrasound doppler arterial legs bilat; Future    Abnormal skin color  -     CV Ultrasound doppler arterial legs bilat; Future    Darkening of skin  -     CV Ultrasound doppler arterial legs bilat; Future          Medication List with Changes/Refills   Current Medications    ALUMINUM & MAGNESIUM HYDROXIDE-SIMETHICONE (MYLANTA MAXIMUM STRENGTH) 400-400-40 MG/5 ML SUSPENSION    Take 10 mLs by mouth every 6 (six) hours as needed for Indigestion.    ASPIRIN (ECOTRIN) 81 MG EC TABLET    Take 81 mg by mouth once daily.    CARVEDILOL (COREG) 3.125 MG TABLET    Take 1  tablet (3.125 mg total) by mouth 2 (two) times daily with meals.    CLOPIDOGREL (PLAVIX) 75 MG TABLET    Take 1 tablet (75 mg total) by mouth once daily.    CO-ENZYME Q-10 30 MG CAPSULE    Take 30 mg by mouth once daily.     DAPAGLIFLOZIN (FARXIGA) 10 MG TABLET    Take 10 mg by mouth once daily. PT STATES SHE HARDLY TAKES IT AND WAS UNSURE OF MG    DICLOFENAC SODIUM (VOLTAREN) 1 % GEL    Apply 2 g topically 4 (four) times daily.    EVOLOCUMAB (REPATHA SURECLICK) 140 MG/ML PNIJ    Inject 1 mL (140 mg total) into the skin every 14 (fourteen) days.    FAMOTIDINE (PEPCID) 20 MG TABLET    TAKE 1 TABLET TWICE DAILY    FISH OIL-OMEGA-3 FATTY ACIDS 300-1,000 MG CAPSULE    Take 2 g by mouth once daily.    FOLIC ACID/MULTIVIT-MIN/LUTEIN (CENTRUM SILVER ORAL)    Take 1 tablet by mouth once daily. ONE DAILY    FUROSEMIDE (LASIX) 40 MG TABLET    Take 1 tablet (40 mg total) by mouth every other day. Alternate with 80mg dose    HYDROCODONE-ACETAMINOPHEN (NORCO)  MG PER TABLET    Take 1 tablet by mouth 2 (two) times daily as needed for Pain.    ROSUVASTATIN (CRESTOR) 20 MG TABLET    Take 1 tablet (20 mg total) by mouth every evening.    SPIRONOLACTONE (ALDACTONE) 25 MG TABLET    TAKE 1 TABLET(25 MG) BY MOUTH EVERY DAY   Discontinued Medications    FUROSEMIDE (LASIX) 80 MG TABLET    Take 1 tablet (80 mg total) by mouth every other day. Alternate with 40mg dose      Follow up in about 3 weeks (around 6/12/2023).

## 2023-05-24 ENCOUNTER — TELEPHONE (OUTPATIENT)
Dept: FAMILY MEDICINE | Facility: CLINIC | Age: 83
End: 2023-05-24
Payer: MEDICARE

## 2023-05-24 NOTE — TELEPHONE ENCOUNTER
----- Message from Nakia Fitch, Patient Care Assistant sent at 5/23/2023  1:31 PM CDT -----  Regarding: advice  Contact: jackeline with [facility]  Type: Needs Medical Advice    Who Called:  jackeline with [employer]    Best Call Back Number:      Additional Information: jackeline with [employer] states she would like a regarding orders to be signed in epic. Please call to advise. Thanks!

## 2023-05-25 ENCOUNTER — CLINICAL SUPPORT (OUTPATIENT)
Dept: CARDIOLOGY | Facility: CLINIC | Age: 83
End: 2023-05-25
Attending: NURSE PRACTITIONER
Payer: MEDICARE

## 2023-05-25 VITALS — WEIGHT: 170 LBS | HEIGHT: 65 IN | BODY MASS INDEX: 28.32 KG/M2

## 2023-05-25 DIAGNOSIS — Z95.2 S/P TAVR (TRANSCATHETER AORTIC VALVE REPLACEMENT): ICD-10-CM

## 2023-05-25 LAB
AV INDEX (PROSTH): 0.5
AV MEAN GRADIENT: 13 MMHG
AV PEAK GRADIENT: 25 MMHG
AV VALVE AREA: 1.43 CM2
AV VELOCITY RATIO: 0.37
BSA FOR ECHO PROCEDURE: 1.88 M2
CV ECHO LV RWT: 0.53 CM
DOP CALC AO PEAK VEL: 2.48 M/S
DOP CALC AO VTI: 53.7 CM
DOP CALC LVOT AREA: 2.9 CM2
DOP CALC LVOT DIAMETER: 1.92 CM
DOP CALC LVOT PEAK VEL: 0.91 M/S
DOP CALC LVOT STROKE VOLUME: 76.98 CM3
DOP CALC MV VTI: 51.9 CM
DOP CALCLVOT PEAK VEL VTI: 26.6 CM
E WAVE DECELERATION TIME: 175.22 MSEC
E/A RATIO: 0.95
E/E' RATIO: 25.33 M/S
ECHO LV POSTERIOR WALL: 1.3 CM (ref 0.6–1.1)
EJECTION FRACTION: 50 %
FRACTIONAL SHORTENING: 24 % (ref 28–44)
INTERVENTRICULAR SEPTUM: 1.11 CM (ref 0.6–1.1)
LA MAJOR: 6.36 CM
LA MINOR: 6.39 CM
LA WIDTH: 5.3 CM
LEFT ATRIUM SIZE: 3.98 CM
LEFT ATRIUM VOLUME INDEX: 61.8 ML/M2
LEFT ATRIUM VOLUME: 114.3 CM3
LEFT INTERNAL DIMENSION IN SYSTOLE: 3.71 CM (ref 2.1–4)
LEFT VENTRICLE DIASTOLIC VOLUME INDEX: 60.79 ML/M2
LEFT VENTRICLE DIASTOLIC VOLUME: 112.46 ML
LEFT VENTRICLE MASS INDEX: 123 G/M2
LEFT VENTRICLE SYSTOLIC VOLUME INDEX: 35.8 ML/M2
LEFT VENTRICLE SYSTOLIC VOLUME: 66.25 ML
LEFT VENTRICULAR INTERNAL DIMENSION IN DIASTOLE: 4.89 CM (ref 3.5–6)
LEFT VENTRICULAR MASS: 226.98 G
LV LATERAL E/E' RATIO: 22.8 M/S
LV SEPTAL E/E' RATIO: 28.5 M/S
LVOT MG: 1.86 MMHG
LVOT MV: 0.64 CM/S
MV MEAN GRADIENT: 4 MMHG
MV PEAK A VEL: 1.2 M/S
MV PEAK E VEL: 1.14 M/S
MV PEAK GRADIENT: 14 MMHG
MV STENOSIS PRESSURE HALF TIME: 42.42 MS
MV VALVE AREA BY CONTINUITY EQUATION: 1.48 CM2
MV VALVE AREA P 1/2 METHOD: 5.19 CM2
PISA MRMAX VEL: 6.55 M/S
PISA TR MAX VEL: 2.99 M/S
RA MAJOR: 5.05 CM
RA PRESSURE: 8 MMHG
RA WIDTH: 3.9 CM
RIGHT VENTRICULAR END-DIASTOLIC DIMENSION: 3.71 CM
RIGHT VENTRICULAR LENGTH IN DIASTOLE (APICAL 4-CHAMBER VIEW): 8.44 CM
RV MID DIAMA: 2.36 CM
RV TISSUE DOPPLER FREE WALL SYSTOLIC VELOCITY 1 (APICAL 4 CHAMBER VIEW): 0.01 CM/S
SINUS: 2.75 CM
STJ: 2.75 CM
TDI LATERAL: 0.05 M/S
TDI SEPTAL: 0.04 M/S
TDI: 0.05 M/S
TR MAX PG: 36 MMHG
TRICUSPID ANNULAR PLANE SYSTOLIC EXCURSION: 1.57 CM
TV REST PULMONARY ARTERY PRESSURE: 44 MMHG

## 2023-05-25 PROCEDURE — 93306 TTE W/DOPPLER COMPLETE: CPT | Mod: S$GLB,,, | Performed by: INTERNAL MEDICINE

## 2023-05-25 PROCEDURE — 93306 ECHO (CUPID ONLY): ICD-10-PCS | Mod: S$GLB,,, | Performed by: INTERNAL MEDICINE

## 2023-06-03 RX ORDER — EVOLOCUMAB 140 MG/ML
140 INJECTION, SOLUTION SUBCUTANEOUS
Qty: 2 ML | Refills: 6 | Status: ACTIVE | OUTPATIENT
Start: 2023-06-03 | End: 2024-01-24 | Stop reason: SDUPTHER

## 2023-06-05 ENCOUNTER — TELEPHONE (OUTPATIENT)
Dept: FAMILY MEDICINE | Facility: CLINIC | Age: 83
End: 2023-06-05
Payer: MEDICARE

## 2023-06-05 ENCOUNTER — SPECIALTY PHARMACY (OUTPATIENT)
Dept: PHARMACY | Facility: CLINIC | Age: 83
End: 2023-06-05
Payer: MEDICARE

## 2023-06-05 NOTE — TELEPHONE ENCOUNTER
Spoke with patient she thought she had an appointment on 6/6/23.  Informed patient that she do not have an appointment.

## 2023-06-05 NOTE — TELEPHONE ENCOUNTER
Outgoing call regarding repatha refill, pt reports she fell behind on dosing but will inject on 6/10. Pending refill

## 2023-06-05 NOTE — TELEPHONE ENCOUNTER
----- Message from Sara Cabral sent at 6/5/2023  9:30 AM CDT -----  Contact: pt  Pt is calling and would like a call back   Please give pt a call back [phone]

## 2023-06-07 ENCOUNTER — EXTERNAL HOME HEALTH (OUTPATIENT)
Dept: HOME HEALTH SERVICES | Facility: HOSPITAL | Age: 83
End: 2023-06-07
Payer: MEDICARE

## 2023-06-12 ENCOUNTER — OFFICE VISIT (OUTPATIENT)
Dept: PRIMARY CARE CLINIC | Facility: CLINIC | Age: 83
End: 2023-06-12
Payer: MEDICARE

## 2023-06-12 VITALS
TEMPERATURE: 98 F | HEART RATE: 80 BPM | OXYGEN SATURATION: 97 % | WEIGHT: 143.31 LBS | SYSTOLIC BLOOD PRESSURE: 140 MMHG | BODY MASS INDEX: 23.85 KG/M2 | DIASTOLIC BLOOD PRESSURE: 86 MMHG | RESPIRATION RATE: 18 BRPM

## 2023-06-12 DIAGNOSIS — E78.2 MIXED HYPERLIPIDEMIA: ICD-10-CM

## 2023-06-12 DIAGNOSIS — I10 ESSENTIAL HYPERTENSION: Primary | ICD-10-CM

## 2023-06-12 DIAGNOSIS — R63.4 WEIGHT LOSS: ICD-10-CM

## 2023-06-12 DIAGNOSIS — R23.8 OTHER SKIN CHANGES: ICD-10-CM

## 2023-06-12 PROCEDURE — 99214 PR OFFICE/OUTPT VISIT, EST, LEVL IV, 30-39 MIN: ICD-10-PCS | Mod: S$GLB,,, | Performed by: NURSE PRACTITIONER

## 2023-06-12 PROCEDURE — 3079F PR MOST RECENT DIASTOLIC BLOOD PRESSURE 80-89 MM HG: ICD-10-PCS | Mod: CPTII,S$GLB,, | Performed by: NURSE PRACTITIONER

## 2023-06-12 PROCEDURE — 3288F PR FALLS RISK ASSESSMENT DOCUMENTED: ICD-10-PCS | Mod: CPTII,S$GLB,, | Performed by: NURSE PRACTITIONER

## 2023-06-12 PROCEDURE — 3077F PR MOST RECENT SYSTOLIC BLOOD PRESSURE >= 140 MM HG: ICD-10-PCS | Mod: CPTII,S$GLB,, | Performed by: NURSE PRACTITIONER

## 2023-06-12 PROCEDURE — 3288F FALL RISK ASSESSMENT DOCD: CPT | Mod: CPTII,S$GLB,, | Performed by: NURSE PRACTITIONER

## 2023-06-12 PROCEDURE — 3079F DIAST BP 80-89 MM HG: CPT | Mod: CPTII,S$GLB,, | Performed by: NURSE PRACTITIONER

## 2023-06-12 PROCEDURE — 1125F AMNT PAIN NOTED PAIN PRSNT: CPT | Mod: CPTII,S$GLB,, | Performed by: NURSE PRACTITIONER

## 2023-06-12 PROCEDURE — 1125F PR PAIN SEVERITY QUANTIFIED, PAIN PRESENT: ICD-10-PCS | Mod: CPTII,S$GLB,, | Performed by: NURSE PRACTITIONER

## 2023-06-12 PROCEDURE — 99214 OFFICE O/P EST MOD 30 MIN: CPT | Mod: S$GLB,,, | Performed by: NURSE PRACTITIONER

## 2023-06-12 PROCEDURE — 1101F PR PT FALLS ASSESS DOC 0-1 FALLS W/OUT INJ PAST YR: ICD-10-PCS | Mod: CPTII,S$GLB,, | Performed by: NURSE PRACTITIONER

## 2023-06-12 PROCEDURE — 3077F SYST BP >= 140 MM HG: CPT | Mod: CPTII,S$GLB,, | Performed by: NURSE PRACTITIONER

## 2023-06-12 PROCEDURE — 1101F PT FALLS ASSESS-DOCD LE1/YR: CPT | Mod: CPTII,S$GLB,, | Performed by: NURSE PRACTITIONER

## 2023-06-12 NOTE — PROGRESS NOTES
Subjective:       Patient ID: Yael Claire is a 82 y.o. female.    Chief Complaint: Follow-up  Last saw her in primary care on 05/22/2023  HPI  Weight loss, darkened bilateral lower extremities and continued weight loss.  Vitals:    06/12/23 1325   BP: (!) 140/86   Pulse: 80   Resp: 18   Temp: 97.6 °F (36.4 °C)     Review of Systems   Constitutional:  Positive for unexpected weight change. Negative for appetite change, fatigue and fever.   Gastrointestinal:  Negative for abdominal distention, abdominal pain, constipation, diarrhea, rectal pain and vomiting.     She weighed 211 pounds on 03/03/2023, 216 on 3/20/2023 at ED, 170 on 05/22/2023 and is 143 pounds today.Therefore, she has lost a total of 27 pounds in a month.She did have extensive aortic stenosis with fluid but I am still concerned about her continued weight loss and worsening darkening of lower extremities.   Lab Results   Component Value Date    WBC 4.73 06/13/2023    HGB 11.8 (L) 06/13/2023    HCT 37.6 06/13/2023    MCV 98 06/13/2023     06/13/2023        Cereal bar and banana with coffee for breakfast this morning  States eats what she considers to be a full meal for dinner  Advised to be sure that she increases to 3 meals and can be smaller more frequent if she cannot tolerate full meal and she verbalized understanding  Can have more meat and beans as well to help protein intake    Objective:      Physical Exam  Vitals and nursing note reviewed.   Constitutional:       General: She is awake.      Appearance: Normal appearance. She is well-developed and well-groomed.      Comments: She is using her walker today   HENT:      Head: Normocephalic and atraumatic.   Cardiovascular:      Rate and Rhythm: Normal rate and regular rhythm.   Musculoskeletal:         General: Normal range of motion.   Skin:     General: Skin is warm and dry.   Neurological:      Mental Status: She is alert.   Psychiatric:         Attention and Perception: Attention and  perception normal.         Mood and Affect: Mood and affect normal.         Speech: Speech normal.         Behavior: Behavior is uncooperative.         Thought Content: Thought content normal.         Cognition and Memory: Cognition and memory normal.         Judgment: Judgment normal.       Assessment & Plan:       Essential hypertension- Stable and she did not take her medication today before her visit   Instructed to be sure to take BLOOD PRESSURE med's  before her appts even if she does not eat     Weight loss  -     CBC Auto Differential; Future; Expected date: 06/12/2023  -     Comprehensive Metabolic Panel; Future; Expected date: 06/12/2023  Will consider the need for further work up but currently she is recovering from TAVR procedure and  Really need to remain on Plavix and will continue follow up and any recommendation per cardiology    Mixed hyperlipidemia    Other skin changes  No changes to medications today  Advised to take BLOOD PRESSURE med's before each visit  She will go to ED if any severe weakness or SOB  Message sent to cardiology team via secure chat and she will be seeing that department this week        Medication List with Changes/Refills   Current Medications    ALUMINUM & MAGNESIUM HYDROXIDE-SIMETHICONE (MYLANTA MAXIMUM STRENGTH) 400-400-40 MG/5 ML SUSPENSION    Take 10 mLs by mouth every 6 (six) hours as needed for Indigestion.    ASPIRIN (ECOTRIN) 81 MG EC TABLET    Take 81 mg by mouth once daily.    CARVEDILOL (COREG) 3.125 MG TABLET    Take 1 tablet (3.125 mg total) by mouth 2 (two) times daily with meals.    CLOPIDOGREL (PLAVIX) 75 MG TABLET    Take 1 tablet (75 mg total) by mouth once daily.    CO-ENZYME Q-10 30 MG CAPSULE    Take 30 mg by mouth once daily.     DAPAGLIFLOZIN (FARXIGA) 10 MG TABLET    Take 10 mg by mouth once daily. PT STATES SHE HARDLY TAKES IT AND WAS UNSURE OF MG    DICLOFENAC SODIUM (VOLTAREN) 1 % GEL    Apply 2 g topically 4 (four) times daily.    EVOLOCUMAB  (REPATHA SURECLICK) 140 MG/ML PNIJ    Inject 1 mL (140 mg total) into the skin every 14 (fourteen) days.    FAMOTIDINE (PEPCID) 20 MG TABLET    TAKE 1 TABLET TWICE DAILY    FISH OIL-OMEGA-3 FATTY ACIDS 300-1,000 MG CAPSULE    Take 2 g by mouth once daily.    FOLIC ACID/MULTIVIT-MIN/LUTEIN (CENTRUM SILVER ORAL)    Take 1 tablet by mouth once daily. ONE DAILY    HYDROCODONE-ACETAMINOPHEN (NORCO)  MG PER TABLET    Take 1 tablet by mouth 2 (two) times daily as needed for Pain.    ROSUVASTATIN (CRESTOR) 20 MG TABLET    Take 1 tablet (20 mg total) by mouth every evening.    SPIRONOLACTONE (ALDACTONE) 25 MG TABLET    TAKE 1 TABLET(25 MG) BY MOUTH EVERY DAY   Changed and/or Refilled Medications    Modified Medication Previous Medication    FUROSEMIDE (LASIX) 40 MG TABLET furosemide (LASIX) 40 MG tablet       Take 0.5 tablets (20 mg total) by mouth once daily.    Take 1 tablet (40 mg total) by mouth every other day. Alternate with 80mg dose      Follow up in about 2 weeks (around 6/26/2023).

## 2023-06-12 NOTE — PATIENT INSTRUCTIONS
Try a small snack between meals like fruit or half sandwich  Have labs tomorrow if none are drawn by cardiology

## 2023-06-16 PROBLEM — E87.6 HYPOKALEMIA: Status: RESOLVED | Noted: 2022-11-16 | Resolved: 2023-06-16

## 2023-06-16 PROBLEM — E66.01 SEVERE OBESITY (BMI 35.0-35.9 WITH COMORBIDITY): Status: RESOLVED | Noted: 2023-04-10 | Resolved: 2023-06-16

## 2023-06-26 ENCOUNTER — OFFICE VISIT (OUTPATIENT)
Dept: PRIMARY CARE CLINIC | Facility: CLINIC | Age: 83
End: 2023-06-26
Payer: MEDICARE

## 2023-06-26 VITALS
SYSTOLIC BLOOD PRESSURE: 124 MMHG | DIASTOLIC BLOOD PRESSURE: 76 MMHG | OXYGEN SATURATION: 98 % | TEMPERATURE: 98 F | WEIGHT: 139.25 LBS | RESPIRATION RATE: 18 BRPM | HEART RATE: 81 BPM | BODY MASS INDEX: 23.17 KG/M2

## 2023-06-26 DIAGNOSIS — R63.0 POOR APPETITE: ICD-10-CM

## 2023-06-26 DIAGNOSIS — R63.4 WEIGHT LOSS: Primary | ICD-10-CM

## 2023-06-26 PROCEDURE — 3078F PR MOST RECENT DIASTOLIC BLOOD PRESSURE < 80 MM HG: ICD-10-PCS | Mod: CPTII,S$GLB,, | Performed by: NURSE PRACTITIONER

## 2023-06-26 PROCEDURE — 1159F PR MEDICATION LIST DOCUMENTED IN MEDICAL RECORD: ICD-10-PCS | Mod: CPTII,S$GLB,, | Performed by: NURSE PRACTITIONER

## 2023-06-26 PROCEDURE — 1101F PR PT FALLS ASSESS DOC 0-1 FALLS W/OUT INJ PAST YR: ICD-10-PCS | Mod: CPTII,S$GLB,, | Performed by: NURSE PRACTITIONER

## 2023-06-26 PROCEDURE — 3288F PR FALLS RISK ASSESSMENT DOCUMENTED: ICD-10-PCS | Mod: CPTII,S$GLB,, | Performed by: NURSE PRACTITIONER

## 2023-06-26 PROCEDURE — 3074F SYST BP LT 130 MM HG: CPT | Mod: CPTII,S$GLB,, | Performed by: NURSE PRACTITIONER

## 2023-06-26 PROCEDURE — 99213 OFFICE O/P EST LOW 20 MIN: CPT | Mod: S$GLB,,, | Performed by: NURSE PRACTITIONER

## 2023-06-26 PROCEDURE — 3074F PR MOST RECENT SYSTOLIC BLOOD PRESSURE < 130 MM HG: ICD-10-PCS | Mod: CPTII,S$GLB,, | Performed by: NURSE PRACTITIONER

## 2023-06-26 PROCEDURE — 1160F PR REVIEW ALL MEDS BY PRESCRIBER/CLIN PHARMACIST DOCUMENTED: ICD-10-PCS | Mod: CPTII,S$GLB,, | Performed by: NURSE PRACTITIONER

## 2023-06-26 PROCEDURE — 1101F PT FALLS ASSESS-DOCD LE1/YR: CPT | Mod: CPTII,S$GLB,, | Performed by: NURSE PRACTITIONER

## 2023-06-26 PROCEDURE — 1159F MED LIST DOCD IN RCRD: CPT | Mod: CPTII,S$GLB,, | Performed by: NURSE PRACTITIONER

## 2023-06-26 PROCEDURE — 3078F DIAST BP <80 MM HG: CPT | Mod: CPTII,S$GLB,, | Performed by: NURSE PRACTITIONER

## 2023-06-26 PROCEDURE — 3288F FALL RISK ASSESSMENT DOCD: CPT | Mod: CPTII,S$GLB,, | Performed by: NURSE PRACTITIONER

## 2023-06-26 PROCEDURE — 99213 PR OFFICE/OUTPT VISIT, EST, LEVL III, 20-29 MIN: ICD-10-PCS | Mod: S$GLB,,, | Performed by: NURSE PRACTITIONER

## 2023-06-26 PROCEDURE — 1126F AMNT PAIN NOTED NONE PRSNT: CPT | Mod: CPTII,S$GLB,, | Performed by: NURSE PRACTITIONER

## 2023-06-26 PROCEDURE — 1160F RVW MEDS BY RX/DR IN RCRD: CPT | Mod: CPTII,S$GLB,, | Performed by: NURSE PRACTITIONER

## 2023-06-26 PROCEDURE — 1126F PR PAIN SEVERITY QUANTIFIED, NO PAIN PRESENT: ICD-10-PCS | Mod: CPTII,S$GLB,, | Performed by: NURSE PRACTITIONER

## 2023-06-26 RX ORDER — MEGESTROL ACETATE 40 MG/1
40 TABLET ORAL DAILY
Qty: 30 TABLET | Refills: 1 | Status: SHIPPED | OUTPATIENT
Start: 2023-06-26 | End: 2023-12-11

## 2023-06-26 NOTE — PROGRESS NOTES
"Subjective:       Patient ID: Yael Claire is a 82 y.o. female.    Chief Complaint: Follow-up weight loss  She was las seen in primary care by me on 06/12/2023  HPI  States following up with weight and states she feels better than at her last visit and does not feel as weak as per her last visit and feels can walk much better with just straight walker.  Vitals:    06/26/23 1026   BP: 124/76   Pulse: 81   Resp: 18   Temp: 97.6 °F (36.4 °C)     Wt Readings from Last 3 Encounters:   06/26/23 1026 63.2 kg (139 lb 3.5 oz)   06/13/23 1332 65.7 kg (144 lb 12.8 oz)   06/12/23 1350 65 kg (143 lb 4.8 oz)   06/12/23 1325 58.1 kg (128 lb 1.4 oz)      Review of Systems   Respiratory:  Negative for shortness of breath.    Cardiovascular:  Negative for chest pain and leg swelling.     She has had 5 pounds weight loss since her last visit  She is on Plavix and unable to get a colonoscopy  She states eats toast and eggs for breakfast at about 11 am or sometimes she may eat honey nut cereal  Next meal 4:30-5 and states not snacking as much as she normally would.   States her appetite is "not what it use to be"  States her left hand was injured about 3 weeks ago, she hit on door and is a little tender  States her feet feel better and may be getting color back in past week  States not as SOB with walking to car and when getting into bed she can step up better to get into it  Objective:      Physical Exam  Vitals and nursing note reviewed.   Constitutional:       General: She is awake.      Appearance: Normal appearance. She is well-developed and well-groomed.   HENT:      Head: Normocephalic and atraumatic.   Eyes:      General: Lids are normal.   Cardiovascular:      Rate and Rhythm: Normal rate and regular rhythm.      Comments: Loud heart sounds consistent with her history of aortic valve disease and has had a TAVR  Pulmonary:      Effort: Pulmonary effort is normal.      Breath sounds: Normal breath sounds and air entry. "   Musculoskeletal:      Cervical back: Full passive range of motion without pain, normal range of motion and neck supple.      Right lower leg: No edema.      Left lower leg: No edema.   Lymphadenopathy:      Head:      Right side of head: No submental, submandibular, tonsillar, preauricular, posterior auricular or occipital adenopathy.      Left side of head: No submental, submandibular, tonsillar, preauricular, posterior auricular or occipital adenopathy.   Skin:     General: Skin is warm and dry.      Findings: No rash.      Comments: She states that her darkened skin changes to lower legs and feet appear to be slightly improving   Neurological:      Mental Status: She is alert.   Psychiatric:         Attention and Perception: Attention and perception normal.         Mood and Affect: Mood and affect normal.         Speech: Speech normal.         Behavior: Behavior normal. Behavior is cooperative.         Thought Content: Thought content normal.         Cognition and Memory: Cognition and memory normal.         Judgment: Judgment normal.       Assessment & Plan:       Weight loss  -     Comprehensive Metabolic Panel; Future; Expected date: 06/26/2023  -     megestroL (MEGACE) 40 MG Tab; Take 1 tablet (40 mg total) by mouth once daily.  Dispense: 30 tablet; Refill: 1    Poor appetite  -     megestroL (MEGACE) 40 MG Tab; Take 1 tablet (40 mg total) by mouth once daily.  Dispense: 30 tablet; Refill: 1        Discussed in detail that if her weight does not stop going down we need to consider GI consult at her next visit and she verbalized understanding  I have discussed with her in detail my concern of continued weight loss even though she did have a great deal of fluid prior.   Protein shake 2-3 times daily  Increase protein with peanut butter, beans, chicken  Eat something every 2-3 hours   Medication List with Changes/Refills   New Medications    MEGESTROL (MEGACE) 40 MG TAB    Take 1 tablet (40 mg total) by mouth  once daily.   Current Medications    ALUMINUM & MAGNESIUM HYDROXIDE-SIMETHICONE (MYLANTA MAXIMUM STRENGTH) 400-400-40 MG/5 ML SUSPENSION    Take 10 mLs by mouth every 6 (six) hours as needed for Indigestion.    ASPIRIN (ECOTRIN) 81 MG EC TABLET    Take 81 mg by mouth once daily.    CARVEDILOL (COREG) 3.125 MG TABLET    Take 1 tablet (3.125 mg total) by mouth 2 (two) times daily with meals.    CLOPIDOGREL (PLAVIX) 75 MG TABLET    Take 1 tablet (75 mg total) by mouth once daily.    CO-ENZYME Q-10 30 MG CAPSULE    Take 30 mg by mouth once daily.     DAPAGLIFLOZIN (FARXIGA) 10 MG TABLET    Take 10 mg by mouth once daily. PT STATES SHE HARDLY TAKES IT AND WAS UNSURE OF MG    DICLOFENAC SODIUM (VOLTAREN) 1 % GEL    Apply 2 g topically 4 (four) times daily.    EVOLOCUMAB (REPATHA SURECLICK) 140 MG/ML PNIJ    Inject 1 mL (140 mg total) into the skin every 14 (fourteen) days.    FAMOTIDINE (PEPCID) 20 MG TABLET    TAKE 1 TABLET TWICE DAILY    FISH OIL-OMEGA-3 FATTY ACIDS 300-1,000 MG CAPSULE    Take 2 g by mouth once daily.    FOLIC ACID/MULTIVIT-MIN/LUTEIN (CENTRUM SILVER ORAL)    Take 1 tablet by mouth once daily. ONE DAILY    FUROSEMIDE (LASIX) 40 MG TABLET    Take 0.5 tablets (20 mg total) by mouth once daily.    HYDROCODONE-ACETAMINOPHEN (NORCO)  MG PER TABLET    Take 1 tablet by mouth 2 (two) times daily as needed for Pain.    ROSUVASTATIN (CRESTOR) 20 MG TABLET    Take 1 tablet (20 mg total) by mouth every evening.    SPIRONOLACTONE (ALDACTONE) 25 MG TABLET    TAKE 1 TABLET(25 MG) BY MOUTH EVERY DAY      Follow up in about 4 weeks (around 7/24/2023).

## 2023-06-26 NOTE — PATIENT INSTRUCTIONS
Protein shake 2-3 times daily  Increase protein with peanut butter, beans, chicken  Eat something every 2-3 hours

## 2023-06-28 ENCOUNTER — CLINICAL SUPPORT (OUTPATIENT)
Dept: CARDIOLOGY | Facility: HOSPITAL | Age: 83
End: 2023-06-28
Payer: MEDICARE

## 2023-06-28 DIAGNOSIS — Z95.0 PRESENCE OF CARDIAC PACEMAKER: ICD-10-CM

## 2023-06-28 PROCEDURE — 93294 CARDIAC DEVICE CHECK - REMOTE: ICD-10-PCS | Mod: ,,, | Performed by: INTERNAL MEDICINE

## 2023-06-28 PROCEDURE — 93294 REM INTERROG EVL PM/LDLS PM: CPT | Mod: ,,, | Performed by: INTERNAL MEDICINE

## 2023-06-28 PROCEDURE — 93296 REM INTERROG EVL PM/IDS: CPT | Mod: PO | Performed by: INTERNAL MEDICINE

## 2023-07-06 DIAGNOSIS — E78.2 MIXED HYPERLIPIDEMIA: Chronic | ICD-10-CM

## 2023-07-07 RX ORDER — ROSUVASTATIN CALCIUM 20 MG/1
TABLET, COATED ORAL
Qty: 90 TABLET | Refills: 1 | Status: SHIPPED | OUTPATIENT
Start: 2023-07-07 | End: 2024-02-07

## 2023-07-07 NOTE — TELEPHONE ENCOUNTER
Refill Routing Note   Medication(s) are not appropriate for processing by [facility] for the following reason(s):      Non-participating provider    [facility] action(s):  Route None identified            Appointments  past 12m or future 3m with PCP    Date Provider   Last Visit   6/26/2023 Madelin De La Vega DNP, APRN   Next Visit   7/24/2023 Madelin De La Vega DNP, APRN   ED visits in past 90 days: 0        Note composed:2:01 AM 07/07/2023

## 2023-07-10 ENCOUNTER — DOCUMENT SCAN (OUTPATIENT)
Dept: HOME HEALTH SERVICES | Facility: HOSPITAL | Age: 83
End: 2023-07-10
Payer: MEDICARE

## 2023-07-11 ENCOUNTER — CLINICAL SUPPORT (OUTPATIENT)
Dept: CARDIOLOGY | Facility: HOSPITAL | Age: 83
End: 2023-07-11
Attending: INTERNAL MEDICINE
Payer: MEDICARE

## 2023-07-11 ENCOUNTER — SPECIALTY PHARMACY (OUTPATIENT)
Dept: PHARMACY | Facility: CLINIC | Age: 83
End: 2023-07-11
Payer: MEDICARE

## 2023-07-11 DIAGNOSIS — Z95.0 CARDIAC PACEMAKER IN SITU: ICD-10-CM

## 2023-07-11 DIAGNOSIS — I50.43 ACUTE ON CHRONIC COMBINED SYSTOLIC AND DIASTOLIC HEART FAILURE: ICD-10-CM

## 2023-07-11 PROCEDURE — 93280 PM DEVICE PROGR EVAL DUAL: CPT | Mod: 26,,, | Performed by: INTERNAL MEDICINE

## 2023-07-11 PROCEDURE — 93280 CARDIAC DEVICE CHECK - IN CLINIC & HOSPITAL: ICD-10-PCS | Mod: 26,,, | Performed by: INTERNAL MEDICINE

## 2023-07-11 NOTE — TELEPHONE ENCOUNTER
Specialty Pharmacy - Refill Coordination    Specialty Medication Orders Linked to Encounter      Flowsheet Row Most Recent Value   Medication #1 evolocumab (REPATHA SURECLICK) 140 mg/mL PnIj (Order#[id number], Rx#[id number])            Refill Questions - Documented Responses      Flowsheet Row Most Recent Value   Refill Screening Questions    Would patient like to speak to a pharmacist? No   When does the patient need to receive the medication? 07/20/23   Refill Delivery Questions    How will the patient receive the medication? [facility]   When does the patient need to receive the medication? 07/20/23   Shipping Address Home   Address in [facility] confirmed and updated if neccessary? Yes   Expected Copay ($) 0   Is the patient able to afford the medication copay? Yes   Payment Method zero copay   Days supply of Refill 28   Supplies needed? No supplies needed   Refill activity completed? Yes   Refill activity plan Refill scheduled   Shipment/Pickup Date: 07/17/23            Current Outpatient Medications   Medication Sig    aluminum & magnesium hydroxide-simethicone (MYLANTA MAXIMUM STRENGTH) 400-400-40 mg/5 mL suspension Take 10 mLs by mouth every 6 (six) hours as needed for Indigestion.    aspirin (ECOTRIN) 81 MG EC tablet Take 81 mg by mouth once daily.    carvediloL (COREG) 3.125 MG tablet Take 1 tablet (3.125 mg total) by mouth 2 (two) times daily with meals.    clopidogreL (PLAVIX) 75 mg tablet Take 1 tablet (75 mg total) by mouth once daily.    co-enzyme Q-10 30 mg capsule Take 30 mg by mouth once daily.     dapagliflozin (FARXIGA) 10 mg tablet Take 10 mg by mouth once daily. PT STATES SHE HARDLY TAKES IT AND WAS UNSURE OF MG    diclofenac sodium (VOLTAREN) 1 % Gel Apply 2 g topically 4 (four) times daily.    evolocumab (REPATHA SURECLICK) 140 mg/mL PnIj Inject 1 mL (140 mg total) into the skin every 14 (fourteen) days.    famotidine (PEPCID) 20 MG tablet TAKE 1 TABLET TWICE DAILY    fish oil-omega-3 fatty  acids 300-1,000 mg capsule Take 2 g by mouth once daily.    FOLIC ACID/MULTIVIT-MIN/LUTEIN (CENTRUM SILVER ORAL) Take 1 tablet by mouth once daily. ONE DAILY    furosemide (LASIX) 40 MG tablet Take 0.5 tablets (20 mg total) by mouth once daily.    HYDROcodone-acetaminophen (NORCO)  mg per tablet Take 1 tablet by mouth 2 (two) times daily as needed for Pain.    megestroL (MEGACE) 40 MG Tab Take 1 tablet (40 mg total) by mouth once daily.    rosuvastatin (CRESTOR) 20 MG tablet TAKE 1 TABLET EVERY EVENING    spironolactone (ALDACTONE) 25 MG tablet TAKE 1 TABLET(25 MG) BY MOUTH EVERY DAY   Last reviewed on 6/26/2023 11:06 AM by Madelin De La Vega, NABILA, APRN    Review of patient's allergies indicates:   Allergen Reactions    Pcn [penicillins] Other (See Comments)     SOB    Clindamycin Diarrhea    Darvocet a500 [propoxyphene n-acetaminophen]     Erythromycin Other (See Comments)    Mycinette [cetylpyridinium-benzocaine]     Last reviewed on  6/26/2023 10:27 AM by Linnette Bradford      Tasks added this encounter   No tasks added.   Tasks due within next 3 months   7/14/2023 - Refill Coordination Outreach (1 time occurrence)     [facility]  [address] [place] [id number]  Phone: [phone]  Fax: [phone]

## 2023-07-12 ENCOUNTER — OFFICE VISIT (OUTPATIENT)
Dept: CARDIOLOGY | Facility: CLINIC | Age: 83
End: 2023-07-12
Payer: MEDICARE

## 2023-07-12 VITALS
HEIGHT: 65 IN | DIASTOLIC BLOOD PRESSURE: 77 MMHG | SYSTOLIC BLOOD PRESSURE: 136 MMHG | HEART RATE: 86 BPM | BODY MASS INDEX: 23.62 KG/M2 | WEIGHT: 141.75 LBS

## 2023-07-12 DIAGNOSIS — I77.9 MILD CAROTID ARTERY DISEASE: Chronic | ICD-10-CM

## 2023-07-12 DIAGNOSIS — I25.709 CORONARY ARTERY DISEASE INVOLVING CORONARY BYPASS GRAFT OF NATIVE HEART WITH ANGINA PECTORIS: Chronic | ICD-10-CM

## 2023-07-12 DIAGNOSIS — I27.20 PULMONARY HYPERTENSION: Chronic | ICD-10-CM

## 2023-07-12 DIAGNOSIS — I10 ESSENTIAL HYPERTENSION: Chronic | ICD-10-CM

## 2023-07-12 DIAGNOSIS — I50.32 CHRONIC DIASTOLIC HEART FAILURE: Primary | Chronic | ICD-10-CM

## 2023-07-12 DIAGNOSIS — Z79.02 LONG TERM (CURRENT) USE OF ANTITHROMBOTICS/ANTIPLATELETS: Chronic | ICD-10-CM

## 2023-07-12 DIAGNOSIS — Z95.2 S/P AVR (AORTIC VALVE REPLACEMENT): Chronic | ICD-10-CM

## 2023-07-12 PROCEDURE — 3075F PR MOST RECENT SYSTOLIC BLOOD PRESS GE 130-139MM HG: ICD-10-PCS | Mod: CPTII,S$GLB,, | Performed by: INTERNAL MEDICINE

## 2023-07-12 PROCEDURE — 99214 OFFICE O/P EST MOD 30 MIN: CPT | Mod: S$GLB,,, | Performed by: INTERNAL MEDICINE

## 2023-07-12 PROCEDURE — 1101F PR PT FALLS ASSESS DOC 0-1 FALLS W/OUT INJ PAST YR: ICD-10-PCS | Mod: CPTII,S$GLB,, | Performed by: INTERNAL MEDICINE

## 2023-07-12 PROCEDURE — 3288F PR FALLS RISK ASSESSMENT DOCUMENTED: ICD-10-PCS | Mod: CPTII,S$GLB,, | Performed by: INTERNAL MEDICINE

## 2023-07-12 PROCEDURE — 3288F FALL RISK ASSESSMENT DOCD: CPT | Mod: CPTII,S$GLB,, | Performed by: INTERNAL MEDICINE

## 2023-07-12 PROCEDURE — 99214 PR OFFICE/OUTPT VISIT, EST, LEVL IV, 30-39 MIN: ICD-10-PCS | Mod: S$GLB,,, | Performed by: INTERNAL MEDICINE

## 2023-07-12 PROCEDURE — 3075F SYST BP GE 130 - 139MM HG: CPT | Mod: CPTII,S$GLB,, | Performed by: INTERNAL MEDICINE

## 2023-07-12 PROCEDURE — 3078F DIAST BP <80 MM HG: CPT | Mod: CPTII,S$GLB,, | Performed by: INTERNAL MEDICINE

## 2023-07-12 PROCEDURE — 1101F PT FALLS ASSESS-DOCD LE1/YR: CPT | Mod: CPTII,S$GLB,, | Performed by: INTERNAL MEDICINE

## 2023-07-12 PROCEDURE — 1159F MED LIST DOCD IN RCRD: CPT | Mod: CPTII,S$GLB,, | Performed by: INTERNAL MEDICINE

## 2023-07-12 PROCEDURE — 3078F PR MOST RECENT DIASTOLIC BLOOD PRESSURE < 80 MM HG: ICD-10-PCS | Mod: CPTII,S$GLB,, | Performed by: INTERNAL MEDICINE

## 2023-07-12 PROCEDURE — 1159F PR MEDICATION LIST DOCUMENTED IN MEDICAL RECORD: ICD-10-PCS | Mod: CPTII,S$GLB,, | Performed by: INTERNAL MEDICINE

## 2023-07-12 PROCEDURE — 1126F AMNT PAIN NOTED NONE PRSNT: CPT | Mod: CPTII,S$GLB,, | Performed by: INTERNAL MEDICINE

## 2023-07-12 PROCEDURE — 1126F PR PAIN SEVERITY QUANTIFIED, NO PAIN PRESENT: ICD-10-PCS | Mod: CPTII,S$GLB,, | Performed by: INTERNAL MEDICINE

## 2023-07-12 RX ORDER — SPIRONOLACTONE 25 MG/1
12.5 TABLET ORAL DAILY
Qty: 45 TABLET | Refills: 0 | Status: SHIPPED | OUTPATIENT
Start: 2023-07-12 | End: 2023-07-24 | Stop reason: SDUPTHER

## 2023-07-12 RX ORDER — FUROSEMIDE 20 MG/1
20 TABLET ORAL DAILY
Qty: 90 TABLET | Refills: 0 | Status: SHIPPED | OUTPATIENT
Start: 2023-07-12 | End: 2023-10-18 | Stop reason: DRUGHIGH

## 2023-07-12 NOTE — PROGRESS NOTES
Subjective:    Patient ID:  Yael Claire is a 82 y.o. female who presents for No chief complaint on file.        HPI    STATUS POST CATHETERIZATION THEN TAVR SIGNIFICANT IMPROVEMENT IN FLUID RETENTION LOST A LOT OF WEIGHT, PROBNP DOWN TO 2780 FROM MORE THAN 11,000,SOME FATIGUE, PPM OK, SEE ROS  Past Medical History:   Diagnosis Date    Acid reflux     Acute coronary syndrome     LIGHT 2004    Acute on chronic combined systolic and diastolic heart failure 02/2023    Anticoagulant long-term use     Aortic valve disorder     nonrheumatic    Arthritis     CHF (congestive heart failure)     Coronary artery disease     cabg & valve sgy    H/O: pneumonia     Heart murmur     Hyperlipidemia     Hypertension     Lower leg edema     Mild carotid artery disease 11/17/2021    Palpitations     Stenosis of aortic and mitral valves     Valvular regurgitation      Past Surgical History:   Procedure Laterality Date    A-V CARDIAC PACEMAKER INSERTION N/A 03/29/2023    Procedure: Dual PPM (Rodriguez);  Surgeon: Too Farrell MD;  Location: [facility];  Service: Cardiology;  Laterality: N/A;    ARTERIOGRAPHY OF AORTIC ROOT N/A 02/23/2023    Procedure: ARTERIOGRAM, AORTIC ROOT;  Surgeon: Eb Ruelas MD;  Location: [facility];  Service: Cardiology;  Laterality: N/A;    CARDIAC CATHETERIZATION      CARDIAC VALVE SURGERY      CATARACT EXTRACTION W/  INTRAOCULAR LENS IMPLANT Bilateral     COLONOSCOPY  ~2019    normal findings per patient report    CORONARY ANGIOGRAPHY INCLUDING BYPASS GRAFTS WITH CATHETERIZATION OF LEFT HEART N/A 02/23/2023    Procedure: Right/Left heart Cath w/Grafts;  Surgeon: Eb Ruelas MD;  Location: [facility];  Service: Cardiology;  Laterality: N/A;    CORONARY ARTERY BYPASS GRAFT  04/2005    HYSTERECTOMY  1980    INSERTION, PACEMAKER, TEMPORARY TRANSVENOUS  4/26/2023    Procedure: Insertion, Pacemaker, Temporary Transvenous;  Surgeon: Ryan Castro MD;  Location: [facility];  Service: Cardiology;;     PERCUTANEOUS TRANSLUMINAL ANGIOPLASTY N/A 4/26/2023    Procedure: PTA (ANGIOPLASTY, PERCUTANEOUS, TRANSLUMINAL);  Surgeon: Ryan Castro MD;  Location: [facility];  Service: Cardiology;  Laterality: N/A;    RIGHT HEART CATHETERIZATION N/A 02/23/2023    Procedure: INSERTION, CATHETER, RIGHT HEART;  Surgeon: Eb Ruelas MD;  Location: [facility];  Service: Cardiology;  Laterality: N/A;    TRANSCATHETER AORTIC VALVE REPLACEMENT (TAVR) Bilateral 4/26/2023    Procedure: (TAVR);  Surgeon: Ryan Castro MD;  Location: [facility];  Service: Cardiology;  Laterality: Bilateral;    TRANSCATHETER AORTIC VALVE REPLACEMENT (TAVR) Bilateral 4/26/2023    Procedure: REPLACEMENT, AORTIC VALVE, TRANSCATHETER (TAVR);  Surgeon: Jarett Mckee MD;  Location: [facility];  Service: Cardiology;  Laterality: Bilateral;    UPPER GASTROINTESTINAL ENDOSCOPY  ~2010    normal findings     Family History   Problem Relation Age of Onset    Hypertension Mother     Hypertension Father     Heart disease Father     Colon cancer Neg Hx     Esophageal cancer Neg Hx     Stomach cancer Neg Hx      Social History     Socioeconomic History    Marital status: Single   Tobacco Use    Smoking status: Never    Smokeless tobacco: Never   Substance and Sexual Activity    Alcohol use: Not Currently     Comment: couple times per yr    Drug use: No       Review of patient's allergies indicates:   Allergen Reactions    Pcn [penicillins] Other (See Comments)     SOB    Clindamycin Diarrhea    Darvocet a500 [propoxyphene n-acetaminophen]     Erythromycin Other (See Comments)    Mycinette [cetylpyridinium-benzocaine]        Current Outpatient Medications:     aspirin (ECOTRIN) 81 MG EC tablet, Take 81 mg by mouth once daily., Disp: , Rfl:     carvediloL (COREG) 3.125 MG tablet, Take 1 tablet (3.125 mg total) by mouth 2 (two) times daily with meals., Disp: 60 tablet, Rfl: 11    clopidogreL (PLAVIX) 75 mg tablet, Take 1 tablet (75 mg total) by mouth once daily., Disp:  90 tablet, Rfl: 0    co-enzyme Q-10 30 mg capsule, Take 30 mg by mouth once daily. , Disp: , Rfl:     dapagliflozin (FARXIGA) 10 mg tablet, Take 10 mg by mouth once daily. PT STATES SHE HARDLY TAKES IT AND WAS UNSURE OF MG, Disp: , Rfl:     diclofenac sodium (VOLTAREN) 1 % Gel, Apply 2 g topically 4 (four) times daily., Disp: , Rfl:     evolocumab (REPATHA SURECLICK) 140 mg/mL PnIj, Inject 1 mL (140 mg total) into the skin every 14 (fourteen) days., Disp: 2 mL, Rfl: 6    famotidine (PEPCID) 20 MG tablet, TAKE 1 TABLET TWICE DAILY, Disp: 180 tablet, Rfl: 0    fish oil-omega-3 fatty acids 300-1,000 mg capsule, Take 2 g by mouth once daily., Disp: , Rfl:     FOLIC ACID/MULTIVIT-MIN/LUTEIN (CENTRUM SILVER ORAL), Take 1 tablet by mouth once daily. ONE DAILY, Disp: , Rfl:     HYDROcodone-acetaminophen (NORCO)  mg per tablet, Take 1 tablet by mouth 2 (two) times daily as needed for Pain., Disp: , Rfl: 0    megestroL (MEGACE) 40 MG Tab, Take 1 tablet (40 mg total) by mouth once daily., Disp: 30 tablet, Rfl: 1    rosuvastatin (CRESTOR) 20 MG tablet, TAKE 1 TABLET EVERY EVENING, Disp: 90 tablet, Rfl: 1    aluminum & magnesium hydroxide-simethicone (MYLANTA MAXIMUM STRENGTH) 400-400-40 mg/5 mL suspension, Take 10 mLs by mouth every 6 (six) hours as needed for Indigestion., Disp: 3840 mL, Rfl: 0    furosemide (LASIX) 20 MG tablet, Take 1 tablet (20 mg total) by mouth once daily., Disp: 90 tablet, Rfl: 0    spironolactone (ALDACTONE) 25 MG tablet, Take 0.5 tablets (12.5 mg total) by mouth once daily., Disp: 45 tablet, Rfl: 0  No current facility-administered medications for this visit.    Facility-Administered Medications Ordered in Other Visits:     chlorhexidine 0.12 % solution 15 mL, 15 mL, Mouth/Throat, Once, Too Farrell MD    mupirocin 2 % ointment, , Nasal, Once, Too Farrell MD    Review of Systems   Constitutional: Positive for malaise/fatigue and weight loss. Negative for decreased appetite  "and fever.   HENT:  Negative for congestion and sore throat.    Eyes:  Negative for blurred vision, photophobia, vision loss in left eye and vision loss in right eye.   Cardiovascular:  Negative for chest pain, claudication, cyanosis, dyspnea on exertion, irregular heartbeat, leg swelling, near-syncope, orthopnea, palpitations and paroxysmal nocturnal dyspnea.   Respiratory:  Negative for cough, shortness of breath, sleep disturbances due to breathing and wheezing.    Endocrine: Negative.    Skin: Negative.  Negative for nail changes and poor wound healing.   Musculoskeletal: Negative.  Negative for falls, joint pain, muscle cramps, muscle weakness, myalgias, neck pain and stiffness.   Gastrointestinal:  Negative for abdominal pain, change in bowel habit, dysphagia, melena and nausea.   Genitourinary:  Negative for dysuria and flank pain.   Neurological: Negative.  Negative for brief paralysis, dizziness, focal weakness, light-headedness, paresthesias, tremors and weakness.   Psychiatric/Behavioral: Negative.  Negative for altered mental status.    Allergic/Immunologic: Negative for persistent infections.      Objective:      Vitals:    07/12/23 1406   BP: 136/77   Pulse: 86   Weight: 64.3 kg (141 lb 12.1 oz)   Height: 5' 5" (1.651 m)   PainSc: 0-No pain     Body mass index is 23.59 kg/m².    Physical Exam  Constitutional:       Appearance: Normal appearance.   HENT:      Head: Normocephalic and atraumatic.   Eyes:      Extraocular Movements: Extraocular movements intact.      Pupils: Pupils are equal, round, and reactive to light.   Neck:      Vascular: No carotid bruit or JVD.   Cardiovascular:      Rate and Rhythm: Normal rate and regular rhythm.      Heart sounds: Murmur heard.     No friction rub. No gallop.   Pulmonary:      Effort: Pulmonary effort is normal.      Breath sounds: Normal breath sounds.   Abdominal:      Palpations: Abdomen is soft.      Tenderness: There is no abdominal tenderness. "   Musculoskeletal:      Cervical back: Neck supple.      Right lower leg: No edema (TRACE).      Left lower leg: No edema.   Skin:     General: Skin is warm and dry.      Capillary Refill: Capillary refill takes less than 2 seconds.   Neurological:      General: No focal deficit present.      Mental Status: She is alert and oriented to person, place, and time.   Psychiatric:         Mood and Affect: Mood normal.         Behavior: Behavior normal.               ..    Chemistry        Component Value Date/Time     (L) 06/13/2023 1303    K 4.5 06/13/2023 1303     06/13/2023 1303    CO2 22 06/13/2023 1303    BUN 17 06/13/2023 1303    CREATININE 0.61 06/13/2023 1303    GLU 79 06/13/2023 1303        Component Value Date/Time    CALCIUM 9.1 06/13/2023 1303    ALKPHOS 179 (H) 06/13/2023 1303    AST 48 (H) 06/13/2023 1303    ALT 29 06/13/2023 1303    BILITOT 0.7 06/13/2023 1303    ESTGFRAFRICA >60 04/06/2022 2216    EGFRNONAA >60 04/06/2022 2216            ..  Lab Results   Component Value Date    CHOL 99 (L) 01/10/2023    CHOL 159 04/14/2022    CHOL 157 06/10/2021     Lab Results   Component Value Date    HDL 37 (L) 01/10/2023    HDL 50 04/14/2022    HDL 51 06/10/2021     Lab Results   Component Value Date    LDLCALC 50.4 (L) 01/10/2023    LDLCALC 94.2 04/14/2022    LDLCALC 95.6 06/10/2021     Lab Results   Component Value Date    TRIG 58 01/10/2023    TRIG 74 04/14/2022    TRIG 52 06/10/2021     Lab Results   Component Value Date    CHOLHDL 37.4 01/10/2023    CHOLHDL 31.4 04/14/2022    CHOLHDL 32.5 06/10/2021     ..  Lab Results   Component Value Date    WBC 4.73 06/13/2023    HGB 11.8 (L) 06/13/2023    HCT 37.6 06/13/2023    MCV 98 06/13/2023     06/13/2023       Test(s) Reviewed  I have reviewed the following in detail:  [] Stress test   [x] Angiography   [] Echocardiogram   [x] Labs   [x] Other:       Assessment:         ICD-10-CM ICD-9-CM   1. Chronic diastolic heart failure  I50.32 428.32   2.  Coronary artery disease involving coronary bypass graft of native heart with angina pectoris  I25.709 414.05     413.9   3. Long term (current) use of antithrombotics/antiplatelets  Z79.02 V58.63   4. S/P AVR (aortic valve replacement)  Z95.2 V43.3   5. Essential hypertension  I10 401.9   6. Mild carotid artery disease  I77.9 447.9   7. Pulmonary hypertension  I27.20 416.8     Problem List Items Addressed This Visit          Pulmonary    Pulmonary hypertension       Cardiac/Vascular    Essential hypertension    Relevant Orders    Comprehensive Metabolic Panel    Coronary artery disease involving coronary bypass graft of native heart with angina pectoris    Relevant Orders    Comprehensive Metabolic Panel    S/P AVR (aortic valve replacement)    Mild carotid artery disease    Chronic diastolic heart failure - Primary    Relevant Orders    Comprehensive Metabolic Panel       Hematology    Long term (current) use of antithrombotics/antiplatelets        Plan:     DECREASE LASIX TO 20, SPIRONOLACTONE TO 12.5, DAILY WEIGHT, 2 LB PER DAY 5 LB PER WEEK RULE EXPLAINED, THE PATIENT HAS LOST SIGNIFICANT AMOUNT OF WEIGHT FLUIDS POST TAVR, NOW CLASS 1-2 HEART FAILURE CLASS 1 ANGINA NO ARRHYTHMIA DIET STAY ACTIVE EXERCISE, RETURN TO CLINIC IN 4 MO WITH LABS      Chronic diastolic heart failure  Comments:  IMPROVED  Orders:  -     Comprehensive Metabolic Panel; Future; Expected date: 10/12/2023    Coronary artery disease involving coronary bypass graft of native heart with angina pectoris  -     Comprehensive Metabolic Panel; Future; Expected date: 10/12/2023    Long term (current) use of antithrombotics/antiplatelets    S/P AVR (aortic valve replacement)    Essential hypertension  Comments:  CONTROLLED  Orders:  -     Comprehensive Metabolic Panel; Future; Expected date: 10/12/2023    Mild carotid artery disease    Pulmonary hypertension    Other orders  -     furosemide (LASIX) 20 MG tablet; Take 1 tablet (20 mg total) by mouth  once daily.  Dispense: 90 tablet; Refill: 0  -     spironolactone (ALDACTONE) 25 MG tablet; Take 0.5 tablets (12.5 mg total) by mouth once daily.  Dispense: 45 tablet; Refill: 0    RTC Low level/low impact aerobic exercise 5x's/wk. Heart healthy diet and risk factor modification.    See labs and med orders.    Aerobic exercise 5x's/wk. Heart healthy diet and risk factor modification.    See labs and med orders.

## 2023-07-20 ENCOUNTER — LAB VISIT (OUTPATIENT)
Dept: PRIMARY CARE CLINIC | Facility: CLINIC | Age: 83
End: 2023-07-20
Payer: MEDICARE

## 2023-07-20 DIAGNOSIS — R63.4 WEIGHT LOSS: ICD-10-CM

## 2023-07-20 LAB
ALBUMIN SERPL BCP-MCNC: 3.5 G/DL (ref 3.5–5.2)
ALP SERPL-CCNC: 154 U/L (ref 55–135)
ALT SERPL W/O P-5'-P-CCNC: 33 U/L (ref 10–44)
ANION GAP SERPL CALC-SCNC: 8 MMOL/L (ref 8–16)
AST SERPL-CCNC: 35 U/L (ref 10–40)
BILIRUB SERPL-MCNC: 0.6 MG/DL (ref 0.1–1)
BUN SERPL-MCNC: 21 MG/DL (ref 8–23)
CALCIUM SERPL-MCNC: 9 MG/DL (ref 8.7–10.5)
CHLORIDE SERPL-SCNC: 107 MMOL/L (ref 95–110)
CO2 SERPL-SCNC: 22 MMOL/L (ref 23–29)
CREAT SERPL-MCNC: 0.8 MG/DL (ref 0.5–1.4)
EST. GFR  (NO RACE VARIABLE): >60 ML/MIN/1.73 M^2
GLUCOSE SERPL-MCNC: 99 MG/DL (ref 70–110)
POTASSIUM SERPL-SCNC: 4 MMOL/L (ref 3.5–5.1)
PROT SERPL-MCNC: 7.1 G/DL (ref 6–8.4)
SODIUM SERPL-SCNC: 137 MMOL/L (ref 136–145)

## 2023-07-20 PROCEDURE — 80053 COMPREHEN METABOLIC PANEL: CPT | Performed by: NURSE PRACTITIONER

## 2023-07-24 ENCOUNTER — OFFICE VISIT (OUTPATIENT)
Dept: PRIMARY CARE CLINIC | Facility: CLINIC | Age: 83
End: 2023-07-24
Payer: MEDICARE

## 2023-07-24 VITALS
HEART RATE: 70 BPM | WEIGHT: 144.19 LBS | TEMPERATURE: 98 F | DIASTOLIC BLOOD PRESSURE: 88 MMHG | OXYGEN SATURATION: 99 % | HEIGHT: 65 IN | SYSTOLIC BLOOD PRESSURE: 132 MMHG | BODY MASS INDEX: 24.02 KG/M2

## 2023-07-24 DIAGNOSIS — R63.4 WEIGHT LOSS: Primary | ICD-10-CM

## 2023-07-24 DIAGNOSIS — Z78.9 IMPAIRED MOBILITY AND ACTIVITIES OF DAILY LIVING: ICD-10-CM

## 2023-07-24 DIAGNOSIS — Z74.09 IMPAIRED MOBILITY AND ACTIVITIES OF DAILY LIVING: ICD-10-CM

## 2023-07-24 DIAGNOSIS — R06.02 SOB (SHORTNESS OF BREATH) ON EXERTION: ICD-10-CM

## 2023-07-24 DIAGNOSIS — I10 ESSENTIAL HYPERTENSION: ICD-10-CM

## 2023-07-24 DIAGNOSIS — I50.32 CHRONIC DIASTOLIC HEART FAILURE: ICD-10-CM

## 2023-07-24 PROCEDURE — 1125F PR PAIN SEVERITY QUANTIFIED, PAIN PRESENT: ICD-10-PCS | Mod: CPTII,S$GLB,, | Performed by: NURSE PRACTITIONER

## 2023-07-24 PROCEDURE — 1159F MED LIST DOCD IN RCRD: CPT | Mod: CPTII,S$GLB,, | Performed by: NURSE PRACTITIONER

## 2023-07-24 PROCEDURE — 99214 OFFICE O/P EST MOD 30 MIN: CPT | Mod: S$GLB,,, | Performed by: NURSE PRACTITIONER

## 2023-07-24 PROCEDURE — 3075F SYST BP GE 130 - 139MM HG: CPT | Mod: CPTII,S$GLB,, | Performed by: NURSE PRACTITIONER

## 2023-07-24 PROCEDURE — 1125F AMNT PAIN NOTED PAIN PRSNT: CPT | Mod: CPTII,S$GLB,, | Performed by: NURSE PRACTITIONER

## 2023-07-24 PROCEDURE — 3079F PR MOST RECENT DIASTOLIC BLOOD PRESSURE 80-89 MM HG: ICD-10-PCS | Mod: CPTII,S$GLB,, | Performed by: NURSE PRACTITIONER

## 2023-07-24 PROCEDURE — 3079F DIAST BP 80-89 MM HG: CPT | Mod: CPTII,S$GLB,, | Performed by: NURSE PRACTITIONER

## 2023-07-24 PROCEDURE — 3288F FALL RISK ASSESSMENT DOCD: CPT | Mod: CPTII,S$GLB,, | Performed by: NURSE PRACTITIONER

## 2023-07-24 PROCEDURE — 1159F PR MEDICATION LIST DOCUMENTED IN MEDICAL RECORD: ICD-10-PCS | Mod: CPTII,S$GLB,, | Performed by: NURSE PRACTITIONER

## 2023-07-24 PROCEDURE — 1160F PR REVIEW ALL MEDS BY PRESCRIBER/CLIN PHARMACIST DOCUMENTED: ICD-10-PCS | Mod: CPTII,S$GLB,, | Performed by: NURSE PRACTITIONER

## 2023-07-24 PROCEDURE — 1160F RVW MEDS BY RX/DR IN RCRD: CPT | Mod: CPTII,S$GLB,, | Performed by: NURSE PRACTITIONER

## 2023-07-24 PROCEDURE — 1101F PT FALLS ASSESS-DOCD LE1/YR: CPT | Mod: CPTII,S$GLB,, | Performed by: NURSE PRACTITIONER

## 2023-07-24 PROCEDURE — 3288F PR FALLS RISK ASSESSMENT DOCUMENTED: ICD-10-PCS | Mod: CPTII,S$GLB,, | Performed by: NURSE PRACTITIONER

## 2023-07-24 PROCEDURE — 1101F PR PT FALLS ASSESS DOC 0-1 FALLS W/OUT INJ PAST YR: ICD-10-PCS | Mod: CPTII,S$GLB,, | Performed by: NURSE PRACTITIONER

## 2023-07-24 PROCEDURE — 3075F PR MOST RECENT SYSTOLIC BLOOD PRESS GE 130-139MM HG: ICD-10-PCS | Mod: CPTII,S$GLB,, | Performed by: NURSE PRACTITIONER

## 2023-07-24 PROCEDURE — 99214 PR OFFICE/OUTPT VISIT, EST, LEVL IV, 30-39 MIN: ICD-10-PCS | Mod: S$GLB,,, | Performed by: NURSE PRACTITIONER

## 2023-07-24 RX ORDER — SPIRONOLACTONE 25 MG/1
12.5 TABLET ORAL DAILY
Qty: 45 TABLET | Refills: 1 | Status: SHIPPED | OUTPATIENT
Start: 2023-07-24 | End: 2024-03-11 | Stop reason: SDUPTHER

## 2023-07-24 NOTE — PROGRESS NOTES
Subjective:       Patient ID: Yael Claire is a 82 y.o. female.    Chief Complaint: Weight Loss (F/u )  Last seen in primary care by me on 06/26/2023.  HPI  Here for follow up with weight loss.  Vitals:    07/24/23 1349   BP: 132/88   Pulse: 70   Temp: 98 °F (36.7 °C)     Review of Systems    Wt Readings from Last 3 Encounters:   07/24/23 1349 65.4 kg (144 lb 2.9 oz)   07/12/23 1406 64.3 kg (141 lb 12.1 oz)   06/26/23 1026 63.2 kg (139 lb 3.5 oz)      Objective:      Physical Exam  Vitals and nursing note reviewed.   Constitutional:       General: She is awake.      Appearance: Normal appearance. She is well-developed and well-groomed.   HENT:      Head: Normocephalic and atraumatic.      Right Ear: Hearing and external ear normal.      Left Ear: Hearing and external ear normal.      Mouth/Throat:      Lips: Pink.      Mouth: Mucous membranes are moist.   Eyes:      General: Lids are normal.   Cardiovascular:      Rate and Rhythm: Normal rate and regular rhythm.      Comments: Bilateral trace edema to feet and nakles  Abdominal:      General: Abdomen is flat. Bowel sounds are increased.      Palpations: Abdomen is soft.   Musculoskeletal:         General: Normal range of motion.      Cervical back: Full passive range of motion without pain, normal range of motion and neck supple.      Right lower leg: Edema present.      Left lower leg: Edema present.   Skin:     General: Skin is warm and dry.             Comments: Ashen darkened skin improving to lower legs that have improved in comparison to her visit on 05/22/2023.  Keloid scar mid sternal region   Neurological:      General: No focal deficit present.      Mental Status: She is alert and oriented to person, place, and time.   Psychiatric:         Attention and Perception: Attention and perception normal.         Mood and Affect: Mood and affect normal.         Speech: Speech normal.         Behavior: Behavior normal. Behavior is cooperative.         Thought  Content: Thought content normal.         Cognition and Memory: Cognition and memory normal.         Judgment: Judgment normal.             Assessment & Plan:       Weight loss    Essential hypertension    Chronic diastolic heart failure  -     spironolactone (ALDACTONE) 25 MG tablet; Take 0.5 tablets (12.5 mg total) by mouth once daily.  Dispense: 45 tablet; Refill: 1    Impaired mobility and activities of daily living    SOB (shortness of breath) on exertion    Letter completed for [employer] to  her garbage can.  Her mobility is better than at last visit and she states less SOB  She did drive herself to appt today  BLOOD PRESSURE controlled on current cardiac med's (Lasix and Coreg)    No weight loss since last visit, actually 3 pound gain and I have discussed that she is not considered in a state of malnutrition and that her albumin level is slightly lower than a month ago but still in normal range; AST and ALT back to more normal ranges        Medication List with Changes/Refills   Current Medications    ALUMINUM & MAGNESIUM HYDROXIDE-SIMETHICONE (MYLANTA MAXIMUM STRENGTH) 400-400-40 MG/5 ML SUSPENSION    Take 10 mLs by mouth every 6 (six) hours as needed for Indigestion.    ASPIRIN (ECOTRIN) 81 MG EC TABLET    Take 81 mg by mouth once daily.    CARVEDILOL (COREG) 3.125 MG TABLET    Take 1 tablet (3.125 mg total) by mouth 2 (two) times daily with meals.    CLOPIDOGREL (PLAVIX) 75 MG TABLET    Take 1 tablet (75 mg total) by mouth once daily.    CO-ENZYME Q-10 30 MG CAPSULE    Take 30 mg by mouth once daily.     DAPAGLIFLOZIN (FARXIGA) 10 MG TABLET    Take 10 mg by mouth once daily. PT STATES SHE HARDLY TAKES IT AND WAS UNSURE OF MG    DICLOFENAC SODIUM (VOLTAREN) 1 % GEL    Apply 2 g topically 4 (four) times daily.    EVOLOCUMAB (REPATHA SURECLICK) 140 MG/ML PNIJ    Inject 1 mL (140 mg total) into the skin every 14 (fourteen) days.    FAMOTIDINE (PEPCID) 20 MG TABLET    TAKE 1 TABLET TWICE DAILY     FISH OIL-OMEGA-3 FATTY ACIDS 300-1,000 MG CAPSULE    Take 2 g by mouth once daily.    FOLIC ACID/MULTIVIT-MIN/LUTEIN (CENTRUM SILVER ORAL)    Take 1 tablet by mouth once daily. ONE DAILY    FUROSEMIDE (LASIX) 20 MG TABLET    Take 1 tablet (20 mg total) by mouth once daily.    HYDROCODONE-ACETAMINOPHEN (NORCO)  MG PER TABLET    Take 1 tablet by mouth 2 (two) times daily as needed for Pain.    MEGESTROL (MEGACE) 40 MG TAB    Take 1 tablet (40 mg total) by mouth once daily.    ROSUVASTATIN (CRESTOR) 20 MG TABLET    TAKE 1 TABLET EVERY EVENING   Changed and/or Refilled Medications    Modified Medication Previous Medication    SPIRONOLACTONE (ALDACTONE) 25 MG TABLET spironolactone (ALDACTONE) 25 MG tablet       Take 0.5 tablets (12.5 mg total) by mouth once daily.    Take 0.5 tablets (12.5 mg total) by mouth once daily.      Follow up in about 2 months (around 9/24/2023).

## 2023-08-04 ENCOUNTER — TELEPHONE (OUTPATIENT)
Dept: FAMILY MEDICINE | Facility: CLINIC | Age: 83
End: 2023-08-04
Payer: MEDICARE

## 2023-08-04 NOTE — TELEPHONE ENCOUNTER
----- Message from Nakia Fitch, Patient Care Assistant sent at 8/4/2023  1:41 PM CDT -----  Regarding: advice  Contact: pt  Type: Needs Medical Advice    Who Called:  pt     Best Call Back Number: [phone] (home)     Additional Information: pt states she would like a callback regarding an handicap sticker. Please call pt to advise. Thanks!

## 2023-08-10 ENCOUNTER — SPECIALTY PHARMACY (OUTPATIENT)
Dept: PHARMACY | Facility: CLINIC | Age: 83
End: 2023-08-10
Payer: MEDICARE

## 2023-08-10 NOTE — TELEPHONE ENCOUNTER
Specialty Pharmacy - Refill Coordination    Specialty Medication Orders Linked to Encounter      Flowsheet Row Most Recent Value   Medication #1 evolocumab (REPATHA SURECLICK) 140 mg/mL PnIj (Order#[id number], Rx#[id number])            Refill Questions - Documented Responses      Flowsheet Row Most Recent Value   Patient Availability and HIPAA Verification    Does patient want to proceed with activity? Yes   HIPAA/medical authority confirmed? Yes   Relationship to patient of person spoken to? Self   Refill Screening Questions    Would patient like to speak to a pharmacist? No   When does the patient need to receive the medication? 08/19/23   Refill Delivery Questions    How will the patient receive the medication? [facility]   When does the patient need to receive the medication? 08/19/23   Shipping Address Home   Address in [facility] confirmed and updated if neccessary? Yes   Expected Copay ($) 0   Is the patient able to afford the medication copay? Yes   Payment Method zero copay   Days supply of Refill 28   Supplies needed? No supplies needed   Refill activity completed? Yes   Refill activity plan Refill scheduled   Shipment/Pickup Date: 08/17/23            Current Outpatient Medications   Medication Sig    aluminum & magnesium hydroxide-simethicone (MYLANTA MAXIMUM STRENGTH) 400-400-40 mg/5 mL suspension Take 10 mLs by mouth every 6 (six) hours as needed for Indigestion.    aspirin (ECOTRIN) 81 MG EC tablet Take 81 mg by mouth once daily.    carvediloL (COREG) 3.125 MG tablet Take 1 tablet (3.125 mg total) by mouth 2 (two) times daily with meals.    clopidogreL (PLAVIX) 75 mg tablet Take 1 tablet (75 mg total) by mouth once daily.    co-enzyme Q-10 30 mg capsule Take 30 mg by mouth once daily.     dapagliflozin (FARXIGA) 10 mg tablet Take 10 mg by mouth once daily. PT STATES SHE HARDLY TAKES IT AND WAS UNSURE OF MG    diclofenac sodium (VOLTAREN) 1 % Gel Apply 2 g topically 4 (four) times daily.    evolocumab  (REPATHA SURECLICK) 140 mg/mL PnIj Inject 1 mL (140 mg total) into the skin every 14 (fourteen) days.    famotidine (PEPCID) 20 MG tablet TAKE 1 TABLET TWICE DAILY    fish oil-omega-3 fatty acids 300-1,000 mg capsule Take 2 g by mouth once daily.    FOLIC ACID/MULTIVIT-MIN/LUTEIN (CENTRUM SILVER ORAL) Take 1 tablet by mouth once daily. ONE DAILY    furosemide (LASIX) 20 MG tablet Take 1 tablet (20 mg total) by mouth once daily.    HYDROcodone-acetaminophen (NORCO)  mg per tablet Take 1 tablet by mouth 2 (two) times daily as needed for Pain.    megestroL (MEGACE) 40 MG Tab Take 1 tablet (40 mg total) by mouth once daily.    rosuvastatin (CRESTOR) 20 MG tablet TAKE 1 TABLET EVERY EVENING    spironolactone (ALDACTONE) 25 MG tablet Take 0.5 tablets (12.5 mg total) by mouth once daily.   Last reviewed on 7/24/2023  2:22 PM by Madelin De La Vega, DNP, APRN    Review of patient's allergies indicates:   Allergen Reactions    Pcn [penicillins] Other (See Comments)     SOB    Clindamycin Diarrhea    Darvocet a500 [propoxyphene n-acetaminophen]     Erythromycin Other (See Comments)    Mycinette [cetylpyridinium-benzocaine]     Last reviewed on  7/24/2023 1:49 PM by Alex De La Vega      Tasks added this encounter   No tasks added.   Tasks due within next 3 months   No tasks due.     Ely Chiang [facility]  [address] [place] [id number]  Phone: [phone]  Fax: [phone]

## 2023-08-14 ENCOUNTER — TELEPHONE (OUTPATIENT)
Dept: PRIMARY CARE CLINIC | Facility: CLINIC | Age: 83
End: 2023-08-14
Payer: MEDICARE

## 2023-08-14 NOTE — TELEPHONE ENCOUNTER
----- Message from Liseth Naidu sent at 8/14/2023  3:57 PM CDT -----  Contact: patient  Type: Needs Medical Advice  Who Called:  patient  Best Call Back Number: [phone] (home)   Additional Information: patient requesting a call back regarding the fax about the handicap sticker for her garbage can

## 2023-08-14 NOTE — TELEPHONE ENCOUNTER
----- Message from Liseth Naidu sent at 8/14/2023  3:57 PM CDT -----  Contact: patient  Type: Needs Medical Advice  Who Called:  patient  Best Call Back Number: [phone] (home)   Additional Information: patient requesting a call back regarding the fax about the handicap sticker for her garbage can

## 2023-08-14 NOTE — TELEPHONE ENCOUNTER
Spoke with patient sent fax over twice, on 8/4/23, patient stated she would call waste management back

## 2023-08-17 ENCOUNTER — TELEPHONE (OUTPATIENT)
Dept: PRIMARY CARE CLINIC | Facility: CLINIC | Age: 83
End: 2023-08-17
Payer: MEDICARE

## 2023-08-17 ENCOUNTER — TELEPHONE (OUTPATIENT)
Dept: CARDIOLOGY | Facility: CLINIC | Age: 83
End: 2023-08-17
Payer: MEDICARE

## 2023-08-17 NOTE — TELEPHONE ENCOUNTER
----- Message from Sandy Pierre sent at 8/16/2023  2:17 PM CDT -----  Type: Needs Medical Advice  Who Called:  pt    Best Call Back Number: [phone]   Additional Information: Pt would like to speak to staff  Thank you

## 2023-08-17 NOTE — TELEPHONE ENCOUNTER
Spoke with patient stated she can  come in office and get the paperwork since the company keeps saying they did not receive it she said she will call back after she speaks with the company.

## 2023-08-17 NOTE — TELEPHONE ENCOUNTER
----- Message from Nilesh Sifuentes sent at 8/17/2023  2:52 PM CDT -----  Contact: self  Type: Needs Medical Advice  Who Called:  Patient    Best Call Back Number: [phone]    Additional Information: States she would like to speak with office regarding handicap tag says it hasn't been received by [facility] but was told office can contact Jeannette at the [facility] [employer] [phone].Please advise

## 2023-09-05 ENCOUNTER — TELEPHONE (OUTPATIENT)
Dept: PRIMARY CARE CLINIC | Facility: CLINIC | Age: 83
End: 2023-09-05
Payer: MEDICARE

## 2023-09-05 NOTE — TELEPHONE ENCOUNTER
Spoke with patient stated there are no available appointments until after the one she has scheduled patient said she would keep her appt on 9/25

## 2023-09-05 NOTE — TELEPHONE ENCOUNTER
----- Message from Zarina Bosch, Patient Care Assistant sent at 9/5/2023 12:34 PM CDT -----  Contact: self  Pt is calling to see if she can be seen on 9/11. Please call back to advise [phone]  thanks

## 2023-09-19 RX ORDER — CARVEDILOL 3.12 MG/1
3.12 TABLET ORAL 2 TIMES DAILY WITH MEALS
Qty: 60 TABLET | Refills: 11 | Status: SHIPPED | OUTPATIENT
Start: 2023-09-19 | End: 2023-09-25 | Stop reason: SDUPTHER

## 2023-09-25 ENCOUNTER — OFFICE VISIT (OUTPATIENT)
Dept: PRIMARY CARE CLINIC | Facility: CLINIC | Age: 83
End: 2023-09-25
Payer: MEDICARE

## 2023-09-25 VITALS
BODY MASS INDEX: 24.87 KG/M2 | DIASTOLIC BLOOD PRESSURE: 82 MMHG | HEART RATE: 84 BPM | SYSTOLIC BLOOD PRESSURE: 138 MMHG | WEIGHT: 149.25 LBS | HEIGHT: 65 IN | OXYGEN SATURATION: 98 % | TEMPERATURE: 98 F

## 2023-09-25 DIAGNOSIS — I10 ESSENTIAL HYPERTENSION: ICD-10-CM

## 2023-09-25 DIAGNOSIS — Z79.02 LONG TERM (CURRENT) USE OF ANTITHROMBOTICS/ANTIPLATELETS: ICD-10-CM

## 2023-09-25 DIAGNOSIS — E78.2 MIXED HYPERLIPIDEMIA: ICD-10-CM

## 2023-09-25 DIAGNOSIS — R63.4 WEIGHT LOSS, UNINTENTIONAL: Primary | ICD-10-CM

## 2023-09-25 PROCEDURE — 3075F PR MOST RECENT SYSTOLIC BLOOD PRESS GE 130-139MM HG: ICD-10-PCS | Mod: CPTII,S$GLB,, | Performed by: NURSE PRACTITIONER

## 2023-09-25 PROCEDURE — 99214 PR OFFICE/OUTPT VISIT, EST, LEVL IV, 30-39 MIN: ICD-10-PCS | Mod: S$GLB,,, | Performed by: NURSE PRACTITIONER

## 2023-09-25 PROCEDURE — 1160F RVW MEDS BY RX/DR IN RCRD: CPT | Mod: CPTII,S$GLB,, | Performed by: NURSE PRACTITIONER

## 2023-09-25 PROCEDURE — 3079F DIAST BP 80-89 MM HG: CPT | Mod: CPTII,S$GLB,, | Performed by: NURSE PRACTITIONER

## 2023-09-25 PROCEDURE — 1160F PR REVIEW ALL MEDS BY PRESCRIBER/CLIN PHARMACIST DOCUMENTED: ICD-10-PCS | Mod: CPTII,S$GLB,, | Performed by: NURSE PRACTITIONER

## 2023-09-25 PROCEDURE — 1159F PR MEDICATION LIST DOCUMENTED IN MEDICAL RECORD: ICD-10-PCS | Mod: CPTII,S$GLB,, | Performed by: NURSE PRACTITIONER

## 2023-09-25 PROCEDURE — 1101F PT FALLS ASSESS-DOCD LE1/YR: CPT | Mod: CPTII,S$GLB,, | Performed by: NURSE PRACTITIONER

## 2023-09-25 PROCEDURE — 1125F AMNT PAIN NOTED PAIN PRSNT: CPT | Mod: CPTII,S$GLB,, | Performed by: NURSE PRACTITIONER

## 2023-09-25 PROCEDURE — 3079F PR MOST RECENT DIASTOLIC BLOOD PRESSURE 80-89 MM HG: ICD-10-PCS | Mod: CPTII,S$GLB,, | Performed by: NURSE PRACTITIONER

## 2023-09-25 PROCEDURE — 3288F FALL RISK ASSESSMENT DOCD: CPT | Mod: CPTII,S$GLB,, | Performed by: NURSE PRACTITIONER

## 2023-09-25 PROCEDURE — 3288F PR FALLS RISK ASSESSMENT DOCUMENTED: ICD-10-PCS | Mod: CPTII,S$GLB,, | Performed by: NURSE PRACTITIONER

## 2023-09-25 PROCEDURE — 1101F PR PT FALLS ASSESS DOC 0-1 FALLS W/OUT INJ PAST YR: ICD-10-PCS | Mod: CPTII,S$GLB,, | Performed by: NURSE PRACTITIONER

## 2023-09-25 PROCEDURE — 1159F MED LIST DOCD IN RCRD: CPT | Mod: CPTII,S$GLB,, | Performed by: NURSE PRACTITIONER

## 2023-09-25 PROCEDURE — 99214 OFFICE O/P EST MOD 30 MIN: CPT | Mod: S$GLB,,, | Performed by: NURSE PRACTITIONER

## 2023-09-25 PROCEDURE — 1125F PR PAIN SEVERITY QUANTIFIED, PAIN PRESENT: ICD-10-PCS | Mod: CPTII,S$GLB,, | Performed by: NURSE PRACTITIONER

## 2023-09-25 PROCEDURE — 3075F SYST BP GE 130 - 139MM HG: CPT | Mod: CPTII,S$GLB,, | Performed by: NURSE PRACTITIONER

## 2023-09-25 RX ORDER — CARVEDILOL 3.12 MG/1
3.12 TABLET ORAL 2 TIMES DAILY WITH MEALS
Qty: 180 TABLET | Refills: 1 | Status: SHIPPED | OUTPATIENT
Start: 2023-09-25 | End: 2024-02-09 | Stop reason: SDUPTHER

## 2023-09-25 NOTE — PROGRESS NOTES
Subjective:       Patient ID: Yael Claire is a 82 y.o. female.    Chief Complaint: Weight Loss (Fu )  Last seen in primary care by me on 07/24/2023  HPI  She is here for follow up with weight and states having some problems with getting her carvedilol refilled.  Vitals:    09/25/23 1426   BP: 138/82   Pulse: 84   Temp: 98.4 °F (36.9 °C)     Review of Systems   Respiratory:  Negative for shortness of breath.    Cardiovascular:  Negative for chest pain and leg swelling.       Wt Readings from Last 3 Encounters:   09/25/23 1426 67.7 kg (149 lb 4 oz)   07/24/23 1349 65.4 kg (144 lb 2.9 oz)   07/12/23 1406 64.3 kg (141 lb 12.1 oz)      She did go to the ED at [facility] on 07/30/2023 and was diagnosed with GERD. She states has had one episode of the same nature  States she took her last carvedilol this weekend and was informed she could not get them refilled until late October because she received 90 days on 07/29/2023. She states she don't know why she ran out so soon and feel that maybe she did not get the full 180 tabs.   Objective:      Physical Exam  Vitals and nursing note reviewed.   Constitutional:       General: She is awake.      Appearance: Normal appearance. She is well-developed and well-groomed.   HENT:      Head: Normocephalic and atraumatic.      Right Ear: Hearing and external ear normal.      Left Ear: Hearing and external ear normal.   Eyes:      General: Lids are normal.         Right eye: No foreign body or discharge.         Left eye: No foreign body or discharge.   Cardiovascular:      Rate and Rhythm: Normal rate and regular rhythm.      Heart sounds: Normal heart sounds.   Pulmonary:      Effort: Pulmonary effort is normal.      Breath sounds: Normal breath sounds and air entry.   Abdominal:      General: Abdomen is flat.   Musculoskeletal:      Cervical back: Full passive range of motion without pain, normal range of motion and neck supple.      Right lower leg: No edema.      Left lower leg: No  edema.   Lymphadenopathy:      Head:      Right side of head: No submental, submandibular, tonsillar, preauricular, posterior auricular or occipital adenopathy.      Left side of head: No submental, submandibular, tonsillar, preauricular, posterior auricular or occipital adenopathy.   Skin:     General: Skin is warm and dry.   Neurological:      General: No focal deficit present.      Mental Status: She is alert and oriented to person, place, and time.   Psychiatric:         Attention and Perception: Attention and perception normal.         Mood and Affect: Mood and affect normal.         Speech: Speech normal.         Behavior: Behavior normal. Behavior is cooperative.         Thought Content: Thought content normal.         Cognition and Memory: Cognition and memory normal.         Judgment: Judgment normal.         Assessment & Plan:       Weight loss, unintentional    Essential hypertension  -     carvediloL (COREG) 3.125 MG tablet; Take 1 tablet (3.125 mg total) by mouth 2 (two) times daily with meals.  Dispense: 180 tablet; Refill: 1    Mixed hyperlipidemia    Long term (current) use of antithrombotics/antiplatelets    Her weight remains stable at this time and I have encouraged to just continue monitoring. States good appetite and eating well. She is currently ho having to take the Megace.    BLOOD PRESSURE controlled and I spoke with pharmacy via the telephone and sent an additional script to see if she can pay for it at [facility] to assure she continues her medication      Medication List with Changes/Refills   Current Medications    ALUMINUM & MAGNESIUM HYDROXIDE-SIMETHICONE (MYLANTA MAXIMUM STRENGTH) 400-400-40 MG/5 ML SUSPENSION    Take 10 mLs by mouth every 6 (six) hours as needed for Indigestion.    ASPIRIN (ECOTRIN) 81 MG EC TABLET    Take 81 mg by mouth once daily.    CLOPIDOGREL (PLAVIX) 75 MG TABLET    Take 1 tablet (75 mg total) by mouth once daily.    CO-ENZYME Q-10 30 MG CAPSULE    Take 30 mg by  mouth once daily.     DAPAGLIFLOZIN (FARXIGA) 10 MG TABLET    Take 10 mg by mouth once daily. PT STATES SHE HARDLY TAKES IT AND WAS UNSURE OF MG    DICLOFENAC SODIUM (VOLTAREN) 1 % GEL    Apply 2 g topically 4 (four) times daily.    EVOLOCUMAB (REPATHA SURECLICK) 140 MG/ML PNIJ    Inject 1 mL (140 mg total) into the skin every 14 (fourteen) days.    FAMOTIDINE (PEPCID) 20 MG TABLET    TAKE 1 TABLET TWICE DAILY    FISH OIL-OMEGA-3 FATTY ACIDS 300-1,000 MG CAPSULE    Take 2 g by mouth once daily.    FOLIC ACID/MULTIVIT-MIN/LUTEIN (CENTRUM SILVER ORAL)    Take 1 tablet by mouth once daily. ONE DAILY    FUROSEMIDE (LASIX) 20 MG TABLET    Take 1 tablet (20 mg total) by mouth once daily.    HYDROCODONE-ACETAMINOPHEN (NORCO)  MG PER TABLET    Take 1 tablet by mouth 2 (two) times daily as needed for Pain.    MEGESTROL (MEGACE) 40 MG TAB    Take 1 tablet (40 mg total) by mouth once daily.    ROSUVASTATIN (CRESTOR) 20 MG TABLET    TAKE 1 TABLET EVERY EVENING    SPIRONOLACTONE (ALDACTONE) 25 MG TABLET    Take 0.5 tablets (12.5 mg total) by mouth once daily.   Changed and/or Refilled Medications    Modified Medication Previous Medication    CARVEDILOL (COREG) 3.125 MG TABLET carvediloL (COREG) 3.125 MG tablet       Take 1 tablet (3.125 mg total) by mouth 2 (two) times daily with meals.    Take 1 tablet (3.125 mg total) by mouth 2 (two) times daily with meals.      No follow-ups on file.

## 2023-09-26 ENCOUNTER — CLINICAL SUPPORT (OUTPATIENT)
Dept: CARDIOLOGY | Facility: HOSPITAL | Age: 83
End: 2023-09-26
Payer: MEDICARE

## 2023-09-26 DIAGNOSIS — Z95.0 PRESENCE OF CARDIAC PACEMAKER: ICD-10-CM

## 2023-09-26 PROCEDURE — 93296 REM INTERROG EVL PM/IDS: CPT | Mod: PO | Performed by: INTERNAL MEDICINE

## 2023-10-12 ENCOUNTER — LAB VISIT (OUTPATIENT)
Dept: PRIMARY CARE CLINIC | Facility: CLINIC | Age: 83
End: 2023-10-12
Payer: MEDICARE

## 2023-10-12 DIAGNOSIS — I25.709 CORONARY ARTERY DISEASE INVOLVING CORONARY BYPASS GRAFT OF NATIVE HEART WITH ANGINA PECTORIS: Chronic | ICD-10-CM

## 2023-10-12 DIAGNOSIS — I50.32 CHRONIC DIASTOLIC HEART FAILURE: Chronic | ICD-10-CM

## 2023-10-12 DIAGNOSIS — I10 ESSENTIAL HYPERTENSION: Chronic | ICD-10-CM

## 2023-10-12 LAB
ALBUMIN SERPL BCP-MCNC: 3.5 G/DL (ref 3.5–5.2)
ALP SERPL-CCNC: 135 U/L (ref 55–135)
ALT SERPL W/O P-5'-P-CCNC: 25 U/L (ref 10–44)
ANION GAP SERPL CALC-SCNC: 8 MMOL/L (ref 8–16)
AST SERPL-CCNC: 29 U/L (ref 10–40)
BILIRUB SERPL-MCNC: 0.7 MG/DL (ref 0.1–1)
BUN SERPL-MCNC: 12 MG/DL (ref 8–23)
CALCIUM SERPL-MCNC: 8.7 MG/DL (ref 8.7–10.5)
CHLORIDE SERPL-SCNC: 104 MMOL/L (ref 95–110)
CO2 SERPL-SCNC: 25 MMOL/L (ref 23–29)
CREAT SERPL-MCNC: 0.8 MG/DL (ref 0.5–1.4)
EST. GFR  (NO RACE VARIABLE): >60 ML/MIN/1.73 M^2
GLUCOSE SERPL-MCNC: 88 MG/DL (ref 70–110)
POTASSIUM SERPL-SCNC: 4.4 MMOL/L (ref 3.5–5.1)
PROT SERPL-MCNC: 7 G/DL (ref 6–8.4)
SODIUM SERPL-SCNC: 137 MMOL/L (ref 136–145)

## 2023-10-12 PROCEDURE — 36415 COLL VENOUS BLD VENIPUNCTURE: CPT | Mod: S$GLB,,, | Performed by: INTERNAL MEDICINE

## 2023-10-12 PROCEDURE — 36415 PR COLLECTION VENOUS BLOOD,VENIPUNCTURE: ICD-10-PCS | Mod: S$GLB,,, | Performed by: INTERNAL MEDICINE

## 2023-10-12 PROCEDURE — 80053 COMPREHEN METABOLIC PANEL: CPT | Performed by: INTERNAL MEDICINE

## 2023-10-18 ENCOUNTER — OFFICE VISIT (OUTPATIENT)
Dept: CARDIOLOGY | Facility: CLINIC | Age: 83
End: 2023-10-18
Payer: MEDICARE

## 2023-10-18 VITALS
HEIGHT: 65 IN | HEART RATE: 61 BPM | WEIGHT: 145.5 LBS | DIASTOLIC BLOOD PRESSURE: 58 MMHG | BODY MASS INDEX: 24.24 KG/M2 | SYSTOLIC BLOOD PRESSURE: 132 MMHG

## 2023-10-18 DIAGNOSIS — I08.0 MITRAL AND AORTIC VALVE DISEASE: Chronic | ICD-10-CM

## 2023-10-18 DIAGNOSIS — I25.709 CORONARY ARTERY DISEASE INVOLVING CORONARY BYPASS GRAFT OF NATIVE HEART WITH ANGINA PECTORIS: Primary | Chronic | ICD-10-CM

## 2023-10-18 DIAGNOSIS — I50.32 CHRONIC DIASTOLIC HEART FAILURE: Chronic | ICD-10-CM

## 2023-10-18 DIAGNOSIS — E78.2 MIXED HYPERLIPIDEMIA: Chronic | ICD-10-CM

## 2023-10-18 DIAGNOSIS — Z79.02 LONG TERM (CURRENT) USE OF ANTITHROMBOTICS/ANTIPLATELETS: Chronic | ICD-10-CM

## 2023-10-18 DIAGNOSIS — I77.9 MILD CAROTID ARTERY DISEASE: Chronic | ICD-10-CM

## 2023-10-18 PROCEDURE — 1101F PT FALLS ASSESS-DOCD LE1/YR: CPT | Mod: CPTII,S$GLB,, | Performed by: INTERNAL MEDICINE

## 2023-10-18 PROCEDURE — 3288F PR FALLS RISK ASSESSMENT DOCUMENTED: ICD-10-PCS | Mod: CPTII,S$GLB,, | Performed by: INTERNAL MEDICINE

## 2023-10-18 PROCEDURE — 99214 OFFICE O/P EST MOD 30 MIN: CPT | Mod: S$GLB,,, | Performed by: INTERNAL MEDICINE

## 2023-10-18 PROCEDURE — 1126F PR PAIN SEVERITY QUANTIFIED, NO PAIN PRESENT: ICD-10-PCS | Mod: CPTII,S$GLB,, | Performed by: INTERNAL MEDICINE

## 2023-10-18 PROCEDURE — 3078F PR MOST RECENT DIASTOLIC BLOOD PRESSURE < 80 MM HG: ICD-10-PCS | Mod: CPTII,S$GLB,, | Performed by: INTERNAL MEDICINE

## 2023-10-18 PROCEDURE — 3078F DIAST BP <80 MM HG: CPT | Mod: CPTII,S$GLB,, | Performed by: INTERNAL MEDICINE

## 2023-10-18 PROCEDURE — 1159F MED LIST DOCD IN RCRD: CPT | Mod: CPTII,S$GLB,, | Performed by: INTERNAL MEDICINE

## 2023-10-18 PROCEDURE — 3075F SYST BP GE 130 - 139MM HG: CPT | Mod: CPTII,S$GLB,, | Performed by: INTERNAL MEDICINE

## 2023-10-18 PROCEDURE — 1159F PR MEDICATION LIST DOCUMENTED IN MEDICAL RECORD: ICD-10-PCS | Mod: CPTII,S$GLB,, | Performed by: INTERNAL MEDICINE

## 2023-10-18 PROCEDURE — 1126F AMNT PAIN NOTED NONE PRSNT: CPT | Mod: CPTII,S$GLB,, | Performed by: INTERNAL MEDICINE

## 2023-10-18 PROCEDURE — 3288F FALL RISK ASSESSMENT DOCD: CPT | Mod: CPTII,S$GLB,, | Performed by: INTERNAL MEDICINE

## 2023-10-18 PROCEDURE — 1101F PR PT FALLS ASSESS DOC 0-1 FALLS W/OUT INJ PAST YR: ICD-10-PCS | Mod: CPTII,S$GLB,, | Performed by: INTERNAL MEDICINE

## 2023-10-18 PROCEDURE — 99214 PR OFFICE/OUTPT VISIT, EST, LEVL IV, 30-39 MIN: ICD-10-PCS | Mod: S$GLB,,, | Performed by: INTERNAL MEDICINE

## 2023-10-18 PROCEDURE — 3075F PR MOST RECENT SYSTOLIC BLOOD PRESS GE 130-139MM HG: ICD-10-PCS | Mod: CPTII,S$GLB,, | Performed by: INTERNAL MEDICINE

## 2023-10-18 RX ORDER — FUROSEMIDE 20 MG/1
10 TABLET ORAL DAILY
Qty: 45 TABLET | Refills: 1 | Status: SHIPPED | OUTPATIENT
Start: 2023-10-18 | End: 2024-10-17

## 2023-10-18 NOTE — PROGRESS NOTES
Subjective:    Patient ID:  Yael Claire is a 82 y.o. female who presents for Hypertension and Carotid Artery Disease        HPI  DISCUSSED LABS CMP NORMAL, DOING OK, NO CP/ SOB, WEIGHT STABLE NO TIA TYPE SYMPTOMS NO NEAR-SYNCOPE, SEE ROS    Past Medical History:   Diagnosis Date    Acid reflux     Acute coronary syndrome     LIGHT 2004    Acute on chronic combined systolic and diastolic heart failure 02/2023    Anticoagulant long-term use     Aortic valve disorder     nonrheumatic    Arthritis     CHF (congestive heart failure)     Coronary artery disease     cabg & valve sgy    H/O: pneumonia     Heart murmur     Hyperlipidemia     Hypertension     Lower leg edema     Mild carotid artery disease 11/17/2021    Palpitations     Stenosis of aortic and mitral valves     Valvular regurgitation      Past Surgical History:   Procedure Laterality Date    A-V CARDIAC PACEMAKER INSERTION N/A 03/29/2023    Procedure: Dual PPM (Rodriguez);  Surgeon: Too Farrell MD;  Location: [facility] CATH;  Service: Cardiology;  Laterality: N/A;    ARTERIOGRAPHY OF AORTIC ROOT N/A 02/23/2023    Procedure: ARTERIOGRAM, AORTIC ROOT;  Surgeon: Eb Ruelas MD;  Location: [facility] CATH;  Service: Cardiology;  Laterality: N/A;    CARDIAC CATHETERIZATION      CARDIAC VALVE SURGERY      CATARACT EXTRACTION W/  INTRAOCULAR LENS IMPLANT Bilateral     COLONOSCOPY  ~2019    normal findings per patient report    CORONARY ANGIOGRAPHY INCLUDING BYPASS GRAFTS WITH CATHETERIZATION OF LEFT HEART N/A 02/23/2023    Procedure: Right/Left heart Cath w/Grafts;  Surgeon: Eb Ruelas MD;  Location: [facility] CATH;  Service: Cardiology;  Laterality: N/A;    CORONARY ARTERY BYPASS GRAFT  04/2005    HYSTERECTOMY  1980    INSERTION, PACEMAKER, TEMPORARY TRANSVENOUS  4/26/2023    Procedure: Insertion, Pacemaker, Temporary Transvenous;  Surgeon: Ryan Castro MD;  Location: [facility] CATH;  Service: Cardiology;;    PERCUTANEOUS TRANSLUMINAL ANGIOPLASTY N/A 4/26/2023     Procedure: PTA (ANGIOPLASTY, PERCUTANEOUS, TRANSLUMINAL);  Surgeon: Ryan Castro MD;  Location: [facility]ATH;  Service: Cardiology;  Laterality: N/A;    RIGHT HEART CATHETERIZATION N/A 02/23/2023    Procedure: INSERTION, CATHETER, RIGHT HEART;  Surgeon: Eb Ruelas MD;  Location: [facility]ATH;  Service: Cardiology;  Laterality: N/A;    TRANSCATHETER AORTIC VALVE REPLACEMENT (TAVR) Bilateral 4/26/2023    Procedure: (TAVR);  Surgeon: Ryan Castro MD;  Location: [facility]ATH;  Service: Cardiology;  Laterality: Bilateral;    TRANSCATHETER AORTIC VALVE REPLACEMENT (TAVR) Bilateral 4/26/2023    Procedure: REPLACEMENT, AORTIC VALVE, TRANSCATHETER (TAVR);  Surgeon: Jarett Mckee MD;  Location: [facility] CATH;  Service: Cardiology;  Laterality: Bilateral;    UPPER GASTROINTESTINAL ENDOSCOPY  ~2010    normal findings     Family History   Problem Relation Age of Onset    Hypertension Mother     Hypertension Father     Heart disease Father     Colon cancer Neg Hx     Esophageal cancer Neg Hx     Stomach cancer Neg Hx      Social History     Socioeconomic History    Marital status: Single   Tobacco Use    Smoking status: Never    Smokeless tobacco: Never   Substance and Sexual Activity    Alcohol use: Not Currently     Comment: couple times per yr    Drug use: No       Review of patient's allergies indicates:   Allergen Reactions    Pcn [penicillins] Other (See Comments)     SOB    Clindamycin Diarrhea    Darvocet a500 [propoxyphene n-acetaminophen]     Erythromycin Other (See Comments)    Mycinette [cetylpyridinium-benzocaine]        Current Outpatient Medications:     aspirin (ECOTRIN) 81 MG EC tablet, Take 81 mg by mouth once daily., Disp: , Rfl:     carvediloL (COREG) 3.125 MG tablet, Take 1 tablet (3.125 mg total) by mouth 2 (two) times daily with meals., Disp: 180 tablet, Rfl: 1    co-enzyme Q-10 30 mg capsule, Take 30 mg by mouth once daily. , Disp: , Rfl:     dapagliflozin (FARXIGA) 10 mg tablet, Take 10 mg by mouth once  daily. PT STATES SHE HARDLY TAKES IT AND WAS UNSURE OF MG, Disp: , Rfl:     diclofenac sodium (VOLTAREN) 1 % Gel, Apply 2 g topically 4 (four) times daily., Disp: , Rfl:     evolocumab (REPATHA SURECLICK) 140 mg/mL PnIj, Inject 1 mL (140 mg total) into the skin every 14 (fourteen) days., Disp: 2 mL, Rfl: 6    famotidine (PEPCID) 20 MG tablet, TAKE 1 TABLET TWICE DAILY, Disp: 180 tablet, Rfl: 0    fish oil-omega-3 fatty acids 300-1,000 mg capsule, Take 2 g by mouth once daily., Disp: , Rfl:     FOLIC ACID/MULTIVIT-MIN/LUTEIN (CENTRUM SILVER ORAL), Take 1 tablet by mouth once daily. ONE DAILY, Disp: , Rfl:     HYDROcodone-acetaminophen (NORCO)  mg per tablet, Take 1 tablet by mouth 2 (two) times daily as needed for Pain., Disp: , Rfl: 0    megestroL (MEGACE) 40 MG Tab, Take 1 tablet (40 mg total) by mouth once daily., Disp: 30 tablet, Rfl: 1    rosuvastatin (CRESTOR) 20 MG tablet, TAKE 1 TABLET EVERY EVENING, Disp: 90 tablet, Rfl: 1    spironolactone (ALDACTONE) 25 MG tablet, Take 0.5 tablets (12.5 mg total) by mouth once daily., Disp: 45 tablet, Rfl: 1    aluminum & magnesium hydroxide-simethicone (MYLANTA MAXIMUM STRENGTH) 400-400-40 mg/5 mL suspension, Take 10 mLs by mouth every 6 (six) hours as needed for Indigestion., Disp: 3840 mL, Rfl: 0    furosemide (LASIX) 20 MG tablet, Take 0.5 tablets (10 mg total) by mouth once daily., Disp: 45 tablet, Rfl: 1  No current facility-administered medications for this visit.    Facility-Administered Medications Ordered in Other Visits:     chlorhexidine 0.12 % solution 15 mL, 15 mL, Mouth/Throat, Once, Too Farrell MD    mupirocin 2 % ointment, , Nasal, Once, Too Farrell MD    Review of Systems   Constitutional: Negative for decreased appetite, fever and malaise/fatigue. Weight gain: SOME.  HENT:  Negative for congestion and nosebleeds.    Eyes:  Negative for blurred vision.   Cardiovascular:  Negative for chest pain (OCC), claudication, cyanosis,  "dyspnea on exertion (MINIMAL), irregular heartbeat, leg swelling, near-syncope, orthopnea, palpitations and paroxysmal nocturnal dyspnea.   Respiratory:  Negative for cough, hemoptysis, shortness of breath, sleep disturbances due to breathing and wheezing.    Endocrine: Negative.    Skin: Negative.  Negative for nail changes and rash.   Musculoskeletal: Negative.  Negative for back pain and falls.   Gastrointestinal:  Negative for abdominal pain, change in bowel habit, dysphagia, melena, nausea and vomiting.   Genitourinary:  Negative for dysuria and flank pain.   Neurological:  Negative for brief paralysis, dizziness, focal weakness, light-headedness, numbness, paresthesias and weakness.   Psychiatric/Behavioral:  Negative for altered mental status and depression.    Allergic/Immunologic: Negative for hives and persistent infections.        Objective:      Vitals:    10/18/23 1318   BP: (!) 132/58   Pulse: 61   Weight: 66 kg (145 lb 8.1 oz)   Height: 5' 5" (1.651 m)   PainSc: 0-No pain     Body mass index is 24.21 kg/m².    Physical Exam  Constitutional:       Appearance: Normal appearance.   HENT:      Head: Normocephalic and atraumatic.   Eyes:      Extraocular Movements: Extraocular movements intact.      Conjunctiva/sclera: Conjunctivae normal.   Neck:      Vascular: No carotid bruit or JVD.   Cardiovascular:      Rate and Rhythm: Normal rate and regular rhythm.      Pulses:           Carotid pulses are 2+ on the right side and 2+ on the left side.       Radial pulses are 2+ on the right side and 2+ on the left side.        Posterior tibial pulses are 2+ on the right side and 2+ on the left side.      Heart sounds: Murmur heard.      Systolic murmur is present with a grade of 1/6 at the upper right sternal border.      No friction rub. No gallop.   Pulmonary:      Effort: Pulmonary effort is normal.      Breath sounds: Normal breath sounds and air entry.   Chest:       Abdominal:      Palpations: Abdomen is " soft.      Tenderness: There is no abdominal tenderness.   Musculoskeletal:      Cervical back: Neck supple.      Right lower leg: No edema.      Left lower leg: No edema.      Comments: USES A CANE   Skin:     General: Skin is warm and dry.      Capillary Refill: Capillary refill takes less than 2 seconds.   Neurological:      General: No focal deficit present.      Mental Status: She is alert and oriented to person, place, and time.      Cranial Nerves: No cranial nerve deficit.   Psychiatric:         Mood and Affect: Mood normal.         Speech: Speech normal.         Behavior: Behavior normal.                 ..    Chemistry        Component Value Date/Time     10/12/2023 1100    K 4.4 10/12/2023 1100     10/12/2023 1100    CO2 25 10/12/2023 1100    BUN 12 10/12/2023 1100    CREATININE 0.8 10/12/2023 1100    GLU 88 10/12/2023 1100        Component Value Date/Time    CALCIUM 8.7 10/12/2023 1100    ALKPHOS 135 10/12/2023 1100    AST 29 10/12/2023 1100    ALT 25 10/12/2023 1100    BILITOT 0.7 10/12/2023 1100    ESTGFRAFRICA 74 03/22/2023 0812    ESTGFRAFRICA >60 04/06/2022 2216    EGFRNONAA >60 04/06/2022 2216            ..  Lab Results   Component Value Date    CHOL 99 (L) 01/10/2023    CHOL 159 04/14/2022    CHOL 157 06/10/2021     Lab Results   Component Value Date    HDL 37 (L) 01/10/2023    HDL 50 04/14/2022    HDL 51 06/10/2021     Lab Results   Component Value Date    LDLCALC 50.4 (L) 01/10/2023    LDLCALC 94.2 04/14/2022    LDLCALC 95.6 06/10/2021     Lab Results   Component Value Date    TRIG 58 01/10/2023    TRIG 74 04/14/2022    TRIG 52 06/10/2021     Lab Results   Component Value Date    CHOLHDL 37.4 01/10/2023    CHOLHDL 31.4 04/14/2022    CHOLHDL 32.5 06/10/2021     ..  Lab Results   Component Value Date    WBC 4.73 06/13/2023    HGB 11.8 (L) 06/13/2023    HCT 37.6 06/13/2023    MCV 98 06/13/2023     06/13/2023       Test(s) Reviewed  I have reviewed the following in detail:  []  Stress test   [] Angiography   [] Echocardiogram   [x] Labs   [] Other:       Assessment:         ICD-10-CM ICD-9-CM   1. Coronary artery disease involving coronary bypass graft of native heart with angina pectoris  I25.709 414.05     413.9   2. Chronic diastolic heart failure  I50.32 428.32   3. Long term (current) use of antithrombotics/antiplatelets  Z79.02 V58.63   4. Mild carotid artery disease  I77.9 447.9   5. Mitral and aortic valve disease  I08.0 396.9   6. Mixed hyperlipidemia  E78.2 272.2     Problem List Items Addressed This Visit          Cardiac/Vascular    Coronary artery disease involving coronary bypass graft of native heart with angina pectoris - Primary    Relevant Orders    Comprehensive Metabolic Panel    Lipid Panel    Mixed hyperlipidemia    Relevant Orders    Comprehensive Metabolic Panel    Lipid Panel    Mitral and aortic valve disease    Mild carotid artery disease    Chronic diastolic heart failure    Relevant Orders    Comprehensive Metabolic Panel       Hematology    Long term (current) use of antithrombotics/antiplatelets        Plan:     DECREASE LASIX TO 10 MG, DAILY WEIGHT 2 LB PER DAY 5 LB PER WEEK RULE EXPLAINED, DC PLAVIX CONTINUE JUST ASPIRIN.ALL CV CLINICALLY STABLE, NO ANGINA, CLASS 1-2 HF, NO TIA, NO CLINICAL ARRHYTHMIA,CONTINUE CURRENT MEDS, EDUCATION, DIET, EXERCISE , RETURN TO CLINIC IN 6 MO WITH LABS      Coronary artery disease involving coronary bypass graft of native heart with angina pectoris  -     Comprehensive Metabolic Panel; Future; Expected date: 04/18/2024  -     Lipid Panel; Future; Expected date: 04/18/2024    Chronic diastolic heart failure  Comments:  IMPROVED  Orders:  -     Comprehensive Metabolic Panel; Future; Expected date: 04/18/2024    Long term (current) use of antithrombotics/antiplatelets  Comments:  DC PLAVIX JUST ASA    Mild carotid artery disease    Mitral and aortic valve disease    Mixed hyperlipidemia  -     Comprehensive Metabolic Panel;  Future; Expected date: 04/18/2024  -     Lipid Panel; Future; Expected date: 04/18/2024    Other orders  -     furosemide (LASIX) 20 MG tablet; Take 0.5 tablets (10 mg total) by mouth once daily.  Dispense: 45 tablet; Refill: 1    RTC Low level/low impact aerobic exercise 5x's/wk. Heart healthy diet and risk factor modification.    See labs and med orders.    Aerobic exercise 5x's/wk. Heart healthy diet and risk factor modification.    See labs and med orders.

## 2023-11-02 ENCOUNTER — CLINICAL SUPPORT (OUTPATIENT)
Dept: CARDIOLOGY | Facility: HOSPITAL | Age: 83
End: 2023-11-02
Attending: INTERNAL MEDICINE
Payer: MEDICARE

## 2023-11-02 ENCOUNTER — CLINICAL SUPPORT (OUTPATIENT)
Dept: CARDIOLOGY | Facility: HOSPITAL | Age: 83
End: 2023-11-02
Payer: MEDICARE

## 2023-11-02 DIAGNOSIS — Z95.0 PRESENCE OF CARDIAC PACEMAKER: ICD-10-CM

## 2023-11-02 PROCEDURE — 93296 REM INTERROG EVL PM/IDS: CPT | Mod: PO | Performed by: INTERNAL MEDICINE

## 2023-11-02 PROCEDURE — 93294 CARDIAC DEVICE CHECK - REMOTE: ICD-10-PCS | Mod: ,,, | Performed by: INTERNAL MEDICINE

## 2023-11-02 PROCEDURE — 93294 REM INTERROG EVL PM/LDLS PM: CPT | Mod: ,,, | Performed by: INTERNAL MEDICINE

## 2023-11-20 ENCOUNTER — TELEPHONE (OUTPATIENT)
Dept: PRIMARY CARE CLINIC | Facility: CLINIC | Age: 83
End: 2023-11-20
Payer: MEDICARE

## 2023-11-20 ENCOUNTER — TELEPHONE (OUTPATIENT)
Dept: FAMILY MEDICINE | Facility: CLINIC | Age: 83
End: 2023-11-20
Payer: MEDICARE

## 2023-11-20 RX ORDER — HYDROXYZINE PAMOATE 25 MG/1
25 CAPSULE ORAL 2 TIMES DAILY PRN
Qty: 30 CAPSULE | Refills: 0 | Status: SHIPPED | OUTPATIENT
Start: 2023-11-20

## 2023-11-20 NOTE — TELEPHONE ENCOUNTER
Patient stated she is experiencing itching around her arms and feet and for 3/4 days, went to [facility] and stated to take Benadryl, patient is wanting to know is there anything that can prescribed other than benadryl. Please advise

## 2023-11-20 NOTE — TELEPHONE ENCOUNTER
----- Message from Tonia Peak sent at 11/20/2023  4:35 PM CST -----  Regarding: Return Call  Patient Returning Call      Who Called:Pt    Who Left Message for Patient: Madelin    Does the patient know what this is regarding?:Yes    Would the patient rather a call back or a response via [facility]?  Call back    Best Call Back Number: [phone]    Additional Information:  Please Advise - Thank you

## 2023-11-20 NOTE — TELEPHONE ENCOUNTER
----- Message from Zarina Bosch, Patient Care Assistant sent at 11/20/2023 10:39 AM CST -----  Type: Needs Medical Advice  Who Called:  self  Symptoms (please be specific):  itchy all over   How long has patient had these symptoms:  few hives   Pharmacy name and phone #:   [facility] #[id number] - [place], [place] - [facility] [address] [facility]  [address]  [facility] [address]  Phone: [phone] Fax: [phone]  Best Call Back Number: [phone]  Additional Information: thanks

## 2023-11-20 NOTE — TELEPHONE ENCOUNTER
Patient is stating she is itching all over.  Would like something sent to her pharmacy.  Please advise.

## 2023-12-11 ENCOUNTER — OFFICE VISIT (OUTPATIENT)
Dept: PRIMARY CARE CLINIC | Facility: CLINIC | Age: 83
End: 2023-12-11
Payer: MEDICARE

## 2023-12-11 VITALS
WEIGHT: 158.75 LBS | DIASTOLIC BLOOD PRESSURE: 60 MMHG | BODY MASS INDEX: 26.45 KG/M2 | SYSTOLIC BLOOD PRESSURE: 132 MMHG | OXYGEN SATURATION: 98 % | HEART RATE: 71 BPM | HEIGHT: 65 IN

## 2023-12-11 DIAGNOSIS — G89.29 CHRONIC PAIN OF LEFT KNEE: ICD-10-CM

## 2023-12-11 DIAGNOSIS — M25.562 CHRONIC PAIN OF LEFT KNEE: ICD-10-CM

## 2023-12-11 DIAGNOSIS — Z87.19 HISTORY OF GASTROESOPHAGEAL REFLUX (GERD): ICD-10-CM

## 2023-12-11 DIAGNOSIS — I10 ESSENTIAL HYPERTENSION: Primary | ICD-10-CM

## 2023-12-11 PROCEDURE — 1160F RVW MEDS BY RX/DR IN RCRD: CPT | Mod: CPTII,S$GLB,, | Performed by: NURSE PRACTITIONER

## 2023-12-11 PROCEDURE — 1159F PR MEDICATION LIST DOCUMENTED IN MEDICAL RECORD: ICD-10-PCS | Mod: CPTII,S$GLB,, | Performed by: NURSE PRACTITIONER

## 2023-12-11 PROCEDURE — 99214 OFFICE O/P EST MOD 30 MIN: CPT | Mod: S$GLB,,, | Performed by: NURSE PRACTITIONER

## 2023-12-11 PROCEDURE — 1160F PR REVIEW ALL MEDS BY PRESCRIBER/CLIN PHARMACIST DOCUMENTED: ICD-10-PCS | Mod: CPTII,S$GLB,, | Performed by: NURSE PRACTITIONER

## 2023-12-11 PROCEDURE — 3288F PR FALLS RISK ASSESSMENT DOCUMENTED: ICD-10-PCS | Mod: CPTII,S$GLB,, | Performed by: NURSE PRACTITIONER

## 2023-12-11 PROCEDURE — 99214 PR OFFICE/OUTPT VISIT, EST, LEVL IV, 30-39 MIN: ICD-10-PCS | Mod: S$GLB,,, | Performed by: NURSE PRACTITIONER

## 2023-12-11 PROCEDURE — 1101F PT FALLS ASSESS-DOCD LE1/YR: CPT | Mod: CPTII,S$GLB,, | Performed by: NURSE PRACTITIONER

## 2023-12-11 PROCEDURE — 1126F PR PAIN SEVERITY QUANTIFIED, NO PAIN PRESENT: ICD-10-PCS | Mod: CPTII,S$GLB,, | Performed by: NURSE PRACTITIONER

## 2023-12-11 PROCEDURE — 3288F FALL RISK ASSESSMENT DOCD: CPT | Mod: CPTII,S$GLB,, | Performed by: NURSE PRACTITIONER

## 2023-12-11 PROCEDURE — 1101F PR PT FALLS ASSESS DOC 0-1 FALLS W/OUT INJ PAST YR: ICD-10-PCS | Mod: CPTII,S$GLB,, | Performed by: NURSE PRACTITIONER

## 2023-12-11 PROCEDURE — 3078F DIAST BP <80 MM HG: CPT | Mod: CPTII,S$GLB,, | Performed by: NURSE PRACTITIONER

## 2023-12-11 PROCEDURE — 1126F AMNT PAIN NOTED NONE PRSNT: CPT | Mod: CPTII,S$GLB,, | Performed by: NURSE PRACTITIONER

## 2023-12-11 PROCEDURE — 1159F MED LIST DOCD IN RCRD: CPT | Mod: CPTII,S$GLB,, | Performed by: NURSE PRACTITIONER

## 2023-12-11 PROCEDURE — 3078F PR MOST RECENT DIASTOLIC BLOOD PRESSURE < 80 MM HG: ICD-10-PCS | Mod: CPTII,S$GLB,, | Performed by: NURSE PRACTITIONER

## 2023-12-11 PROCEDURE — 3075F PR MOST RECENT SYSTOLIC BLOOD PRESS GE 130-139MM HG: ICD-10-PCS | Mod: CPTII,S$GLB,, | Performed by: NURSE PRACTITIONER

## 2023-12-11 PROCEDURE — 3075F SYST BP GE 130 - 139MM HG: CPT | Mod: CPTII,S$GLB,, | Performed by: NURSE PRACTITIONER

## 2023-12-11 RX ORDER — FAMOTIDINE 40 MG/1
40 TABLET, FILM COATED ORAL NIGHTLY
Qty: 90 TABLET | Refills: 3 | Status: SHIPPED | OUTPATIENT
Start: 2023-12-11 | End: 2024-12-10

## 2023-12-11 RX ORDER — DICLOFENAC SODIUM 10 MG/G
2 GEL TOPICAL 4 TIMES DAILY
Qty: 100 G | Refills: 3 | Status: SHIPPED | OUTPATIENT
Start: 2023-12-11

## 2023-12-11 NOTE — PATIENT INSTRUCTIONS
Blake Conley,     If you are due for any health screening(s) below please notify me so we can arrange them to be ordered and scheduled. Most healthy patients at your age complete them, but you are free to accept or refuse.     If you can't do it, I'll definitely understand. If you can, I'd certainly appreciate it!    All of your core healthy metrics are met.

## 2023-12-11 NOTE — PROGRESS NOTES
Subjective:       Patient ID: Yael Claire is a 83 y.o. female.    Chief Complaint: Follow-up chronic medical diagnosis.  Last seen in primary care by me on 09/25/2023  HPI  She is here to follow up with chronic medical diagnosis HTN, having break through reflux symptoms about once weekly.  Vitals:    12/11/23 1307   BP: 132/60   Pulse: 71     Review of Systems   Respiratory:  Negative for shortness of breath.    Cardiovascular:  Negative for chest pain.   Musculoskeletal:  Positive for arthralgias.        States pin in upper shoulders and ankles and states using emu ointment and it helps her     She states has uncomfortable feeling in her stomach sometimes about once weekly after certain foods.  Objective:      Physical Exam  Vitals and nursing note reviewed.   Constitutional:       General: She is awake.      Appearance: Normal appearance. She is well-developed and well-groomed.   HENT:      Head: Normocephalic and atraumatic.      Right Ear: Hearing, tympanic membrane, ear canal and external ear normal.      Left Ear: Hearing, tympanic membrane, ear canal and external ear normal.      Nose: Nose normal.   Eyes:      General: Lids are normal.   Cardiovascular:      Rate and Rhythm: Normal rate and regular rhythm.      Comments: Slight puffy swelling to outer ankles  Pulmonary:      Effort: Pulmonary effort is normal.      Breath sounds: Normal breath sounds and air entry.   Musculoskeletal:      Cervical back: Full passive range of motion without pain, normal range of motion and neck supple.   Lymphadenopathy:      Head:      Right side of head: No submental, submandibular, tonsillar, preauricular, posterior auricular or occipital adenopathy.      Left side of head: No submental, submandibular, tonsillar, preauricular, posterior auricular or occipital adenopathy.   Skin:     General: Skin is warm and dry.   Neurological:      Mental Status: She is alert and oriented to person, place, and time.   Psychiatric:          Attention and Perception: Attention and perception normal.         Mood and Affect: Mood and affect normal.         Speech: Speech normal.         Behavior: Behavior normal. Behavior is cooperative.         Thought Content: Thought content normal.         Cognition and Memory: Cognition and memory normal.         Judgment: Judgment normal.         Assessment & Plan:       Essential hypertension    Chronic pain of left knee  -     diclofenac sodium (VOLTAREN) 1 % Gel; Apply 2 g topically 4 (four) times daily.  Dispense: 100 g; Refill: 3    History of gastroesophageal reflux (GERD)  -     famotidine (PEPCID) 40 MG tablet; Take 1 tablet (40 mg total) by mouth nightly.  Dispense: 90 tablet; Refill: 3    She states she had her flu shot at [facility] and will have her pneumonia vaccine this week.  No changes to her medications today and she states taking all med's as ordered        Medication List with Changes/Refills   Current Medications    ALUMINUM & MAGNESIUM HYDROXIDE-SIMETHICONE (MYLANTA MAXIMUM STRENGTH) 400-400-40 MG/5 ML SUSPENSION    Take 10 mLs by mouth every 6 (six) hours as needed for Indigestion.    ASPIRIN (ECOTRIN) 81 MG EC TABLET    Take 81 mg by mouth once daily.    CARVEDILOL (COREG) 3.125 MG TABLET    Take 1 tablet (3.125 mg total) by mouth 2 (two) times daily with meals.    CO-ENZYME Q-10 30 MG CAPSULE    Take 30 mg by mouth once daily.     DAPAGLIFLOZIN (FARXIGA) 10 MG TABLET    Take 10 mg by mouth once daily. PT STATES SHE HARDLY TAKES IT AND WAS UNSURE OF MG    EVOLOCUMAB (REPATHA SURECLICK) 140 MG/ML PNIJ    Inject 1 mL (140 mg total) into the skin every 14 (fourteen) days.    FISH OIL-OMEGA-3 FATTY ACIDS 300-1,000 MG CAPSULE    Take 2 g by mouth once daily.    FOLIC ACID/MULTIVIT-MIN/LUTEIN (CENTRUM SILVER ORAL)    Take 1 tablet by mouth once daily. ONE DAILY    FUROSEMIDE (LASIX) 20 MG TABLET    Take 0.5 tablets (10 mg total) by mouth once daily.    HYDROCODONE-ACETAMINOPHEN (NORCO)   MG PER TABLET    Take 1 tablet by mouth 2 (two) times daily as needed for Pain.    HYDROXYZINE PAMOATE (VISTARIL) 25 MG CAP    Take 1 capsule (25 mg total) by mouth 2 (two) times daily as needed (itching).    ROSUVASTATIN (CRESTOR) 20 MG TABLET    TAKE 1 TABLET EVERY EVENING    SPIRONOLACTONE (ALDACTONE) 25 MG TABLET    Take 0.5 tablets (12.5 mg total) by mouth once daily.   Changed and/or Refilled Medications    Modified Medication Previous Medication    DICLOFENAC SODIUM (VOLTAREN) 1 % GEL diclofenac sodium (VOLTAREN) 1 % Gel       Apply 2 g topically 4 (four) times daily.    Apply 2 g topically 4 (four) times daily.    FAMOTIDINE (PEPCID) 40 MG TABLET famotidine (PEPCID) 20 MG tablet       Take 1 tablet (40 mg total) by mouth nightly.    TAKE 1 TABLET TWICE DAILY   Discontinued Medications    MEGESTROL (MEGACE) 40 MG TAB    Take 1 tablet (40 mg total) by mouth once daily.      Follow up in about 3 months (around 3/11/2024).

## 2023-12-19 LAB
OHS CV AF BURDEN PERCENT: < 1
OHS CV DC REMOTE DEVICE TYPE: NORMAL
OHS CV RV PACING PERCENT: 1 %

## 2023-12-26 ENCOUNTER — CLINICAL SUPPORT (OUTPATIENT)
Dept: CARDIOLOGY | Facility: HOSPITAL | Age: 83
End: 2023-12-26

## 2023-12-26 DIAGNOSIS — Z95.0 PRESENCE OF CARDIAC PACEMAKER: ICD-10-CM

## 2023-12-27 ENCOUNTER — TELEPHONE (OUTPATIENT)
Dept: FAMILY MEDICINE | Facility: CLINIC | Age: 83
End: 2023-12-27
Payer: MEDICARE

## 2023-12-27 NOTE — TELEPHONE ENCOUNTER
Pt was told that she should have a refill of her medication at [facility]. She said she will call and check

## 2023-12-27 NOTE — TELEPHONE ENCOUNTER
----- Message from Ree Desouza sent at 12/27/2023  3:29 PM CST -----  Regarding: Refill  Type:  RX Refill Request    Who Called: pt   Refill or New Rx:refill     RX Name and Strength:carvediloL (COREG) 3.125 MG tablet  How is the patient currently taking it? (ex. 1XDay):as directed     Is this a 30 day or 90 day RX:90   Preferred Pharmacy with phone number:  [facility] - [place], [place] - [address]  [address] [address]  Phone: [phone] Fax: [phone]    [facility][id number] [place], [place] - [facility] [address] [facility] [address]  [address]  [facility] [place] [place] [address]  Phone: [phone] Fax: [phone]       Local or Mail Order:local   Ordering Provider:tatiana Alfaro Call Back Number:[phone] (home)     Additional Information: Requesting call back once sent to the pharmacy  please advise thank you

## 2024-01-05 ENCOUNTER — TELEPHONE (OUTPATIENT)
Dept: FAMILY MEDICINE | Facility: CLINIC | Age: 84
End: 2024-01-05
Payer: MEDICARE

## 2024-01-05 NOTE — TELEPHONE ENCOUNTER
----- Message from Linda Bishop sent at 1/5/2024 12:18 PM CST -----  Regarding: Call back asap  Type:  Needs Medical Advice    Who Called: Pt      Would the patient rather a call back or a response via [facility]? Call back    Best Call Back Number: [phone]    Additional Information: Pt would like a nurse to callback. Thank you

## 2024-01-05 NOTE — TELEPHONE ENCOUNTER
Patient was prescribed medication for the covid.  Called the office due to unable to find medication.  Patient found medication in [place].

## 2024-01-05 NOTE — TELEPHONE ENCOUNTER
----- Message from Greta Pruett sent at 1/5/2024  7:09 AM CST -----  Contact: self  Pt tested positive for covid late yesterday evening and she wants to know if you can order her the medicine for it. She really wants the medicine to help knock it out fast.  Please call back at [phone] and thanks

## 2024-01-24 DIAGNOSIS — I25.709 CORONARY ARTERY DISEASE INVOLVING CORONARY BYPASS GRAFT OF NATIVE HEART WITH ANGINA PECTORIS: Primary | ICD-10-CM

## 2024-01-24 RX ORDER — EVOLOCUMAB 140 MG/ML
140 INJECTION, SOLUTION SUBCUTANEOUS
Qty: 2 ML | Refills: 6 | Status: ACTIVE | OUTPATIENT
Start: 2024-01-24

## 2024-02-01 ENCOUNTER — HOSPITAL ENCOUNTER (OUTPATIENT)
Dept: CARDIOLOGY | Facility: HOSPITAL | Age: 84
Discharge: HOME | End: 2024-02-01
Attending: INTERNAL MEDICINE
Payer: MEDICARE

## 2024-02-01 PROCEDURE — 93294 REM INTERROG EVL PM/LDLS PM: CPT | Mod: ,,, | Performed by: INTERNAL MEDICINE

## 2024-02-01 PROCEDURE — 93296 REM INTERROG EVL PM/IDS: CPT | Mod: PO | Performed by: INTERNAL MEDICINE

## 2024-02-06 DIAGNOSIS — R07.9 CHEST PAIN, UNSPECIFIED TYPE: Primary | ICD-10-CM

## 2024-02-06 LAB
OHS CV AF BURDEN PERCENT: < 1
OHS CV DC REMOTE DEVICE TYPE: NORMAL
OHS CV RV PACING PERCENT: 3.7 %

## 2024-02-06 RX ORDER — NITROGLYCERIN 0.4 MG/1
0.4 TABLET SUBLINGUAL EVERY 5 MIN PRN
COMMUNITY
End: 2024-02-06 | Stop reason: SDUPTHER

## 2024-02-06 RX ORDER — NITROGLYCERIN 0.4 MG/1
0.4 TABLET SUBLINGUAL EVERY 5 MIN PRN
Qty: 25 TABLET | Refills: 0 | Status: SHIPPED | OUTPATIENT
Start: 2024-02-06

## 2024-02-07 DIAGNOSIS — E78.2 MIXED HYPERLIPIDEMIA: Chronic | ICD-10-CM

## 2024-02-07 RX ORDER — ROSUVASTATIN CALCIUM 20 MG/1
20 TABLET, COATED ORAL NIGHTLY
Qty: 90 TABLET | Refills: 3 | Status: SHIPPED | OUTPATIENT
Start: 2024-02-07 | End: 2024-05-08 | Stop reason: SDUPTHER

## 2024-02-07 NOTE — TELEPHONE ENCOUNTER
Refill Routing Note   Medication(s) are not appropriate for processing by [facility] for the following reason(s):        Non-participating provider    [facility] action(s):  Route             Appointments  past 12m or future 3m with PCP    Date Provider   Last Visit   12/11/2023 Madelin De La Vega, NABILA, WILIAM   Next Visit   3/11/2024 Madelin De La Vega DNP, WILIAM   ED visits in past 90 days: 1        Note composed:8:23 AM 02/07/2024

## 2024-02-09 ENCOUNTER — TELEPHONE (OUTPATIENT)
Dept: FAMILY MEDICINE | Facility: CLINIC | Age: 84
End: 2024-02-09
Payer: MEDICARE

## 2024-02-09 ENCOUNTER — TELEPHONE (OUTPATIENT)
Dept: CARDIOLOGY | Facility: CLINIC | Age: 84
End: 2024-02-09
Payer: MEDICARE

## 2024-02-09 DIAGNOSIS — I10 ESSENTIAL HYPERTENSION: ICD-10-CM

## 2024-02-09 RX ORDER — CARVEDILOL 12.5 MG/1
12.5 TABLET ORAL 2 TIMES DAILY WITH MEALS
Qty: 180 TABLET | Refills: 1 | Status: SHIPPED | OUTPATIENT
Start: 2024-02-09 | End: 2024-03-11 | Stop reason: SDUPTHER

## 2024-02-09 NOTE — TELEPHONE ENCOUNTER
----- Message from Fabby Loera sent at 2/9/2024  9:22 AM CST -----    Type:  Needs Medical Advice    Who Called: pt    Symptoms (please be specific): na     How long has patient had these symptoms:  na    Pharmacy name and phone #:      [id number] - [place], [place] - [address] [facility] [address]  [address]  [facility] [place] [address]  Phone: [phone] Fax: [phone]        Would the patient rather a call back or a response via [facility]? Call back    Best Call Back Number: [phone]      Additional Information: pt would like a different blood pressure, she said the one she is on is to weak. She isnt sure of the last medication that she was taking but would like to be put back on it and doesn't know why the switched her. She said she wants the 12mg ones back that Ceferino was giving her.      Please call Back to advise. Thanks!

## 2024-02-09 NOTE — TELEPHONE ENCOUNTER
----- Message from Eb Ruelas MD sent at 2/9/2024 11:00 AM CST -----  Contact: Patient  OK FOR 12.5 BID  ----- Message -----  From: Kain El MA  Sent: 2/9/2024  10:14 AM CST  To: Eb Ruelas MD    Pt says carvedilol 3.125mg is not working and that she ran out cause she had to take more than 1 at a time so can you send in carvedilol 12mg this time  ----- Message -----  From: Rosa Maria Wilson  Sent: 2/9/2024  10:05 AM CST  To: Suraj BOSTON Staff    Type:  RX Refill Request    Who Called:  Self  Refill or New Rx:  Refill - she is out  RX Name and Strength:  she was on 12 and now the 3.5 is not enough and she wants the 12s again  Preferred Pharmacy with phone number:    [id number] [address] [facility]  [address]  [facility] [address]  Phone: [phone] Fax: [phone]  Local or Mail Order:  Local  Ordering Provider:  Suraj  Best Call Back Number:  [phone]  Additional Information:  Please call the patient back at the phone number listed above to advise. Thank you!             Ftsg Text: The defect edges were debeveled with a #15 scalpel blade.  Given the location of the defect, shape of the defect and the proximity to free margins a full thickness skin graft was deemed most appropriate.  Using a sterile surgical marker, the primary defect shape was transferred to the donor site. The area thus outlined was incised deep to adipose tissue with a #15 scalpel blade.  The harvested graft was then trimmed of adipose tissue until only dermis and epidermis was left.  The skin margins of the secondary defect were undermined to an appropriate distance in all directions utilizing iris scissors.  The secondary defect was closed with interrupted buried subcutaneous sutures.  The skin edges were then re-apposed with running  sutures.  The skin graft was then placed in the primary defect and oriented appropriately.

## 2024-02-09 NOTE — TELEPHONE ENCOUNTER
Talked with patient  and offered appointment due to patient want a change of dosage in medication.  Once appointment was offered patient stated she will contact a different provider.

## 2024-03-11 ENCOUNTER — OFFICE VISIT (OUTPATIENT)
Dept: PRIMARY CARE CLINIC | Facility: CLINIC | Age: 84
End: 2024-03-11
Payer: MEDICARE

## 2024-03-11 VITALS
DIASTOLIC BLOOD PRESSURE: 68 MMHG | HEART RATE: 71 BPM | BODY MASS INDEX: 27.58 KG/M2 | TEMPERATURE: 98 F | SYSTOLIC BLOOD PRESSURE: 112 MMHG | HEIGHT: 65 IN | WEIGHT: 165.56 LBS | OXYGEN SATURATION: 97 %

## 2024-03-11 DIAGNOSIS — I10 ESSENTIAL HYPERTENSION: Primary | ICD-10-CM

## 2024-03-11 DIAGNOSIS — I77.9 MILD CAROTID ARTERY DISEASE: ICD-10-CM

## 2024-03-11 DIAGNOSIS — E78.2 MIXED HYPERLIPIDEMIA: ICD-10-CM

## 2024-03-11 DIAGNOSIS — I50.32 CHRONIC DIASTOLIC HEART FAILURE: ICD-10-CM

## 2024-03-11 PROBLEM — I49.5 SSS (SICK SINUS SYNDROME): Status: RESOLVED | Noted: 2023-03-29 | Resolved: 2024-03-11

## 2024-03-11 PROBLEM — T50.905A DRUG SIDE EFFECTS, INITIAL ENCOUNTER: Status: RESOLVED | Noted: 2019-05-01 | Resolved: 2024-03-11

## 2024-03-11 PROBLEM — I50.33 ACUTE ON CHRONIC DIASTOLIC HEART FAILURE: Status: RESOLVED | Noted: 2023-01-10 | Resolved: 2024-03-11

## 2024-03-11 PROBLEM — R07.89 CHEST WALL DISCOMFORT: Status: RESOLVED | Noted: 2019-05-01 | Resolved: 2024-03-11

## 2024-03-11 PROBLEM — I25.709 CORONARY ARTERY DISEASE INVOLVING CORONARY BYPASS GRAFT OF NATIVE HEART WITH ANGINA PECTORIS: Status: RESOLVED | Noted: 2017-04-18 | Resolved: 2024-03-11

## 2024-03-11 PROCEDURE — 3074F SYST BP LT 130 MM HG: CPT | Mod: CPTII,S$GLB,, | Performed by: NURSE PRACTITIONER

## 2024-03-11 PROCEDURE — 1125F AMNT PAIN NOTED PAIN PRSNT: CPT | Mod: CPTII,S$GLB,, | Performed by: NURSE PRACTITIONER

## 2024-03-11 PROCEDURE — 3078F DIAST BP <80 MM HG: CPT | Mod: CPTII,S$GLB,, | Performed by: NURSE PRACTITIONER

## 2024-03-11 PROCEDURE — 99214 OFFICE O/P EST MOD 30 MIN: CPT | Mod: S$GLB,,, | Performed by: NURSE PRACTITIONER

## 2024-03-11 PROCEDURE — 1159F MED LIST DOCD IN RCRD: CPT | Mod: CPTII,S$GLB,, | Performed by: NURSE PRACTITIONER

## 2024-03-11 PROCEDURE — 1160F RVW MEDS BY RX/DR IN RCRD: CPT | Mod: CPTII,S$GLB,, | Performed by: NURSE PRACTITIONER

## 2024-03-11 RX ORDER — SPIRONOLACTONE 25 MG/1
12.5 TABLET ORAL DAILY
Qty: 45 TABLET | Refills: 3 | Status: SHIPPED | OUTPATIENT
Start: 2024-03-11 | End: 2025-03-11

## 2024-03-11 RX ORDER — CARVEDILOL 12.5 MG/1
6.25 TABLET ORAL 2 TIMES DAILY WITH MEALS
Qty: 180 TABLET | Refills: 1
Start: 2024-03-11 | End: 2025-03-11

## 2024-03-11 NOTE — PROGRESS NOTES
Subjective:       Patient ID: Yael Claire is a 83 y.o. female.    Chief Complaint: Annual exam   Last seen in primary care by me on 12/11/2023  HPI  Here for annual and need refills.   Vitals:    03/11/24 1335   BP: 112/68   Pulse: 71   Temp: 98 °F (36.7 °C)     BP Readings from Last 3 Encounters:   03/11/24 112/68   12/11/23 132/60   10/18/23 (!) 132/58      Pulse Readings from Last 3 Encounters:   03/11/24 71   12/11/23 71   10/18/23 61      Review of Systems   Cardiovascular:  Negative for chest pain and leg swelling.       Lab Results   Component Value Date    HGBA1C 5.0 04/25/2023      States this morning she took 2 of the 3.25 mg or carvedilol  She is concerned about taking the 12.5 mg twice daily.I have explained that she feels better taking  Objective:      Physical Exam  Vitals and nursing note reviewed.   Constitutional:       General: She is awake.      Appearance: Normal appearance. She is well-developed and well-groomed.      Comments: She is using a straight cane at this time   HENT:      Head: Normocephalic and atraumatic.      Right Ear: Hearing and external ear normal.      Left Ear: Hearing and external ear normal.      Nose: Nose normal.   Eyes:      General: Lids are normal.   Cardiovascular:      Rate and Rhythm: Normal rate and regular rhythm.      Heart sounds: Normal heart sounds.      Comments: Right outer ankle with slight puffiness  Pulmonary:      Effort: Pulmonary effort is normal.      Breath sounds: Normal breath sounds.   Abdominal:      General: Abdomen is flat.   Musculoskeletal:         General: Normal range of motion.      Cervical back: Full passive range of motion without pain, normal range of motion and neck supple.   Lymphadenopathy:      Head:      Right side of head: No submental, submandibular, tonsillar, preauricular, posterior auricular or occipital adenopathy.      Left side of head: No submental, submandibular, tonsillar, preauricular, posterior auricular or occipital  adenopathy.   Skin:     General: Skin is warm and dry.   Neurological:      General: No focal deficit present.      Mental Status: She is alert and oriented to person, place, and time.   Psychiatric:         Attention and Perception: Attention and perception normal.         Mood and Affect: Mood and affect normal.         Speech: Speech normal.         Behavior: Behavior normal. Behavior is cooperative.         Thought Content: Thought content normal.         Cognition and Memory: Cognition and memory normal.         Judgment: Judgment normal.         Assessment & Plan:       Essential hypertension  -     carvediloL (COREG) 12.5 MG tablet; Take 0.5 tablets (6.25 mg total) by mouth 2 (two) times daily with meals.  Dispense: 180 tablet; Refill: 1    Mixed hyperlipidemia    Chronic diastolic heart failure  -     spironolactone (ALDACTONE) 25 MG tablet; Take 0.5 tablets (12.5 mg total) by mouth once daily.  Dispense: 45 tablet; Refill: 3    Mild carotid artery disease        I have explained the importance of compliance and to always let me know if she is not taking a medication or a different dosage.  She has plenty of the 3.125 and the 12.5 mg carvedilol but she prefers to take 6.6 twice daily and will use the two dosages she has to accommodate that strength (either two of the 3.125 or half of the 12.5)    Her blood pressure is very slightly low but states felt better shortly after eating some crackers  Continue on Pravachol for CAD  BP Readings from Last 3 Encounters:   03/11/24 112/68   12/11/23 132/60   10/18/23 (!) 132/58      Lab Results   Component Value Date    HGBA1C 5.0 04/25/2023      Medication List with Changes/Refills   Current Medications    ALUMINUM & MAGNESIUM HYDROXIDE-SIMETHICONE (MYLANTA MAXIMUM STRENGTH) 400-400-40 MG/5 ML SUSPENSION    Take 10 mLs by mouth every 6 (six) hours as needed for Indigestion.    ASPIRIN (ECOTRIN) 81 MG EC TABLET    Take 81 mg by mouth once daily.    CO-ENZYME Q-10 30 MG  CAPSULE    Take 30 mg by mouth once daily.     DAPAGLIFLOZIN (FARXIGA) 10 MG TABLET    Take 10 mg by mouth once daily. PT STATES SHE HARDLY TAKES IT AND WAS UNSURE OF MG    DICLOFENAC SODIUM (VOLTAREN) 1 % GEL    Apply 2 g topically 4 (four) times daily.    EVOLOCUMAB (REPATHA SURECLICK) 140 MG/ML PNIJ    Inject 1 mL (140 mg total) into the skin every 14 (fourteen) days.    FAMOTIDINE (PEPCID) 40 MG TABLET    Take 1 tablet (40 mg total) by mouth nightly.    FISH OIL-OMEGA-3 FATTY ACIDS 300-1,000 MG CAPSULE    Take 2 g by mouth once daily.    FOLIC ACID/MULTIVIT-MIN/LUTEIN (CENTRUM SILVER ORAL)    Take 1 tablet by mouth once daily. ONE DAILY    FUROSEMIDE (LASIX) 20 MG TABLET    Take 0.5 tablets (10 mg total) by mouth once daily.    HYDROCODONE-ACETAMINOPHEN (NORCO)  MG PER TABLET    Take 1 tablet by mouth 2 (two) times daily as needed for Pain.    HYDROXYZINE PAMOATE (VISTARIL) 25 MG CAP    Take 1 capsule (25 mg total) by mouth 2 (two) times daily as needed (itching).    NITROGLYCERIN (NITROSTAT) 0.4 MG SL TABLET    Place 1 tablet (0.4 mg total) under the tongue every 5 (five) minutes as needed for Chest pain.    ROSUVASTATIN (CRESTOR) 20 MG TABLET    Take 1 tablet (20 mg total) by mouth every evening.   Changed and/or Refilled Medications    Modified Medication Previous Medication    CARVEDILOL (COREG) 12.5 MG TABLET carvediloL (COREG) 12.5 MG tablet       Take 0.5 tablets (6.25 mg total) by mouth 2 (two) times daily with meals.    Take 1 tablet (12.5 mg total) by mouth 2 (two) times daily with meals.    SPIRONOLACTONE (ALDACTONE) 25 MG TABLET spironolactone (ALDACTONE) 25 MG tablet       Take 0.5 tablets (12.5 mg total) by mouth once daily.    Take 0.5 tablets (12.5 mg total) by mouth once daily.      Follow up in about 4 months (around 7/11/2024).

## 2024-04-18 ENCOUNTER — LAB VISIT (OUTPATIENT)
Dept: PRIMARY CARE CLINIC | Facility: CLINIC | Age: 84
End: 2024-04-18
Payer: MEDICARE

## 2024-04-18 DIAGNOSIS — I50.32 CHRONIC DIASTOLIC HEART FAILURE: Chronic | ICD-10-CM

## 2024-04-18 DIAGNOSIS — I25.709 CORONARY ARTERY DISEASE INVOLVING CORONARY BYPASS GRAFT OF NATIVE HEART WITH ANGINA PECTORIS: Chronic | ICD-10-CM

## 2024-04-18 DIAGNOSIS — E78.2 MIXED HYPERLIPIDEMIA: Chronic | ICD-10-CM

## 2024-04-18 PROCEDURE — 80053 COMPREHEN METABOLIC PANEL: CPT | Performed by: INTERNAL MEDICINE

## 2024-04-18 PROCEDURE — 80061 LIPID PANEL: CPT | Performed by: INTERNAL MEDICINE

## 2024-04-18 PROCEDURE — 36415 COLL VENOUS BLD VENIPUNCTURE: CPT | Mod: S$GLB,,, | Performed by: INTERNAL MEDICINE

## 2024-04-19 LAB
ALBUMIN SERPL BCP-MCNC: 3.5 G/DL (ref 3.5–5.2)
ALP SERPL-CCNC: 139 U/L (ref 55–135)
ALT SERPL W/O P-5'-P-CCNC: 29 U/L (ref 10–44)
ANION GAP SERPL CALC-SCNC: 8 MMOL/L (ref 8–16)
AST SERPL-CCNC: 34 U/L (ref 10–40)
BILIRUB SERPL-MCNC: 0.6 MG/DL (ref 0.1–1)
BUN SERPL-MCNC: 10 MG/DL (ref 8–23)
CALCIUM SERPL-MCNC: 9.3 MG/DL (ref 8.7–10.5)
CHLORIDE SERPL-SCNC: 108 MMOL/L (ref 95–110)
CHOLEST SERPL-MCNC: 192 MG/DL (ref 120–199)
CHOLEST/HDLC SERPL: 3.7 {RATIO} (ref 2–5)
CO2 SERPL-SCNC: 24 MMOL/L (ref 23–29)
CREAT SERPL-MCNC: 0.7 MG/DL (ref 0.5–1.4)
EST. GFR  (NO RACE VARIABLE): >60 ML/MIN/1.73 M^2
GLUCOSE SERPL-MCNC: 89 MG/DL (ref 70–110)
HDLC SERPL-MCNC: 52 MG/DL (ref 40–75)
HDLC SERPL: 27.1 % (ref 20–50)
LDLC SERPL CALC-MCNC: 123 MG/DL (ref 63–159)
NONHDLC SERPL-MCNC: 140 MG/DL
POTASSIUM SERPL-SCNC: 4.2 MMOL/L (ref 3.5–5.1)
PROT SERPL-MCNC: 6.8 G/DL (ref 6–8.4)
SODIUM SERPL-SCNC: 140 MMOL/L (ref 136–145)
TRIGL SERPL-MCNC: 85 MG/DL (ref 30–150)

## 2024-05-02 ENCOUNTER — CLINICAL SUPPORT (OUTPATIENT)
Dept: CARDIOLOGY | Facility: HOSPITAL | Age: 84
End: 2024-05-02
Payer: MEDICARE

## 2024-05-02 ENCOUNTER — HOSPITAL ENCOUNTER (OUTPATIENT)
Dept: CARDIOLOGY | Facility: HOSPITAL | Age: 84
Discharge: HOME | End: 2024-05-02
Attending: INTERNAL MEDICINE

## 2024-05-02 DIAGNOSIS — Z95.0 PRESENCE OF CARDIAC PACEMAKER: ICD-10-CM

## 2024-05-02 PROCEDURE — 93294 REM INTERROG EVL PM/LDLS PM: CPT | Mod: ,,, | Performed by: INTERNAL MEDICINE

## 2024-05-02 PROCEDURE — 93296 REM INTERROG EVL PM/IDS: CPT | Mod: PO | Performed by: INTERNAL MEDICINE

## 2024-05-07 LAB
OHS CV AF BURDEN PERCENT: < 1
OHS CV DC REMOTE DEVICE TYPE: NORMAL
OHS CV RV PACING PERCENT: 15 %

## 2024-05-08 ENCOUNTER — OFFICE VISIT (OUTPATIENT)
Dept: CARDIOLOGY | Facility: CLINIC | Age: 84
End: 2024-05-08
Payer: MEDICARE

## 2024-05-08 VITALS
WEIGHT: 167.13 LBS | HEIGHT: 65 IN | HEART RATE: 62 BPM | BODY MASS INDEX: 27.85 KG/M2 | SYSTOLIC BLOOD PRESSURE: 123 MMHG | DIASTOLIC BLOOD PRESSURE: 58 MMHG

## 2024-05-08 DIAGNOSIS — I27.20 PULMONARY HYPERTENSION: Chronic | ICD-10-CM

## 2024-05-08 DIAGNOSIS — I77.9 MILD CAROTID ARTERY DISEASE: ICD-10-CM

## 2024-05-08 DIAGNOSIS — I10 ESSENTIAL HYPERTENSION: Chronic | ICD-10-CM

## 2024-05-08 DIAGNOSIS — I25.708 CORONARY ARTERY DISEASE OF BYPASS GRAFT OF NATIVE HEART WITH STABLE ANGINA PECTORIS: Chronic | ICD-10-CM

## 2024-05-08 DIAGNOSIS — E78.2 MIXED HYPERLIPIDEMIA: Chronic | ICD-10-CM

## 2024-05-08 DIAGNOSIS — Z95.2 S/P TAVR (TRANSCATHETER AORTIC VALVE REPLACEMENT): Chronic | ICD-10-CM

## 2024-05-08 DIAGNOSIS — Z95.0 CARDIAC PACEMAKER IN SITU: Chronic | ICD-10-CM

## 2024-05-08 DIAGNOSIS — I50.32 CHRONIC DIASTOLIC HEART FAILURE: Primary | Chronic | ICD-10-CM

## 2024-05-08 DIAGNOSIS — I08.0 MITRAL AND AORTIC VALVE DISEASE: Chronic | ICD-10-CM

## 2024-05-08 PROBLEM — I50.9 ACUTE EXACERBATION OF CHF (CONGESTIVE HEART FAILURE): Status: RESOLVED | Noted: 2023-03-20 | Resolved: 2024-05-08

## 2024-05-08 PROCEDURE — 1101F PT FALLS ASSESS-DOCD LE1/YR: CPT | Mod: CPTII,S$GLB,, | Performed by: INTERNAL MEDICINE

## 2024-05-08 PROCEDURE — 1159F MED LIST DOCD IN RCRD: CPT | Mod: CPTII,S$GLB,, | Performed by: INTERNAL MEDICINE

## 2024-05-08 PROCEDURE — 3288F FALL RISK ASSESSMENT DOCD: CPT | Mod: CPTII,S$GLB,, | Performed by: INTERNAL MEDICINE

## 2024-05-08 PROCEDURE — 1125F AMNT PAIN NOTED PAIN PRSNT: CPT | Mod: CPTII,S$GLB,, | Performed by: INTERNAL MEDICINE

## 2024-05-08 PROCEDURE — 3078F DIAST BP <80 MM HG: CPT | Mod: CPTII,S$GLB,, | Performed by: INTERNAL MEDICINE

## 2024-05-08 PROCEDURE — 3074F SYST BP LT 130 MM HG: CPT | Mod: CPTII,S$GLB,, | Performed by: INTERNAL MEDICINE

## 2024-05-08 PROCEDURE — 99214 OFFICE O/P EST MOD 30 MIN: CPT | Mod: S$GLB,,, | Performed by: INTERNAL MEDICINE

## 2024-05-08 RX ORDER — ROSUVASTATIN CALCIUM 20 MG/1
20 TABLET, COATED ORAL NIGHTLY
Qty: 90 TABLET | Refills: 3 | Status: SHIPPED | OUTPATIENT
Start: 2024-05-08 | End: 2025-05-08

## 2024-05-08 NOTE — PROGRESS NOTES
Subjective:    Patient ID:  Yael Claire is a 83 y.o. female who presents for Follow-up        Follow-up  Pertinent negatives include no abdominal pain, change in bowel habit, chest pain (RARE), chills, congestion, coughing, diaphoresis, fever, nausea, numbness, rash, vomiting or weakness.     DISCUSSED LABS AND GOALS CMP OK,  UP FROM 50, HDL 52, STATES COMPLAINT WITH MEDS, SOME SLOWNESS B/O KNEES, PPM CHECK OK, SEE ROS    Past Medical History:   Diagnosis Date    Acid reflux     Acute coronary syndrome     LIGHT 2004    Acute on chronic combined systolic and diastolic heart failure 02/2023    Anticoagulant long-term use     Aortic valve disorder     nonrheumatic    Arthritis     CHF (congestive heart failure)     Coronary artery disease     cabg & valve sgy    H/O: pneumonia     Heart murmur     Hyperlipidemia     Hypertension     Lower leg edema     Mild carotid artery disease 11/17/2021    Palpitations     Stenosis of aortic and mitral valves     Valvular regurgitation      Past Surgical History:   Procedure Laterality Date    A-V CARDIAC PACEMAKER INSERTION N/A 03/29/2023    Procedure: Dual PPM (Rodriguez);  Surgeon: Too Farrell MD;  [facility];  Service: Cardiology;  Laterality: N/A;    ARTERIOGRAPHY OF AORTIC ROOT N/A 02/23/2023    Procedure: ARTERIOGRAM, AORTIC ROOT;  Surgeon: Eb Ruelas MD;  [facility];  Service: Cardiology;  Laterality: N/A;    CARDIAC CATHETERIZATION      CARDIAC VALVE SURGERY      CATARACT EXTRACTION W/  INTRAOCULAR LENS IMPLANT Bilateral     COLONOSCOPY  ~2019    normal findings per patient report    CORONARY ANGIOGRAPHY INCLUDING BYPASS GRAFTS WITH CATHETERIZATION OF LEFT HEART N/A 02/23/2023    Procedure: Right/Left heart Cath w/Grafts;  Surgeon: Eb Ruelas MD;  Location: [facility] [place] C[facility];  Service: Cardiology;  Laterality: N/A;    CORONARY ARTERY BYPASS GRAFT  04/2005    HYSTERECTOMY  1980    INSERTION, PACEMAKER, TEMPORARY  TRANSVENOUS  4/26/2023    Procedure: Insertion, Pacemaker, Temporary Transvenous;  Surgeon: Ryan Castro MD;  Location: [facility];  Service: Cardiology;;    PERCUTANEOUS TRANSLUMINAL ANGIOPLASTY N/A 4/26/2023    Procedure: PTA (ANGIOPLASTY, PERCUTANEOUS, TRANSLUMINAL);  Surgeon: Ryan Castro MD;  Location: [facility];  Service: Cardiology;  Laterality: N/A;    RIGHT HEART CATHETERIZATION N/A 02/23/2023    Procedure: INSERTION, CATHETER, RIGHT HEART;  Surgeon: bE Ruelas MD;  Location: [facility];  Service: Cardiology;  Laterality: N/A;    TRANSCATHETER AORTIC VALVE REPLACEMENT (TAVR) Bilateral 4/26/2023    Procedure: (TAVR);  Surgeon: Ryan Castro MD;  Location: [facility];  Service: Cardiology;  Laterality: Bilateral;    TRANSCATHETER AORTIC VALVE REPLACEMENT (TAVR) Bilateral 4/26/2023    Procedure: REPLACEMENT, AORTIC VALVE, TRANSCATHETER (TAVR);  Surgeon: Jarett Mckee MD;  Location: [facility];  Service: Cardiology;  Laterality: Bilateral;    UPPER GASTROINTESTINAL ENDOSCOPY  ~2010    normal findings     Family History   Problem Relation Name Age of Onset    Hypertension Mother      Hypertension Father      Heart disease Father      Colon cancer Neg Hx      Esophageal cancer Neg Hx      Stomach cancer Neg Hx       Social History     Socioeconomic History    Marital status: Single   Tobacco Use    Smoking status: Never    Smokeless tobacco: Never   Substance and Sexual Activity    Alcohol use: Not Currently     Comment: couple times per yr    Drug use: No     Social Determinants of Health     Financial Resource Strain: Low Risk  (3/20/2023)    Received from [facility], [facility]    Overall Financial Resource Strain (CARDIA)     Difficulty of Paying Living Expenses: Not hard at all   Food Insecurity: No Food Insecurity (3/20/2023)     [facility], [facility]    Hunger Vital Sign     Worried About Running Out of Food in the Last Year: Never true     Ran Out of Food in the Last Year: Never true   Stress: No Stress Concern Present (3/20/2023)    [facility], [facility]    [facility] - Occupational Stress Questionnaire     Feeling of Stress : Not at all       Review of patient's allergies indicates:   Allergen Reactions    Pcn [penicillins] Other (See Comments)     SOB    Clindamycin Diarrhea    Darvocet a500 [propoxyphene n-acetaminophen]     Erythromycin Other (See Comments)    Mycinette [cetylpyridinium-benzocaine]        Current Outpatient Medications:     aluminum & magnesium hydroxide-simethicone (MYLANTA MAXIMUM STRENGTH) 400-400-40 mg/5 mL suspension, Take 10 mLs by mouth every 6 (six) hours as needed for Indigestion., Disp: 3840 mL, Rfl: 0    aspirin (ECOTRIN) 81 MG EC tablet, Take 81 mg by mouth once daily., Disp: , Rfl:     carvediloL (COREG) 12.5 MG tablet, Take 0.5 tablets (6.25 mg total) by mouth 2 (two) times daily with meals., Disp: 180 tablet, Rfl: 1    co-enzyme Q-10 30 mg capsule, Take 30 mg by mouth once daily. , Disp: , Rfl:     diclofenac sodium (VOLTAREN) 1 % Gel, Apply 2 g topically 4 (four) times daily., Disp: 100 g, Rfl: 3    evolocumab (REPATHA SURECLICK) 140 mg/mL PnIj, Inject 1 mL (140 mg total) into the skin every 14 (fourteen) days., Disp: 2 mL, Rfl: 6    famotidine (PEPCID) 40 MG tablet, Take 1 tablet (40 mg total) by mouth nightly., Disp: 90 tablet, Rfl: 3    fish oil-omega-3 fatty acids 300-1,000 mg capsule, Take 2 g by mouth once daily., Disp: , Rfl:      FOLIC ACID/MULTIVIT-MIN/LUTEIN (CENTRUM SILVER ORAL), Take 1 tablet by mouth once daily. ONE DAILY, Disp: , Rfl:     furosemide (LASIX) 20 MG tablet, Take 0.5 tablets (10 mg total) by mouth once daily., Disp: 45 tablet, Rfl: 1    HYDROcodone-acetaminophen (NORCO)  mg per tablet, Take 1 tablet by mouth 2 (two) times daily as needed for Pain., Disp: , Rfl: 0    hydrOXYzine pamoate (VISTARIL) 25 MG Cap, Take 1 capsule (25 mg total) by mouth 2 (two) times daily as needed (itching)., Disp: 30 capsule, Rfl: 0    nitroGLYCERIN (NITROSTAT) 0.4 MG SL tablet, Place 1 tablet (0.4 mg total) under the tongue every 5 (five) minutes as needed for Chest pain., Disp: 25 tablet, Rfl: 0    spironolactone (ALDACTONE) 25 MG tablet, Take 0.5 tablets (12.5 mg total) by mouth once daily., Disp: 45 tablet, Rfl: 3    dapagliflozin (FARXIGA) 10 mg tablet, Take 10 mg by mouth once daily. PT STATES SHE HARDLY TAKES IT AND WAS UNSURE OF MG (Patient not taking: Reported on 5/8/2024), Disp: , Rfl:     rosuvastatin (CRESTOR) 20 MG tablet, Take 1 tablet (20 mg total) by mouth every evening., Disp: 90 tablet, Rfl: 3  No current facility-administered medications for this visit.    Facility-Administered Medications Ordered in Other Visits:     chlorhexidine 0.12 % solution 15 mL, 15 mL, Mouth/Throat, Once, Too Farrell MD    mupirocin 2 % ointment, , Nasal, Once, Too Farrell MD    Review of Systems   Constitutional: Negative for chills, decreased appetite, diaphoresis, fever and malaise/fatigue.   HENT:  Negative for congestion and nosebleeds.    Eyes:  Negative for blurred vision.   Cardiovascular:  Negative for chest pain (RARE), claudication, cyanosis, dyspnea on exertion (MINIMAL), irregular heartbeat, leg swelling, near-syncope, orthopnea, palpitations and paroxysmal nocturnal dyspnea.   Respiratory:  Negative for cough, hemoptysis, shortness of breath and wheezing.    Endocrine: Negative for polyphagia  "and polyuria.   Skin: Negative.  Negative for nail changes and rash.   Musculoskeletal:  Positive for joint pain. Negative for back pain and falls.   Gastrointestinal:  Negative for abdominal pain, change in bowel habit, dysphagia, melena, nausea and vomiting.   Genitourinary:  Negative for dysuria and flank pain.   Neurological:  Negative for brief paralysis, dizziness, focal weakness, light-headedness, numbness and weakness.   Psychiatric/Behavioral:  Negative for altered mental status and depression.    Allergic/Immunologic: Negative for hives and persistent infections.        Objective:      Vitals:    05/08/24 1352   BP: (!) 123/58   Pulse: 62   Weight: 75.8 kg (167 lb 1.7 oz)   Height: 5' 5" (1.651 m)   PainSc:   5   PainLoc: Knee     Body mass index is 27.81 kg/m².    Physical Exam  Constitutional:       General: She is not in acute distress.     Appearance: Normal appearance.   HENT:      Head: Normocephalic and atraumatic.   Eyes:      General: No scleral icterus.     Extraocular Movements: Extraocular movements intact.   Neck:      Vascular: No carotid bruit or JVD.   Cardiovascular:      Rate and Rhythm: Normal rate and regular rhythm. No extrasystoles are present.     Pulses:           Carotid pulses are 2+ on the right side and 2+ on the left side.       Radial pulses are 2+ on the right side and 2+ on the left side.        Posterior tibial pulses are 2+ on the right side and 2+ on the left side.      Heart sounds: Murmur heard.      Systolic murmur is present.      No friction rub. No gallop.   Pulmonary:      Effort: Pulmonary effort is normal. No respiratory distress.      Breath sounds: Normal breath sounds. No rales.   Chest:       Abdominal:      Palpations: Abdomen is soft.      Tenderness: There is no abdominal tenderness.   Musculoskeletal:      Cervical back: Neck supple.      Right lower leg: No edema.      Left lower leg: No edema.      Comments: USES A CANE   Skin:     General: Skin is warm " and dry.      Capillary Refill: Capillary refill takes less than 2 seconds.   Neurological:      General: No focal deficit present.      Mental Status: She is alert and oriented to person, place, and time.      Cranial Nerves: No cranial nerve deficit.   Psychiatric:         Mood and Affect: Mood normal.         Speech: Speech normal.         Behavior: Behavior normal.             ..    Chemistry        Component Value Date/Time     04/18/2024 1005    K 4.2 04/18/2024 1005     04/18/2024 1005    CO2 24 04/18/2024 1005    BUN 10 04/18/2024 1005    CREATININE 0.7 04/18/2024 1005    GLU 89 04/18/2024 1005        Component Value Date/Time    CALCIUM 9.3 04/18/2024 1005    ALKPHOS 139 (H) 04/18/2024 1005    AST 34 04/18/2024 1005    ALT 29 04/18/2024 1005    BILITOT 0.6 04/18/2024 1005    ESTGFRAFRICA 74 03/22/2023 0812    ESTGFRAFRICA >60 04/06/2022 2216    EGFRNONAA >60 04/06/2022 2216            ..  Lab Results   Component Value Date    CHOL 192 04/18/2024    CHOL 99 (L) 01/10/2023    CHOL 159 04/14/2022     Lab Results   Component Value Date    HDL 52 04/18/2024    HDL 37 (L) 01/10/2023    HDL 50 04/14/2022     Lab Results   Component Value Date    LDLCALC 123.0 04/18/2024    LDLCALC 50.4 (L) 01/10/2023    LDLCALC 94.2 04/14/2022     Lab Results   Component Value Date    TRIG 85 04/18/2024    TRIG 58 01/10/2023    TRIG 74 04/14/2022     Lab Results   Component Value Date    CHOLHDL 27.1 04/18/2024    CHOLHDL 37.4 01/10/2023    CHOLHDL 31.4 04/14/2022     ..  Lab Results   Component Value Date    WBC 4.73 06/13/2023    HGB 11.8 (L) 06/13/2023    HCT 37.6 06/13/2023    MCV 98 06/13/2023     06/13/2023       Test(s) Reviewed  I have reviewed the following in detail:  [] Stress test   [] Angiography   [] Echocardiogram   [x] Labs   [x] Other:       Assessment:         ICD-10-CM ICD-9-CM   1. Chronic diastolic heart failure  I50.32 428.32   2. Coronary artery disease of bypass graft of native heart with  stable angina pectoris  I25.708 414.05     413.9   3. Mild carotid artery disease  I77.9 447.9   4. Mixed hyperlipidemia  E78.2 272.2   5. Mitral and aortic valve disease  I08.0 396.9   6. S/P TAVR (transcatheter aortic valve replacement)  Z95.2 V43.3   7. Essential hypertension  I10 401.9   8. Cardiac pacemaker in situ  Z95.0 V45.01   9. Pulmonary hypertension  I27.20 416.8     Problem List Items Addressed This Visit          Cardiac/Vascular    Essential hypertension    Coronary artery disease of bypass graft of native heart with stable angina pectoris    Relevant Orders    Comprehensive Metabolic Panel    Lipid Panel    Echo    Mixed hyperlipidemia    Relevant Medications    rosuvastatin (CRESTOR) 20 MG tablet    Other Relevant Orders    Comprehensive Metabolic Panel    Lipid Panel    S/P TAVR (transcatheter aortic valve replacement)    Relevant Orders    Echo    Mitral and aortic valve disease    Mild carotid artery disease    Pulmonary hypertension    Relevant Orders    Echo    Chronic diastolic heart failure - Primary    Relevant Orders    Comprehensive Metabolic Panel    BNP    Cardiac pacemaker in situ        Plan:     DAILY MEDS, NIGHTLY CRESTOR ALSO SHE IS ON REPATHA, DAILY WEIGHT 2 LB PER DAY 5 LB PER WEEK RULE EXPLAINED, CLASS 1 ANGINA CLASS 2 HEART FAILURE WHICH HAS IMPROVED WALKING EXERCISE PROGRAM NO CLINICAL ARRHYTHMIA RETURN TO CLINIC IN 6 MONTHS WITH LABS AND REPEAT ECHO CHECK VALVES       Chronic diastolic heart failure  Comments:  STABLE  Orders:  -     Comprehensive Metabolic Panel; Future; Expected date: 05/08/2024  -     BNP; Future; Expected date: 11/08/2024    Coronary artery disease of bypass graft of native heart with stable angina pectoris  -     Comprehensive Metabolic Panel; Future; Expected date: 05/08/2024  -     Lipid Panel; Future; Expected date: 05/08/2024  -     Echo    Mild carotid artery disease    Mixed hyperlipidemia  Comments:  DIET AND MEDS  Orders:  -     rosuvastatin  (CRESTOR) 20 MG tablet; Take 1 tablet (20 mg total) by mouth every evening.  Dispense: 90 tablet; Refill: 3  -     Comprehensive Metabolic Panel; Future; Expected date: 05/08/2024  -     Lipid Panel; Future; Expected date: 05/08/2024    Mitral and aortic valve disease    S/P TAVR (transcatheter aortic valve replacement)  -     Echo    Essential hypertension    Cardiac pacemaker in situ    Pulmonary hypertension  Comments:  IMPROVED  Orders:  -     Echo    RTC Low level/low impact aerobic exercise 5x's/wk. Heart healthy diet and risk factor modification.    See labs and med orders.    Aerobic exercise 5x's/wk. Heart healthy diet and risk factor modification.    See labs and med orders.

## 2024-05-29 DIAGNOSIS — Z87.19 HISTORY OF GASTROESOPHAGEAL REFLUX (GERD): ICD-10-CM

## 2024-05-29 NOTE — TELEPHONE ENCOUNTER
Refill Routing Note   Medication(s) are not appropriate for processing by [facility] for the following reason(s):        Non-participating provider    [facility] action(s):  Route               Appointments  past 12m or future 3m with PCP    Date Provider   Last Visit   3/11/2024 Madelin De La Vega, NABILA, WILIAM   Next Visit   7/8/2024 Madelin De La Vega DNP, WILIAM   ED visits in past 90 days: 0        Note composed:4:03 PM 05/29/2024

## 2024-05-30 ENCOUNTER — TELEPHONE (OUTPATIENT)
Dept: CARDIOLOGY | Facility: CLINIC | Age: 84
End: 2024-05-30
Payer: MEDICARE

## 2024-05-31 RX ORDER — FAMOTIDINE 20 MG/1
20 TABLET, FILM COATED ORAL 2 TIMES DAILY
Qty: 180 TABLET | Refills: 3 | Status: SHIPPED | OUTPATIENT
Start: 2024-05-31

## 2024-06-13 ENCOUNTER — CLINICAL SUPPORT (OUTPATIENT)
Dept: CARDIOLOGY | Facility: CLINIC | Age: 84
End: 2024-06-13
Attending: INTERNAL MEDICINE
Payer: MEDICARE

## 2024-06-13 VITALS — BODY MASS INDEX: 26.84 KG/M2 | HEIGHT: 66 IN | WEIGHT: 167 LBS

## 2024-06-13 LAB
AV INDEX (PROSTH): 0.34
AV MEAN GRADIENT: 31 MMHG
AV PEAK GRADIENT: 50 MMHG
AV VALVE AREA BY VELOCITY RATIO: 1.09 CM²
AV VALVE AREA: 1.1 CM²
AV VELOCITY RATIO: 0.34
BSA FOR ECHO PROCEDURE: 1.88 M2
CV ECHO LV RWT: 0.69 CM
DOP CALC AO PEAK VEL: 3.53 M/S
DOP CALC AO VTI: 81.2 CM
DOP CALC LVOT AREA: 3.2 CM2
DOP CALC LVOT DIAMETER: 2.02 CM
DOP CALC LVOT PEAK VEL: 1.2 M/S
DOP CALC LVOT STROKE VOLUME: 89.37 CM3
DOP CALCLVOT PEAK VEL VTI: 27.9 CM
E WAVE DECELERATION TIME: 358.61 MSEC
E/A RATIO: 0.62
E/E' RATIO: 15.83 M/S
ECHO LV POSTERIOR WALL: 1.34 CM (ref 0.6–1.1)
EJECTION FRACTION: 60 %
FRACTIONAL SHORTENING: 32 % (ref 28–44)
INTERVENTRICULAR SEPTUM: 1.52 CM (ref 0.6–1.1)
IVRT: 88.24 MSEC
LEFT ATRIUM VOLUME INDEX MOD: 29.1 ML/M2
LEFT ATRIUM VOLUME MOD: 53.86 CM3
LEFT INTERNAL DIMENSION IN SYSTOLE: 2.64 CM (ref 2.1–4)
LEFT VENTRICLE DIASTOLIC VOLUME INDEX: 35.04 ML/M2
LEFT VENTRICLE DIASTOLIC VOLUME: 64.83 ML
LEFT VENTRICLE MASS INDEX: 111 G/M2
LEFT VENTRICLE SYSTOLIC VOLUME INDEX: 13.8 ML/M2
LEFT VENTRICLE SYSTOLIC VOLUME: 25.53 ML
LEFT VENTRICULAR INTERNAL DIMENSION IN DIASTOLE: 3.87 CM (ref 3.5–6)
LEFT VENTRICULAR MASS: 205.99 G
LV LATERAL E/E' RATIO: 13.57 M/S
LV SEPTAL E/E' RATIO: 19 M/S
LVOT MG: 3 MMHG
LVOT MV: 0.81 CM/S
MV PEAK A VEL: 1.54 M/S
MV PEAK E VEL: 0.95 M/S
MV STENOSIS PRESSURE HALF TIME: 104 MS
MV VALVE AREA P 1/2 METHOD: 2.12 CM2
PISA TR MAX VEL: 2.2 M/S
PULM VEIN S/D RATIO: 1.15
PV PEAK D VEL: 0.54 M/S
PV PEAK S VEL: 0.62 M/S
RA PRESSURE ESTIMATED: 3 MMHG
RV TB RVSP: 5 MMHG
RV TISSUE DOPPLER FREE WALL SYSTOLIC VELOCITY 1 (APICAL 4 CHAMBER VIEW): 8.36 CM/S
SINUS: 2.83 CM
STJ: 2.51 CM
TDI LATERAL: 0.07 M/S
TDI SEPTAL: 0.05 M/S
TDI: 0.06 M/S
TR MAX PG: 19 MMHG
TRICUSPID ANNULAR PLANE SYSTOLIC EXCURSION: 2.17 CM
TV REST PULMONARY ARTERY PRESSURE: 22 MMHG
Z-SCORE OF LEFT VENTRICULAR DIMENSION IN END DIASTOLE: -2.84
Z-SCORE OF LEFT VENTRICULAR DIMENSION IN END SYSTOLE: -1.45

## 2024-07-08 ENCOUNTER — OFFICE VISIT (OUTPATIENT)
Dept: PRIMARY CARE CLINIC | Facility: CLINIC | Age: 84
End: 2024-07-08
Payer: MEDICARE

## 2024-07-08 VITALS
DIASTOLIC BLOOD PRESSURE: 78 MMHG | SYSTOLIC BLOOD PRESSURE: 126 MMHG | HEIGHT: 66 IN | BODY MASS INDEX: 26.58 KG/M2 | TEMPERATURE: 98 F | HEART RATE: 75 BPM | WEIGHT: 165.38 LBS | OXYGEN SATURATION: 96 %

## 2024-07-08 DIAGNOSIS — I10 ESSENTIAL HYPERTENSION: Primary | ICD-10-CM

## 2024-07-08 DIAGNOSIS — R42 DIZZINESS: ICD-10-CM

## 2024-07-08 DIAGNOSIS — E78.2 MIXED HYPERLIPIDEMIA: ICD-10-CM

## 2024-07-08 DIAGNOSIS — M16.11 OSTEOARTHRITIS OF RIGHT HIP, UNSPECIFIED OSTEOARTHRITIS TYPE: ICD-10-CM

## 2024-07-08 DIAGNOSIS — H61.23 BILATERAL IMPACTED CERUMEN: ICD-10-CM

## 2024-07-08 PROCEDURE — 1160F RVW MEDS BY RX/DR IN RCRD: CPT | Mod: CPTII,S$GLB,, | Performed by: NURSE PRACTITIONER

## 2024-07-08 PROCEDURE — 3078F DIAST BP <80 MM HG: CPT | Mod: CPTII,S$GLB,, | Performed by: NURSE PRACTITIONER

## 2024-07-08 PROCEDURE — 1125F AMNT PAIN NOTED PAIN PRSNT: CPT | Mod: CPTII,S$GLB,, | Performed by: NURSE PRACTITIONER

## 2024-07-08 PROCEDURE — 3288F FALL RISK ASSESSMENT DOCD: CPT | Mod: CPTII,S$GLB,, | Performed by: NURSE PRACTITIONER

## 2024-07-08 PROCEDURE — 99214 OFFICE O/P EST MOD 30 MIN: CPT | Mod: S$GLB,,, | Performed by: NURSE PRACTITIONER

## 2024-07-08 PROCEDURE — 1159F MED LIST DOCD IN RCRD: CPT | Mod: CPTII,S$GLB,, | Performed by: NURSE PRACTITIONER

## 2024-07-08 PROCEDURE — 3074F SYST BP LT 130 MM HG: CPT | Mod: CPTII,S$GLB,, | Performed by: NURSE PRACTITIONER

## 2024-07-08 PROCEDURE — 1101F PT FALLS ASSESS-DOCD LE1/YR: CPT | Mod: CPTII,S$GLB,, | Performed by: NURSE PRACTITIONER

## 2024-07-08 NOTE — PROGRESS NOTES
"Subjective:       Patient ID: Yael Claire is a 83 y.o. female.    Chief Complaint: Follow-up chronic medical diagnosis and hip pain bilaterally  Last seen in primary care by me on 03/11/2024  HPI  States has to rub some type of pain cream on her hips regularly. Pain is achy and it goes to 7-8. The "emu cream" helps for 3-4 hour. States the Voltaren does not help as much.  She states she feels that her achiness is related to her injection of Repatha  Vitals:    07/08/24 1405   BP: 126/78   Pulse: 75   Temp: 98.2 °F (36.8 °C)     Review of Systems   Constitutional:  Negative for fatigue.   Musculoskeletal:         States achiness to hips and low back   Neurological:  Positive for dizziness. Negative for headaches.        Dizziness is minimal and off and on       Her last xray of left hip was on 05/11/2023   Objective:      Physical Exam  Vitals and nursing note reviewed.   Constitutional:       General: She is awake.      Appearance: Normal appearance. She is well-developed.      Comments: Using straight can   HENT:      Head: Normocephalic and atraumatic.      Right Ear: There is impacted cerumen.      Left Ear: There is impacted cerumen.      Ears:      Comments: Bilateral cerumen impaction  Eyes:      General: Lids are normal.   Cardiovascular:      Rate and Rhythm: Normal rate and regular rhythm.      Heart sounds: Normal heart sounds.      Comments: Slightly loud harsh with history of valve replacement  Pulmonary:      Effort: Pulmonary effort is normal.      Breath sounds: Normal breath sounds and air entry.   Abdominal:      General: Abdomen is flat.      Palpations: Abdomen is soft.      Tenderness: There is no abdominal tenderness.   Musculoskeletal:        Arms:       Cervical back: Full passive range of motion without pain, normal range of motion and neck supple.      Comments: Tenderness to left upper shoulder and    Lymphadenopathy:      Head:      Right side of head: No submental, submandibular, " tonsillar, preauricular, posterior auricular or occipital adenopathy.      Left side of head: No submental, submandibular, tonsillar, preauricular, posterior auricular or occipital adenopathy.   Skin:     General: Skin is warm and dry.      Findings: No rash.             Comments: Keloid to mid sternum healed surgical site   Neurological:      General: No focal deficit present.      Mental Status: She is alert and oriented to person, place, and time.   Psychiatric:         Attention and Perception: Attention and perception normal.         Mood and Affect: Mood and affect normal.         Speech: Speech normal.         Behavior: Behavior normal. Behavior is cooperative.         Thought Content: Thought content normal.         Cognition and Memory: Cognition and memory normal.         Judgment: Judgment normal.         Assessment & Plan:       Essential hypertension    Osteoarthritis of right hip, unspecified osteoarthritis type    Dizziness    Mixed hyperlipidemia    Bilateral impacted cerumen    BLOOD PRESSURE controlled on Coreg, Aldactone  No changes to medication regimen at this time, No NSAIDs  BP Readings from Last 3 Encounters:   07/08/24 126/78   05/08/24 (!) 123/58   03/11/24 112/68      Osteoarthritis-  Continue the cream she is using OTC and continue her movement daily and walking more inside the home to avoid more stiffness and that movement is important    Mixed Hyperlipidemia-  She is to continue the Repatha and encouraged to take the Co Q 10 twice daily to help with the  I have explained that she is to not stop due to her increased risk and prior cardiac history  No refills needed    Medication List with Changes/Refills   Current Medications    ALUMINUM & MAGNESIUM HYDROXIDE-SIMETHICONE (MYLANTA MAXIMUM STRENGTH) 400-400-40 MG/5 ML SUSPENSION    Take 10 mLs by mouth every 6 (six) hours as needed for Indigestion.    ASPIRIN (ECOTRIN) 81 MG EC TABLET    Take 81 mg by mouth once daily.    CARVEDILOL  (COREG) 12.5 MG TABLET    Take 0.5 tablets (6.25 mg total) by mouth 2 (two) times daily with meals.    CO-ENZYME Q-10 30 MG CAPSULE    Take 30 mg by mouth once daily.     DAPAGLIFLOZIN (FARXIGA) 10 MG TABLET    Take 10 mg by mouth once daily. PT STATES SHE HARDLY TAKES IT AND WAS UNSURE OF MG    DICLOFENAC SODIUM (VOLTAREN) 1 % GEL    Apply 2 g topically 4 (four) times daily.    EVOLOCUMAB (REPATHA SURECLICK) 140 MG/ML PNIJ    Inject 1 mL (140 mg total) into the skin every 14 (fourteen) days.    FAMOTIDINE (PEPCID) 20 MG TABLET    Take 1 tablet (20 mg total) by mouth 2 (two) times daily.    FISH OIL-OMEGA-3 FATTY ACIDS 300-1,000 MG CAPSULE    Take 2 g by mouth once daily.    FOLIC ACID/MULTIVIT-MIN/LUTEIN (CENTRUM SILVER ORAL)    Take 1 tablet by mouth once daily. ONE DAILY    FUROSEMIDE (LASIX) 20 MG TABLET    Take 0.5 tablets (10 mg total) by mouth once daily.    HYDROCODONE-ACETAMINOPHEN (NORCO)  MG PER TABLET    Take 1 tablet by mouth 2 (two) times daily as needed for Pain.    HYDROXYZINE PAMOATE (VISTARIL) 25 MG CAP    Take 1 capsule (25 mg total) by mouth 2 (two) times daily as needed (itching).    NITROGLYCERIN (NITROSTAT) 0.4 MG SL TABLET    Place 1 tablet (0.4 mg total) under the tongue every 5 (five) minutes as needed for Chest pain.    ROSUVASTATIN (CRESTOR) 20 MG TABLET    Take 1 tablet (20 mg total) by mouth every evening.    SPIRONOLACTONE (ALDACTONE) 25 MG TABLET    Take 0.5 tablets (12.5 mg total) by mouth once daily.          Follow up in about 2 months (around 9/8/2024).

## 2024-07-18 ENCOUNTER — HOSPITAL ENCOUNTER (OUTPATIENT)
Dept: CARDIOLOGY | Facility: HOSPITAL | Age: 84
Discharge: HOME | End: 2024-07-18
Attending: INTERNAL MEDICINE
Payer: MEDICARE

## 2024-07-18 DIAGNOSIS — Z95.0 CARDIAC PACEMAKER IN SITU: ICD-10-CM

## 2024-07-18 DIAGNOSIS — I50.43 ACUTE ON CHRONIC COMBINED SYSTOLIC AND DIASTOLIC HEART FAILURE: ICD-10-CM

## 2024-07-18 PROCEDURE — 93280 PM DEVICE PROGR EVAL DUAL: CPT | Mod: HCNC,PO

## 2024-08-01 ENCOUNTER — HOSPITAL ENCOUNTER (OUTPATIENT)
Dept: CARDIOLOGY | Facility: HOSPITAL | Age: 84
Discharge: HOME | End: 2024-08-01
Attending: INTERNAL MEDICINE
Payer: MEDICARE

## 2024-08-01 ENCOUNTER — CLINICAL SUPPORT (OUTPATIENT)
Dept: CARDIOLOGY | Facility: HOSPITAL | Age: 84
End: 2024-08-01
Payer: MEDICARE

## 2024-08-01 DIAGNOSIS — Z95.0 PRESENCE OF CARDIAC PACEMAKER: ICD-10-CM

## 2024-08-01 PROCEDURE — 93296 REM INTERROG EVL PM/IDS: CPT | Mod: HCNC,PO | Performed by: INTERNAL MEDICINE

## 2024-08-01 PROCEDURE — 93294 REM INTERROG EVL PM/LDLS PM: CPT | Mod: HCNC,,, | Performed by: INTERNAL MEDICINE

## 2024-08-02 DIAGNOSIS — I10 ESSENTIAL HYPERTENSION: ICD-10-CM

## 2024-08-02 RX ORDER — CARVEDILOL 12.5 MG/1
6.25 TABLET ORAL 2 TIMES DAILY WITH MEALS
Qty: 90 TABLET | Refills: 3 | Status: CANCELLED | OUTPATIENT
Start: 2024-08-02 | End: 2025-08-02

## 2024-08-02 NOTE — TELEPHONE ENCOUNTER
----- Message from Kaleigh Allen sent at 8/2/2024  1:30 PM CDT -----  non-specific  Please call [facility] and verify her script for blood pressure meds       [id number] - [place] - [address] [facility] [address]  [address]  [place] [address]  Phone: [phone] Fax: [phone]    Please call to advise  [phone]

## 2024-08-05 DIAGNOSIS — I10 ESSENTIAL HYPERTENSION: ICD-10-CM

## 2024-08-05 RX ORDER — CARVEDILOL 12.5 MG/1
6.25 TABLET ORAL 2 TIMES DAILY WITH MEALS
Qty: 60 TABLET | Refills: 0 | Status: SHIPPED | OUTPATIENT
Start: 2024-08-05 | End: 2024-08-05 | Stop reason: SDUPTHER

## 2024-08-05 RX ORDER — CARVEDILOL 12.5 MG/1
6.25 TABLET ORAL 2 TIMES DAILY WITH MEALS
Qty: 90 TABLET | Refills: 1 | Status: SHIPPED | OUTPATIENT
Start: 2024-08-05 | End: 2024-08-07 | Stop reason: SDUPTHER

## 2024-08-08 RX ORDER — CARVEDILOL 12.5 MG/1
6.25 TABLET ORAL 2 TIMES DAILY WITH MEALS
Qty: 90 TABLET | Refills: 1 | Status: SHIPPED | OUTPATIENT
Start: 2024-08-08 | End: 2025-08-08

## 2024-08-21 DIAGNOSIS — I25.709 CORONARY ARTERY DISEASE INVOLVING CORONARY BYPASS GRAFT OF NATIVE HEART WITH ANGINA PECTORIS: ICD-10-CM

## 2024-08-21 RX ORDER — EVOLOCUMAB 140 MG/ML
140 INJECTION, SOLUTION SUBCUTANEOUS
Qty: 2 ML | Refills: 6 | Status: ACTIVE | OUTPATIENT
Start: 2024-08-21

## 2024-08-30 LAB
OHS CV AF BURDEN PERCENT: < 1
OHS CV DC REMOTE DEVICE TYPE: NORMAL
OHS CV RV PACING PERCENT: 17 %

## 2024-09-09 ENCOUNTER — OFFICE VISIT (OUTPATIENT)
Dept: PRIMARY CARE CLINIC | Facility: CLINIC | Age: 84
End: 2024-09-09
Payer: MEDICARE

## 2024-09-09 VITALS
BODY MASS INDEX: 27.04 KG/M2 | HEART RATE: 82 BPM | RESPIRATION RATE: 18 BRPM | OXYGEN SATURATION: 97 % | WEIGHT: 167.56 LBS | SYSTOLIC BLOOD PRESSURE: 126 MMHG | DIASTOLIC BLOOD PRESSURE: 88 MMHG

## 2024-09-09 DIAGNOSIS — Z78.0 ASYMPTOMATIC MENOPAUSAL STATE: ICD-10-CM

## 2024-09-09 DIAGNOSIS — G89.29 CHRONIC PAIN OF LEFT KNEE: ICD-10-CM

## 2024-09-09 DIAGNOSIS — I50.32 CHRONIC DIASTOLIC HEART FAILURE: ICD-10-CM

## 2024-09-09 DIAGNOSIS — R42 LIGHTHEADEDNESS: ICD-10-CM

## 2024-09-09 DIAGNOSIS — M25.562 CHRONIC PAIN OF LEFT KNEE: ICD-10-CM

## 2024-09-09 DIAGNOSIS — I10 ESSENTIAL HYPERTENSION: Primary | ICD-10-CM

## 2024-09-09 PROCEDURE — 3079F DIAST BP 80-89 MM HG: CPT | Mod: CPTII,S$GLB,, | Performed by: NURSE PRACTITIONER

## 2024-09-09 PROCEDURE — 1101F PT FALLS ASSESS-DOCD LE1/YR: CPT | Mod: CPTII,S$GLB,, | Performed by: NURSE PRACTITIONER

## 2024-09-09 PROCEDURE — 1160F RVW MEDS BY RX/DR IN RCRD: CPT | Mod: CPTII,S$GLB,, | Performed by: NURSE PRACTITIONER

## 2024-09-09 PROCEDURE — 1159F MED LIST DOCD IN RCRD: CPT | Mod: CPTII,S$GLB,, | Performed by: NURSE PRACTITIONER

## 2024-09-09 PROCEDURE — 3288F FALL RISK ASSESSMENT DOCD: CPT | Mod: CPTII,S$GLB,, | Performed by: NURSE PRACTITIONER

## 2024-09-09 PROCEDURE — 1126F AMNT PAIN NOTED NONE PRSNT: CPT | Mod: CPTII,S$GLB,, | Performed by: NURSE PRACTITIONER

## 2024-09-09 PROCEDURE — 99214 OFFICE O/P EST MOD 30 MIN: CPT | Mod: S$GLB,,, | Performed by: NURSE PRACTITIONER

## 2024-09-09 PROCEDURE — 3074F SYST BP LT 130 MM HG: CPT | Mod: CPTII,S$GLB,, | Performed by: NURSE PRACTITIONER

## 2024-09-09 RX ORDER — DICLOFENAC SODIUM 10 MG/G
2 GEL TOPICAL 4 TIMES DAILY
Qty: 100 G | Refills: 3 | Status: SHIPPED | OUTPATIENT
Start: 2024-09-09

## 2024-09-09 RX ORDER — SPIRONOLACTONE 25 MG/1
12.5 TABLET ORAL DAILY
Start: 2024-09-09 | End: 2025-09-09

## 2024-09-09 NOTE — PROGRESS NOTES
Subjective:       Patient ID: Yael Claire is a 83 y.o. female.    Chief Complaint: Follow-up chronic medical.  Last seen in primary care by me on 07/08/2024.  HPI  Here to follow HTN and some lightheadedness.  Vitals:    09/09/24 1406   BP: 126/88   Pulse: 82   Resp: 18     Review of Systems    States having a little light headed when she first gets up and feel a little better when she starts to eat and drinking. She started taking her blood pressure when it happens and she notice BLOOD PRESSURE was running a little low.  Wt Readings from Last 3 Encounters:   09/09/24 1406 76 kg (167 lb 8.8 oz)   07/08/24 1405 75 kg (165 lb 5.5 oz)   06/13/24 1558 75.8 kg (167 lb)      Lab Results   Component Value Date    WBC 4.73 06/13/2023    HGB 11.8 (L) 06/13/2023    HCT 37.6 06/13/2023    MCV 98 06/13/2023     06/13/2023        Objective:      Physical Exam  Vitals and nursing note reviewed.   Constitutional:       General: She is awake.      Appearance: Normal appearance. She is well-developed and well-groomed.   HENT:      Head: Normocephalic and atraumatic.   Eyes:      General: Lids are normal.         Right eye: No foreign body, discharge or hordeolum.         Left eye: No foreign body, discharge or hordeolum.   Cardiovascular:      Rate and Rhythm: Normal rate and regular rhythm.      Heart sounds: Normal heart sounds.      Comments: Loud hars sound consistent with aortic valve disease  Pulmonary:      Effort: Pulmonary effort is normal.      Breath sounds: Normal breath sounds and air entry.   Musculoskeletal:         General: Normal range of motion.        Arms:       Cervical back: Full passive range of motion without pain, normal range of motion and neck supple.      Right lower leg: No edema.      Left lower leg: No edema.      Comments: States achy feeling at the sites above at the first part of the day and sometimes uses and OTC (Salon Pas) patch and this helps the pain greatly   Lymphadenopathy:       Head:      Right side of head: No submental, submandibular, tonsillar, preauricular, posterior auricular or occipital adenopathy.      Left side of head: No submental, submandibular, tonsillar, preauricular, posterior auricular or occipital adenopathy.   Skin:     General: Skin is warm and dry.      Findings: No rash.             Comments: Large keloid area   Neurological:      General: No focal deficit present.      Mental Status: She is alert and oriented to person, place, and time.   Psychiatric:         Attention and Perception: Attention and perception normal.         Mood and Affect: Mood and affect normal.         Speech: Speech normal.         Behavior: Behavior normal. Behavior is cooperative.         Thought Content: Thought content normal.         Cognition and Memory: Cognition and memory normal.         Judgment: Judgment normal.         Assessment & Plan:       Essential hypertension    Lightheadedness    Chronic diastolic heart failure  -     spironolactone (ALDACTONE) 25 MG tablet; Take 0.5 tablets (12.5 mg total) by mouth once daily.    Chronic pain of left knee  -     diclofenac sodium (VOLTAREN) 1 % Gel; Apply 2 g topically 4 (four) times daily.  Dispense: 100 g; Refill: 3    Asymptomatic menopausal state  -     DXA Bone Density Axial Skeleton 1 or more sites; Future; Expected date: 09/09/2024    BLOOD PRESSURE is controlled with some episodes of slightly low home readings  She will hold the aldactone until her next visit  She is taking all of her med's as ordered  Encouraged to continue home BLOOD PRESSURE readings and record on log given  She has been instructed to go to ED if she notices that the lightheadedness is worsening  She will be getting all labs ordered by Myrna Villarreal and Dr. Ruelas on 11/14      Medication List with Changes/Refills   Current Medications    ALUMINUM & MAGNESIUM HYDROXIDE-SIMETHICONE (MYLANTA MAXIMUM STRENGTH) 400-400-40 MG/5 ML SUSPENSION    Take 10 mLs by mouth  every 6 (six) hours as needed for Indigestion.    ASPIRIN (ECOTRIN) 81 MG EC TABLET    Take 81 mg by mouth once daily.    CARVEDILOL (COREG) 12.5 MG TABLET    Take 0.5 tablets (6.25 mg total) by mouth 2 (two) times daily with meals.    CO-ENZYME Q-10 30 MG CAPSULE    Take 30 mg by mouth once daily.     DAPAGLIFLOZIN (FARXIGA) 10 MG TABLET    Take 10 mg by mouth once daily. PT STATES SHE HARDLY TAKES IT AND WAS UNSURE OF MG    EVOLOCUMAB (REPATHA SURECLICK) 140 MG/ML PNIJ    Inject 1 mL (140 mg total) into the skin every 14 (fourteen) days.    FAMOTIDINE (PEPCID) 20 MG TABLET    Take 1 tablet (20 mg total) by mouth 2 (two) times daily.    FISH OIL-OMEGA-3 FATTY ACIDS 300-1,000 MG CAPSULE    Take 2 g by mouth once daily.    FOLIC ACID/MULTIVIT-MIN/LUTEIN (CENTRUM SILVER ORAL)    Take 1 tablet by mouth once daily. ONE DAILY    FUROSEMIDE (LASIX) 20 MG TABLET    Take 0.5 tablets (10 mg total) by mouth once daily.    HYDROCODONE-ACETAMINOPHEN (NORCO)  MG PER TABLET    Take 1 tablet by mouth 2 (two) times daily as needed for Pain.    HYDROXYZINE PAMOATE (VISTARIL) 25 MG CAP    Take 1 capsule (25 mg total) by mouth 2 (two) times daily as needed (itching).    NITROGLYCERIN (NITROSTAT) 0.4 MG SL TABLET    Place 1 tablet (0.4 mg total) under the tongue every 5 (five) minutes as needed for Chest pain.    ROSUVASTATIN (CRESTOR) 20 MG TABLET    Take 1 tablet (20 mg total) by mouth every evening.   Changed and/or Refilled Medications    Modified Medication Previous Medication    DICLOFENAC SODIUM (VOLTAREN) 1 % GEL diclofenac sodium (VOLTAREN) 1 % Gel       Apply 2 g topically 4 (four) times daily.    Apply 2 g topically 4 (four) times daily.    SPIRONOLACTONE (ALDACTONE) 25 MG TABLET spironolactone (ALDACTONE) 25 MG tablet       Take 0.5 tablets (12.5 mg total) by mouth once daily.    Take 0.5 tablets (12.5 mg total) by mouth once daily.      Follow up in about 3 months (around 12/9/2024).

## 2024-09-23 ENCOUNTER — TELEPHONE (OUTPATIENT)
Dept: FAMILY MEDICINE | Facility: CLINIC | Age: 84
End: 2024-09-23
Payer: MEDICARE

## 2024-09-23 NOTE — TELEPHONE ENCOUNTER
----- Message from Georgette Dewitt sent at 9/23/2024  3:20 PM CDT -----  Name of Who is Calling:MARGARET BARDALES [[id number]]                   What is the request in detail:pt wants to get a bone test done please call pt back                   Can the clinic reply by [facility]: No                   What Number to Call Back if not in [facility]:[phone]

## 2024-10-09 DIAGNOSIS — I10 ESSENTIAL HYPERTENSION: ICD-10-CM

## 2024-10-09 NOTE — TELEPHONE ENCOUNTER
----- Message from Ting sent at 10/9/2024  1:14 PM CDT -----  Type: Needs Medical Advice  Who Called:  pt  Pharmacy name and phone #:    [facility] Call Back Number: [phone]  Additional Information: pt is calling the office for carvediloL (COREG) 12.5 MG tablet  She usually receive them through mail order but they wont be there until a certain date, asking that a small rx be sent over to hold her until she receives her mail order

## 2024-10-09 NOTE — TELEPHONE ENCOUNTER
Patient would like a small supply sent to her local pharmacy. Just enough to last until the mail order supply arrive.      Please assist provider out.

## 2024-10-10 RX ORDER — CARVEDILOL 12.5 MG/1
6.25 TABLET ORAL 2 TIMES DAILY WITH MEALS
Qty: 10 TABLET | Refills: 0 | Status: SHIPPED | OUTPATIENT
Start: 2024-10-10 | End: 2025-10-10

## 2024-10-31 ENCOUNTER — HOSPITAL ENCOUNTER (OUTPATIENT)
Dept: CARDIOLOGY | Facility: HOSPITAL | Age: 84
Discharge: HOME | End: 2024-10-31
Attending: INTERNAL MEDICINE
Payer: MEDICARE

## 2024-10-31 ENCOUNTER — CLINICAL SUPPORT (OUTPATIENT)
Dept: CARDIOLOGY | Facility: HOSPITAL | Age: 84
End: 2024-10-31
Payer: MEDICARE

## 2024-10-31 DIAGNOSIS — Z95.0 PRESENCE OF CARDIAC PACEMAKER: ICD-10-CM

## 2024-10-31 PROCEDURE — 93294 REM INTERROG EVL PM/LDLS PM: CPT | Mod: HCNC,,, | Performed by: INTERNAL MEDICINE

## 2024-10-31 PROCEDURE — 93296 REM INTERROG EVL PM/IDS: CPT | Mod: HCNC,PO | Performed by: INTERNAL MEDICINE

## 2024-11-14 ENCOUNTER — LAB VISIT (OUTPATIENT)
Dept: PRIMARY CARE CLINIC | Facility: CLINIC | Age: 84
End: 2024-11-14
Payer: MEDICARE

## 2024-11-14 DIAGNOSIS — E78.2 MIXED HYPERLIPIDEMIA: Chronic | ICD-10-CM

## 2024-11-14 DIAGNOSIS — I25.708 CORONARY ARTERY DISEASE OF BYPASS GRAFT OF NATIVE HEART WITH STABLE ANGINA PECTORIS: Chronic | ICD-10-CM

## 2024-11-14 DIAGNOSIS — Z95.2 S/P TAVR (TRANSCATHETER AORTIC VALVE REPLACEMENT): ICD-10-CM

## 2024-11-14 DIAGNOSIS — I50.32 CHRONIC DIASTOLIC HEART FAILURE: Chronic | ICD-10-CM

## 2024-11-14 LAB
ALBUMIN SERPL BCP-MCNC: 3.4 G/DL (ref 3.5–5.2)
ALBUMIN SERPL BCP-MCNC: 3.4 G/DL (ref 3.5–5.2)
ALP SERPL-CCNC: 131 U/L (ref 40–150)
ALP SERPL-CCNC: 131 U/L (ref 40–150)
ALT SERPL W/O P-5'-P-CCNC: 25 U/L (ref 10–44)
ALT SERPL W/O P-5'-P-CCNC: 25 U/L (ref 10–44)
ANION GAP SERPL CALC-SCNC: 7 MMOL/L (ref 8–16)
ANION GAP SERPL CALC-SCNC: 7 MMOL/L (ref 8–16)
AST SERPL-CCNC: 34 U/L (ref 10–40)
AST SERPL-CCNC: 34 U/L (ref 10–40)
BASOPHILS # BLD AUTO: 0.05 K/UL (ref 0–0.2)
BASOPHILS NFR BLD: 0.6 % (ref 0–1.9)
BILIRUB SERPL-MCNC: 0.5 MG/DL (ref 0.1–1)
BILIRUB SERPL-MCNC: 0.5 MG/DL (ref 0.1–1)
BNP SERPL-MCNC: 97 PG/ML (ref 0–99)
BUN SERPL-MCNC: 14 MG/DL (ref 8–23)
BUN SERPL-MCNC: 14 MG/DL (ref 8–23)
CALCIUM SERPL-MCNC: 8.7 MG/DL (ref 8.7–10.5)
CALCIUM SERPL-MCNC: 8.7 MG/DL (ref 8.7–10.5)
CHLORIDE SERPL-SCNC: 106 MMOL/L (ref 95–110)
CHLORIDE SERPL-SCNC: 106 MMOL/L (ref 95–110)
CHOLEST SERPL-MCNC: 177 MG/DL (ref 120–199)
CHOLEST/HDLC SERPL: 3.8 {RATIO} (ref 2–5)
CO2 SERPL-SCNC: 27 MMOL/L (ref 23–29)
CO2 SERPL-SCNC: 27 MMOL/L (ref 23–29)
CREAT SERPL-MCNC: 0.8 MG/DL (ref 0.5–1.4)
CREAT SERPL-MCNC: 0.8 MG/DL (ref 0.5–1.4)
DIFFERENTIAL METHOD BLD: ABNORMAL
EOSINOPHIL # BLD AUTO: 0.7 K/UL (ref 0–0.5)
EOSINOPHIL NFR BLD: 8.1 % (ref 0–8)
ERYTHROCYTE [DISTWIDTH] IN BLOOD BY AUTOMATED COUNT: 11.9 % (ref 11.5–14.5)
EST. GFR  (NO RACE VARIABLE): >60 ML/MIN/1.73 M^2
EST. GFR  (NO RACE VARIABLE): >60 ML/MIN/1.73 M^2
GLUCOSE SERPL-MCNC: 77 MG/DL (ref 70–110)
GLUCOSE SERPL-MCNC: 77 MG/DL (ref 70–110)
HCT VFR BLD AUTO: 38.7 % (ref 37–48.5)
HDLC SERPL-MCNC: 47 MG/DL (ref 40–75)
HDLC SERPL: 26.6 % (ref 20–50)
HGB BLD-MCNC: 12.6 G/DL (ref 12–16)
IMM GRANULOCYTES # BLD AUTO: 0.05 K/UL (ref 0–0.04)
IMM GRANULOCYTES NFR BLD AUTO: 0.6 % (ref 0–0.5)
LDLC SERPL CALC-MCNC: 110.4 MG/DL (ref 63–159)
LYMPHOCYTES # BLD AUTO: 1.2 K/UL (ref 1–4.8)
LYMPHOCYTES NFR BLD: 14.2 % (ref 18–48)
MCH RBC QN AUTO: 32.2 PG (ref 27–31)
MCHC RBC AUTO-ENTMCNC: 32.6 G/DL (ref 32–36)
MCV RBC AUTO: 99 FL (ref 82–98)
MONOCYTES # BLD AUTO: 0.5 K/UL (ref 0.3–1)
MONOCYTES NFR BLD: 6.3 % (ref 4–15)
NEUTROPHILS # BLD AUTO: 5.7 K/UL (ref 1.8–7.7)
NEUTROPHILS NFR BLD: 70.2 % (ref 38–73)
NONHDLC SERPL-MCNC: 130 MG/DL
NRBC BLD-RTO: 0 /100 WBC
PLATELET # BLD AUTO: 212 K/UL (ref 150–450)
PMV BLD AUTO: 10.2 FL (ref 9.2–12.9)
POTASSIUM SERPL-SCNC: 4.2 MMOL/L (ref 3.5–5.1)
POTASSIUM SERPL-SCNC: 4.2 MMOL/L (ref 3.5–5.1)
PROT SERPL-MCNC: 6.8 G/DL (ref 6–8.4)
PROT SERPL-MCNC: 6.8 G/DL (ref 6–8.4)
RBC # BLD AUTO: 3.91 M/UL (ref 4–5.4)
SODIUM SERPL-SCNC: 140 MMOL/L (ref 136–145)
SODIUM SERPL-SCNC: 140 MMOL/L (ref 136–145)
TRIGL SERPL-MCNC: 98 MG/DL (ref 30–150)
WBC # BLD AUTO: 8.1 K/UL (ref 3.9–12.7)

## 2024-11-14 PROCEDURE — 83880 ASSAY OF NATRIURETIC PEPTIDE: CPT | Mod: HCNC | Performed by: INTERNAL MEDICINE

## 2024-11-14 PROCEDURE — 80053 COMPREHEN METABOLIC PANEL: CPT | Mod: HCNC | Performed by: INTERNAL MEDICINE

## 2024-11-14 PROCEDURE — 85025 COMPLETE CBC W/AUTO DIFF WBC: CPT | Mod: HCNC | Performed by: NURSE PRACTITIONER

## 2024-11-14 PROCEDURE — 80061 LIPID PANEL: CPT | Mod: HCNC | Performed by: INTERNAL MEDICINE

## 2024-11-14 NOTE — PROGRESS NOTES
Confirmed name and , venipuncture performed with 25 gauge butterfly, x's 2 attempt , L antecubital. Labs drawn per orders, pressure held X 1 min, Coban dressing applied. Advised pt in aftercare instructions.

## 2024-11-15 LAB
OHS CV AF BURDEN PERCENT: < 1
OHS CV DC REMOTE DEVICE TYPE: NORMAL
OHS CV RV PACING PERCENT: 19 %

## 2024-11-20 ENCOUNTER — OFFICE VISIT (OUTPATIENT)
Dept: CARDIOLOGY | Facility: CLINIC | Age: 84
End: 2024-11-20
Payer: MEDICARE

## 2024-11-20 VITALS
DIASTOLIC BLOOD PRESSURE: 62 MMHG | RESPIRATION RATE: 18 BRPM | BODY MASS INDEX: 27.51 KG/M2 | HEART RATE: 72 BPM | WEIGHT: 170.44 LBS | SYSTOLIC BLOOD PRESSURE: 131 MMHG

## 2024-11-20 DIAGNOSIS — Z79.02 LONG TERM (CURRENT) USE OF ANTITHROMBOTICS/ANTIPLATELETS: Chronic | ICD-10-CM

## 2024-11-20 DIAGNOSIS — I08.0 MITRAL AND AORTIC VALVE DISEASE: Chronic | ICD-10-CM

## 2024-11-20 DIAGNOSIS — I77.9 MILD CAROTID ARTERY DISEASE: ICD-10-CM

## 2024-11-20 DIAGNOSIS — I50.33 ACUTE ON CHRONIC DIASTOLIC HEART FAILURE: ICD-10-CM

## 2024-11-20 DIAGNOSIS — I25.708 CORONARY ARTERY DISEASE OF BYPASS GRAFT OF NATIVE HEART WITH STABLE ANGINA PECTORIS: Primary | Chronic | ICD-10-CM

## 2024-11-20 DIAGNOSIS — I50.32 CHRONIC DIASTOLIC HEART FAILURE: Chronic | ICD-10-CM

## 2024-11-20 DIAGNOSIS — I10 ESSENTIAL HYPERTENSION: Chronic | ICD-10-CM

## 2024-11-20 PROBLEM — I27.20 PULMONARY HYPERTENSION: Status: RESOLVED | Noted: 2023-04-10 | Resolved: 2024-11-20

## 2024-11-20 PROBLEM — R09.89 BRUIT OF LEFT CAROTID ARTERY: Status: RESOLVED | Noted: 2021-05-19 | Resolved: 2024-11-20

## 2024-11-20 PROCEDURE — 1101F PT FALLS ASSESS-DOCD LE1/YR: CPT | Mod: CPTII,S$GLB,, | Performed by: INTERNAL MEDICINE

## 2024-11-20 PROCEDURE — 1159F MED LIST DOCD IN RCRD: CPT | Mod: CPTII,S$GLB,, | Performed by: INTERNAL MEDICINE

## 2024-11-20 PROCEDURE — 1125F AMNT PAIN NOTED PAIN PRSNT: CPT | Mod: CPTII,S$GLB,, | Performed by: INTERNAL MEDICINE

## 2024-11-20 PROCEDURE — 3075F SYST BP GE 130 - 139MM HG: CPT | Mod: CPTII,S$GLB,, | Performed by: INTERNAL MEDICINE

## 2024-11-20 PROCEDURE — 99214 OFFICE O/P EST MOD 30 MIN: CPT | Mod: S$GLB,,, | Performed by: INTERNAL MEDICINE

## 2024-11-20 PROCEDURE — 3078F DIAST BP <80 MM HG: CPT | Mod: CPTII,S$GLB,, | Performed by: INTERNAL MEDICINE

## 2024-11-20 PROCEDURE — 3288F FALL RISK ASSESSMENT DOCD: CPT | Mod: CPTII,S$GLB,, | Performed by: INTERNAL MEDICINE

## 2024-11-20 RX ORDER — DAPAGLIFLOZIN 10 MG/1
10 TABLET, FILM COATED ORAL DAILY
Qty: 90 TABLET | Refills: 1 | Status: SHIPPED | OUTPATIENT
Start: 2024-11-20

## 2024-11-20 NOTE — PROGRESS NOTES
Subjective:    Patient ID:  Yael Claire is a 84 y.o. female who presents for Coronary Artery Disease and Valvular Heart Disease        HPI  RECENT LABS CMP OK BNP 97 , ECHO NORMAL LV FUNCTION MILDLY DILATED LEFT ATRIUM TAVR MODERATE STENOSIS AORTIC VALVE AREA 1.1 CM2 MILD MITRAL REGURGITATION PA PRESSURE 22 MM OF MERCURY, ER VISIT TO [facility] [place]  WITH GI SX NO TRUE CP, SEE ROS       Left Ventricle: The left ventricle is normal in size. There is concentric hypertrophy. There is normal systolic function. Ejection fraction by visual approximation is 60%.    Right Ventricle: Normal right ventricular cavity size. Systolic function is normal.    Left Atrium: Left atrium is mildly dilated.    Aortic Valve: There is a transcatheter valve replacement in the aortic position. It is reported to be a Medtronic valve. Mildly to moderately restricted motion. There is moderate stenosis. Aortic valve area by VTI is 1.10 cm². Aortic valve peak velocity is 3.53 m/s. Mean gradient is 31 mmHg. The dimensionless index is 0.34.    Mitral Valve: There is severe posterior mitral annular calcification present. There is mild regurgitation.    Pulmonary Artery: The estimated pulmonary artery systolic pressure is 22 mmHg.    IVC/SVC: Normal venous pressure at 3 mmHg.    TECHNICALLY DIFFICULT STUDY.  Past Medical History:   Diagnosis Date    Acid reflux     Acute coronary syndrome     LIGHT 2004    Acute on chronic combined systolic and diastolic heart failure 02/2023    Anticoagulant long-term use     Aortic valve disorder     nonrheumatic    Arthritis     CHF (congestive heart failure)     Coronary artery disease     cabg & valve sgy    H/O: pneumonia     Heart murmur     Hyperlipidemia     Hypertension     Lower leg edema     Mild carotid artery disease 11/17/2021    Palpitations     Stenosis of aortic and mitral valves     Valvular regurgitation      Past Surgical History:   Procedure Laterality Date    A-V CARDIAC  PACEMAKER INSERTION N/A 03/29/2023    Procedure: Dual PPM (Rodriguez);  Surgeon: Too Farrell MD;  Location: [facility] CATH;  Service: Cardiology;  Laterality: N/A;    ARTERIOGRAPHY OF AORTIC ROOT N/A 02/23/2023    Procedure: ARTERIOGRAM, AORTIC ROOT;  Surgeon: Eb Ruelas MD;  Location: [facility] CATH;  Service: Cardiology;  Laterality: N/A;    CARDIAC CATHETERIZATION      CARDIAC VALVE SURGERY      CATARACT EXTRACTION W/  INTRAOCULAR LENS IMPLANT Bilateral     COLONOSCOPY  ~2019    normal findings per patient report    CORONARY ANGIOGRAPHY INCLUDING BYPASS GRAFTS WITH CATHETERIZATION OF LEFT HEART N/A 02/23/2023    Procedure: Right/Left heart Cath w/Grafts;  Surgeon: Eb Ruelas MD;  Location: [facility] CATH;  Service: Cardiology;  Laterality: N/A;    CORONARY ARTERY BYPASS GRAFT  04/2005    HYSTERECTOMY  1980    INSERTION, PACEMAKER, TEMPORARY TRANSVENOUS  4/26/2023    Procedure: Insertion, Pacemaker, Temporary Transvenous;  Surgeon: Ryan Castro MD;  Location: [facility] CATH;  Service: Cardiology;;    PERCUTANEOUS TRANSLUMINAL ANGIOPLASTY N/A 4/26/2023    Procedure: PTA (ANGIOPLASTY, PERCUTANEOUS, TRANSLUMINAL);  Surgeon: Ryan Castro MD;  Location: [facility]ATH;  Service: Cardiology;  Laterality: N/A;    RIGHT HEART CATHETERIZATION N/A 02/23/2023    Procedure: INSERTION, CATHETER, RIGHT HEART;  Surgeon: Eb Ruelas MD;  Location: [facility]ATH;  Service: Cardiology;  Laterality: N/A;    TRANSCATHETER AORTIC VALVE REPLACEMENT (TAVR) Bilateral 4/26/2023    Procedure: (TAVR);  Surgeon: Ryan Castor MD;  Location: [facility];  Service: Cardiology;  Laterality: Bilateral;    TRANSCATHETER AORTIC VALVE REPLACEMENT (TAVR) Bilateral 4/26/2023    Procedure: REPLACEMENT, AORTIC VALVE, TRANSCATHETER (TAVR);  Surgeon: Jarett Mckee MD;  Location: [facility] CATH;  Service: Cardiology;  Laterality: Bilateral;    UPPER GASTROINTESTINAL ENDOSCOPY  ~2010    normal findings     Family History   Problem Relation Name Age of Onset     Hypertension Mother      Hypertension Father      Heart disease Father      Colon cancer Neg Hx      Esophageal cancer Neg Hx      Stomach cancer Neg Hx       Social History     Socioeconomic History    Marital status: Single   Tobacco Use    Smoking status: Never    Smokeless tobacco: Never   Substance and Sexual Activity    Alcohol use: Not Currently     Comment: couple times per yr    Drug use: No     Social Drivers of Health     Financial Resource Strain: Low Risk  (3/20/2023)    [facility]    Overall Financial Resource Strain (CARDIA)     Difficulty of Paying Living Expenses: Not hard at all   Food Insecurity: No Food Insecurity (3/20/2023)    [facility]    Hunger Vital Sign     Worried About Running Out of Food in the Last Year: Never true     Ran Out of Food in the Last Year: Never true   Stress: No Stress Concern Present (3/20/2023)    [facility]    [place] - Occupational Stress Questionnaire     Feeling of Stress : Not at all       Review of patient's allergies indicates:   Allergen Reactions    Pcn [penicillins] Other (See Comments)     SOB    Clindamycin Diarrhea    Darvocet a500 [propoxyphene n-acetaminophen]     Erythromycin Other (See Comments)    Mycinette [cetylpyridinium-benzocaine]        Current Outpatient Medications:     aluminum & magnesium hydroxide-simethicone (MYLANTA MAXIMUM STRENGTH) 400-400-40 mg/5 mL suspension, Take 10 mLs by mouth  every 6 (six) hours as needed for Indigestion., Disp: 3840 mL, Rfl: 0    aspirin (ECOTRIN) 81 MG EC tablet, Take 81 mg by mouth once daily., Disp: , Rfl:     carvediloL (COREG) 12.5 MG tablet, Take 0.5 tablets (6.25 mg total) by mouth 2 (two) times daily with meals., Disp: 10 tablet, Rfl: 0    co-enzyme Q-10 30 mg capsule, Take 30 mg by mouth once daily. , Disp: , Rfl:     diclofenac sodium (VOLTAREN) 1 % Gel, Apply 2 g topically 4 (four) times daily., Disp: 100 g, Rfl: 3    evolocumab (REPATHA SURECLICK) 140 mg/mL PnIj, Inject 1 mL (140 mg total) into the skin every 14 (fourteen) days., Disp: 2 mL, Rfl: 6    famotidine (PEPCID) 20 MG tablet, Take 1 tablet (20 mg total) by mouth 2 (two) times daily., Disp: 180 tablet, Rfl: 3    fish oil-omega-3 fatty acids 300-1,000 mg capsule, Take 2 g by mouth once daily., Disp: , Rfl:     FOLIC ACID/MULTIVIT-MIN/LUTEIN (CENTRUM SILVER ORAL), Take 1 tablet by mouth once daily. ONE DAILY, Disp: , Rfl:     furosemide (LASIX) 20 MG tablet, Take 0.5 tablets (10 mg total) by mouth once daily., Disp: 45 tablet, Rfl: 1    HYDROcodone-acetaminophen (NORCO)  mg per tablet, Take 1 tablet by mouth 2 (two) times daily as needed for Pain., Disp: , Rfl: 0    hydrOXYzine pamoate (VISTARIL) 25 MG Cap, Take 1 capsule (25 mg total) by mouth 2 (two) times daily as needed (itching)., Disp: 30 capsule, Rfl: 0    nitroGLYCERIN (NITROSTAT) 0.4 MG SL tablet, Place 1 tablet (0.4 mg total) under the tongue every 5 (five) minutes as needed for Chest pain., Disp: 25 tablet, Rfl: 0    rosuvastatin (CRESTOR) 20 MG tablet, Take 1 tablet (20 mg total) by mouth every evening., Disp: 90 tablet, Rfl: 3    spironolactone (ALDACTONE) 25 MG tablet, Take 0.5 tablets (12.5 mg total) by mouth once daily., Disp: , Rfl:     dapagliflozin propanediol (FARXIGA) 10 mg tablet, Take 1 tablet (10 mg total) by mouth once daily. PT STATES SHE HARDLY TAKES IT AND WAS UNSURE OF MG, Disp: 90 tablet, Rfl: 1  No current  facility-administered medications for this visit.    Facility-Administered Medications Ordered in Other Visits:     chlorhexidine 0.12 % solution 15 mL, 15 mL, Mouth/Throat, Once, Too Farrell MD    mupirocin 2 % ointment, , Nasal, Once, Too Farrell MD    Review of Systems   Constitutional: Negative for chills, decreased appetite, diaphoresis, fever and malaise/fatigue.   HENT:  Negative for congestion and nosebleeds.    Eyes:  Negative for blurred vision.   Cardiovascular:  Negative for chest pain, claudication, cyanosis, dyspnea on exertion (MINIMAL), irregular heartbeat, leg swelling, near-syncope, orthopnea, palpitations and paroxysmal nocturnal dyspnea.   Respiratory:  Negative for cough, hemoptysis, shortness of breath and wheezing.    Endocrine: Negative for polyphagia and polyuria.   Skin: Negative.  Negative for nail changes and rash.   Musculoskeletal:  Positive for joint pain (R HIP). Negative for back pain and falls.   Gastrointestinal:  Negative for abdominal pain, change in bowel habit, dysphagia, melena, nausea and vomiting.   Genitourinary:  Negative for dysuria and flank pain.   Neurological:  Negative for brief paralysis, dizziness, focal weakness, light-headedness, numbness and weakness.   Psychiatric/Behavioral:  Negative for altered mental status and depression.    Allergic/Immunologic: Negative for hives and persistent infections.        Objective:      Vitals:    11/20/24 0927   BP: 131/62   Pulse: 72   Resp: 18   Weight: 77.3 kg (170 lb 6.7 oz)   PainSc:   5   PainLoc: Hip     Body mass index is 27.51 kg/m².    Physical Exam  Constitutional:       General: She is not in acute distress.     Appearance: Normal appearance. She is overweight.   HENT:      Head: Normocephalic and atraumatic.   Eyes:      General: No scleral icterus.     Extraocular Movements: Extraocular movements intact.   Neck:      Vascular: No carotid bruit or JVD.   Cardiovascular:      Rate and Rhythm:  Normal rate and regular rhythm. No extrasystoles are present.     Pulses:           Carotid pulses are 2+ on the right side and 2+ on the left side.       Radial pulses are 2+ on the right side and 2+ on the left side.        Posterior tibial pulses are 2+ on the right side and 2+ on the left side.      Heart sounds: Murmur heard.      Systolic murmur is present with a grade of 1/6 at the upper right sternal border and lower left sternal border.      No friction rub. No gallop.   Pulmonary:      Effort: Pulmonary effort is normal. No respiratory distress.      Breath sounds: Normal breath sounds. No rales.   Chest:       Abdominal:      Palpations: Abdomen is soft.      Tenderness: There is no abdominal tenderness.   Musculoskeletal:      Cervical back: Neck supple.      Right lower leg: No edema.      Left lower leg: No edema.      Comments: USES A CANE   Skin:     General: Skin is warm and dry.      Capillary Refill: Capillary refill takes less than 2 seconds.   Neurological:      General: No focal deficit present.      Mental Status: She is alert and oriented to person, place, and time.      Cranial Nerves: No cranial nerve deficit.   Psychiatric:         Mood and Affect: Mood normal.         Speech: Speech normal.         Behavior: Behavior normal.                 ..    Chemistry        Component Value Date/Time     11/14/2024 0850     11/14/2024 0850    K 4.2 11/14/2024 0850    K 4.2 11/14/2024 0850     11/14/2024 0850     11/14/2024 0850    CO2 27 11/14/2024 0850    CO2 27 11/14/2024 0850    BUN 14 11/14/2024 0850    BUN 14 11/14/2024 0850    CREATININE 0.8 11/14/2024 0850    CREATININE 0.8 11/14/2024 0850    GLU 77 11/14/2024 0850    GLU 77 11/14/2024 0850        Component Value Date/Time    CALCIUM 8.7 11/14/2024 0850    CALCIUM 8.7 11/14/2024 0850    ALKPHOS 131 11/14/2024 0850    ALKPHOS 131 11/14/2024 0850    AST 34 11/14/2024 0850    AST 34 11/14/2024 0850    ALT 25 11/14/2024  0850    ALT 25 11/14/2024 0850    BILITOT 0.5 11/14/2024 0850    BILITOT 0.5 11/14/2024 0850    ESTGFRAFRICA 74 03/22/2023 0812    ESTGFRAFRICA >60 04/06/2022 2216    EGFRNONAA >60 04/06/2022 2216            ..  Lab Results   Component Value Date    CHOL 177 11/14/2024    CHOL 192 04/18/2024    CHOL 99 (L) 01/10/2023     Lab Results   Component Value Date    HDL 47 11/14/2024    HDL 52 04/18/2024    HDL 37 (L) 01/10/2023     Lab Results   Component Value Date    LDLCALC 110.4 11/14/2024    LDLCALC 123.0 04/18/2024    LDLCALC 50.4 (L) 01/10/2023     Lab Results   Component Value Date    TRIG 98 11/14/2024    TRIG 85 04/18/2024    TRIG 58 01/10/2023     Lab Results   Component Value Date    CHOLHDL 26.6 11/14/2024    CHOLHDL 27.1 04/18/2024    CHOLHDL 37.4 01/10/2023     ..  Lab Results   Component Value Date    WBC 8.10 11/14/2024    HGB 12.6 11/14/2024    HCT 38.7 11/14/2024    MCV 99 (H) 11/14/2024     11/14/2024       Test(s) Reviewed  I have reviewed the following in detail:  [] Stress test   [] Angiography   [x] Echocardiogram   [x] Labs   [] Other:       Assessment:         ICD-10-CM ICD-9-CM   1. Coronary artery disease of bypass graft of native heart with stable angina pectoris  I25.708 414.05     413.9   2. Chronic diastolic heart failure  I50.32 428.32   3. Mitral and aortic valve disease  I08.0 396.9   4. Mild carotid artery disease  I77.9 447.9   5. Essential hypertension  I10 401.9   6. Long term (current) use of antithrombotics/antiplatelets  Z79.02 V58.63   7. Acute on chronic diastolic heart failure  I50.33 428.33     Problem List Items Addressed This Visit          Cardiac/Vascular    Essential hypertension    Relevant Orders    Comprehensive Metabolic Panel    Coronary artery disease of bypass graft of native heart with stable angina pectoris - Primary    Relevant Orders    Comprehensive Metabolic Panel    Magnesium    Mitral and aortic valve disease    Mild carotid artery disease    Chronic  diastolic heart failure    Relevant Orders    Comprehensive Metabolic Panel    Magnesium    BNP       Hematology    Long term (current) use of antithrombotics/antiplatelets     Other Visit Diagnoses       Acute on chronic diastolic heart failure        Improved    Relevant Medications    dapagliflozin propanediol (FARXIGA) 10 mg tablet             Plan:     DAILY WEIGHT, 2 LB PER DAY 5 LB PER WEEK RULE EXPLAINED, CLASS 2 HEART FAILURE CLASS 1 ANGINA NO CLINICAL ARRHYTHMIA DIET EXERCISE AS MUCH AS POSSIBLE MONITOR TAVR SOME RESTENOSIS, RETURN TO CLINIC IN 6 MO WITH LABS      Coronary artery disease of bypass graft of native heart with stable angina pectoris  -     Comprehensive Metabolic Panel; Future; Expected date: 11/20/2024  -     Magnesium; Future; Expected date: 11/20/2024    Chronic diastolic heart failure  -     Comprehensive Metabolic Panel; Future; Expected date: 11/20/2024  -     Magnesium; Future; Expected date: 11/20/2024  -     BNP; Future; Expected date: 05/20/2025    Mitral and aortic valve disease  Comments:  Restenosis of TAVR    Mild carotid artery disease    Essential hypertension  -     Comprehensive Metabolic Panel; Future; Expected date: 11/20/2024    Long term (current) use of antithrombotics/antiplatelets  Comments:  ASA ONLY NOW    Acute on chronic diastolic heart failure  Comments:  Improved  Orders:  -     dapagliflozin propanediol (FARXIGA) 10 mg tablet; Take 1 tablet (10 mg total) by mouth once daily. PT STATES SHE HARDLY TAKES IT AND WAS UNSURE OF MG  Dispense: 90 tablet; Refill: 1    RTC Low level/low impact aerobic exercise 5x's/wk. Heart healthy diet and risk factor modification.    See labs and med orders.    Aerobic exercise 5x's/wk. Heart healthy diet and risk factor modification.    See labs and med orders.      ALL CV CLINICALLY STABLE, NO ANGINA, NO HF, NO TIA, NO CLINICAL ARRHYTHMIA,CONTINUE CURRENT MEDS, EDUCATION, DIET, EXERCISE

## 2024-12-09 ENCOUNTER — OFFICE VISIT (OUTPATIENT)
Dept: PRIMARY CARE CLINIC | Facility: CLINIC | Age: 84
End: 2024-12-09
Payer: MEDICARE

## 2024-12-09 VITALS
HEART RATE: 66 BPM | BODY MASS INDEX: 27.21 KG/M2 | HEIGHT: 66 IN | WEIGHT: 169.31 LBS | RESPIRATION RATE: 17 BRPM | DIASTOLIC BLOOD PRESSURE: 82 MMHG | SYSTOLIC BLOOD PRESSURE: 118 MMHG | OXYGEN SATURATION: 99 %

## 2024-12-09 DIAGNOSIS — I10 ESSENTIAL HYPERTENSION: ICD-10-CM

## 2024-12-09 PROCEDURE — 3079F DIAST BP 80-89 MM HG: CPT | Mod: CPTII,S$GLB,, | Performed by: NURSE PRACTITIONER

## 2024-12-09 PROCEDURE — 1159F MED LIST DOCD IN RCRD: CPT | Mod: CPTII,S$GLB,, | Performed by: NURSE PRACTITIONER

## 2024-12-09 PROCEDURE — 3074F SYST BP LT 130 MM HG: CPT | Mod: CPTII,S$GLB,, | Performed by: NURSE PRACTITIONER

## 2024-12-09 PROCEDURE — 1101F PT FALLS ASSESS-DOCD LE1/YR: CPT | Mod: CPTII,S$GLB,, | Performed by: NURSE PRACTITIONER

## 2024-12-09 PROCEDURE — 3288F FALL RISK ASSESSMENT DOCD: CPT | Mod: CPTII,S$GLB,, | Performed by: NURSE PRACTITIONER

## 2024-12-09 PROCEDURE — 1160F RVW MEDS BY RX/DR IN RCRD: CPT | Mod: CPTII,S$GLB,, | Performed by: NURSE PRACTITIONER

## 2024-12-09 PROCEDURE — 1125F AMNT PAIN NOTED PAIN PRSNT: CPT | Mod: CPTII,S$GLB,, | Performed by: NURSE PRACTITIONER

## 2024-12-09 PROCEDURE — 99214 OFFICE O/P EST MOD 30 MIN: CPT | Mod: S$GLB,,, | Performed by: NURSE PRACTITIONER

## 2024-12-09 RX ORDER — CARVEDILOL 12.5 MG/1
6.25 TABLET ORAL 2 TIMES DAILY WITH MEALS
Qty: 90 TABLET | Refills: 1 | Status: SHIPPED | OUTPATIENT
Start: 2024-12-09 | End: 2025-12-09

## 2024-12-09 NOTE — PROGRESS NOTES
"Patient ID: Yael Claire is a 84 y.o. female.    Chief Complaint: Follow-up (Discuss medication and refills. )  Last seen in primary care by me on 09/09/2024    History of Present Illness    CHIEF COMPLAINT:  Patient presents today for follow up.    CARDIOVASCULAR:  She reports a recent emergency room visit on October 10th due to central chest pain in the upper chest. An echocardiogram was performed during the visit. She takes Farxiga for heart failure risk reduction, which is not covered by Medicare. Alternative medications, Invokana or Jardiance, have been suggested as replacements. She also takes Carvedilol, but reports confusion regarding the dosage. The prescription label indicates 12.5mg, but she has been taking a whole pill twice daily. There is a possibility that the dosage was increased following the ER visit, but this is unclear to her.    GASTROINTESTINAL:  She reports experiencing frequent stomach growling. She also describes occasional pain in both sides of the lower abdomen, which she characterizes as "black and deep." The pain is intermittent and resolves on its own. She denies any associated abdominal swelling or persistent discomfort.    MEDICATIONS:  She takes Repatha for cholesterol management and uses Bengay for pain relief as needed.    LABS:  Recent lab results show slightly low albumin at 3.4, just below the normal range of 3.5. Red blood count has improved from 3.83 to 3.9. Cholesterol levels are within normal limits, with total cholesterol at 177 mg/dL, which is below the target of 200 mg/dL. LDL cholesterol is at 110 mg/dL, showing improvement from a previous value of 123 mg/dL.      ROS:  General: -fever, -chills, -fatigue, -weight gain, -weight loss  Eyes: -vision changes, -redness, -discharge  ENT: -ear pain, -nasal congestion, -sore throat  Cardiovascular: +chest pain, -palpitations, -lower extremity edema  Respiratory: -cough, -shortness of breath  Gastrointestinal: +abdominal pain, " -nausea, -vomiting, -diarrhea, -constipation, -blood in stool, -change in bowel habits  Genitourinary: -dysuria, -hematuria, -frequency  Musculoskeletal: -joint pain, -muscle pain  Skin: -rash, -lesion  Neurological: -headache, -dizziness, -numbness, -tingling  Psychiatric: -anxiety, -depression, -sleep difficulty         Physical Exam    General: No acute distress. Well-developed. Well-nourished.  Eyes: EOMI. Sclerae anicteric.  HENT: Normocephalic. Atraumatic. Nares patent. Moist oral mucosa.  Ears: Bilateral TMs clear. Bilateral EACs clear.  Cardiovascular: Regular rhythm. No murmurs. No rubs. No gallops. Normal S1, S2. Normal heart sounds.  Respiratory: Normal respiratory effort. Clear to auscultation bilaterally. No rales. No rhonchi. No wheezing.  Abdomen: Soft. Non-tender. Non-distended. Normoactive bowel sounds.  Musculoskeletal: No  obvious deformity.  Extremities: No lower extremity edema.  Neurological: Alert & oriented x3. No slurred speech. Normal gait.  Psychiatric: Normal mood. Normal affect. Good insight. Good judgment.  Skin: Warm. Dry. No rash.         Assessment & Plan    IMPRESSION:  - Reviewed patient's medication coverage, noting Farxiga is no longer on preferred formulary  - Considering switch to Invokana or Jardiance for heart failure risk reduction rather than diabetes management  - Evaluated recent bloodwork, noting slight improvements in red blood count and cholesterol levels  - LDL cholesterol increased from previous year but still improving overall  - Assessed cardiac medication dosage, noting discrepancy between prescription and electronic record    HEART FAILURE:  - Continued cardiac medication at current dosage pending clarification of discrepancy.  - Auscultated the patient's heart and noted a tachycardia and clicking sound.  - Performed physical exam, including cardiac and pulmonary auscultation.  - Assessed for peripheral edema, which was not present.  - Acknowledged use of Farxiga  for heart failure risk reduction.  - Cardiology Prescribed Farxiga for heart failure management.  - Continued Carvedilol 12.5 mg twice daily for heart failure management.  - Continued Farxiga for 90 days while awaiting decision on potential switch to Invokana or Jardiance.    DIABETES:  - Considering switching to Invokana or Jardiance due to insurance coverage issues.  - Will consult with Dr. Ruelas (cardiology who prescribe Farxiga) regarding preferred SGLT2 inhibitor (Invokana vs Jardiance) before next refill.    HYPERLIPIDEMIA:  - Discussed cholesterol levels and trends over time with the patient.  - Reviewed the patient's cholesterol levels over time.  - Noted total cholesterol is 177 mg/dL (goal <200 mg/dL).  - Observed LDL cholesterol is 110 mg/dL, previously 50 mg/dL 1 year ago.  - Acknowledged that cholesterol levels are improving over time.  - Continued Repatha to manage cholesterol levels.    RENAL FUNCTION:  - Reviewed the patient's renal function test results.  - Noted the patient's renal function is satisfactory, with albumin at 3.4 g/dL (normal is 3.5 g/dL).    LOWER EXTREMITY PAIN:  - Patient reported occasional pain in bilateral lower extremities.  HTN-Controlled on current regimen    FLU VACCINATION:  - Explained importance of flu vaccination for preventing illness.  Will get at local pharmacy    FOLLOW UP:  - Follow up in 3-4 months (around March) to reassess overall health status.          BP Readings from Last 3 Encounters:   12/09/24 118/82   11/20/24 131/62   09/09/24 126/88      Pulse Readings from Last 3 Encounters:   12/09/24 66   11/20/24 72   09/09/24 82      Medication List with Changes/Refills   Current Medications    ALUMINUM & MAGNESIUM HYDROXIDE-SIMETHICONE (MYLANTA MAXIMUM STRENGTH) 400-400-40 MG/5 ML SUSPENSION    Take 10 mLs by mouth every 6 (six) hours as needed for Indigestion.    ASPIRIN (ECOTRIN) 81 MG EC TABLET    Take 81 mg by mouth once daily.    CO-ENZYME Q-10 30 MG CAPSULE     Take 30 mg by mouth once daily.     DAPAGLIFLOZIN PROPANEDIOL (FARXIGA) 10 MG TABLET    Take 1 tablet (10 mg total) by mouth once daily. PT STATES SHE HARDLY TAKES IT AND WAS UNSURE OF MG    DICLOFENAC SODIUM (VOLTAREN) 1 % GEL    Apply 2 g topically 4 (four) times daily.    EVOLOCUMAB (REPATHA SURECLICK) 140 MG/ML PNIJ    Inject 1 mL (140 mg total) into the skin every 14 (fourteen) days.    FAMOTIDINE (PEPCID) 20 MG TABLET    Take 1 tablet (20 mg total) by mouth 2 (two) times daily.    FISH OIL-OMEGA-3 FATTY ACIDS 300-1,000 MG CAPSULE    Take 2 g by mouth once daily.    FOLIC ACID/MULTIVIT-MIN/LUTEIN (CENTRUM SILVER ORAL)    Take 1 tablet by mouth once daily. ONE DAILY    FUROSEMIDE (LASIX) 20 MG TABLET    Take 0.5 tablets (10 mg total) by mouth once daily.    HYDROCODONE-ACETAMINOPHEN (NORCO)  MG PER TABLET    Take 1 tablet by mouth 2 (two) times daily as needed for Pain.    HYDROXYZINE PAMOATE (VISTARIL) 25 MG CAP    Take 1 capsule (25 mg total) by mouth 2 (two) times daily as needed (itching).    NITROGLYCERIN (NITROSTAT) 0.4 MG SL TABLET    Place 1 tablet (0.4 mg total) under the tongue every 5 (five) minutes as needed for Chest pain.    ROSUVASTATIN (CRESTOR) 20 MG TABLET    Take 1 tablet (20 mg total) by mouth every evening.    SPIRONOLACTONE (ALDACTONE) 25 MG TABLET    Take 0.5 tablets (12.5 mg total) by mouth once daily.   Changed and/or Refilled Medications    Modified Medication Previous Medication    CARVEDILOL (COREG) 12.5 MG TABLET carvediloL (COREG) 12.5 MG tablet       Take 0.5 tablets (6.25 mg total) by mouth 2 (two) times daily with meals.    Take 0.5 tablets (6.25 mg total) by mouth 2 (two) times daily with meals.        Follow up in about 3 months (around 3/9/2025).    This note was generated with the assistance of ambient listening technology. Verbal consent was obtained by the patient and accompanying visitor(s) for the recording of patient appointment to facilitate this note. I attest  to having reviewed and edited the generated note for accuracy, though some syntax or spelling errors may persist. Please contact the author of this note for any clarification.

## 2024-12-12 DIAGNOSIS — I27.20 PULMONARY HYPERTENSION: ICD-10-CM

## 2024-12-12 DIAGNOSIS — I50.32 CHRONIC DIASTOLIC HEART FAILURE: Primary | ICD-10-CM

## 2024-12-12 DIAGNOSIS — I50.43 ACUTE ON CHRONIC COMBINED SYSTOLIC AND DIASTOLIC HEART FAILURE: ICD-10-CM

## 2024-12-12 DIAGNOSIS — I50.33 ACUTE ON CHRONIC DIASTOLIC HEART FAILURE: ICD-10-CM

## 2024-12-13 ENCOUNTER — TELEPHONE (OUTPATIENT)
Dept: CARDIOLOGY | Facility: CLINIC | Age: 84
End: 2024-12-13
Payer: MEDICARE

## 2024-12-13 NOTE — TELEPHONE ENCOUNTER
----- Message from Eddie sent at 12/13/2024 10:55 AM CST -----  Regarding: Wrong pharmacy  Type:  Needs Medical Advice    Who Called: pT    Symptoms (please be specific): wrong pharmacy     Pharmacy name and phone #:      [id number] - [place], [place] - [address] AT [facility] [address]  [address]  [facility] [address]  Phone: [phone] Fax: [phone]        Would the patient rather a call back or a response via [facility]? Call back    Best Call Back Number: [phone]      Additional Information: Sts that her RX was called into the wrong pharmacy and isn't sure why it was sent there and so far.  Needs to have the empagliflozin (JARDIANCE) 10 mg tablet  transferred to [facility].   Please advise -- Thank you

## 2024-12-13 NOTE — TELEPHONE ENCOUNTER
Spoke with pt to let her know that the med was sent to [facility] to get approval and she can ask [facility] to send it to [facility] if that is easier for her. Pt verbalized understanding.

## 2024-12-23 ENCOUNTER — TELEPHONE (OUTPATIENT)
Dept: FAMILY MEDICINE | Facility: CLINIC | Age: 84
End: 2024-12-23
Payer: MEDICARE

## 2024-12-23 NOTE — TELEPHONE ENCOUNTER
----- Message from Saul sent at 12/23/2024 11:53 AM CST -----  Regarding: advice  Type:  Needs Medical Advice    Who Called: pt    Best Call Back Number: [phone]      Additional Information: pt wants a call back to discuss some concerns she having with her carvediloL (COREG) 12.5 MG tablet.  please call to discuss.

## 2024-12-23 NOTE — TELEPHONE ENCOUNTER
Patient stated she have been taking one tablet twice daily. Now she is out of medication.  And requesting a new prescription.  Please advisel

## 2024-12-23 NOTE — TELEPHONE ENCOUNTER
Unable to reach via telephone. Will continue reaching out regarding her coreg dosage. She states has been taking the Coreg 12.5 mg twice daily. Her scrip is for half tablet 6.25 mg twice daily. Will continue trying to reach her.   Pulse Readings from Last 3 Encounters:   12/09/24 66   11/20/24 72   09/09/24 82

## 2025-01-13 DIAGNOSIS — Z00.00 ENCOUNTER FOR MEDICARE ANNUAL WELLNESS EXAM: ICD-10-CM

## 2025-01-30 ENCOUNTER — CLINICAL SUPPORT (OUTPATIENT)
Dept: CARDIOLOGY | Facility: HOSPITAL | Age: 85
End: 2025-01-30
Payer: MEDICARE

## 2025-01-30 ENCOUNTER — HOSPITAL ENCOUNTER (OUTPATIENT)
Dept: CARDIOLOGY | Facility: HOSPITAL | Age: 85
Discharge: HOME | End: 2025-01-30
Attending: INTERNAL MEDICINE
Payer: MEDICARE

## 2025-01-30 DIAGNOSIS — Z95.0 PRESENCE OF CARDIAC PACEMAKER: ICD-10-CM

## 2025-01-30 PROCEDURE — 93296 REM INTERROG EVL PM/IDS: CPT | Mod: PO | Performed by: INTERNAL MEDICINE

## 2025-01-30 PROCEDURE — 93294 REM INTERROG EVL PM/LDLS PM: CPT | Mod: ,,, | Performed by: INTERNAL MEDICINE

## 2025-02-02 LAB
OHS CV AF BURDEN PERCENT: < 1
OHS CV DC REMOTE DEVICE TYPE: NORMAL
OHS CV RV PACING PERCENT: 13 %

## 2025-02-10 ENCOUNTER — OFFICE VISIT (OUTPATIENT)
Dept: PRIMARY CARE CLINIC | Facility: CLINIC | Age: 85
End: 2025-02-10
Payer: MEDICARE

## 2025-02-10 VITALS
TEMPERATURE: 98 F | RESPIRATION RATE: 16 BRPM | HEIGHT: 65 IN | WEIGHT: 165.81 LBS | SYSTOLIC BLOOD PRESSURE: 116 MMHG | OXYGEN SATURATION: 99 % | DIASTOLIC BLOOD PRESSURE: 70 MMHG | BODY MASS INDEX: 27.63 KG/M2 | HEART RATE: 60 BPM

## 2025-02-10 DIAGNOSIS — I50.32 CHRONIC DIASTOLIC HEART FAILURE: ICD-10-CM

## 2025-02-10 DIAGNOSIS — R53.81 MALAISE AND FATIGUE: Primary | ICD-10-CM

## 2025-02-10 DIAGNOSIS — R53.83 MALAISE AND FATIGUE: Primary | ICD-10-CM

## 2025-02-10 DIAGNOSIS — I10 ESSENTIAL HYPERTENSION: ICD-10-CM

## 2025-02-10 DIAGNOSIS — R06.02 SOB (SHORTNESS OF BREATH): ICD-10-CM

## 2025-02-10 PROCEDURE — 99213 OFFICE O/P EST LOW 20 MIN: CPT | Mod: S$GLB,,, | Performed by: NURSE PRACTITIONER

## 2025-02-10 RX ORDER — FUROSEMIDE 20 MG/1
10 TABLET ORAL DAILY
Qty: 45 TABLET | Refills: 0 | Status: SHIPPED | OUTPATIENT
Start: 2025-02-10 | End: 2026-02-10

## 2025-02-10 RX ORDER — CARVEDILOL 12.5 MG/1
6.25 TABLET ORAL 2 TIMES DAILY WITH MEALS
Qty: 90 TABLET | Refills: 1 | Status: SHIPPED | OUTPATIENT
Start: 2025-02-10 | End: 2026-02-10

## 2025-02-10 NOTE — PROGRESS NOTES
Patient ID: Yael Claire is a 84 y.o. female.    Chief Complaint: Fever (Generalized body aches)  Last seen in primary care by me on 12/09/2024  History of Present Illness    CHIEF COMPLAINT:  Patient presents today with feeling unwell and chills.    CURRENT SYMPTOMS:  She reports lateral abdominal pain that improved since she has been using hemp cream for pain management. She denies shortness of breath when lying down and diarrhea.    MEDICATIONS:  She is currently taking Spironolactone and Carvedilol 12.5 mg (half tablet twice daily). She reports being out of Lasix.    MEDICAL HISTORY:  She has a history of aortic valve disease.         Physical Exam    General: No acute distress. Well-developed. Well-nourished.  Eyes: EOMI. Sclerae anicteric.  HENT: Normocephalic. Atraumatic. Nares patent. Moist oral mucosa. All normal.  Ears: Bilateral TMs clear. Cerumen in right ear.  Cardiovascular: Regular rate. Regular rhythm. No murmurs. Harsh tone to her heart sounds,   Respiratory: Normal respiratory effort. Clear to auscultation bilaterally. No rales. No rhonchi. No wheezing.  Abdomen: Soft. Non-tender. Non-distended. Normoactive bowel sounds.  Musculoskeletal: No  obvious deformity.  Extremities: No lower extremity edema.  Neurological: Alert & oriented x3. No slurred speech. Normal gait.  Psychiatric: Normal mood. Normal affect. Good insight. Good judgment.  Skin: Warm. Dry. No rash.         Assessment & Plan    IMPRESSION:  - Assessed patient for potential flu given reported symptoms of feeling unwell and warm, despite absence of fever or elevated heart rate  - Noted patient's history of aortic valve disease during physical exam  - Performed flu test to rule out influenza as cause of symptoms  - Evaluated patient's fluid status and medication regimen, particularly diuretics  - Reviewed patient's blood pressure management, noting isolated systolic hypertension likely due to age-related vessel changes    AORTIC VALVE  DISEASE:  - Noted the patient's history of aortic valve disease.  - Auscultated and detected a loud and harsh murmur, consistent with aortic valve disease.  - Confirmed the presence of aortic valve disease based on characteristic sound on auscultation.    HYPERTENSION:  - Educated the patient about isolated systolic hypertension as a result of aging and less viable blood vessels.  - Explained the purpose of Coreg (carvedilol) in managing blood pressure and heart rate.  - Instructed the patient to monitor blood pressure at home, particularly watching for diastolic pressure reaching 90.  - Adjusted Coreg (carvedilol): Continued 12.5 mg (half tablet) twice daily as baseline dose.  - Advised the patient not to take additional half tablet unless diastolic blood pressure reaches 90.  - Cautioned against taking extra doses due to risk of lowering heart rate excessively.  - Noted the patient's home blood pressure monitoring with systolic readings of 155 or 160.  - Observed the patient's heart rate running in the 60s, with current heart rate at 60 bpm.    FLUID RETENTION:  - Continued spironolactone 12.5 mg (half of 25 mg tablet) daily.  - Continued Lasix 10 mg daily for fluid management.  - Continued spironolactone 12.5 mg (half of 25 mg tablet) daily for fluid management.  - Noted the patient's reported fluid retention issues.  - Prescribed 45 tablets of spironolactone to be taken as half tablet daily, sent to [facility] pharmacy.    MEDICATIONS/SUPPLEMENTS:  - Refilled Lasix and Coreg (carvedilol), sent to [facility] in [place].    INFLUENZA SCREENING:  - Noted the patient's reports of feeling lousy and warm, experiencing fatigue and malaise.  - Will perform a flu test to rule out influenza.    DRY SKIN:  - Observed dry skin on the patient's body during exam.    CERUMEN IMPACTION:  - Observed cerumen impaction in the left ear during exam.        She has been instructed to     BP Readings from Last 3 Encounters:   02/10/25  116/70   12/09/24 118/82   11/20/24 131/62      Pulse Readings from Last 3 Encounters:   02/10/25 60   12/09/24 66   11/20/24 72      Medication List with Changes/Refills   Current Medications    ALUMINUM & MAGNESIUM HYDROXIDE-SIMETHICONE (MYLANTA MAXIMUM STRENGTH) 400-400-40 MG/5 ML SUSPENSION    Take 10 mLs by mouth every 6 (six) hours as needed for Indigestion.    ASPIRIN (ECOTRIN) 81 MG EC TABLET    Take 81 mg by mouth once daily.    CO-ENZYME Q-10 30 MG CAPSULE    Take 30 mg by mouth once daily.     DICLOFENAC SODIUM (VOLTAREN) 1 % GEL    Apply 2 g topically 4 (four) times daily.    EMPAGLIFLOZIN (JARDIANCE) 10 MG TABLET    Take 1 tablet (10 mg total) by mouth once daily.    EVOLOCUMAB (REPATHA SURECLICK) 140 MG/ML PNIJ    Inject 1 mL (140 mg total) into the skin every 14 (fourteen) days.    FAMOTIDINE (PEPCID) 20 MG TABLET    Take 1 tablet (20 mg total) by mouth 2 (two) times daily.    FISH OIL-OMEGA-3 FATTY ACIDS 300-1,000 MG CAPSULE    Take 2 g by mouth once daily.    FOLIC ACID/MULTIVIT-MIN/LUTEIN (CENTRUM SILVER ORAL)    Take 1 tablet by mouth once daily. ONE DAILY    HYDROCODONE-ACETAMINOPHEN (NORCO)  MG PER TABLET    Take 1 tablet by mouth 2 (two) times daily as needed for Pain.    HYDROXYZINE PAMOATE (VISTARIL) 25 MG CAP    Take 1 capsule (25 mg total) by mouth 2 (two) times daily as needed (itching).    NITROGLYCERIN (NITROSTAT) 0.4 MG SL TABLET    Place 1 tablet (0.4 mg total) under the tongue every 5 (five) minutes as needed for Chest pain.    ROSUVASTATIN (CRESTOR) 20 MG TABLET    Take 1 tablet (20 mg total) by mouth every evening.    SPIRONOLACTONE (ALDACTONE) 25 MG TABLET    Take 0.5 tablets (12.5 mg total) by mouth once daily.   Changed and/or Refilled Medications    Modified Medication Previous Medication    CARVEDILOL (COREG) 12.5 MG TABLET carvediloL (COREG) 12.5 MG tablet       Take 0.5 tablets (6.25 mg total) by mouth 2 (two) times daily with meals.    Take 0.5 tablets (6.25 mg total)  by mouth 2 (two) times daily with meals.    FUROSEMIDE (LASIX) 20 MG TABLET furosemide (LASIX) 20 MG tablet       Take 0.5 tablets (10 mg total) by mouth once daily.    Take 0.5 tablets (10 mg total) by mouth once daily.        No follow-ups on file.    This note was generated with the assistance of ambient listening technology. Verbal consent was obtained by the patient and accompanying visitor(s) for the recording of patient appointment to facilitate this note. I attest to having reviewed and edited the generated note for accuracy, though some syntax or spelling errors may persist. Please contact the author of this note for any clarification.

## 2025-02-18 ENCOUNTER — TELEPHONE (OUTPATIENT)
Dept: CARDIOLOGY | Facility: CLINIC | Age: 85
End: 2025-02-18
Payer: MEDICARE

## 2025-02-18 ENCOUNTER — OFFICE VISIT (OUTPATIENT)
Dept: FAMILY MEDICINE | Facility: CLINIC | Age: 85
End: 2025-02-18
Payer: MEDICARE

## 2025-02-18 VITALS
DIASTOLIC BLOOD PRESSURE: 72 MMHG | SYSTOLIC BLOOD PRESSURE: 120 MMHG | WEIGHT: 167.31 LBS | HEART RATE: 75 BPM | HEIGHT: 65 IN | BODY MASS INDEX: 27.88 KG/M2 | OXYGEN SATURATION: 98 %

## 2025-02-18 DIAGNOSIS — Z99.89 DEPENDENCE ON OTHER ENABLING MACHINES AND DEVICES: ICD-10-CM

## 2025-02-18 DIAGNOSIS — Z87.19 HISTORY OF GASTROESOPHAGEAL REFLUX (GERD): ICD-10-CM

## 2025-02-18 DIAGNOSIS — E78.2 MIXED HYPERLIPIDEMIA: ICD-10-CM

## 2025-02-18 DIAGNOSIS — I10 ESSENTIAL HYPERTENSION: ICD-10-CM

## 2025-02-18 DIAGNOSIS — Z79.891 LONG TERM (CURRENT) USE OF OPIATE ANALGESIC: ICD-10-CM

## 2025-02-18 DIAGNOSIS — I50.32 CHRONIC DIASTOLIC HEART FAILURE: ICD-10-CM

## 2025-02-18 DIAGNOSIS — M47.816 SPONDYLOSIS WITHOUT MYELOPATHY OR RADICULOPATHY, LUMBAR REGION: ICD-10-CM

## 2025-02-18 DIAGNOSIS — I25.708 CORONARY ARTERY DISEASE OF BYPASS GRAFT OF NATIVE HEART WITH STABLE ANGINA PECTORIS: ICD-10-CM

## 2025-02-18 DIAGNOSIS — Z00.00 ENCOUNTER FOR PREVENTIVE HEALTH EXAMINATION: Primary | ICD-10-CM

## 2025-02-18 DIAGNOSIS — Z95.0 CARDIAC PACEMAKER IN SITU: ICD-10-CM

## 2025-02-18 DIAGNOSIS — I35.1 AORTIC VALVE INSUFFICIENCY, ETIOLOGY OF CARDIAC VALVE DISEASE UNSPECIFIED: ICD-10-CM

## 2025-02-18 DIAGNOSIS — I77.9 MILD CAROTID ARTERY DISEASE: ICD-10-CM

## 2025-02-18 PROCEDURE — 99214 OFFICE O/P EST MOD 30 MIN: CPT | Mod: PBBFAC,PO | Performed by: NURSE PRACTITIONER

## 2025-02-18 RX ORDER — FAMOTIDINE 20 MG/1
20 TABLET, FILM COATED ORAL 2 TIMES DAILY
Qty: 180 TABLET | Refills: 3 | Status: SHIPPED | OUTPATIENT
Start: 2025-02-18

## 2025-02-18 RX ORDER — CARVEDILOL 6.25 MG/1
6.25 TABLET ORAL 2 TIMES DAILY WITH MEALS
Qty: 180 TABLET | Refills: 3 | Status: SHIPPED | OUTPATIENT
Start: 2025-02-18 | End: 2026-02-18

## 2025-02-18 NOTE — TELEPHONE ENCOUNTER
----- Message from Alysa sent at 2/17/2025  4:30 PM CST -----  Type:  Patient Returning CallWho Called:  pt Who Left Message for Patient:  Abimael the patient know what this is regarding?:  yes medicationBest Call Back Number:  [phone] Information:   Please call back to advise. Thanks!

## 2025-02-18 NOTE — PROGRESS NOTES
"  Yael Claire presented for an initial Medicare AWV today. The following components were reviewed and updated:    Medical history  Family History  Social history  Allergies and Current Medications  Health Risk Assessment  Health Maintenance  Care Team    **See Completed Assessments for Annual Wellness visit with in the encounter summary    The following assessments were completed:  Depression Screening  Cognitive function Screening    Timed Get Up Test  Whisper Test      Opioid documentation:  Patient does not have a current opioid prescription.        Vitals:    02/18/25 1228   BP: 120/72   BP Location: Right arm   Patient Position: Sitting   Pulse: 75   SpO2: 98%   Weight: 75.9 kg (167 lb 5.3 oz)   Height: 5' 5" (1.651 m)     Body mass index is 27.85 kg/m².     Physical Exam  Vitals reviewed.   Pulmonary:      Effort: Pulmonary effort is normal. No respiratory distress.   Neurological:      Mental Status: She is alert and oriented to person, place, and time.   Psychiatric:         Mood and Affect: Mood normal.         Behavior: Behavior normal.         Thought Content: Thought content normal.         Judgment: Judgment normal.     Diagnoses and health risks identified today and associated recommendations/orders:  1. Encounter for preventive health examination  Reviewed and discussed health maintenance.      2. Aortic valve insufficiency, etiology of cardiac valve disease unspecified  Stable- continue current treatment and follow up routinely with PCP and cardiology ()  Asa daily, repatha 140mg every 2 weeks, lasix 20mg daily, crestor 20mg daily, aldactone 25mg daily, coreg 6.25mg bid    3. Cardiac pacemaker in situ  Stable- continue current treatment and follow up routinely with PCP and cardiology ()    4. Chronic diastolic heart failure  Stable- continue current treatment and follow up routinely with PCP and cardiology ()  Jardiance 10mg daily, Asa daily, repatha 140mg every 2 weeks, " lasix 20mg daily, aldactone 25mg daily, coreg 6.25mg bid    5. Coronary artery disease of bypass graft of native heart with stable angina pectoris  Stable- continue current treatment and follow up routinely with PCP and cardiology ()  Asa daily, repatha 140mg every 2 weeks, lasix 20mg daily, crestor 20mg daily, aldactone 25mg daily, coreg 6.25mg bid    6. Essential hypertension  Stable- continue current treatment and follow up routinely with PCP and cardiology ()  lasix 20mg daily, crestor 20mg daily, aldactone 25mg daily, coreg 6.25mg bid    7. Mild carotid artery disease  Stable- continue current treatment and follow up routinely with PCP and cardiology ()  Asa daily, repatha 140mg every 2 weeks and crestor 20mg daily     8. Mixed hyperlipidemia  Stable- continue current treatment and follow up routinely with PCP and cardiology ()  repatha 140mg every 2 weeks and crestor 20mg daily   Lab Results   Component Value Date    CHOL 177 11/14/2024    CHOL 192 04/18/2024    CHOL 99 (L) 01/10/2023     Lab Results   Component Value Date    HDL 47 11/14/2024    HDL 52 04/18/2024    HDL 37 (L) 01/10/2023     Lab Results   Component Value Date    LDLCALC 110.4 11/14/2024    LDLCALC 123.0 04/18/2024    LDLCALC 50.4 (L) 01/10/2023     Lab Results   Component Value Date    TRIG 98 11/14/2024    TRIG 85 04/18/2024    TRIG 58 01/10/2023       Lab Results   Component Value Date    CHOLHDL 26.6 11/14/2024    CHOLHDL 27.1 04/18/2024    CHOLHDL 37.4 01/10/2023      9. History of gastroesophageal reflux (GERD)  Stable- continue current treatment and follow up routinely with PCP   - famotidine (PEPCID) 20 MG tablet; Take 1 tablet (20 mg total) by mouth 2 (two) times daily.  Dispense: 180 tablet; Refill: 3    10. Dependence on other enabling machines and devices  Safety issues discussed and precautions reviewed    11. Spondylosis without myelopathy or radiculopathy, lumbar region   12. Long term  (current) use of opiate analgesic   Followed by pain management (Arminda Crow)  Norco 10/325mg  bid prn pain    Provided Yael with a 5-10 year written screening schedule and personal prevention plan. Recommendations were developed using the USPSTF age appropriate recommendations. Education, counseling, and referrals were provided as needed.  After Visit Summary printed and given to patient which includes a list of additional screenings\tests needed.    I offered to discuss advanced care planning, including how to pick a person who would make decisions for you if you were unable to make them for yourself, called a health care power of , and what kind of decisions you might make such as use of life sustaining treatments such as ventilators and tube feeding when faced with a life limiting illness recorded on a living will that they will need to know. (How you want to be cared for as you near the end of your natural life)   X Patient is interested in learning more about how to make advanced directives.  I provided them paperwork and offered to discuss this with them.  Viri Damon, NP

## 2025-02-18 NOTE — PATIENT INSTRUCTIONS
Counseling and Referral of Other Preventative  (Italic type indicates deductible and co-insurance are waived)    Patient Name: Yael Claire  Today's Date: 2/18/2025    Health Maintenance       Date Due Completion Date    TETANUS VACCINE Never done ---    Shingles Vaccine (1 of 2) Never done ---    RSV Vaccine (Age 60+ and Pregnant patients) (1 - 1-dose 75+ series) Never done ---    Aspirin/Antiplatelet Therapy 02/18/2026 2/18/2025    DEXA Scan 10/16/2027 10/16/2024    Lipid Panel 11/14/2029 11/14/2024        No orders of the defined types were placed in this encounter.    The following information is provided to all patients.  This information is to help you find resources for any of the problems found today that may be affecting your health:                  Living healthy guide: www.[facility].[place].gov      Understanding Diabetes: www.diabetes.org      Eating healthy: www.cdc.gov/healthyweight      [facility] home safety checklist: www.cdc.gov/steadi/patient.html      Agency on Aging: www.goea.[place].[facility]      Alcoholics anonymous (AA): www.aa.org      Physical Activity: www.sofia.nih.gov/yo1fjki      Tobacco use: www.quitwithusla.org

## 2025-02-24 DIAGNOSIS — I50.43 ACUTE ON CHRONIC COMBINED SYSTOLIC AND DIASTOLIC HEART FAILURE: ICD-10-CM

## 2025-02-24 DIAGNOSIS — I50.33 ACUTE ON CHRONIC DIASTOLIC HEART FAILURE: ICD-10-CM

## 2025-02-24 DIAGNOSIS — I50.32 CHRONIC DIASTOLIC HEART FAILURE: Primary | ICD-10-CM

## 2025-02-24 RX ORDER — DAPAGLIFLOZIN 10 MG/1
10 TABLET, FILM COATED ORAL DAILY
Qty: 90 TABLET | Refills: 1 | Status: SHIPPED | OUTPATIENT
Start: 2025-02-24

## 2025-02-25 ENCOUNTER — TELEPHONE (OUTPATIENT)
Dept: CARDIOLOGY | Facility: CLINIC | Age: 85
End: 2025-02-25
Payer: MEDICARE

## 2025-02-25 DIAGNOSIS — I10 ESSENTIAL HYPERTENSION: Primary | ICD-10-CM

## 2025-02-25 RX ORDER — POTASSIUM CHLORIDE 750 MG/1
10 TABLET, EXTENDED RELEASE ORAL DAILY
Qty: 90 TABLET | Refills: 1 | Status: SHIPPED | OUTPATIENT
Start: 2025-02-25

## 2025-02-25 NOTE — TELEPHONE ENCOUNTER
----- Message from Katherin sent at 2/25/2025 10:24 AM CST -----  Contact: [facility]  Type:  Needs Medical AdviceWho Called: Krish from [facility] (please be specific): needs clarification on rx, fordapagliflozin propanediol (FARXIGA) 10 mg tablet and also taking jaurdiance, is pt suppose to be taking those or if the Farxiga should be stopped. Pharmacy name and phone #:  [facility] - [facility] [address] [address] [facility] [id number] [facility] [address]: [phone] Fax: [phone] the patient rather a call back or a response via [facility]? CallBest Call Back Number: [phone] (home) Additional Information: please advise and thank you.

## 2025-03-10 ENCOUNTER — OFFICE VISIT (OUTPATIENT)
Dept: PRIMARY CARE CLINIC | Facility: CLINIC | Age: 85
End: 2025-03-10
Payer: MEDICARE

## 2025-03-10 VITALS
DIASTOLIC BLOOD PRESSURE: 60 MMHG | SYSTOLIC BLOOD PRESSURE: 102 MMHG | HEIGHT: 65 IN | HEART RATE: 68 BPM | BODY MASS INDEX: 28.21 KG/M2 | WEIGHT: 169.31 LBS

## 2025-03-10 DIAGNOSIS — R79.9 ABNORMAL FINDING OF BLOOD CHEMISTRY, UNSPECIFIED: ICD-10-CM

## 2025-03-10 DIAGNOSIS — I10 ESSENTIAL HYPERTENSION: Primary | ICD-10-CM

## 2025-03-10 DIAGNOSIS — Z12.31 ENCOUNTER FOR SCREENING MAMMOGRAM FOR MALIGNANT NEOPLASM OF BREAST: ICD-10-CM

## 2025-03-10 DIAGNOSIS — R35.81 NOCTURNAL POLYURIA: ICD-10-CM

## 2025-03-10 DIAGNOSIS — R42 DIZZINESS: ICD-10-CM

## 2025-03-10 PROCEDURE — 1159F MED LIST DOCD IN RCRD: CPT | Mod: CPTII,S$GLB,, | Performed by: NURSE PRACTITIONER

## 2025-03-10 PROCEDURE — 83036 HEMOGLOBIN GLYCOSYLATED A1C: CPT | Mod: HCNC | Performed by: NURSE PRACTITIONER

## 2025-03-10 PROCEDURE — 99214 OFFICE O/P EST MOD 30 MIN: CPT | Mod: S$GLB,,, | Performed by: NURSE PRACTITIONER

## 2025-03-10 PROCEDURE — 1125F AMNT PAIN NOTED PAIN PRSNT: CPT | Mod: CPTII,S$GLB,, | Performed by: NURSE PRACTITIONER

## 2025-03-10 PROCEDURE — 3288F FALL RISK ASSESSMENT DOCD: CPT | Mod: CPTII,S$GLB,, | Performed by: NURSE PRACTITIONER

## 2025-03-10 PROCEDURE — 1160F RVW MEDS BY RX/DR IN RCRD: CPT | Mod: CPTII,S$GLB,, | Performed by: NURSE PRACTITIONER

## 2025-03-10 PROCEDURE — 3074F SYST BP LT 130 MM HG: CPT | Mod: CPTII,S$GLB,, | Performed by: NURSE PRACTITIONER

## 2025-03-10 PROCEDURE — 3078F DIAST BP <80 MM HG: CPT | Mod: CPTII,S$GLB,, | Performed by: NURSE PRACTITIONER

## 2025-03-10 PROCEDURE — 1101F PT FALLS ASSESS-DOCD LE1/YR: CPT | Mod: CPTII,S$GLB,, | Performed by: NURSE PRACTITIONER

## 2025-03-10 NOTE — PROGRESS NOTES
Patient ID: Yael Claire is a 84 y.o. female.    Chief Complaint: Follow-up fatigue and SOB.  Last seen in primary care by me on 02/10/2025  History of Present Illness    CHIEF COMPLAINT:  Patient presents today for follow up of dizziness and fatigue    URINARY SYMPTOMS:  She reports frequent urination starting from 3:00 PM, with at least 3 episodes between 5:00 PM and 9:00 PM before bedtime.    GASTROINTESTINAL:  She has bowel movements up to twice daily and experiences midsection abdominal pain that improves with bowel movements and urination.    PAIN MANAGEMENT:  She uses topical cream and patches for back pain management, requiring assistance with patch application.    MEDICATIONS:  She takes blood pressure medications twice daily. She has not taken Lasix today.    PREVENTIVE CARE:  Her last mammogram was in 2021.      ROS:  General: -fever, -chills, +fatigue, -weight gain, -weight loss  Eyes: -vision changes, -redness, -discharge  ENT: -ear pain, -nasal congestion, -sore throat  Cardiovascular: -chest pain, -palpitations, -lower extremity edema  Respiratory: -cough, -shortness of breath  Gastrointestinal: +abdominal pain, -nausea, -vomiting, -diarrhea, -constipation, -blood in stool  Genitourinary: -dysuria, -hematuria, -frequency  Musculoskeletal: -joint pain, -muscle pain, +back pain  Skin: -rash, -lesion  Neurological: -headache, +dizziness, -numbness, -tingling  Psychiatric: -anxiety, -depression, -sleep difficulty         Physical Exam    Vitals: BP: ___. Hypotensive.  General: No acute distress. Well-developed. Well-nourished.  Eyes: EOMI. Sclerae anicteric.  HENT: Normocephalic. Atraumatic. Nares patent. Moist oral mucosa.  Ears: Bilateral TMs clear. Bilateral EACs clear.  Cardiovascular: Regular rate. Regular rhythm. No murmurs. No rubs. No gallops. Normal S1, S2.  Respiratory: Normal respiratory effort. Clear to auscultation bilaterally. No rales. No rhonchi. No wheezing.  Abdomen: Soft. Non-tender.  Non-distended. Normoactive bowel sounds.  Musculoskeletal: No  obvious deformity.  Extremities: No lower extremity edema.  Neurological: Alert & oriented x3. No slurred speech. Normal gait.  Psychiatric: Normal mood. Normal affect. Good insight. Good judgment.  Skin: Warm. Dry. No rash. Nail beds appear normal.         Assessment & Plan    IMPRESSION:  - Assessed reported dizziness, fatigue, and low blood pressure (102/60)  - Evaluated kidney and liver function from November labs  - Considered possible causes for frequent nighttime urination, including diabetes and urinary tract infection  - Reviewed diabetes risk and previous A1C results  - Evaluated reported abdominal pain and its relation to bowel movements  - Considered potential anemia  - Assessed breast health and recommended mammogram due to long interval since last screening    LOW BLOOD PRESSURE:  - Patient reports feeling woozy when standing up.  - Measured blood pressure, which is low at 102/60, potentially contributing to dizziness.  - Acknowledged the low blood pressure and its potential relation to dizziness.  - Advised the patient to exercise caution when standing up or sitting down.  - Inquired about the patient's fatigue status.  - Continued Lasix prescription.  - Advised the patient to watch getting up and down carefully due to low blood pressure.  - Continued blood pressure medication twice daily.  - Measured blood pressure, which is low at 102/60.  - Confirmed low blood pressure.  - Inquired about the patient's blood pressure medication and fluid intake.  - Advised the patient to be careful when changing positions.  - Plan to monitor blood pressure.    FREQUENT URINATION:  - Patient reports frequent urination, especially at night.  - Plan to check for diabetes and urinary tract infection as possible causes.  - Ordered tests to investigate frequent urination.  - She states does not feel like a UTI and does not think she needs a  "urinalysis    ABDOMINAL PAIN:  - Patient reports abdominal pain across the midsection.  - Noted that pain improves with bowel movements.    BACK PAIN:  - Patient reports upper back pain.  - Recommend continued use of cream and patches for back pain relief.    PREDIABETES SCREENING:  - Explained pre-diabetes range (5.5 to 6.4 in A1C) and diabetes diagnosis threshold (6.5).  - Ordered hemoglobin A1C test.  - Noted that the patient's last sugar check was in 2023 and was within normal range.  - Explained prediabetes range and plan to check A1C.  - Recommend limiting soda intake.    BREAST HEALTH:  - Patient to continue to monitor for breast changes during self-exams.  - Mammogram ordered.  - [facility].    ENVIRONMENTAL HEALTH CONCERNS:  - Discussed potential health risks associated with proximity to running car engines.  - Briefly mentioned historical concerns about microwaves and current safety.    LABS:  - Complete blood count ordered to check for anemia.    FOLLOW UP:  - Follow up in 4 weeks.          BP Readings from Last 3 Encounters:   03/10/25 102/60   02/18/25 120/72   02/10/25 116/70      Lab Results   Component Value Date    HGBA1C 5.0 04/25/2023      Medication List with Changes/Refills   Current Medications    ALUMINUM & MAGNESIUM HYDROXIDE-SIMETHICONE (MYLANTA MAXIMUM STRENGTH) 400-400-40 MG/5 ML SUSPENSION    Take 10 mLs by mouth every 6 (six) hours as needed for Indigestion.    ASPIRIN (ECOTRIN) 81 MG EC TABLET    Take 81 mg by mouth once daily.    CARVEDILOL (COREG) 6.25 MG TABLET    Take 1 tablet (6.25 mg total) by mouth 2 (two) times daily with meals.    CO-ENZYME Q-10 30 MG CAPSULE    Take 30 mg by mouth once daily.     DAPAGLIFLOZIN PROPANEDIOL (FARXIGA) 10 MG TABLET    Take 1 tablet (10 mg total) by mouth once daily.    DICLOFENAC SODIUM (VOLTAREN) 1 % GEL    Apply 2 g topically 4 (four) times daily.    EVOLOCUMAB (REPATHA SURECLICK) 140 MG/ML " PNIJ    Inject 1 mL (140 mg total) into the skin every 14 (fourteen) days.    FAMOTIDINE (PEPCID) 20 MG TABLET    Take 1 tablet (20 mg total) by mouth 2 (two) times daily.    FISH OIL-OMEGA-3 FATTY ACIDS 300-1,000 MG CAPSULE    Take 2 g by mouth once daily.    FOLIC ACID/MULTIVIT-MIN/LUTEIN (CENTRUM SILVER ORAL)    Take 1 tablet by mouth once daily. ONE DAILY    FUROSEMIDE (LASIX) 20 MG TABLET    Take 0.5 tablets (10 mg total) by mouth once daily.    HYDROCODONE-ACETAMINOPHEN (NORCO)  MG PER TABLET    Take 1 tablet by mouth 2 (two) times daily as needed for Pain.    NITROGLYCERIN (NITROSTAT) 0.4 MG SL TABLET    Place 1 tablet (0.4 mg total) under the tongue every 5 (five) minutes as needed for Chest pain.    POTASSIUM CHLORIDE SA (K-DUR,KLOR-CON M) 10 MEQ TABLET    Take 1 tablet (10 mEq total) by mouth once daily.    ROSUVASTATIN (CRESTOR) 20 MG TABLET    Take 1 tablet (20 mg total) by mouth every evening.    SPIRONOLACTONE (ALDACTONE) 25 MG TABLET    Take 0.5 tablets (12.5 mg total) by mouth once daily.        Follow up in about 4 weeks (around 4/7/2025).    This note was generated with the assistance of ambient listening technology. Verbal consent was obtained by the patient and accompanying visitor(s) for the recording of patient appointment to facilitate this note. I attest to having reviewed and edited the generated note for accuracy, though some syntax or spelling errors may persist. Please contact the author of this note for any clarification.

## 2025-03-11 ENCOUNTER — RESULTS FOLLOW-UP (OUTPATIENT)
Dept: FAMILY MEDICINE | Facility: CLINIC | Age: 85
End: 2025-03-11

## 2025-03-11 ENCOUNTER — TELEPHONE (OUTPATIENT)
Dept: FAMILY MEDICINE | Facility: CLINIC | Age: 85
End: 2025-03-11
Payer: MEDICARE

## 2025-03-11 LAB
ESTIMATED AVG GLUCOSE: 97 MG/DL (ref 68–131)
HBA1C MFR BLD: 5 % (ref 4–5.6)

## 2025-03-11 NOTE — TELEPHONE ENCOUNTER
----- Message from Madelin De La Vega DNP, APRN sent at 3/11/2025  1:53 PM CDT -----  Please notify her HGBA1c was at 5.0 which is normal and no diabetes. Keep her follow up appt in April.  ----- Message -----  From: [facility] EHR Lab Interface  Sent: 3/11/2025   1:27 AM CDT  To: Madelin De La Vega DNP, WILIAM

## 2025-03-11 NOTE — TELEPHONE ENCOUNTER
Spoke with the patient informed her of Madelin De La Vega DNP message, she verbalized understanding.

## 2025-03-26 DIAGNOSIS — I25.709 CORONARY ARTERY DISEASE INVOLVING CORONARY BYPASS GRAFT OF NATIVE HEART WITH ANGINA PECTORIS: ICD-10-CM

## 2025-03-26 RX ORDER — EVOLOCUMAB 140 MG/ML
140 INJECTION, SOLUTION SUBCUTANEOUS
Qty: 2 ML | Refills: 6 | Status: ACTIVE | OUTPATIENT
Start: 2025-03-26

## 2025-04-11 ENCOUNTER — OFFICE VISIT (OUTPATIENT)
Dept: PRIMARY CARE CLINIC | Facility: CLINIC | Age: 85
End: 2025-04-11
Payer: MEDICARE

## 2025-04-11 VITALS
WEIGHT: 169.06 LBS | HEIGHT: 65 IN | TEMPERATURE: 98 F | HEART RATE: 73 BPM | DIASTOLIC BLOOD PRESSURE: 64 MMHG | SYSTOLIC BLOOD PRESSURE: 110 MMHG | OXYGEN SATURATION: 97 % | BODY MASS INDEX: 28.17 KG/M2

## 2025-04-11 DIAGNOSIS — E78.2 MIXED HYPERLIPIDEMIA: Chronic | ICD-10-CM

## 2025-04-11 DIAGNOSIS — I10 ESSENTIAL HYPERTENSION: Primary | ICD-10-CM

## 2025-04-11 DIAGNOSIS — R42 LIGHTHEADED: ICD-10-CM

## 2025-04-11 PROCEDURE — 3078F DIAST BP <80 MM HG: CPT | Mod: CPTII,S$GLB,, | Performed by: NURSE PRACTITIONER

## 2025-04-11 PROCEDURE — 1160F RVW MEDS BY RX/DR IN RCRD: CPT | Mod: CPTII,S$GLB,, | Performed by: NURSE PRACTITIONER

## 2025-04-11 PROCEDURE — 3074F SYST BP LT 130 MM HG: CPT | Mod: CPTII,S$GLB,, | Performed by: NURSE PRACTITIONER

## 2025-04-11 PROCEDURE — 1101F PT FALLS ASSESS-DOCD LE1/YR: CPT | Mod: CPTII,S$GLB,, | Performed by: NURSE PRACTITIONER

## 2025-04-11 PROCEDURE — 99214 OFFICE O/P EST MOD 30 MIN: CPT | Mod: S$GLB,,, | Performed by: NURSE PRACTITIONER

## 2025-04-11 PROCEDURE — G2211 COMPLEX E/M VISIT ADD ON: HCPCS | Mod: S$GLB,,, | Performed by: NURSE PRACTITIONER

## 2025-04-11 PROCEDURE — 3288F FALL RISK ASSESSMENT DOCD: CPT | Mod: CPTII,S$GLB,, | Performed by: NURSE PRACTITIONER

## 2025-04-11 PROCEDURE — 1125F AMNT PAIN NOTED PAIN PRSNT: CPT | Mod: CPTII,S$GLB,, | Performed by: NURSE PRACTITIONER

## 2025-04-11 PROCEDURE — 1159F MED LIST DOCD IN RCRD: CPT | Mod: CPTII,S$GLB,, | Performed by: NURSE PRACTITIONER

## 2025-04-11 RX ORDER — ROSUVASTATIN CALCIUM 20 MG/1
20 TABLET, COATED ORAL NIGHTLY
Qty: 90 TABLET | Refills: 3 | Status: SHIPPED | OUTPATIENT
Start: 2025-04-11 | End: 2026-04-11

## 2025-04-11 NOTE — PROGRESS NOTES
Patient ID: Yael Claire is a 84 y.o. female.    Chief Complaint:Follow up chronic medical  Last seen in primary care by me on 03/10/2025  History of Present Illness    HPI:  Patient presents for a follow-up visit to discuss recent test results and ongoing health management. Patient reports almost daily lightheadedness, which she associates with her medication, and occasional leg pain. She recently underwent a mammogram, during which she had discomfort due to pressure on a pre-existing scar. She expresses concern about the scar's sensitivity and mentions being cautious when washing the area near her pacemaker. She reports having recently consumed a banana, a slice of pizza, and a cup of coffee prior to the visit.    She denies chest pain and shortness of breath. She denies having diabetes or pre-diabetes.    MEDICATIONS:  Patient is on Coreg for blood pressure management and Spironolactone (Aldactone) with a dosage change to 25 mg, reduced to 1 tablet. She is also taking Hydrocodone for pain, Crestor, and Rapatha, which is mailed to her house.    MEDICAL HISTORY:  Patient has a history of aortic valve stenosis.    SURGICAL HISTORY:  She has undergone an aortic valve replacement for aortic valve stenosis, though the date is not specified. Patient has had a pacemaker implantation, with the date also not specified.    TEST RESULTS:  Comprehensive labs was completed, with results showing normal liver and kidney function. A blood sugar test was also performed, indicating no diabetes or pre-diabetes.         Physical Exam    Vitals: Blood pressure: 110/64.  General: No acute distress. Well-developed. Well-nourished.  Eyes: EOMI. Sclerae anicteric.  HENT: Normocephalic. Atraumatic. Nares patent. Moist oral mucosa.  Ears: Bilateral TMs clear. Bilateral EACs clear.  Cardiovascular: Regular rate. Regular rhythm. Murmur present. No rubs. No gallops. Normal S1, S2. Murmur appreciated.  Respiratory: Normal respiratory effort.  Clear to auscultation bilaterally. No rales. No rhonchi. No wheezing.  Abdomen: Soft. Non-tender. Non-distended. Normoactive bowel sounds.  Musculoskeletal: No  obvious deformity. Able to squeeze hand tightly.  Extremities: No lower extremity edema.  Neurological: Alert & oriented x3. No slurred speech. Normal gait.  Psychiatric: Normal mood. Normal affect. Good insight. Good judgment.  Skin: Warm. Dry. No rash. Nail beds on hand look good.  IMAGING:  Patient underwent a mammogram on 4/2. The results showed no masses, dense breast tissue, no microcalcifications, no skin thickening, and no nipple retraction. Her risk factor was assessed as 1/4 (very low). The recommendation is to continue regular screenings.         Assessment & Plan    I35.0 Nonrheumatic aortic (valve) stenosis  Z95.2 Presence of prosthetic heart valve  Z95.0 Presence of cardiac pacemaker  I10 Essential (primary) hypertension  G89.29 Other chronic pain  E78.5 Hyperlipidemia, unspecified  R01.1 Cardiac murmur, unspecified  R92.341 Mammographic extreme density, right breast  R42 Dizziness and giddiness    IMPRESSION:  - /64, slightly lower than previous readings.  - Confirmed absence of diabetes or prediabetes.  - Recent mammogram results showed no concerning findings and low risk factor.  - Heart auscultation revealed persistent harsh sound consistent with known aortic valve replacement.  - Examined wrist following reported minor injury, finding good nail bed appearance and  strength.    AORTIC VALVE STENOSIS:  - Auscultated the patient's heart and noted a persistent harsh sound associated with aortic valve stenosis.  - Acknowledged the patient's new aortic valve.  - Auscultated the heart and noted a persistent harsh sound.    CARDIAC PACEMAKER:  - Addressed the patient's concern about pacemaker during mammogram.    HYPERTENSION:  - Measured the patient's blood pressure, which was 110/64, noting it is slightly lower than previous  readings.  - Explained the potential connection between lightheadedness and antihypertensive medication.  - Instructed the patient to check blood pressure in the morning, especially if experiencing lightheadedness, and to drink water and recheck if it is low.  - She is on spironolactone (Aldactone) to 25 mg.  - Continued Coreg for blood pressure management.  - Advised the patient to monitor blood pressure and maintain adequate hydration.    CHRONIC PAIN:  - Continued hydrocodone for pain management.  - Noted the patient's report of pain radiating down the leg.    HYPERLIPIDEMIA:  - Continued Crestor (rosuvastatin) for cholesterol management.  - Transferred the prescription to [facility].    MAMMOGRAM RESULTS:  - Reviewed recent mammogram results showing dense breasts but no masses or other abnormalities.  - Assessed the patient's risk factor as low.  - Recommend continuing regular mammogram screenings.    DIZZINESS AND LIGHTHEADEDNESS:  - Noted the patient's report of daily lightheadedness.  - Explained the potential connection between lightheadedness and antihypertensive medication.  - Instructed the patient to check blood pressure in the morning, especially if experiencing lightheadedness, and to drink water and recheck if it is low.  - Recommend increased hydration to manage lightheadedness.  Get up slowly from seated position    GENERAL HEALTH RECOMMENDATIONS:  - Educated on importance of monitoring sugar intake, particularly in processed snacks.  - Patient to remain as active as possible, including walking and doing yard work.  - Recommend increasing water intake, particularly on warmer days.    FOLLOW-UP AND TESTS:  - Ordered CBC (Complete Blood Count) with upcoming labs.  - Follow up in 3 months.       BP Readings from Last 3 Encounters:   04/11/25 110/64   03/10/25 102/60   02/18/25 120/72       Discussed results of labs dated  03/10/2025  Discussed results of mammogram form [facility] on  04/02/2025    Visit today included increased complexity associated with the care of the episodic problems: coronary disease, HTN, chroinc diastolic heart failure,  which were addressed. This correlates with managing longitudinal care of the patient due to the serious and/or complex managed problem(s).  Spent 30 minutes with this patient    Medication List with Changes/Refills   Current Medications    ALUMINUM & MAGNESIUM HYDROXIDE-SIMETHICONE (MYLANTA MAXIMUM STRENGTH) 400-400-40 MG/5 ML SUSPENSION    Take 10 mLs by mouth every 6 (six) hours as needed for Indigestion.    ASPIRIN (ECOTRIN) 81 MG EC TABLET    Take 81 mg by mouth once daily.    CARVEDILOL (COREG) 6.25 MG TABLET    Take 1 tablet (6.25 mg total) by mouth 2 (two) times daily with meals.    CO-ENZYME Q-10 30 MG CAPSULE    Take 30 mg by mouth once daily.     DAPAGLIFLOZIN PROPANEDIOL (FARXIGA) 10 MG TABLET    Take 1 tablet (10 mg total) by mouth once daily.    DICLOFENAC SODIUM (VOLTAREN) 1 % GEL    Apply 2 g topically 4 (four) times daily.    EVOLOCUMAB (REPATHA SURECLICK) 140 MG/ML PNIJ    Inject 1 mL (140 mg total) into the skin every 14 (fourteen) days.    FAMOTIDINE (PEPCID) 20 MG TABLET    Take 1 tablet (20 mg total) by mouth 2 (two) times daily.    FISH OIL-OMEGA-3 FATTY ACIDS 300-1,000 MG CAPSULE    Take 2 g by mouth once daily.    FOLIC ACID/MULTIVIT-MIN/LUTEIN (CENTRUM SILVER ORAL)    Take 1 tablet by mouth once daily. ONE DAILY    FUROSEMIDE (LASIX) 20 MG TABLET    Take 0.5 tablets (10 mg total) by mouth once daily.    HYDROCODONE-ACETAMINOPHEN (NORCO)  MG PER TABLET    Take 1 tablet by mouth 2 (two) times daily as needed for Pain.    NITROGLYCERIN (NITROSTAT) 0.4 MG SL TABLET    Place 1 tablet (0.4 mg total) under the tongue every 5 (five) minutes as needed for Chest pain.    POTASSIUM CHLORIDE SA (K-DUR,KLOR-CON M) 10 MEQ TABLET    Take 1 tablet (10 mEq total) by mouth once daily.    SPIRONOLACTONE (ALDACTONE) 25 MG TABLET    Take 0.5  tablets (12.5 mg total) by mouth once daily.   Changed and/or Refilled Medications    Modified Medication Previous Medication    ROSUVASTATIN (CRESTOR) 20 MG TABLET rosuvastatin (CRESTOR) 20 MG tablet       Take 1 tablet (20 mg total) by mouth every evening.    Take 1 tablet (20 mg total) by mouth every evening.        Follow up in about 3 months (around 7/11/2025).    This note was generated with the assistance of ambient listening technology. Verbal consent was obtained by the patient and accompanying visitor(s) for the recording of patient appointment to facilitate this note. I attest to having reviewed and edited the generated note for accuracy, though some syntax or spelling errors may persist. Please contact the author of this note for any clarification.

## 2025-05-01 ENCOUNTER — HOSPITAL ENCOUNTER (OUTPATIENT)
Dept: CARDIOLOGY | Facility: HOSPITAL | Age: 85
Discharge: HOME | End: 2025-05-01
Attending: INTERNAL MEDICINE
Payer: MEDICARE

## 2025-05-01 ENCOUNTER — CLINICAL SUPPORT (OUTPATIENT)
Dept: CARDIOLOGY | Facility: HOSPITAL | Age: 85
End: 2025-05-01
Payer: MEDICARE

## 2025-05-01 DIAGNOSIS — Z95.0 PRESENCE OF CARDIAC PACEMAKER: ICD-10-CM

## 2025-05-01 PROCEDURE — 93294 REM INTERROG EVL PM/LDLS PM: CPT | Mod: ,,, | Performed by: INTERNAL MEDICINE

## 2025-05-01 PROCEDURE — 93296 REM INTERROG EVL PM/IDS: CPT | Mod: PO | Performed by: INTERNAL MEDICINE

## 2025-05-12 LAB
OHS CV AF BURDEN PERCENT: < 1
OHS CV DC REMOTE DEVICE TYPE: NORMAL
OHS CV ICD SHOCK: NO
OHS CV RV PACING PERCENT: 8.6 %

## 2025-05-16 ENCOUNTER — LAB VISIT (OUTPATIENT)
Dept: PRIMARY CARE CLINIC | Facility: CLINIC | Age: 85
End: 2025-05-16
Payer: MEDICARE

## 2025-05-16 DIAGNOSIS — I25.708 CORONARY ARTERY DISEASE OF BYPASS GRAFT OF NATIVE HEART WITH STABLE ANGINA PECTORIS: Chronic | ICD-10-CM

## 2025-05-16 DIAGNOSIS — I10 ESSENTIAL HYPERTENSION: ICD-10-CM

## 2025-05-16 DIAGNOSIS — I50.32 CHRONIC DIASTOLIC HEART FAILURE: Chronic | ICD-10-CM

## 2025-05-16 LAB
ALBUMIN SERPL BCP-MCNC: 3.2 G/DL (ref 3.5–5.2)
ALP SERPL-CCNC: 104 UNIT/L (ref 40–150)
ALT SERPL W/O P-5'-P-CCNC: 16 UNIT/L (ref 10–44)
ANION GAP (OHS): 8 MMOL/L (ref 8–16)
AST SERPL-CCNC: 26 UNIT/L (ref 11–45)
BILIRUB SERPL-MCNC: 0.6 MG/DL (ref 0.1–1)
BNP SERPL-MCNC: 53 PG/ML (ref 0–99)
BUN SERPL-MCNC: 14 MG/DL (ref 8–23)
CALCIUM SERPL-MCNC: 8.5 MG/DL (ref 8.7–10.5)
CHLORIDE SERPL-SCNC: 104 MMOL/L (ref 95–110)
CO2 SERPL-SCNC: 25 MMOL/L (ref 23–29)
CREAT SERPL-MCNC: 0.7 MG/DL (ref 0.5–1.4)
GFR SERPLBLD CREATININE-BSD FMLA CKD-EPI: >60 ML/MIN/1.73/M2
GLUCOSE SERPL-MCNC: 117 MG/DL (ref 70–110)
MAGNESIUM SERPL-MCNC: 2.1 MG/DL (ref 1.6–2.6)
POTASSIUM SERPL-SCNC: 4.2 MMOL/L (ref 3.5–5.1)
PROT SERPL-MCNC: 6.8 GM/DL (ref 6–8.4)
SODIUM SERPL-SCNC: 137 MMOL/L (ref 136–145)

## 2025-05-16 PROCEDURE — 80053 COMPREHEN METABOLIC PANEL: CPT | Performed by: INTERNAL MEDICINE

## 2025-05-16 PROCEDURE — 83880 ASSAY OF NATRIURETIC PEPTIDE: CPT | Performed by: INTERNAL MEDICINE

## 2025-05-16 PROCEDURE — 83735 ASSAY OF MAGNESIUM: CPT | Performed by: INTERNAL MEDICINE

## 2025-05-21 ENCOUNTER — OFFICE VISIT (OUTPATIENT)
Dept: CARDIOLOGY | Facility: CLINIC | Age: 85
End: 2025-05-21
Payer: MEDICARE

## 2025-05-21 VITALS
DIASTOLIC BLOOD PRESSURE: 60 MMHG | SYSTOLIC BLOOD PRESSURE: 125 MMHG | HEIGHT: 65 IN | BODY MASS INDEX: 27.66 KG/M2 | HEART RATE: 68 BPM | WEIGHT: 166 LBS

## 2025-05-21 DIAGNOSIS — R07.9 CHEST PAIN, UNSPECIFIED TYPE: ICD-10-CM

## 2025-05-21 DIAGNOSIS — Z95.3 S/P TAVR (TRANSCATHETER AORTIC VALVE REPLACEMENT): ICD-10-CM

## 2025-05-21 DIAGNOSIS — I08.0 MITRAL AND AORTIC VALVE DISEASE: Chronic | ICD-10-CM

## 2025-05-21 DIAGNOSIS — E78.2 MIXED HYPERLIPIDEMIA: Chronic | ICD-10-CM

## 2025-05-21 DIAGNOSIS — I50.32 CHRONIC DIASTOLIC HEART FAILURE: Chronic | ICD-10-CM

## 2025-05-21 DIAGNOSIS — I10 ESSENTIAL HYPERTENSION: Chronic | ICD-10-CM

## 2025-05-21 DIAGNOSIS — I25.708 CORONARY ARTERY DISEASE OF BYPASS GRAFT OF NATIVE HEART WITH STABLE ANGINA PECTORIS: Primary | Chronic | ICD-10-CM

## 2025-05-21 PROCEDURE — 99214 OFFICE O/P EST MOD 30 MIN: CPT | Mod: S$GLB,,, | Performed by: INTERNAL MEDICINE

## 2025-05-21 PROCEDURE — 1126F AMNT PAIN NOTED NONE PRSNT: CPT | Mod: CPTII,S$GLB,, | Performed by: INTERNAL MEDICINE

## 2025-05-21 PROCEDURE — 3074F SYST BP LT 130 MM HG: CPT | Mod: CPTII,S$GLB,, | Performed by: INTERNAL MEDICINE

## 2025-05-21 PROCEDURE — 1159F MED LIST DOCD IN RCRD: CPT | Mod: CPTII,S$GLB,, | Performed by: INTERNAL MEDICINE

## 2025-05-21 PROCEDURE — 3078F DIAST BP <80 MM HG: CPT | Mod: CPTII,S$GLB,, | Performed by: INTERNAL MEDICINE

## 2025-05-21 PROCEDURE — 3288F FALL RISK ASSESSMENT DOCD: CPT | Mod: CPTII,S$GLB,, | Performed by: INTERNAL MEDICINE

## 2025-05-21 PROCEDURE — 1101F PT FALLS ASSESS-DOCD LE1/YR: CPT | Mod: CPTII,S$GLB,, | Performed by: INTERNAL MEDICINE

## 2025-05-21 RX ORDER — NITROGLYCERIN 0.4 MG/1
0.4 TABLET SUBLINGUAL EVERY 5 MIN PRN
Qty: 25 TABLET | Refills: 0 | Status: SHIPPED | OUTPATIENT
Start: 2025-05-21

## 2025-05-21 NOTE — PROGRESS NOTES
Subjective:    Patient ID:  Yael Claire is a 84 y.o. female who presents for Follow-up and Presence of cardiac pacemaker        HPI  DISCUSSED LABS AND GOALS CMP OK BNP 53, MAGNESIUM 2.1, DOING OK, SOME LIGHTHEADEDNESS WITH COREG, SEE ROS    Past Medical History:   Diagnosis Date    Acid reflux     Acute coronary syndrome     LIGHT 2004    Acute on chronic combined systolic and diastolic heart failure 02/2023    Anticoagulant long-term use     Aortic valve disorder     nonrheumatic    Arthritis     CHF (congestive heart failure)     Coronary artery disease     cabg & valve sgy    H/O: pneumonia     Heart murmur     Hyperlipidemia     Hypertension     Lower leg edema     Mild carotid artery disease 11/17/2021    Palpitations     Stenosis of aortic and mitral valves     Valvular regurgitation      Past Surgical History:   Procedure Laterality Date    A-V CARDIAC PACEMAKER INSERTION N/A 03/29/2023    Procedure: Dual PPM (Rodriguez);  Surgeon: Too Farrell MD;  Location: [facility]ATH;  Service: Cardiology;  Laterality: N/A;    ARTERIOGRAPHY OF AORTIC ROOT N/A 02/23/2023    Procedure: ARTERIOGRAM, AORTIC ROOT;  Surgeon: Eb Ruelas MD;  Location: [facility]ATH;  Service: Cardiology;  Laterality: N/A;    CARDIAC CATHETERIZATION      CARDIAC VALVE SURGERY      CATARACT EXTRACTION W/  INTRAOCULAR LENS IMPLANT Bilateral     COLONOSCOPY  ~2019    normal findings per patient report    CORONARY ANGIOGRAPHY INCLUDING BYPASS GRAFTS WITH CATHETERIZATION OF LEFT HEART N/A 02/23/2023    Procedure: Right/Left heart Cath w/Grafts;  Surgeon: Eb Ruelas MD;  Location: [facility];  Service: Cardiology;  Laterality: N/A;    CORONARY ARTERY BYPASS GRAFT  04/2005    HYSTERECTOMY  1980    INSERTION, PACEMAKER, TEMPORARY TRANSVENOUS  4/26/2023    Procedure: Insertion, Pacemaker, Temporary Transvenous;  Surgeon: Rayn Castro MD;  Location: [facility];  Service: Cardiology;;    PERCUTANEOUS TRANSLUMINAL ANGIOPLASTY N/A 4/26/2023     Procedure: PTA (ANGIOPLASTY, PERCUTANEOUS, TRANSLUMINAL);  Surgeon: Ryan Castro MD;  Location: [facility];  Service: Cardiology;  Laterality: N/A;    RIGHT HEART CATHETERIZATION N/A 02/23/2023    Procedure: INSERTION, CATHETER, RIGHT HEART;  Surgeon: Eb Ruelas MD;  Location: [facility];  Service: Cardiology;  Laterality: N/A;    TRANSCATHETER AORTIC VALVE REPLACEMENT (TAVR) Bilateral 4/26/2023    Procedure: (TAVR);  Surgeon: Ryan Castro MD;  Location: [facility];  Service: Cardiology;  Laterality: Bilateral;    TRANSCATHETER AORTIC VALVE REPLACEMENT (TAVR) Bilateral 4/26/2023    Procedure: REPLACEMENT, AORTIC VALVE, TRANSCATHETER (TAVR);  Surgeon: Jarett Mckee MD;  Location: [facility];  Service: Cardiology;  Laterality: Bilateral;    UPPER GASTROINTESTINAL ENDOSCOPY  ~2010    normal findings     Family History   Problem Relation Name Age of Onset    Hypertension Mother      Hypertension Father      Heart disease Father      Colon cancer Neg Hx      Esophageal cancer Neg Hx      Stomach cancer Neg Hx       Social History     Socioeconomic History    Marital status: Single   Tobacco Use    Smoking status: Never    Smokeless tobacco: Never   Substance and Sexual Activity    Alcohol use: Not Currently     Comment: couple times per yr    Drug use: No     Social Drivers of Health     Financial Resource Strain: Low Risk  (2/18/2025)    Overall Financial Resource Strain (CARDIA)     Difficulty of Paying Living Expenses: Not hard at all   Food Insecurity: No Food Insecurity (2/18/2025)    Hunger Vital Sign     Worried About Running Out of Food in the Last Year: Never true     Ran Out of Food in the Last Year: Never true   Transportation Needs: No Transportation Needs (2/18/2025)    PRAPARE - Transportation     Lack of Transportation (Medical): No     Lack of Transportation (Non-Medical): No   Physical Activity: Insufficiently Active (2/18/2025)    Exercise Vital Sign     Days of Exercise per Week: 3 days      "Minutes of Exercise per Session: 20 min   Stress: No Stress Concern Present (2/18/2025)    [employer] - Occupational Stress Questionnaire     Feeling of Stress : Only a little   Housing Stability: Low Risk  (2/18/2025)    Housing Stability Vital Sign     Unable to Pay for Housing in the Last Year: No     Homeless in the Last Year: No       Review of patient's allergies indicates:   Allergen Reactions    Pcn [penicillins] Other (See Comments)     SOB    Clindamycin Diarrhea    Darvocet a500 [propoxyphene n-acetaminophen]     Erythromycin Other (See Comments)    Mycinette [cetylpyridinium-benzocaine]      Current Medications[1]    Review of Systems   Constitutional: Negative for chills, decreased appetite, diaphoresis, fever and malaise/fatigue.   HENT:  Negative for congestion and nosebleeds.    Eyes:  Negative for blurred vision.   Cardiovascular:  Negative for chest pain, claudication, cyanosis, dyspnea on exertion (MINIMAL), irregular heartbeat, leg swelling, near-syncope, orthopnea, palpitations and paroxysmal nocturnal dyspnea.   Respiratory:  Negative for cough, hemoptysis, shortness of breath and wheezing.    Endocrine: Negative for polyphagia and polyuria.   Skin: Negative.  Negative for nail changes and rash.   Musculoskeletal:  Positive for arthritis. Negative for back pain and falls.   Gastrointestinal:  Negative for abdominal pain, change in bowel habit, dysphagia, melena, nausea and vomiting.   Genitourinary:  Negative for dysuria and flank pain.   Neurological:  Negative for brief paralysis, dizziness, focal weakness, light-headedness, numbness and weakness.   Psychiatric/Behavioral:  Negative for altered mental status and depression.    Allergic/Immunologic: Negative for hives and persistent infections.        Objective:      Vitals:    05/21/25 1055   BP: 125/60   Pulse: 68   Weight: 75.3 kg (166 lb 0.1 oz)   Height: 5' 5" (1.651 m)   PainSc: 0-No pain     Body mass index " is 27.62 kg/m².    Physical Exam  Constitutional:       General: She is not in acute distress.     Appearance: Normal appearance. She is overweight.   HENT:      Head: Normocephalic and atraumatic.   Eyes:      General: No scleral icterus.     Extraocular Movements: Extraocular movements intact.   Neck:      Vascular: No carotid bruit or JVD.   Cardiovascular:      Rate and Rhythm: Normal rate and regular rhythm. No extrasystoles are present.     Pulses:           Carotid pulses are 2+ on the right side and 2+ on the left side.       Radial pulses are 2+ on the right side and 2+ on the left side.        Posterior tibial pulses are 2+ on the right side and 2+ on the left side.      Heart sounds: Murmur heard.      Systolic murmur is present with a grade of 1/6 at the upper right sternal border.      No friction rub. No gallop.   Pulmonary:      Effort: Pulmonary effort is normal.      Breath sounds: Normal breath sounds and air entry. No rales.   Chest:       Abdominal:      Palpations: Abdomen is soft.      Tenderness: There is no abdominal tenderness.   Musculoskeletal:      Cervical back: Neck supple.      Right lower leg: No edema.      Left lower leg: No edema.      Comments: USES A CANE   Skin:     General: Skin is warm and dry.      Capillary Refill: Capillary refill takes less than 2 seconds.   Neurological:      General: No focal deficit present.      Mental Status: She is alert and oriented to person, place, and time.      Cranial Nerves: No cranial nerve deficit.   Psychiatric:         Mood and Affect: Mood normal.         Speech: Speech normal.         Behavior: Behavior normal.               ..    Chemistry        Component Value Date/Time     05/16/2025 0943     11/14/2024 0850     11/14/2024 0850    K 4.2 05/16/2025 0943    K 4.2 11/14/2024 0850    K 4.2 11/14/2024 0850     05/16/2025 0943     11/14/2024 0850     11/14/2024 0850    CO2 25 05/16/2025 0943    CO2 27  11/14/2024 0850    CO2 27 11/14/2024 0850    BUN 14 05/16/2025 0943    CREATININE 0.7 05/16/2025 0943     (H) 05/16/2025 0943    GLU 77 11/14/2024 0850    GLU 77 11/14/2024 0850        Component Value Date/Time    CALCIUM 8.5 (L) 05/16/2025 0943    CALCIUM 8.7 11/14/2024 0850    CALCIUM 8.7 11/14/2024 0850    ALKPHOS 104 05/16/2025 0943    ALKPHOS 131 11/14/2024 0850    ALKPHOS 131 11/14/2024 0850    AST 26 05/16/2025 0943    AST 34 11/14/2024 0850    AST 34 11/14/2024 0850    ALT 16 05/16/2025 0943    ALT 25 11/14/2024 0850    ALT 25 11/14/2024 0850    BILITOT 0.6 05/16/2025 0943    BILITOT 0.5 11/14/2024 0850    BILITOT 0.5 11/14/2024 0850    ESTGFRAFRICA 74 03/22/2023 0812    ESTGFRAFRICA >60 04/06/2022 2216    EGFRNONAA >60 04/06/2022 2216            ..  Lab Results   Component Value Date    CHOL 177 11/14/2024    CHOL 192 04/18/2024    CHOL 99 (L) 01/10/2023     Lab Results   Component Value Date    HDL 47 11/14/2024    HDL 52 04/18/2024    HDL 37 (L) 01/10/2023     Lab Results   Component Value Date    LDLCALC 110.4 11/14/2024    LDLCALC 123.0 04/18/2024    LDLCALC 50.4 (L) 01/10/2023     Lab Results   Component Value Date    TRIG 98 11/14/2024    TRIG 85 04/18/2024    TRIG 58 01/10/2023     Lab Results   Component Value Date    CHOLHDL 26.6 11/14/2024    CHOLHDL 27.1 04/18/2024    CHOLHDL 37.4 01/10/2023     ..  Lab Results   Component Value Date    WBC 8.10 11/14/2024    HGB 12.6 11/14/2024    HCT 38.7 11/14/2024    MCV 99 (H) 11/14/2024     11/14/2024       Test(s) Reviewed  I have reviewed the following in detail:  [] Stress test   [] Angiography   [] Echocardiogram   [x] Labs   [] Other:       Assessment:         ICD-10-CM ICD-9-CM   1. Coronary artery disease of bypass graft of native heart with stable angina pectoris  I25.708 414.05     413.9   2. Chronic diastolic heart failure  I50.32 428.32   3. Mitral and aortic valve disease  I08.0 396.9   4. S/P TAVR (transcatheter aortic valve  replacement)  Z95.3 V43.3   5. Essential hypertension  I10 401.9   6. Mixed hyperlipidemia  E78.2 272.2   7. Chest pain, unspecified type  R07.9 786.50     Problem List Items Addressed This Visit          Cardiac/Vascular    Essential hypertension    Relevant Orders    Comprehensive Metabolic Panel    Coronary artery disease of bypass graft of native heart with stable angina pectoris - Primary    Relevant Orders    Echo    Comprehensive Metabolic Panel    Mixed hyperlipidemia    Relevant Orders    Comprehensive Metabolic Panel    S/P TAVR (transcatheter aortic valve replacement)    Relevant Orders    Echo    Mitral and aortic valve disease    Relevant Orders    Echo    Chronic diastolic heart failure    Relevant Orders    Comprehensive Metabolic Panel    Magnesium     Other Visit Diagnoses         Chest pain, unspecified type        Relevant Medications    nitroGLYCERIN (NITROSTAT) 0.4 MG SL tablet             Plan:     TAKE COREG WITH FOOD, RETURN TO CLINIC IN 6 MO WITH LABS AND ECHO FOLLOW-UP TAVR ETCETERA, DAILY WEIGHT 2 LB PER DAY 5 LB PER WEEK RULE    ALL CV CLINICALLY STABLE, NO ANGINA, NO OVERT HF NOW CLASS 1-2, NO TIA, NO CLINICAL ARRHYTHMIA,CONTINUE CURRENT MEDS, EDUCATION, DIET, EXERCISE         Coronary artery disease of bypass graft of native heart with stable angina pectoris  -     Echo; Future; Expected date: 11/21/2025  -     Comprehensive Metabolic Panel; Future; Expected date: 11/21/2025    Chronic diastolic heart failure  -     Comprehensive Metabolic Panel; Future; Expected date: 11/21/2025  -     Magnesium; Future; Expected date: 11/21/2025    Mitral and aortic valve disease  -     Echo; Future; Expected date: 11/21/2025    S/P TAVR (transcatheter aortic valve replacement)  -     Echo; Future; Expected date: 11/21/2025    Essential hypertension  Comments:  CONTROLLED  Orders:  -     Comprehensive Metabolic Panel; Future; Expected date: 11/21/2025    Mixed hyperlipidemia  -     Comprehensive  Metabolic Panel; Future; Expected date: 11/21/2025    Chest pain, unspecified type  -     nitroGLYCERIN (NITROSTAT) 0.4 MG SL tablet; Place 1 tablet (0.4 mg total) under the tongue every 5 (five) minutes as needed for Chest pain.  Dispense: 25 tablet; Refill: 0    RTC Low level/low impact aerobic exercise 5x's/wk. Heart healthy diet and risk factor modification.    See labs and med orders.    Aerobic exercise 5x's/wk. Heart healthy diet and risk factor modification.    See labs and med orders.             [1]   Current Outpatient Medications:     aspirin (ECOTRIN) 81 MG EC tablet, Take 81 mg by mouth once daily., Disp: , Rfl:     carvediloL (COREG) 6.25 MG tablet, Take 1 tablet (6.25 mg total) by mouth 2 (two) times daily with meals., Disp: 180 tablet, Rfl: 3    co-enzyme Q-10 30 mg capsule, Take 30 mg by mouth once daily. , Disp: , Rfl:     dapagliflozin propanediol (FARXIGA) 10 mg tablet, Take 1 tablet (10 mg total) by mouth once daily., Disp: 90 tablet, Rfl: 1    diclofenac sodium (VOLTAREN) 1 % Gel, Apply 2 g topically 4 (four) times daily., Disp: 100 g, Rfl: 3    evolocumab (REPATHA SURECLICK) 140 mg/mL PnIj, Inject 1 mL (140 mg total) into the skin every 14 (fourteen) days., Disp: 2 mL, Rfl: 6    famotidine (PEPCID) 20 MG tablet, Take 1 tablet (20 mg total) by mouth 2 (two) times daily., Disp: 180 tablet, Rfl: 3    fish oil-omega-3 fatty acids 300-1,000 mg capsule, Take 2 g by mouth once daily., Disp: , Rfl:     FOLIC ACID/MULTIVIT-MIN/LUTEIN (CENTRUM SILVER ORAL), Take 1 tablet by mouth once daily. ONE DAILY, Disp: , Rfl:     furosemide (LASIX) 20 MG tablet, Take 0.5 tablets (10 mg total) by mouth once daily., Disp: 45 tablet, Rfl: 0    HYDROcodone-acetaminophen (NORCO)  mg per tablet, Take 1 tablet by mouth 2 (two) times daily as needed for Pain., Disp: , Rfl: 0    potassium chloride SA (K-DUR,KLOR-CON M) 10 MEQ tablet, Take 1 tablet (10 mEq total) by mouth once daily., Disp: 90 tablet, Rfl: 1     rosuvastatin (CRESTOR) 20 MG tablet, Take 1 tablet (20 mg total) by mouth every evening., Disp: 90 tablet, Rfl: 3    spironolactone (ALDACTONE) 25 MG tablet, Take 0.5 tablets (12.5 mg total) by mouth once daily., Disp: , Rfl:     aluminum & magnesium hydroxide-simethicone (MYLANTA MAXIMUM STRENGTH) 400-400-40 mg/5 mL suspension, Take 10 mLs by mouth every 6 (six) hours as needed for Indigestion., Disp: 3840 mL, Rfl: 0    nitroGLYCERIN (NITROSTAT) 0.4 MG SL tablet, Place 1 tablet (0.4 mg total) under the tongue every 5 (five) minutes as needed for Chest pain., Disp: 25 tablet, Rfl: 0  No current facility-administered medications for this visit.    Facility-Administered Medications Ordered in Other Visits:     chlorhexidine 0.12 % solution 15 mL, 15 mL, Mouth/Throat, Once, Too Farrell MD    mupirocin 2 % ointment, , Nasal, Once, Too Farrell MD

## 2025-05-22 DIAGNOSIS — I50.32 CHRONIC DIASTOLIC HEART FAILURE: ICD-10-CM

## 2025-05-22 DIAGNOSIS — R06.02 SOB (SHORTNESS OF BREATH): ICD-10-CM

## 2025-05-26 RX ORDER — FUROSEMIDE 20 MG/1
10 TABLET ORAL
Qty: 45 TABLET | Refills: 0 | Status: SHIPPED | OUTPATIENT
Start: 2025-05-26

## 2025-06-30 ENCOUNTER — OFFICE VISIT (OUTPATIENT)
Dept: PRIMARY CARE CLINIC | Facility: CLINIC | Age: 85
End: 2025-06-30
Payer: MEDICARE

## 2025-06-30 VITALS
HEART RATE: 66 BPM | SYSTOLIC BLOOD PRESSURE: 118 MMHG | OXYGEN SATURATION: 98 % | RESPIRATION RATE: 17 BRPM | DIASTOLIC BLOOD PRESSURE: 72 MMHG | HEIGHT: 65 IN | BODY MASS INDEX: 27.1 KG/M2 | WEIGHT: 162.69 LBS

## 2025-06-30 DIAGNOSIS — I10 ESSENTIAL HYPERTENSION: Primary | ICD-10-CM

## 2025-06-30 PROCEDURE — 1126F AMNT PAIN NOTED NONE PRSNT: CPT | Mod: CPTII,S$GLB,, | Performed by: NURSE PRACTITIONER

## 2025-06-30 PROCEDURE — 3074F SYST BP LT 130 MM HG: CPT | Mod: CPTII,S$GLB,, | Performed by: NURSE PRACTITIONER

## 2025-06-30 PROCEDURE — 3078F DIAST BP <80 MM HG: CPT | Mod: CPTII,S$GLB,, | Performed by: NURSE PRACTITIONER

## 2025-06-30 PROCEDURE — 99213 OFFICE O/P EST LOW 20 MIN: CPT | Mod: S$GLB,,, | Performed by: NURSE PRACTITIONER

## 2025-06-30 PROCEDURE — 1159F MED LIST DOCD IN RCRD: CPT | Mod: CPTII,S$GLB,, | Performed by: NURSE PRACTITIONER

## 2025-06-30 PROCEDURE — 1101F PT FALLS ASSESS-DOCD LE1/YR: CPT | Mod: CPTII,S$GLB,, | Performed by: NURSE PRACTITIONER

## 2025-06-30 PROCEDURE — 1160F RVW MEDS BY RX/DR IN RCRD: CPT | Mod: CPTII,S$GLB,, | Performed by: NURSE PRACTITIONER

## 2025-06-30 PROCEDURE — 3288F FALL RISK ASSESSMENT DOCD: CPT | Mod: CPTII,S$GLB,, | Performed by: NURSE PRACTITIONER

## 2025-06-30 NOTE — PROGRESS NOTES
"Subjective:       Patient ID: Yael Claire is a 84 y.o. female.    Chief Complaint: Hypertension  Last seen in primary care by me on 04/11/2025.  HPI  States BLOOD PRESSURE readings at home running high on her machine  States systolic in 140", diastolics in high 80's  States taking 2 carvedilol this morning to help decrease her BLOOD PRESSURE  She is taking Lasix every day  Takes aldactone 0.5 of that tablet daily   She denies any OTC meds except pepto bismuth 1-2 times weekly  Vitals:    06/30/25 1202   BP: 118/72   Pulse:    Resp:      Review of Systems   Respiratory:  Negative for shortness of breath.    Cardiovascular:  Negative for chest pain and leg swelling.       Objective:      Physical Exam  Vitals and nursing note reviewed.   Constitutional:       General: She is awake.      Appearance: Normal appearance. She is well-developed.   HENT:      Head: Normocephalic and atraumatic.   Eyes:      General: Lids are normal.   Cardiovascular:      Rate and Rhythm: Normal rate and regular rhythm.      Heart sounds: Normal heart sounds.   Pulmonary:      Effort: Pulmonary effort is normal.      Breath sounds: Normal breath sounds and air entry.   Musculoskeletal:         General: Normal range of motion.      Cervical back: Full passive range of motion without pain and normal range of motion.      Right lower leg: No edema.      Left lower leg: No edema.   Skin:     General: Skin is warm and dry.   Neurological:      General: No focal deficit present.      Mental Status: She is alert and oriented to person, place, and time.   Psychiatric:         Attention and Perception: Attention and perception normal.         Mood and Affect: Mood and affect normal.         Speech: Speech normal.         Behavior: Behavior normal. Behavior is cooperative.         Thought Content: Thought content normal.         Cognition and Memory: Cognition and memory normal.         Judgment: Judgment normal.         Assessment & Plan:     "   Essential hypertension    Stable  Continue current medication at current dosages (Carvedilol, Lasix)     BP Readings from Last 3 Encounters:   06/30/25 118/72   05/21/25 125/60   04/11/25 110/64        Check blood pressure about 1.5-2 hours after taking medication  Try to take medication at same time each morning  Pulse Readings from Last 3 Encounters:   06/30/25 66   05/21/25 68   04/11/25 73    I have explained to her the pharmacodynamics of the medications slowly getting out of her body and thereby her BLOOD PRESSURE will go up  Discussed low pulse as a result of too much carvedilol so I do not want her over taking the dosage.  Discussed 12 hours time frame as well    Medication List with Changes/Refills   Current Medications    ALUMINUM & MAGNESIUM HYDROXIDE-SIMETHICONE (MYLANTA MAXIMUM STRENGTH) 400-400-40 MG/5 ML SUSPENSION    Take 10 mLs by mouth every 6 (six) hours as needed for Indigestion.    ASPIRIN (ECOTRIN) 81 MG EC TABLET    Take 81 mg by mouth once daily.    CARVEDILOL (COREG) 6.25 MG TABLET    Take 1 tablet (6.25 mg total) by mouth 2 (two) times daily with meals.    CO-ENZYME Q-10 30 MG CAPSULE    Take 30 mg by mouth once daily.     DAPAGLIFLOZIN PROPANEDIOL (FARXIGA) 10 MG TABLET    Take 1 tablet (10 mg total) by mouth once daily.    DICLOFENAC SODIUM (VOLTAREN) 1 % GEL    Apply 2 g topically 4 (four) times daily.    EVOLOCUMAB (REPATHA SURECLICK) 140 MG/ML PNIJ    Inject 1 mL (140 mg total) into the skin every 14 (fourteen) days.    FAMOTIDINE (PEPCID) 20 MG TABLET    Take 1 tablet (20 mg total) by mouth 2 (two) times daily.    FISH OIL-OMEGA-3 FATTY ACIDS 300-1,000 MG CAPSULE    Take 2 g by mouth once daily.    FOLIC ACID/MULTIVIT-MIN/LUTEIN (CENTRUM SILVER ORAL)    Take 1 tablet by mouth once daily. ONE DAILY    FUROSEMIDE (LASIX) 20 MG TABLET    Take 1/2 (one-half) tablet by mouth once daily    HYDROCODONE-ACETAMINOPHEN (NORCO)  MG PER TABLET    Take 1 tablet by mouth 2 (two) times daily  as needed for Pain.    NITROGLYCERIN (NITROSTAT) 0.4 MG SL TABLET    Place 1 tablet (0.4 mg total) under the tongue every 5 (five) minutes as needed for Chest pain.    POTASSIUM CHLORIDE SA (K-DUR,KLOR-CON M) 10 MEQ TABLET    Take 1 tablet (10 mEq total) by mouth once daily.    ROSUVASTATIN (CRESTOR) 20 MG TABLET    Take 1 tablet (20 mg total) by mouth every evening.    SPIRONOLACTONE (ALDACTONE) 25 MG TABLET    Take 0.5 tablets (12.5 mg total) by mouth once daily.      No follow-ups on file.

## 2025-07-11 ENCOUNTER — OFFICE VISIT (OUTPATIENT)
Dept: PRIMARY CARE CLINIC | Facility: CLINIC | Age: 85
End: 2025-07-11
Payer: MEDICARE

## 2025-07-11 VITALS
WEIGHT: 162.5 LBS | RESPIRATION RATE: 16 BRPM | HEART RATE: 73 BPM | SYSTOLIC BLOOD PRESSURE: 130 MMHG | DIASTOLIC BLOOD PRESSURE: 82 MMHG | HEIGHT: 65 IN | BODY MASS INDEX: 27.07 KG/M2 | OXYGEN SATURATION: 99 %

## 2025-07-11 DIAGNOSIS — E78.2 MIXED HYPERLIPIDEMIA: ICD-10-CM

## 2025-07-11 DIAGNOSIS — M79.9 MUSCULOSKELETAL DISORDER: ICD-10-CM

## 2025-07-11 DIAGNOSIS — E83.51 HYPOCALCEMIA: ICD-10-CM

## 2025-07-11 DIAGNOSIS — I10 ESSENTIAL HYPERTENSION: Primary | ICD-10-CM

## 2025-07-11 PROBLEM — R60.0 LOWER LEG EDEMA: Status: RESOLVED | Noted: 2022-11-30 | Resolved: 2025-07-11

## 2025-07-11 RX ORDER — CARVEDILOL 6.25 MG/1
6.25 TABLET ORAL 2 TIMES DAILY WITH MEALS
Qty: 180 TABLET | Refills: 0 | Status: SHIPPED | OUTPATIENT
Start: 2025-07-11 | End: 2025-07-11 | Stop reason: SDUPTHER

## 2025-07-11 RX ORDER — CARVEDILOL 12.5 MG/1
6.25 TABLET ORAL 2 TIMES DAILY WITH MEALS
Qty: 180 TABLET | Refills: 0 | Status: SHIPPED | OUTPATIENT
Start: 2025-07-11 | End: 2026-07-11

## 2025-07-11 NOTE — PROGRESS NOTES
"Patient ID: Yael Claire is a 84 y.o. female.    Chief Complaint: Follow-up blood pressure  Last seen in primary care by me on 06/3/2025. She came in that date with concerns about home BLOOD PRESSURE readings.     History of Present Illness    CHIEF COMPLAINT:  Patient presents today for follow up    CARDIOVASCULAR:  She has a history of aortic valve repair. She reports home blood pressure readings of 135/82, 133/82, and 124/72. Pulse typically ranges between 60-87 BPM, with one isolated reading of 196. She takes blood pressure medication 6.25 mg twice daily and requires a refill after accidentally dropping and discarding some pills.    DIZZINESS:  She experiences morning dizziness, describing a "swirly" sensation in her head. Symptoms improve with eating a small amount and brief rest, typically resolving within one hour of onset.    MUSCULOSKELETAL:  She reports upper back and shoulder pain, which she manages with topical pain relief patches with good effect.    LABORATORY RESULTS:  Labs from March 16 showed normal magnesium at 2.1, normal liver and kidney function tests, and slightly low calcium at 8.5 (normal range 8.7).      ROS:  General: -fever, -chills, -fatigue, -weight gain, -weight loss  Eyes: -vision changes, -redness, -discharge  ENT: -ear pain, -nasal congestion, -sore throat  Cardiovascular: -chest pain, -palpitations, -lower extremity edema  Respiratory: -cough, -shortness of breath  Gastrointestinal: -abdominal pain, -nausea, -vomiting, -diarrhea, -constipation, -blood in stool  Genitourinary: -dysuria, -hematuria, -frequency  Musculoskeletal: -joint pain, +muscle pain, +pain with movement  Skin: -rash, -lesion  Neurological: -headache, +dizziness, -numbness, -tingling, +lightheadedness  Psychiatric: -anxiety, -depression, -sleep difficulty         Physical Exam    Vitals: Blood pressure readings: 135/82, 133/82, 124/72. Pulse between 60-87 with one reading of 196.  General: No acute distress. " Well-developed. Well-nourished.  Eyes: EOMI. Sclerae anicteric.  HENT: Normocephalic. Atraumatic. Nares patent. Moist oral mucosa.  Ears: Bilateral TMs clear. Bilateral EACs clear.  Cardiovascular: Regular rate. Regular rhythm. No murmurs. No rubs. No gallops. Normal S1, S2. Clicking and swishing heart sounds. Aortic valve click present post-repair.  Respiratory: Normal respiratory effort. Clear to auscultation bilaterally. No rales. No rhonchi. No wheezing.  Abdomen: Soft. Non-tender. Non-distended. Normoactive bowel sounds.  Musculoskeletal: No  obvious deformity. Tenderness to palpation of lateral ribs.  Extremities: No lower extremity edema.  Neurological: Alert & oriented x3. No slurred speech. Normal gait.  Psychiatric: Normal mood. Normal affect. Good insight. Good judgment.  Skin: Warm. Dry. No rash.         Assessment & Plan    Z95.2 Presence of prosthetic heart valve  R01.1 Cardiac murmur, unspecified  R42 Dizziness and giddiness  E83.51 Hypocalcemia  M54.9 Dorsalgia, unspecified  M25.519 Pain in unspecified shoulder      PROSTHETIC HEART VALVE AND CARDIAC MURMUR:  - Auscultated heart, noting expected loud clicking sound consistent with patient's history of aortic stenosis and valve repair.  - Emphasized the importance of maintaining heart rate above 50 bpm.  - Scheduled the patient for an echocardiogram next month to monitor heart and valve function.    DIZZINESS AND GIDDINESS:  - Assessed reported dizziness and swirling sensation, considering potential causes such as low blood sugar, dehydration, or caffeine intake without food.  - Noted that symptoms resolve after eating and resting for about 1 hour.  - Discussed risks of positional changes, particularly when getting up from hot baths or showers which may exacerbate symptoms.  - Advised patient to monitor blood pressure during episodes, eat and rest when feeling dizzy, and continue using supportive bars in bathroom for safety.    HYPOCALCEMIA:  -  Reviewed recent lab work from March 16th, noting good magnesium, liver, and renal function; calcium slightly low at 8.5 (normal 8.7).      DORSALGIA AND SHOULDER PAIN:  - Noted tenderness when pressing on ribs and pain in the shoulder during exam.  - Explained this indicates musculoskeletal pain rather than cardiac origin, possibly inflammation at the chondrocostal junction.  - Clarified that cardiac pain typically does not worsen with pressure, distinguishing it from musculoskeletal pain.  - Confirmed that patient uses patches for relief of both rib and shoulder pain with good effect.    BLOOD PRESSURE MANAGEMENT:  Controlled on Coreg, lasix  - A few evaluated home BP readings, noting generally good values (135/82, 133/82, 124/72) with one isolated elevated reading of 196.  - Instructed patient to continue monitoring BP at home and record readings.  - Continue BP medication (likely metoprolol) at 6.25 mg twice daily, or take half of a 12.5 mg tablet twice daily if pharmacy dispenses 12.5 mg tablets instead of 6.25 mg.    SUPPLEMENTS AND MEDICATION MANAGEMENT:  - Reviewed beet powder supplement, determining it is not of significant concern due to low sugar content and natural ingredients.  - Continue beet supplement, 1 scoop in 6 oz of water, up to twice daily.  - Recommend picking up dropped pills instead of discarding them to avoid waste.          Medication List with Changes/Refills   Current Medications    ALUMINUM & MAGNESIUM HYDROXIDE-SIMETHICONE (MYLANTA MAXIMUM STRENGTH) 400-400-40 MG/5 ML SUSPENSION    Take 10 mLs by mouth every 6 (six) hours as needed for Indigestion.    ASPIRIN (ECOTRIN) 81 MG EC TABLET    Take 81 mg by mouth once daily.    CO-ENZYME Q-10 30 MG CAPSULE    Take 30 mg by mouth once daily.     DAPAGLIFLOZIN PROPANEDIOL (FARXIGA) 10 MG TABLET    Take 1 tablet (10 mg total) by mouth once daily.    DICLOFENAC SODIUM (VOLTAREN) 1 % GEL    Apply 2 g topically 4 (four) times daily.    EVOLOCUMAB  (REPATHA SURECLICK) 140 MG/ML PNIJ    Inject 1 mL (140 mg total) into the skin every 14 (fourteen) days.    FAMOTIDINE (PEPCID) 20 MG TABLET    Take 1 tablet (20 mg total) by mouth 2 (two) times daily.    FISH OIL-OMEGA-3 FATTY ACIDS 300-1,000 MG CAPSULE    Take 2 g by mouth once daily.    FOLIC ACID/MULTIVIT-MIN/LUTEIN (CENTRUM SILVER ORAL)    Take 1 tablet by mouth once daily. ONE DAILY    FUROSEMIDE (LASIX) 20 MG TABLET    Take 1/2 (one-half) tablet by mouth once daily    HYDROCODONE-ACETAMINOPHEN (NORCO)  MG PER TABLET    Take 1 tablet by mouth 2 (two) times daily as needed for Pain.    NITROGLYCERIN (NITROSTAT) 0.4 MG SL TABLET    Place 1 tablet (0.4 mg total) under the tongue every 5 (five) minutes as needed for Chest pain.    POTASSIUM CHLORIDE SA (K-DUR,KLOR-CON M) 10 MEQ TABLET    Take 1 tablet (10 mEq total) by mouth once daily.    ROSUVASTATIN (CRESTOR) 20 MG TABLET    Take 1 tablet (20 mg total) by mouth every evening.    SPIRONOLACTONE (ALDACTONE) 25 MG TABLET    Take 0.5 tablets (12.5 mg total) by mouth once daily.   Changed and/or Refilled Medications    Modified Medication Previous Medication    CARVEDILOL (COREG) 12.5 MG TABLET carvediloL (COREG) 6.25 MG tablet       Take 0.5 tablets (6.25 mg total) by mouth 2 (two) times daily with meals.    Take 1 tablet (6.25 mg total) by mouth 2 (two) times daily with meals.        Follow up in about 3 months (around 10/11/2025).    This note was generated with the assistance of ambient listening technology. Verbal consent was obtained by the patient and accompanying visitor(s) for the recording of patient appointment to facilitate this note. I attest to having reviewed and edited the generated note for accuracy, though some syntax or spelling errors may persist. Please contact the author of this note for any clarification.

## 2025-07-23 DIAGNOSIS — R07.9 CHEST PAIN, UNSPECIFIED TYPE: ICD-10-CM

## 2025-07-23 RX ORDER — NITROGLYCERIN 0.4 MG/1
0.4 TABLET SUBLINGUAL EVERY 5 MIN PRN
Qty: 25 TABLET | Refills: 0 | Status: SHIPPED | OUTPATIENT
Start: 2025-07-23

## 2025-07-31 ENCOUNTER — CLINICAL SUPPORT (OUTPATIENT)
Dept: CARDIOLOGY | Facility: HOSPITAL | Age: 85
End: 2025-07-31
Payer: MEDICARE

## 2025-07-31 ENCOUNTER — HOSPITAL ENCOUNTER (OUTPATIENT)
Dept: CARDIOLOGY | Facility: HOSPITAL | Age: 85
Discharge: HOME | End: 2025-07-31
Attending: INTERNAL MEDICINE
Payer: MEDICARE

## 2025-07-31 DIAGNOSIS — Z95.0 PRESENCE OF CARDIAC PACEMAKER: ICD-10-CM

## 2025-07-31 LAB
OHS CV AF BURDEN PERCENT: < 1
OHS CV DC REMOTE DEVICE TYPE: NORMAL
OHS CV RV PACING PERCENT: 12 %

## 2025-07-31 PROCEDURE — 93296 REM INTERROG EVL PM/IDS: CPT | Mod: PO | Performed by: INTERNAL MEDICINE

## 2025-07-31 PROCEDURE — 93294 REM INTERROG EVL PM/LDLS PM: CPT | Mod: ,,, | Performed by: INTERNAL MEDICINE

## 2025-08-11 ENCOUNTER — OFFICE VISIT (OUTPATIENT)
Dept: PRIMARY CARE CLINIC | Facility: CLINIC | Age: 85
End: 2025-08-11
Payer: MEDICARE

## 2025-08-11 VITALS
HEIGHT: 65 IN | OXYGEN SATURATION: 98 % | SYSTOLIC BLOOD PRESSURE: 118 MMHG | RESPIRATION RATE: 16 BRPM | BODY MASS INDEX: 27 KG/M2 | WEIGHT: 162.06 LBS | DIASTOLIC BLOOD PRESSURE: 72 MMHG | TEMPERATURE: 98 F | HEART RATE: 70 BPM

## 2025-08-11 DIAGNOSIS — I10 ESSENTIAL HYPERTENSION: Primary | ICD-10-CM

## 2025-08-11 DIAGNOSIS — R42 DIZZINESS: ICD-10-CM

## 2025-08-11 PROCEDURE — 1160F RVW MEDS BY RX/DR IN RCRD: CPT | Mod: CPTII,S$GLB,, | Performed by: NURSE PRACTITIONER

## 2025-08-11 PROCEDURE — 1159F MED LIST DOCD IN RCRD: CPT | Mod: CPTII,S$GLB,, | Performed by: NURSE PRACTITIONER

## 2025-08-11 PROCEDURE — 1125F AMNT PAIN NOTED PAIN PRSNT: CPT | Mod: CPTII,S$GLB,, | Performed by: NURSE PRACTITIONER

## 2025-08-11 PROCEDURE — 1101F PT FALLS ASSESS-DOCD LE1/YR: CPT | Mod: CPTII,S$GLB,, | Performed by: NURSE PRACTITIONER

## 2025-08-11 PROCEDURE — 3074F SYST BP LT 130 MM HG: CPT | Mod: CPTII,S$GLB,, | Performed by: NURSE PRACTITIONER

## 2025-08-11 PROCEDURE — 99214 OFFICE O/P EST MOD 30 MIN: CPT | Mod: S$GLB,,, | Performed by: NURSE PRACTITIONER

## 2025-08-11 PROCEDURE — 3078F DIAST BP <80 MM HG: CPT | Mod: CPTII,S$GLB,, | Performed by: NURSE PRACTITIONER

## 2025-08-11 PROCEDURE — 3288F FALL RISK ASSESSMENT DOCD: CPT | Mod: CPTII,S$GLB,, | Performed by: NURSE PRACTITIONER

## 2025-08-20 ENCOUNTER — OFFICE VISIT (OUTPATIENT)
Dept: CARDIOLOGY | Facility: CLINIC | Age: 85
End: 2025-08-20
Payer: MEDICARE

## 2025-08-20 VITALS
BODY MASS INDEX: 27.07 KG/M2 | HEART RATE: 78 BPM | HEIGHT: 65 IN | SYSTOLIC BLOOD PRESSURE: 114 MMHG | DIASTOLIC BLOOD PRESSURE: 55 MMHG | WEIGHT: 162.5 LBS

## 2025-08-20 DIAGNOSIS — I25.708 CORONARY ARTERY DISEASE OF BYPASS GRAFT OF NATIVE HEART WITH STABLE ANGINA PECTORIS: Chronic | ICD-10-CM

## 2025-08-20 DIAGNOSIS — I10 ESSENTIAL HYPERTENSION: Chronic | ICD-10-CM

## 2025-08-20 DIAGNOSIS — I95.1 ORTHOSTASIS: ICD-10-CM

## 2025-08-20 DIAGNOSIS — R07.89 ATYPICAL CHEST PAIN: Primary | ICD-10-CM

## 2025-08-20 PROCEDURE — 1126F AMNT PAIN NOTED NONE PRSNT: CPT | Mod: CPTII,S$GLB,, | Performed by: INTERNAL MEDICINE

## 2025-08-20 PROCEDURE — 3288F FALL RISK ASSESSMENT DOCD: CPT | Mod: CPTII,S$GLB,, | Performed by: INTERNAL MEDICINE

## 2025-08-20 PROCEDURE — 3074F SYST BP LT 130 MM HG: CPT | Mod: CPTII,S$GLB,, | Performed by: INTERNAL MEDICINE

## 2025-08-20 PROCEDURE — 99214 OFFICE O/P EST MOD 30 MIN: CPT | Mod: S$GLB,,, | Performed by: INTERNAL MEDICINE

## 2025-08-20 PROCEDURE — 1101F PT FALLS ASSESS-DOCD LE1/YR: CPT | Mod: CPTII,S$GLB,, | Performed by: INTERNAL MEDICINE

## 2025-08-20 PROCEDURE — 3078F DIAST BP <80 MM HG: CPT | Mod: CPTII,S$GLB,, | Performed by: INTERNAL MEDICINE

## 2025-08-20 PROCEDURE — 1159F MED LIST DOCD IN RCRD: CPT | Mod: CPTII,S$GLB,, | Performed by: INTERNAL MEDICINE

## 2025-08-20 RX ORDER — CARVEDILOL 6.25 MG/1
6.25 TABLET ORAL 2 TIMES DAILY
Qty: 180 TABLET | Refills: 1 | Status: SHIPPED | OUTPATIENT
Start: 2025-08-20 | End: 2026-08-20

## 2025-08-29 ENCOUNTER — OFFICE VISIT (OUTPATIENT)
Dept: PRIMARY CARE CLINIC | Facility: CLINIC | Age: 85
End: 2025-08-29
Payer: MEDICARE

## 2025-08-29 VITALS
TEMPERATURE: 98 F | HEART RATE: 66 BPM | HEIGHT: 65 IN | RESPIRATION RATE: 17 BRPM | SYSTOLIC BLOOD PRESSURE: 118 MMHG | WEIGHT: 162.94 LBS | OXYGEN SATURATION: 99 % | BODY MASS INDEX: 27.15 KG/M2 | DIASTOLIC BLOOD PRESSURE: 70 MMHG

## 2025-08-29 DIAGNOSIS — R42 LIGHTHEADEDNESS: Primary | ICD-10-CM

## 2025-08-29 DIAGNOSIS — S99.921A INJURY OF SECOND TOE OF RIGHT FOOT: ICD-10-CM

## 2025-08-29 DIAGNOSIS — Z95.3 S/P TAVR (TRANSCATHETER AORTIC VALVE REPLACEMENT): ICD-10-CM

## 2025-08-29 DIAGNOSIS — I50.32 CHRONIC DIASTOLIC HEART FAILURE: ICD-10-CM

## 2025-08-29 DIAGNOSIS — I10 ESSENTIAL HYPERTENSION: ICD-10-CM

## 2025-08-29 DIAGNOSIS — Z79.02 LONG TERM (CURRENT) USE OF ANTITHROMBOTICS/ANTIPLATELETS: ICD-10-CM

## 2025-08-29 PROCEDURE — 1159F MED LIST DOCD IN RCRD: CPT | Mod: CPTII,S$GLB,, | Performed by: NURSE PRACTITIONER

## 2025-08-29 PROCEDURE — 3078F DIAST BP <80 MM HG: CPT | Mod: CPTII,S$GLB,, | Performed by: NURSE PRACTITIONER

## 2025-08-29 PROCEDURE — 3074F SYST BP LT 130 MM HG: CPT | Mod: CPTII,S$GLB,, | Performed by: NURSE PRACTITIONER

## 2025-08-29 PROCEDURE — 1160F RVW MEDS BY RX/DR IN RCRD: CPT | Mod: CPTII,S$GLB,, | Performed by: NURSE PRACTITIONER

## 2025-08-29 PROCEDURE — 3288F FALL RISK ASSESSMENT DOCD: CPT | Mod: CPTII,S$GLB,, | Performed by: NURSE PRACTITIONER

## 2025-08-29 PROCEDURE — 1126F AMNT PAIN NOTED NONE PRSNT: CPT | Mod: CPTII,S$GLB,, | Performed by: NURSE PRACTITIONER

## 2025-08-29 PROCEDURE — 99214 OFFICE O/P EST MOD 30 MIN: CPT | Mod: S$GLB,,, | Performed by: NURSE PRACTITIONER

## 2025-08-29 PROCEDURE — 1101F PT FALLS ASSESS-DOCD LE1/YR: CPT | Mod: CPTII,S$GLB,, | Performed by: NURSE PRACTITIONER
